# Patient Record
Sex: FEMALE | Race: WHITE | NOT HISPANIC OR LATINO | Employment: OTHER | URBAN - METROPOLITAN AREA
[De-identification: names, ages, dates, MRNs, and addresses within clinical notes are randomized per-mention and may not be internally consistent; named-entity substitution may affect disease eponyms.]

---

## 2023-04-05 ENCOUNTER — HOSPITAL ENCOUNTER (INPATIENT)
Facility: HOSPITAL | Age: 83
DRG: 57 | End: 2023-04-05
Attending: PHYSICAL MEDICINE & REHABILITATION | Admitting: PHYSICAL MEDICINE & REHABILITATION
Payer: MEDICARE

## 2023-04-05 DIAGNOSIS — Z09 FOLLOW-UP EXAM: ICD-10-CM

## 2023-04-05 DIAGNOSIS — Z74.09 IMPAIRED FUNCTIONAL MOBILITY, BALANCE, GAIT, AND ENDURANCE: Primary | ICD-10-CM

## 2023-04-05 PROBLEM — I63.9 INFARCTION OF LEFT BASAL GANGLIA: Status: ACTIVE | Noted: 2023-04-05

## 2023-04-05 RX ORDER — ACETAMINOPHEN 325 MG/1
650 TABLET ORAL EVERY 6 HOURS PRN
Status: DISCONTINUED | OUTPATIENT
Start: 2023-04-05 | End: 2023-04-29 | Stop reason: HOSPADM

## 2023-04-05 RX ORDER — POLYETHYLENE GLYCOL 3350 17 G/17G
17 POWDER, FOR SOLUTION ORAL DAILY PRN
Status: DISCONTINUED | OUTPATIENT
Start: 2023-04-05 | End: 2023-04-29 | Stop reason: HOSPADM

## 2023-04-05 RX ORDER — ACETAMINOPHEN 650 MG/1
650 SUPPOSITORY RECTAL EVERY 4 HOURS PRN
Status: DISCONTINUED | OUTPATIENT
Start: 2023-04-05 | End: 2023-04-06

## 2023-04-05 RX ORDER — ASPIRIN 81 MG/1
81 TABLET, CHEWABLE ORAL DAILY
Status: DISCONTINUED | OUTPATIENT
Start: 2023-04-06 | End: 2023-04-29 | Stop reason: HOSPADM

## 2023-04-05 RX ORDER — MELATONIN
1000 DAILY
Status: DISCONTINUED | OUTPATIENT
Start: 2023-04-06 | End: 2023-04-29 | Stop reason: HOSPADM

## 2023-04-05 RX ORDER — CLOPIDOGREL BISULFATE 75 MG/1
75 TABLET ORAL DAILY
Status: COMPLETED | OUTPATIENT
Start: 2023-04-06 | End: 2023-04-19

## 2023-04-05 RX ORDER — ASCORBIC ACID 500 MG
250 TABLET ORAL DAILY
Status: DISCONTINUED | OUTPATIENT
Start: 2023-04-06 | End: 2023-04-29 | Stop reason: HOSPADM

## 2023-04-05 RX ORDER — AMLODIPINE BESYLATE 10 MG/1
10 TABLET ORAL
Status: DISCONTINUED | OUTPATIENT
Start: 2023-04-06 | End: 2023-04-29 | Stop reason: HOSPADM

## 2023-04-05 RX ORDER — ENOXAPARIN SODIUM 100 MG/ML
40 INJECTION SUBCUTANEOUS EVERY 12 HOURS
Status: DISCONTINUED | OUTPATIENT
Start: 2023-04-06 | End: 2023-04-06

## 2023-04-05 RX ORDER — AMOXICILLIN 250 MG
2 CAPSULE ORAL 2 TIMES DAILY
Status: DISCONTINUED | OUTPATIENT
Start: 2023-04-05 | End: 2023-04-09

## 2023-04-05 RX ORDER — BISACODYL 5 MG/1
5 TABLET, DELAYED RELEASE ORAL DAILY PRN
Status: DISCONTINUED | OUTPATIENT
Start: 2023-04-05 | End: 2023-04-29 | Stop reason: HOSPADM

## 2023-04-05 RX ORDER — LISINOPRIL 5 MG/1
5 TABLET ORAL
Status: DISCONTINUED | OUTPATIENT
Start: 2023-04-06 | End: 2023-04-29 | Stop reason: HOSPADM

## 2023-04-05 RX ORDER — ROSUVASTATIN CALCIUM 20 MG/1
20 TABLET, COATED ORAL NIGHTLY
Status: DISCONTINUED | OUTPATIENT
Start: 2023-04-05 | End: 2023-04-29 | Stop reason: HOSPADM

## 2023-04-05 RX ORDER — BISACODYL 10 MG
10 SUPPOSITORY, RECTAL RECTAL DAILY PRN
Status: DISCONTINUED | OUTPATIENT
Start: 2023-04-05 | End: 2023-04-29 | Stop reason: HOSPADM

## 2023-04-05 RX ADMIN — ROSUVASTATIN CALCIUM 20 MG: 20 TABLET, FILM COATED ORAL at 22:06

## 2023-04-05 RX ADMIN — ACETAMINOPHEN 650 MG: 325 TABLET, FILM COATED ORAL at 22:06

## 2023-04-05 RX ADMIN — DOCUSATE SODIUM 50MG AND SENNOSIDES 8.6MG 2 TABLET: 8.6; 5 TABLET, FILM COATED ORAL at 22:06

## 2023-04-06 ENCOUNTER — APPOINTMENT (OUTPATIENT)
Dept: OTHER | Facility: HOSPITAL | Age: 83
DRG: 57 | End: 2023-04-06
Payer: MEDICARE

## 2023-04-06 LAB
ANION GAP SERPL CALCULATED.3IONS-SCNC: 9.7 MMOL/L (ref 5–15)
BASOPHILS # BLD AUTO: 0.05 10*3/MM3 (ref 0–0.2)
BASOPHILS NFR BLD AUTO: 0.7 % (ref 0–1.5)
BUN SERPL-MCNC: 22 MG/DL (ref 8–23)
BUN/CREAT SERPL: 31 (ref 7–25)
CALCIUM SPEC-SCNC: 9.6 MG/DL (ref 8.6–10.5)
CHLORIDE SERPL-SCNC: 107 MMOL/L (ref 98–107)
CO2 SERPL-SCNC: 24.3 MMOL/L (ref 22–29)
CREAT SERPL-MCNC: 0.71 MG/DL (ref 0.57–1)
DEPRECATED RDW RBC AUTO: 40.5 FL (ref 37–54)
EGFRCR SERPLBLD CKD-EPI 2021: 84 ML/MIN/1.73
EOSINOPHIL # BLD AUTO: 0.34 10*3/MM3 (ref 0–0.4)
EOSINOPHIL NFR BLD AUTO: 5.1 % (ref 0.3–6.2)
ERYTHROCYTE [DISTWIDTH] IN BLOOD BY AUTOMATED COUNT: 12.1 % (ref 12.3–15.4)
GLUCOSE SERPL-MCNC: 157 MG/DL (ref 65–99)
HCT VFR BLD AUTO: 42.4 % (ref 34–46.6)
HGB BLD-MCNC: 14.3 G/DL (ref 12–15.9)
IMM GRANULOCYTES # BLD AUTO: 0.02 10*3/MM3 (ref 0–0.05)
IMM GRANULOCYTES NFR BLD AUTO: 0.3 % (ref 0–0.5)
LYMPHOCYTES # BLD AUTO: 1.48 10*3/MM3 (ref 0.7–3.1)
LYMPHOCYTES NFR BLD AUTO: 22 % (ref 19.6–45.3)
MCH RBC QN AUTO: 30.2 PG (ref 26.6–33)
MCHC RBC AUTO-ENTMCNC: 33.7 G/DL (ref 31.5–35.7)
MCV RBC AUTO: 89.5 FL (ref 79–97)
MONOCYTES # BLD AUTO: 0.78 10*3/MM3 (ref 0.1–0.9)
MONOCYTES NFR BLD AUTO: 11.6 % (ref 5–12)
NEUTROPHILS NFR BLD AUTO: 4.06 10*3/MM3 (ref 1.7–7)
NEUTROPHILS NFR BLD AUTO: 60.3 % (ref 42.7–76)
NRBC BLD AUTO-RTO: 0 /100 WBC (ref 0–0.2)
PLATELET # BLD AUTO: 177 10*3/MM3 (ref 140–450)
PMV BLD AUTO: 9.8 FL (ref 6–12)
POTASSIUM SERPL-SCNC: 3.7 MMOL/L (ref 3.5–5.2)
RBC # BLD AUTO: 4.74 10*6/MM3 (ref 3.77–5.28)
SODIUM SERPL-SCNC: 141 MMOL/L (ref 136–145)
WBC NRBC COR # BLD: 6.73 10*3/MM3 (ref 3.4–10.8)

## 2023-04-06 PROCEDURE — 25010000002 ENOXAPARIN PER 10 MG: Performed by: PHYSICAL MEDICINE & REHABILITATION

## 2023-04-06 PROCEDURE — 97162 PT EVAL MOD COMPLEX 30 MIN: CPT

## 2023-04-06 PROCEDURE — 96125 COGNITIVE TEST BY HC PRO: CPT

## 2023-04-06 PROCEDURE — 97110 THERAPEUTIC EXERCISES: CPT

## 2023-04-06 PROCEDURE — 97535 SELF CARE MNGMENT TRAINING: CPT | Performed by: OCCUPATIONAL THERAPIST

## 2023-04-06 PROCEDURE — 85025 COMPLETE CBC W/AUTO DIFF WBC: CPT | Performed by: PHYSICAL MEDICINE & REHABILITATION

## 2023-04-06 PROCEDURE — 97166 OT EVAL MOD COMPLEX 45 MIN: CPT | Performed by: OCCUPATIONAL THERAPIST

## 2023-04-06 PROCEDURE — 97530 THERAPEUTIC ACTIVITIES: CPT

## 2023-04-06 PROCEDURE — 80048 BASIC METABOLIC PNL TOTAL CA: CPT | Performed by: PHYSICAL MEDICINE & REHABILITATION

## 2023-04-06 RX ORDER — MELATONIN
1000 DAILY
Status: ON HOLD | COMMUNITY
End: 2023-04-27 | Stop reason: SDUPTHER

## 2023-04-06 RX ORDER — ROSUVASTATIN CALCIUM 10 MG/1
10 TABLET, COATED ORAL DAILY
Status: ON HOLD | COMMUNITY
End: 2023-04-26 | Stop reason: SDUPTHER

## 2023-04-06 RX ORDER — ENOXAPARIN SODIUM 100 MG/ML
60 INJECTION SUBCUTANEOUS EVERY 12 HOURS
Status: DISCONTINUED | OUTPATIENT
Start: 2023-04-07 | End: 2023-04-06

## 2023-04-06 RX ORDER — MULTIVIT WITH MINERALS/LUTEIN
250 TABLET ORAL DAILY
Status: ON HOLD | COMMUNITY
End: 2023-04-27 | Stop reason: SDUPTHER

## 2023-04-06 RX ORDER — ENOXAPARIN SODIUM 100 MG/ML
40 INJECTION SUBCUTANEOUS EVERY 24 HOURS
Status: DISCONTINUED | OUTPATIENT
Start: 2023-04-07 | End: 2023-04-29 | Stop reason: HOSPADM

## 2023-04-06 RX ORDER — IBUPROFEN 200 MG
200 TABLET ORAL EVERY 6 HOURS PRN
Status: ON HOLD | COMMUNITY
End: 2023-04-27 | Stop reason: SDUPTHER

## 2023-04-06 RX ADMIN — ACETAMINOPHEN 650 MG: 325 TABLET, FILM COATED ORAL at 20:26

## 2023-04-06 RX ADMIN — DOCUSATE SODIUM 50MG AND SENNOSIDES 8.6MG 2 TABLET: 8.6; 5 TABLET, FILM COATED ORAL at 07:33

## 2023-04-06 RX ADMIN — AMLODIPINE BESYLATE 10 MG: 10 TABLET ORAL at 07:29

## 2023-04-06 RX ADMIN — LISINOPRIL 5 MG: 5 TABLET ORAL at 07:31

## 2023-04-06 RX ADMIN — ROSUVASTATIN CALCIUM 20 MG: 20 TABLET, FILM COATED ORAL at 22:13

## 2023-04-06 RX ADMIN — ASPIRIN 81 MG: 81 TABLET, CHEWABLE ORAL at 07:29

## 2023-04-06 RX ADMIN — DOCUSATE SODIUM 50MG AND SENNOSIDES 8.6MG 2 TABLET: 8.6; 5 TABLET, FILM COATED ORAL at 20:26

## 2023-04-06 RX ADMIN — CLOPIDOGREL BISULFATE 75 MG: 75 TABLET, FILM COATED ORAL at 07:32

## 2023-04-06 RX ADMIN — ACETAMINOPHEN 650 MG: 325 TABLET, FILM COATED ORAL at 05:19

## 2023-04-06 RX ADMIN — ROSUVASTATIN CALCIUM 20 MG: 20 TABLET, FILM COATED ORAL at 07:32

## 2023-04-06 RX ADMIN — ENOXAPARIN SODIUM 40 MG: 100 INJECTION SUBCUTANEOUS at 07:30

## 2023-04-06 RX ADMIN — Medication 1000 UNITS: at 07:32

## 2023-04-06 RX ADMIN — OXYCODONE HYDROCHLORIDE AND ACETAMINOPHEN 250 MG: 500 TABLET ORAL at 07:32

## 2023-04-06 NOTE — PROGRESS NOTES
Inpatient Rehabilitation Functional Measures Assessment and Plan of Care    Plan of Care  Updated Problems/Interventions  Mobility    [OT] Toilet Transfers(Active)  Current Status(04/06/2023): mod x2  Weekly Goal(04/13/2023): min  Discharge Goal: CGA    [OT] Tub/Shower Transfers(Active)  Current Status(04/06/2023): est mod x2  Weekly Goal(04/13/2023): min  Discharge Goal: CGA        Self Care    [OT] Bathing(Active)  Current Status(04/06/2023): mod  Weekly Goal(04/13/2023): min  Discharge Goal: CGA    [OT] Dressing (Lower)(Active)  Current Status(04/06/2023): max/dep  Weekly Goal(04/13/2023): mod  Discharge Goal: min    [OT] Dressing (Upper)(Active)  Current Status(04/06/2023): min  Weekly Goal(04/13/2023): SBA  Discharge Goal:  set up    [OT] Grooming(Active)  Current Status(04/06/2023): min  Weekly Goal(04/13/2023): SBA  Discharge Goal: set up    [OT] Toileting(Active)  Current Status(04/06/2023): dep  Weekly Goal(04/13/2023): mod  Discharge Goal: CGA    Functional Measures  MESHA Eating:  Branch  MESHA Grooming: Branch  MESHA Bathing:  Branch  MESHA Upper Body Dressing:  Branch  MESHA Lower Body Dressing:  Branch  MESHA Toileting:  Branch    MESHA Bladder Management  Level of Assistance:  Branch  Frequency/Number of Accidents this Shift:  Branch    MESHA Bowel Management  Level of Assistance: Branch  Frequency/Number of Accidents this Shift: Branch    MESHA Bed/Chair/Wheelchair Transfer:  Branch  MESHA Toilet Transfer:  Branch  MESHA Tub/Shower Transfer:  Branch    Previously Documented Mode of Locomotion at Discharge: Field  MESHA Expected Mode of Locomotion at Discharge: Branch  MESHA Walk/Wheelchair:  Branch  MESHA Stairs:  Branch    MESHA Comprehension:  Branch  MESHA Expression:  Branch  MESHA Social Interaction:  Branch  MESHA Problem Solving:  Branch  MESHA Memory:  Branch    Therapy Mode Minutes  Occupational Therapy: Branch  Physical Therapy: Branch  Speech Language Pathology:  Branch    Signed by: Patsy Blank OTR/L

## 2023-04-06 NOTE — H&P
"Name: PIERRE FREEMAN  : 1940  MRN: [id number]    ADMITTING DIAGNOSIS:   CVA of Left Basal Ganglia  Impaired mobility  Impaired cognition    HISTORY OF PRESENT ILLNESS:  Mrs. Lizz Freeman is an 82 year old right handed female with past medical history of hyperlipidemia who presents to rehab with a left basal ganglia stroke. She woke up on 3/29/2023 and went to make coffee but noticed that she was having a hard time stirring her coffee with her right hand. She was also having difficulty walking. She spoke to her daughter on the phone and her daughter said her speech \"sounded funny\" so she went to the ED. She was found to be severely hypertensive with left basal ganglia stroke thought to be from small vessel disease. TPA was not administered due to last known well at time of arrival. Not a candidate for mechanical thrombectomy. Stroke was thought to be small vessel disease based on size and location without significant carotid disease, so she was placed on DAPT for 21 days with aspirin monotherapy thereafter.     On admission, she is doing well. She complains of some shortness of breath and a cough, which is new. CXR was obtained in acute care showing remote granulomatous disease without evidence of acute process. She has had difficulty urinating which started about 24 hours ago, though she does not feel significant urgency and cannot tell her bladder is distended. She complains of weakness of the left upper and lower extremity. Her last bowel movement was this morning.     Questions were answered and encouraged.    ALLERGIES:   Allergies   Allergen Reactions   • Sulfa Antibiotics Itching and Rash         PAST MEDICAL HISTORY:  Past Medical History:   Diagnosis Date   • GERD (gastroesophageal reflux disease)    • Hyperlipidemia    • Hypertension    • Stroke          PAST SURGICAL HISTORY:   Past Surgical History:   Procedure Laterality Date   • COLONOSCOPY     • HYSTERECTOMY     • TONSILLECTOMY   "       FAMILY HISTORY:   Mother: CVA Father: CAD    SOCIAL HISTORY:  Tobacco: never  Alcohol: denies  Illicit Drugs: denies    FUNCTIONAL HISTORY:  Baseline Function: independent with all ADLS  Living: lives in a one story home with a basement alone since 2019 when her  . She has three adult daughters, one of which lives locally and helps her with her laundry and groceries.   Education: some college ([employer])  Career: assistant to the board of directors of the [employer]  DME: walker, shower bench    Per PT/OT/SLP PreAdmission Notes:   PT: min-mod with mobility, walking 14'x2  OT: setup for grooming, mad assist for LBD, toileting and bathing transfers  SLP: dysphagia on pureed and nectar thick liquids, severe cognitive deficit, dysarthria    HOME MEDICATIONS:  Prior to Admission medications    Medication Sig Start Date End Date Taking? Authorizing Provider   Cholecalciferol 25 MCG (1000 UT) tablet Take 1 tablet by mouth Daily.   Yes Bryan Tafoya MD   ibuprofen (ADVIL,MOTRIN) 200 MG tablet Take 1 tablet by mouth Every 6 (Six) Hours As Needed for Mild Pain.   Yes Bryan Tafoya MD   rosuvastatin (CRESTOR) 10 MG tablet Take 1 tablet by mouth Daily.   Yes Bryan Tafoya MD   vitamin C (ASCORBIC ACID) 250 MG tablet Take 1 tablet by mouth Daily.   Yes ProviderBryan MD         REVIEW OF SYSTEMS:    General: denies weight change, fatigue, weakness, fever, chills, night sweats.   Skin: denies itching, rashes, sores and bruises.   Neurologic: denies headache, nausea, vomiting, or visual changes.   HEENT:  denies discharge, sore throat, allergies, and congestion.   Respiratory: denies shortness of breath, wheeze, cough and hemoptysis.   Cardiac:  denies chest pain or palpitations.   Gastrointestinal:  denies changes in appetite, nausea, vomiting, dysphagia, abdominal pain, constipation, or diarrhea.   Urinary:  (+) retention  Musculoskeletal:  denies muscle  weakness, pain, or joint stiffness.    OBJECTIVE:    Vitals:    23 0515   BP: 142/73   Pulse: 85   Resp: 20   Temp: 98 °F (36.7 °C)   SpO2: 96%       PHYSICAL EXAM:   General: pleasant, in no acute distress  HEENT: NCAT, sclera anicteric, conjunctiva pink, mucoa moist  Cardiovascular: RRR, +S1+S2, no obvious m/g/r  Lungs: CTAB, no rhonchi or wheezing  Abdomen: normoactive bowel sounds, soft, non tender to palpation  Extremities: no peripheral edema  Skin: exposed surfaces of skin warm, intact, without erythema  Neuro: awake alert and oriented to person, place, time, and situation, CN II-XII grossly intact  MSK: LUE and LLE: 5 out of 5 throughout, RUE: 3/5 shoulder, 4/5 otherwise, her LE: 4/5 throughout      MEDICATIONS  Scheduled Meds:  amLODIPine, 10 mg, Oral, Q24H  vitamin C, 250 mg, Oral, Daily  aspirin, 81 mg, Oral, Daily  cholecalciferol, 1,000 Units, Oral, Daily  clopidogrel, 75 mg, Oral, Daily  enoxaparin, 40 mg, Subcutaneous, Q12H  lisinopril, 5 mg, Oral, Q24H  rosuvastatin, 20 mg, Oral, Nightly  senna-docusate sodium, 2 tablet, Oral, BID        Continuous Infusions:       PRN Meds:  •  acetaminophen **OR** acetaminophen  •  senna-docusate sodium **AND** polyethylene glycol **AND** bisacodyl **AND** bisacodyl      RESULTS:  Glucose   Date/Time Value Ref Range Status   2023 113 71 - 139 mg/dL Final     Results from last 7 days   Lab Units 23  0602   WBC 10*3/mm3 6.73   HEMOGLOBIN g/dL 14.3   HEMATOCRIT % 42.4   PLATELETS 10*3/mm3 177     Results from last 7 days   Lab Units 23  0602   SODIUM mmol/L 141   POTASSIUM mmol/L 3.7   CHLORIDE mmol/L 107   CO2 mmol/L 24.3   BUN mg/dL 22   CREATININE mg/dL 0.71   CALCIUM mg/dL 9.6   GLUCOSE mg/dL 157*     IMAGIN/5/2023 CXR 1 VIEW  DATE: 2023 at 9:02 AM.     COMPARISON: 2018     INDICATION: hypoxia.     FINDINGS: There are calcified granulomas and calcified hilar lymph nodes. Thoracic aorta is calcified, ectatic, and  torturous. There is borderline enlargement of the cardiac silhouette. No effusion or pneumothorax is seen.     IMPRESSION: Remote granulomatous disease and ectatic and torturous thoracic aorta with no active disease in the chest.     Dictated by: Danilo Hanna M.D.     Images and Report reviewed and interpreted by: Danilo Hanna M.D.       3/29/2023 MRI STROKE PROTOCOL  DATE: March 29, 2023     CLINICAL INDICATION: Slurred speech     COMPARISON: CT head March 29, 2023 there is parenchymal volume loss and T2 hyperintensity of the periventricular white matter consistent with chronic small vessel ischemic change.     TECHNIQUE: Diffusion-weighted imaging and ADC map of the brain was performed per stroke protocol without IV gadolinium contrast.     FINDINGS: There is restricted diffusion left basal ganglia consistent with acute/subacute infarct. There is no significant associated edema or mass effect.     IMPRESSION:   1. Restricted diffusion left basal ganglia consistent with acute/subacute infarct without significant mass effect.   2. Chronic change with volume loss and chronic ischemic change of the periventricular white matter.     The emergency department was notified of this report at 8:01 PM.     Dictated by: Danilo Hanna M.D.     Images and Report reviewed and interpreted by: Danilo Hanna M.D.       ASSESSMENT and PLAN:    Infarction of left basal ganglia    Impaired Mobility   Impaired ADLs  Impaired Cognition  - Initiate comprehensive rehab plan with speech therapy, Occupational Therapy, and physical therapy 3 hours/day 5 days per week    CVA of left basal ganglia  - Etiology thought to be small vessel disease  - Deficits include dysphagia, acute on chronic cognitive impairments, weakness of the right upper and lower extremity, diminished motor initiation on the right, impaired mobility   - Continue DAPT for 21 days from 3/29/2023 with asa monotherapy thereafter (4/20/2023)  - Continue  rosuvastatin 20mg qd    Urinary retention  - Patient with urinary retention the last 24 hours. BladderScan overnight showed greater than 500 cc in the bladder.  - Initiate bladder scan every 6 hours if no void.  If PVR is greater than 300 cc intermittent catheterize.    Hypertension  - Continue amlodipine 10 mg daily and lisinopril 5 mg daily    Hyperlipidemia  - Continue Crestor 20 mg daily    Dysphagia  - Initiate speech therapy  - Puréed with nectar thick diet    Vitamin Supplementation   - Continue home supplementation of vitamin C and D  - Follow up with PCP     Bowel Regimen  - Initiate docusate-senna two tabs two times daily   - Use PRN miralax, bisacodyl po, and bisacodyl rectal as indicated      CODE STATUS:  Code Status (Patient has no pulse and is not breathing): CPR (Attempt to Resuscitate)  Medical Interventions (Patient has pulse or is breathing): Full Support  Release to patient: Routine Release      REHAB NECESSITY:  Now admit for comprehensive acute inpatient rehabilitation .  This would be an interdisciplinary program with physical therapy 1.5 hour,  occupational therapy 1.5 hour,  5 days a week. Rehabilitation nursing for carryover, monitoring of medical   status, bowel and bladder, and skin  Ongoing physician follow-up.  Weekly team conferences.  Goals are to achieve a level of safety with  mobility and self-care and improved ADLs and swallowing.   Rehabilitation prognosis good.  Medical prognosis good.  Estimated length of stay is approximately 2 weeks, but is only an estimation.   The patient's functional status and clinical status is unchanged from preadmission assessment and the patient continues appropriate for acute inpatient rehabilitation.  Goal is for home with occupational, physical, and speech   therapies.  Barrier to discharge: ADLs, independence, cognition, safety - work on strength, transfers, cognition to overcome.     Patient seen and evaluated with attending physician,   Tana. Please see attestation.     Shannon Martinez,   Physical Medicine and Rehabilitation   PGY-2    Used appropriate personal protective equipment including mask and gloves.  Additional gown if indicated.  Mask used was standard procedure mask. Appropriate PPE was worn during the entire visit.  Hand hygiene was completed before and after.

## 2023-04-06 NOTE — PROGRESS NOTES
SECTION GG    Eating Performance: Patient completed the activities by themself with no  assistance from a helper.    Eating Discharge Goals: Patient completed the activities by themself with no  assistance from a helper.    Section B. Hearing and Vision  Ability to Hear:  Adequate - no difficulty in normal conversation, social  interaction, listening to TV  Ability to See in Adequate Light:  Adequate - sees fine detail, such as regular  print in newspapers/books    Section B. Health Literacy  Frequency of Needing Assistance Reading:  Never    Section D. Mood  Presence of little interest or pleasure in doing things:   No  Frequency of having little interest or pleasure in doing things:   Never or 1  day  Presence of feeling down, depressed, or hopeless:   No  Frequency of feeling down, depressed, or hopeless:   Never or 1 day   Interview Ended. Above responses do not meet criteria to continue.  Total Severity Score:   0    Section D. Social Isolation  Frequ[place] of Feeling Lonely or Isolated:  Rarely    Section J. Health Conditions (Pain Effect on Sleep)  Pain Effect on Sleep:   Frequently    Signed by: Janis Magana RN

## 2023-04-06 NOTE — PLAN OF CARE
Problem: Rehabilitation ([facility]) Plan of Care  Goal: Plan of Care Review  Outcome: Ongoing, Progressing  Flowsheets (Taken 4/6/2023 1540)  Progress: no change  Plan of Care Reviewed With: patient  Outcome Evaluation: Pt is A&Ox4, cooperative, flat affect, pt is a new admission to rehab from [facility] on 4/5, no c/o pain, up w/ asst x1 to WC, R side hemiparesis, does have R facial droop, R side slightly weaker than left, she does have some slurred speech and difficulty walking, NIH 2-for facial droop and dysarthria, per night shift RN, pt did not urinate all last night, encouraged fluids and therapy put pt on toilet and she still did not go, notified Dr. Fajardo, and he placed order for bladder scan and I/O cath if no void, , I/O cath and got out 500ml dark yellow urine, next to bladder scan around 1730 tonight, encourage patient to drink more fluids, she is on NTL and doesnt really like it much, pt participated in all therapies today, she is tired and currently taking a nap, will continue to monitor.   Goal Outcome Evaluation:  Plan of Care Reviewed With: patient        Progress: no change  Outcome Evaluation: Pt is A&Ox4, cooperative, flat affect, pt is a new admission to rehab from [facility] on 4/5, no c/o pain, up w/ asst x1 to WC, R side hemiparesis, does have R facial droop, R side slightly weaker than left, she does have some slurred speech and difficulty walking, NIH 2-for facial droop and dysarthria, per night shift RN, pt did not urinate all last night, encouraged fluids and therapy put pt on toilet and she still did not go, notified Dr. Fajardo, and he placed order for bladder scan and I/O cath if no void, , I/O cath and got out 500ml dark yellow urine, next to bladder scan around 1730 tonight, encourage patient to drink more fluids, she is on NTL and doesnt really like it much, pt participated in all therapies today, she is tired and currently taking a nap, will continue to monitor.

## 2023-04-06 NOTE — PLAN OF CARE
Goal Outcome Evaluation:  Plan of Care Reviewed With: patient     Cognitive-Linguistic Quick Test (CLQT) administered. Pt scored an overall composite severity rating of 3.4/4.0, indicating a mild cognitive-communication deficit for her age. She scored WNL in the domains of memory and language and mildly impaired in the domains of attention, executive functions, and language. She scored below the criterion cut-off scores for her age on generative naming and mazes. She also exhibited mild difficulty with story retelling, design generation, and symbol trails tasks. She exhibited relative strengths in stating personal facts, symbol cancellation, confrontation naming, and design memory. On clock draw task, she exhibited reduced spacing of numbers and spacing of hands of clock; however, she iliana hands to correct time. Pt also presented with moderate mixed (flaccid/hypokinetic) dysarthria. She is tolerating puree diet with NTL. Plan to complete Clinical Swallow Evaluation in next session. Recommend continued speech therapy to address cognitive-communication skills, motor speech and swallowing.

## 2023-04-06 NOTE — PROGRESS NOTES
Inpatient Rehabilitation Plan of Care Note    Plan of Care  Care Plan Reviewed - No updates at this time.    Psychosocial    [RN] Coping/Adjustment(Active)  Current Status(04/06/2023): Lacks insight with current situation  Weekly Goal(04/13/2023): provide education material regarding stroke  Discharge Goal: Knowledgeable of care needs and stroke recovery        Sphincter Control    [RN] Bladder Management(Active)  Current Status(04/06/2023): Urinary Retention  Weekly Goal(04/13/2023): The patient is able to void independently  Discharge Goal: The patient empties bladder completely    [RN] Bowel Management(Active)  Current Status(04/06/2023): Incontinent of stool  Weekly Goal(04/13/2023): Continent 50%  Discharge Goal: Continent 100%        Safety    [RN] Potential for Injury(Active)  Current Status(04/06/2023): Risk for falls  Weekly Goal(04/13/2023): Instruct family/caregivers regarding safety precautions  and need for close supervision  Discharge Goal: Family/caregiver will be knowledgeable of need for cueing and  supervision to avoid falls    Signed by: Janis Magana RN

## 2023-04-06 NOTE — PLAN OF CARE
Goal Outcome Evaluation:  Plan of Care Reviewed With: patient             Problem: Rehabilitation ([facility]) Plan of Care  Goal: Plan of Care Review  Outcome: Ongoing, Progressing  Flowsheets (Taken 4/6/2023 2268)  Plan of Care Reviewed With: patient  Outcome Evaluation:  Pt is alert and oriented x 4. Dysarthria noted, slurred speech. Meds whole with AS, NTL. Right hemiparesis with right droop. Incontinent of bowel and bladder this shift. Wearing brief. Checked and changed q2 during rounds. Scattered bruising. Pt states blurred vision to right eye prior to stroke. Per report pt had need 02 during HS; spot checked during rounds and pt was at 90% on RA; 2L nc placed and reading 96%. C/o of HA, PRN Tylenol administered 2206. Has rested well with eyes closed. Turned and repositioned q2. no unsafe behaviors. Call light within reach.

## 2023-04-06 NOTE — NURSING NOTE
Pt incontinent per report; brief has been dry all night. Bladder scanned this am for 564cc. Assist x 2 to BSC with no success. Pt stated that other hospital used a Purewick. Pt assisted back to bed and placed Purewick to see if pt would void. She denies bladder discomfort. Will pass on to dayshift RN to assess.

## 2023-04-06 NOTE — PROGRESS NOTES
SECTION GG    Self Care Performance:   Oral Hygiene: [place] sets up or cleans up; patient completes activity. [place]  assists only prior to or following the activity.   Toileting Hygiene: [place] does all of the effort. Patient does none of the  effort to complete the activity. Or, the assistance of 2 or more helpers is  required for the patient to complete the activity.   Shower/Bathe Self: [place] does more than half the effort. [place] lifts or holds  trunk or limbs and provides more than half the effort.   Upper Body Dressing: [place] does less than half the effort. [place] lifts, holds  or supports trunk or limbs but provides less than half the effort.   Lower Body Dressing: [place] does all of the effort. Patient does none of the  effort to complete the activity. Or, the assistance of 2 or more helpers is  required for the patient to complete the activity.   Putting On/Taking Off Footwear: [place] does all of the effort. Patient does  none of the effort to complete the activity. Or, the assistance of 2 or more  helpers is required for the patient to complete the activity.    Self Care Discharge Goals:   Shower/Bathe Self: [place] does less than half the effort. [place] lifts, holds  or supports trunk or limbs but provides less than half the effort.    Mobility Toilet Transfer Performance: [place] does more than half the effort.  [place] lifts or holds trunk or limbs and provides more than half the effort.    Mobility Toilet Transfer Discharge Goal: [place] does less than half the effort.  [place] lifts, holds or supports trunk or limbs but provides less than half the  effort.    Signed by: Patsy Blank, OTR/L

## 2023-04-06 NOTE — PROGRESS NOTES
Inpatient Rehabilitation Functional Measures Assessment and Plan of Care    Plan of Care  Updated Problems/Interventions  Swallow Function    [ST] Swallowing(Active)  Current Status(04/06/2023): Puree diet with Nectar Thick Liquids  Weekly Goal(04/11/2023): Will tolerate trials of soft (ground meats)/no mixed  consistencies with MIN cues for clearing oral cavity.  Discharge Goal: Will tolerate the least restrictive diet with 1:1 supervision  for meals.        Cognition    [ST] Attention(Active)  Current Status(04/06/2023): Mildly impaired attention skills on CLQT.  Weekly Goal(04/11/2023): Will complete basic attention to detail tasks with NO  cues.  Discharge Goal: Improved attention skills for home environment.        Communication    [ST] Expression(Active)  Current Status(04/06/2023): Mildly impaired language skills on CLQT. Occasional  word finding delays in conversation. Moderate mixed dysarthria.  Weekly Goal(04/11/2023): Will use speech intelligibility strategies in  conversation with MIN cues.  Discharge Goal: Will be able to participate in a complex conversation with NO  cues for use of speech intelligibility strategies.    Signed by: Janell Gallego, SLP

## 2023-04-06 NOTE — PROGRESS NOTES
"Section B. Hearing, Speech, Vision: Expression of Ideas and Wants: Exhibits some  difficulty with expressing needs and ideas (e.g., some words or finishing  thoughts) or speech is not clear.  Understanding Verbal and Non-Verbal Content: Understands: Clear comprehension  without cues or repetitions.    Section C. Cognitive Patterns: Brief Interview for Mental Status (BIMS) was  conducted.  Repetition of Three Words: Three words  Able to report correct year: Correct  Able to report correct month: Accurate within 5 days  Able to report correct day of the week: Correct  Able to recall \"sock\": Yes, no cue required  Able to recall \"blue\": Yes, no cue required  Able to recall \"bed\": Yes, no cue required    BIMS SUMMARY SCORE: 15 Cognitively intact Patient was able to complete the Brief  Interview for Mental Status    Section C. Signs and Symptoms of Delirium (from CAM): Evidence of Acute Change  in Mental Status:   Yes  Inattention: Behavior not present  Thinking Disorganized or Incoherent:   Behavior not present  Altered Level of Consciousness:   Behavior not present    Signed by: Janell Gallego, SLP    "

## 2023-04-06 NOTE — PROGRESS NOTES
Discharge Planning Assessment  [facility]     Patient Name: Melissa Gomez  MRN: [id number]  Today's Date: 4/6/2023    Admit Date: 4/5/2023    Plan: Patient's plan is return home. Daughter available to assist and possible private caretaker needed as well.   Discharge Needs Assessment    No documentation.                Discharge Plan     Row Name 04/06/23 1552       Plan    Plan Patient's plan is return home. Daughter available to assist and possible private caretaker needed as well.    Patient/Family in Agreement with Plan yes    Plan Comments Completed assessment with patient and patient's daughter. Patient lives alone in a two level home with a basement. Two steps with a rail to enter the house. Patient's bed and bath on the main floor. She does not go to the basement. Patient was independent before coming to the hospital with taking meds and preparing meals. She is no longer driving and family and friends helped with cleaning, grocery shopping and running errands. Patient's  passed away in 2019 and she has three adult daughters, one local. Her local daughter can assist patient but not 24/7. Patient uses a rolling walker for mobility and has a shower chair and grab bars to help in the bathroom. Patient and daughter agreeable to private caregiver coming to help patient at home, if needed. SW explained her role on the unit and team and family conference. Will continue to assess for d/c needs.              Continued Care and Services - Admitted Since 4/5/2023    Coordination has not been started for this encounter.          Demographic Summary    No documentation.                Functional Status     Row Name 04/06/23 1543       Functional Status    Usual Activity Tolerance moderate    Current Activity Tolerance fair       Physical Activity    On average, how many days per week do you engage in moderate to strenuous exercise (like a brisk walk)? 0 days    On average, how many minutes do you engage in  exercise at this level? 0 min    Number of minutes of exercise per week 0       Functional Status, IADL    Medications independent    Meal Preparation assistive equipment;independent    Housekeeping assistive person    Laundry assistive person    Shopping assistive person       Mental Status    General Appearance WDL WDL       Mental Status Summary    Recent Changes in Mental Status/Cognitive Functioning no changes       Employment/    Employment Status retired    Employment/ Comments Retired from [employer]- 23 years               Psychosocial     Row Name 04/06/23 3339       Behavior WDL    Behavior WDL WDL    Motor Movement gait unsteady       Emotion Mood WDL    Emotion/Mood/Affect WDL emotion mood    Emotion/Mood appropriate to situation;calm       Mental Health    Little Interest or Pleasure in Doing Things 0-->not at all    Feeling Down, Depressed or Hopeless 0-->not at all    Do you feel stress - tense, restless, nervous, or anxious, or unable to sleep at night because your mind is troubled all the time - these days? Not at all       Speech WDL    Speech WDL speech    Speech dysarthric;soft/quiet       Perceptual State WDL    Perceptual State WDL WDL       Thought Process WDL    Thought Process WDL WDL       Intellectual Performance WDL    Intellectual Performance WDL WDL    Intellectual Performance able to comprehend;oriented x 4    Level of Consciousness Alert       Coping/Stress    Major Change/Loss/Stressor loss of independence;family/significant other illness    Patient Personal Strengths strong support system    Sources of Support adult child(naz);friend(s)    Reaction to Health Status adjusting    Understanding of Condition and Treatment adequate understanding of medical condition       Developmental Stage (Eriksson's)    Developmental Stage Stage 8 (65 years-death/Late Adulthood) Integrity vs. Despair       C-SSRS (Recent)    Q1 Wished to be Dead (Past Month) no    Q2 Suicidal  Thoughts (Past Month) no    Q6 Suicide Behavior (Lifetime) no       Violence Risk    Feels Like Hurting Others no    Previous Attempt to Harm Others no               Abuse/Neglect     Row Name 04/06/23 1549       Personal Safety    Feels Unsafe at Home or Work/School no    Feels Threatened by Someone no    Does Anyone Try to Keep You From Having Contact with Others or Doing Things Outside Your Home? no    Physical Signs of Abuse Present no               Legal    No documentation.                Substance Abuse     Row Name 04/06/23 1549       AUDIT-C (Alcohol Use Disorders ID Test)    Q1: How often do you have a drink containing alcohol? Never    Q2: How many drinks containing alcohol do you have on a typical day when you are drinking? None    Q3: How often do you have six or more drinks on one occasion? Never    Audit-C Score 0               Patient Forms    No documentation.                   JANET Christie

## 2023-04-06 NOTE — CONSULTS
"Nutrition Services    Patient Name:  Melissa Gomez  YOB: 1940  MRN: [id number]  Admit Date:  4/5/2023    Assessment Date:  04/06/23    CLINICAL NUTRITION ASSESSMENT    Comments:  Nutrition consult received for new rehab admission. Pt s/p infarction of L basal ganglia; dysarthria and L hemiparesis present. Current diet: pureed, NTL.   Visited pt in doorway during lunch; upon introduction, pt started aspirating severely after bite of pureed food. Pt states this happens on occasion, more with food vs. NTLs; SLP is following. Pt denies N/V. Pt reported no wt loss, but desiring to lose wt. Question accuracy of last wt reading of 422#; per chart review, pt was 191# 1/9/23 - suspect pt remains close to this wt (possible that pounds were entered incorrectly as kg). Pt reports low appetite since \"forever\". Pt has Boost Pudding on supplement order; currently unavailable. Pt agreeable to trying a variety of NTL compliant ONS. RD to check back on Monday for acceptance/tolerance of added supplements and make adjustments as needed.    Recommendations:   1. Adding Magic Cup (varying flavors) BID.  2. Adding Mighty Shake (varying flavors) BID.     RD will continue to follow-up, per protocol.          Reason for Assessment Acute Rehab Admission     Admitting Diagnosis Infarction of L basal ganglia     Medical/Surgical History   Past Medical History:   Diagnosis Date   • GERD (gastroesophageal reflux disease)    • Hyperlipidemia    • Hypertension    • Stroke        Past Surgical History:   Procedure Laterality Date   • COLONOSCOPY     • HYSTERECTOMY     • TONSILLECTOMY          Encounter Information        Nutrition History:  3 small meals/d   Food Preferences:    Supplements:    Factors Affecting Intake: chewing difficulty, decreased appetite, early satiety , swallow impairment     Current Nutrition Orders & Evaluation of Intake       Oral Nutrition     Food Allergies NKFA   Current PO Diet Diet: Regular/House " "Diet; Texture: Pureed (NDD 1); Fluid Consistency: Nectar Thick   Supplement N/a   PO Evaluation     % PO Intake No documented intakes since admission.     # of Days Evaluated 1   --  Anthropometrics        Current Height  Current Weight  BMI kg/m2 Height: 162.6 cm (64\")  Weight: (!) 192 kg (422 lb 6.4 oz) (04/05/23 2019)  Body mass index is 72.51 kg/m².   Adj BMI (if applicable)        Ideal Body Weight (IBW) 120#   Adj IBW (if applicable)        Usual Body Weight (UBW) DEANA - pt reports no recent wt changes.    Weight Change/Trend Stable       Weight History Wt Readings from Last 15 Encounters:   04/05/23 2019 (!) 192 kg (422 lb 6.4 oz)   04/05/23 1957 (!) 192 kg (422 lb 6.4 oz)      --  Physical Findings          Physical Appearance alert, hemiparesis , obese, oriented   Oral/Mouth Cavity teeth missing   Edema  lower extremity , 1+ (trace), 2+ (mild)   Gastrointestinal normoactive, last bowel movement:4/6   Skin  skin intact   Tubes/Drains none     Tests/Procedures/Labs        Tests/Procedures No new tests/procedures       Pertinent Labs     Results from last 7 days   Lab Units 04/06/23  0602   SODIUM mmol/L 141   POTASSIUM mmol/L 3.7   CHLORIDE mmol/L 107   CO2 mmol/L 24.3   BUN mg/dL 22   CREATININE mg/dL 0.71   CALCIUM mg/dL 9.6   GLUCOSE mg/dL 157*     Results from last 7 days   Lab Units 04/06/23  0602   HEMOGLOBIN g/dL 14.3   HEMATOCRIT % 42.4   WBC 10*3/mm3 6.73     Results from last 7 days   Lab Units 04/06/23  0602   PLATELETS 10*3/mm3 177     No results found for: COVID19  Lab Results   Component Value Date    HGBA1C 5.3 03/30/2023        Medications           Scheduled Medications amLODIPine, 10 mg, Oral, Q24H  vitamin C, 250 mg, Oral, Daily  aspirin, 81 mg, Oral, Daily  cholecalciferol, 1,000 Units, Oral, Daily  clopidogrel, 75 mg, Oral, Daily  enoxaparin, 40 mg, Subcutaneous, Q12H  lisinopril, 5 mg, Oral, Q24H  rosuvastatin, 20 mg, Oral, Nightly  senna-docusate sodium, 2 tablet, Oral, BID     "   Infusions     PRN Medications •  acetaminophen **OR** acetaminophen  •  senna-docusate sodium **AND** polyethylene glycol **AND** bisacodyl **AND** bisacodyl        Nutrition Diagnosis        Nutrition Dx Problem  Problem: Predicted Suboptimal Intake, Biting/Chewing Difficulty and Swallowing Difficulty  Etiology: Medical Diagnosis Infarction of L basal ganglia  Signs/Symptoms: Report of Minimal PO Intake and Report/Observation    Comment:      NUTRITION INTERVENTION / PLAN OF CARE  Goals        Intervention Goal(s) Maintain nutrition status, Reduce/improve symptoms, Establish PO intake, Tolerate PO , Advance diet, Maintain weight and Appropriate weight loss     Nutrition Intervention        RD Action Adjusted supplement, Encourage intake, Follow Tx Progress and Care plan reviewed     Prescription        Diet Prescription    Supplement Prescription Mighty Shakes BID, Magic Cups BID (varying flavors of each)   Snack Prescription    EN Prescription    New Prescription Ordered? Yes     Monitor/Evaluation        Monitor Per protocol   Discharge Needs Pending clinical course   Education Will instruct as appropriate       RD to follow up per protocol.    Electronically signed by:  Yue Ayala  04/06/23 12:36 EDT

## 2023-04-06 NOTE — THERAPY EVALUATION
Inpatient Rehabilitation - Speech Language Pathology Initial Evaluation    [facility]     Patient Name: Melissa Gomez  : 1940  MRN: [id number]    Today's Date: 2023                   Admit Date: 2023       Visit Dx:      ICD-10-CM ICD-9-CM   1. Impaired functional mobility, balance, gait, and endurance  Z74.09 V49.89   2. Follow-up exam  Z09 V67.9       Patient Active Problem List   Diagnosis   • Infarction of left basal ganglia       Past Medical History:   Diagnosis Date   • GERD (gastroesophageal reflux disease)    • Hyperlipidemia    • Hypertension    • Stroke        Past Surgical History:   Procedure Laterality Date   • COLONOSCOPY     • HYSTERECTOMY     • TONSILLECTOMY         SLP Recommendation and Plan    SLP Diagnosis: mild, cognitive-linguistic disorder, moderate, dysarthria (23)  SLP Diagnosis Comments: Oropharyngeal dysphagia (23)     Rehab Potential/Prognosis: good (23)     Anticipated Discharge Disposition (SLP): home with OP services (23)        Predicted Duration Therapy Intervention (Days): until discharge (23)  SLC Diagnostic Follow-Up Needed:  (Swallow evaluation) (23)                            SLP EVALUATION (last 72 hours)     SLP SLC Evaluation     Row Name 23                   Communication Assessment/Intervention    Document Type evaluation  -AL        Subjective Information no complaints  -AL        Patient Observations alert;cooperative  -AL        Patient/Family/Caregiver Comments/Observations Pt sitting upright in wheelchair.  -AL        Patient Effort good  -AL        Symptoms Noted During/After Treatment none  -AL           General Information    Patient Profile Reviewed yes  -AL        Pertinent History Of Current Problem Pt is an 82-year-old female admitted to inpatient rehab under diagnosis L Basal Ganglia Infarct/Right Sided Weakness with onset date 3/29/23. Pt reports changes in  speech, cognition and swallowing.  -AL        Precautions/Limitations, Vision WFL;for purposes of eval  typically wears reading glasses, but did not have them with her  -AL        Precautions/Limitations, Hearing WFL  -AL        Patient Level of Education Unknown  -AL        Prior Level of Function-Communication WFL  -AL        Plans/Goals Discussed with patient  -AL        Barriers to Rehab none identified  -AL        Patient's Goals for Discharge functional communication;functional cognition  regular diet  -AL        Standardized Assessment Used CLQT  -AL           Pain Scale: Numbers Pre/Post-Treatment    Pretreatment Pain Rating 0/10 - no pain  -AL           Oral Musculature and Cranial Nerve Assessment    Oral Motor, Comment Right facial weakness, judged to be mild-moderately reduced. Lingual protrusion at midline. Lateralization appeared WNL. Vocal quality was clear.  -AL           Motor Speech Assessment/Intervention    Motor Speech, Comment Moderate mixed (flaccid/hypokinetic) dysarthria characterized by imprecise consonants and intermittent periods of rushed/mumbled speech with reduced vocal intensity. Pt judged to be 70% intelligible to an unfamiliar listener in complex conversation. Speech clarity noted to be reduced in afternoon session compared to morning session.  -AL           Cognitive Assessment Intervention- SLP    Cognition, Comment BIMS: Pt was oriented to month, year and RUTH. She recalled 3/3 unrelated words immediately and after short delay.  -AL           Standardized Tests    Cognitive/Memory Tests CLQT: Cognitive Linguistic Quick Test  -AL           CLQT (The Cognitive Linguistic Quick Test)    Attention Domain Score 154  -AL        Attention Severity Rating 3: Mild  -AL        Memory Domain Score 148  -AL        Memory Severity Rating 3: Mild  -AL        Executive Function Domain Score 14  -AL        Executive Function Severity Rating 3: Mild  -AL        Language Domain Score 25  -AL         Language Severity Rating 3: Mild  -AL        Visuospatial Domain Score 66  -AL        Visuospatial Severity Rating 4: WNL  -AL        Clock Drawing Total Score 11  -AL        Clock Drawing Severity Rating WNL  -AL        Composite Severity Rating 3.4  -AL        Composite Severity Rating Range 3.4 - 2.5: Mild  -AL        CLQT Comments Pt scored an overall composite severity rating of 3.4/4.0, indicating a mild cognitive-communication deficit for her age. She scored WNL in the domains of memory and language and mildly impaired in the domains of attention, executive functions, and language. She scored below the criterion cut-off scores for her age on generative naming and mazes. She also exhibited mild difficulty with design generation, story retelling, and symbol trails tasks. She exhibited relative strengths in stating personal facts, symbol cancellation, confrontation naming, and design memory. On clock draw task, she exhibited reduced spacing of numbers and spacing of hands of clock; however, she iliana hands to correct time. Pt also presented with moderate mixed (flaccid/hypokinetic) dysarthria. She is tolerating puree diet with NTL. Recommend continued speech therapy to address cognitive-communication skills, motor speech and swallowing.  -AL           SLP Evaluation Clinical Impressions    SLP Diagnosis mild;cognitive-linguistic disorder;moderate;dysarthria  -AL        SLP Diagnosis Comments Oropharyngeal dysphagia  -AL        Rehab Potential/Prognosis good  -AL        SLC Criteria for Skilled Therapy Interventions Met yes  -AL        Functional Impact functional impact in social situations;difficulty in expressing complex messages;needs 24 hour supervision;difficulty completing home management task  -AL           Recommendations    Therapy Frequency (SLP SLC) 2 times/day;5 days per week  -AL        Predicted Duration Therapy Intervention (Days) until discharge  -AL        Anticipated Discharge Disposition (SLP)  home with  services  -AL        SLC Diagnostic Follow-Up Needed --  Swallow evaluation  -AL           Communication Treatment Objective and Progress Goals (SLP)    Motor Speech/Dysarthria Treatment Objectives Motor Speech/Dysarthria Treatment Objectives (Group)  -AL        Cognitive Linguistic Treatment Objectives Cognitive Linguistic Treatment Objectives (Group)  -AL           Motor Speech/Dysarthria Treatment Objectives    Articulation Selection articulation, SLP goal 1  -AL           Articulation Goal 1 (SLP)    Improve Articulation Goal 1 (SLP) by over-articulating at word level;by over-articulating at phrase level;by over-articulating in connected speech;80%;with minimal cues (75-90%)  -AL        Time Frame (Articulation Goal 1, SLP) 1 week  -AL           Cognitive Linguistic Treatment Objectives    Attention Selection attention, SLP goal 1  -AL        Memory Skills Selection memory skills, SLP goal 1  -AL        Organizational Skills Selection organizational skills, SLP goal 1  -AL        Reasoning Selection reasoning, SLP goal 1  -AL        Executive Function Skills Selection executive function skills, SLP goal 1  -AL           Attention Goal 1 (SLP)    Improve Attention by Goal 1 (SLP) complete selective attention task;complete sustained attention task;80%;independently (over 90% accuracy)  -AL        Time Frame (Attention Goal 1, SLP) 1 week  -AL           Memory Skills Goal 1 (SLP)    Improve Memory Skills Through Goal 1 (SLP) recalling related word lists with an imposed delay;listen to a paragraph and answer questions;recall details of the day;80%;independently (over 90% accuracy)  -AL        Time Frame (Memory Skills Goal 1, SLP) 1 week  -AL           Organizational Skills Goal 1 (SLP)    Improve Thought Organization Through Goal 1 (SLP) completing a divergent naming task;80%;with minimal cues (75-90%)  -AL        Time Frame (Thought Organization Skills Goal 1, SLP) 1 week  -AL           Reasoning Goal  1 (SLP)    Improve Reasoning Through Goal 1 (SLP) complete deductive reasoning task;80%;independently (over 90% accuracy)  -AL        Time Frame (Reasoning Goal 1, SLP) 1 week  -AL           Executive Functional Skills Goal 1 (SLP)    Improve Executive Function Skills Goal 1 (SLP) home management activity;80%;with minimal cues (75-90%)  -AL        Time Frame (Executive Function Skills Goal 1, SLP) 1 week  -AL              User Key  (r) = Recorded By, (t) = Taken By, (c) = Cosigned By    Initials Name Effective Dates    Janell Erickson, MS CCC-SLP 06/16/21 -                Patient was not wearing a face mask during this therapy encounter. Therapist used appropriate personal protective equipment including mask, eye protection and gloves.  Mask used was standard procedure mask. Appropriate PPE was worn during the entire therapy session. Hand hygiene was completed before and after therapy session. Patient is not in enhanced droplet precautions.               EDUCATION    The patient has been educated in the following areas:       Cognitive Impairment Communication Impairment.             SLP GOALS     Row Name 04/06/23 0900             Articulation Goal 1 (SLP)    Improve Articulation Goal 1 (SLP) by over-articulating at word level;by over-articulating at phrase level;by over-articulating in connected speech;80%;with minimal cues (75-90%)  -AL      Time Frame (Articulation Goal 1, SLP) 1 week  -AL         Attention Goal 1 (SLP)    Improve Attention by Goal 1 (SLP) complete selective attention task;complete sustained attention task;80%;independently (over 90% accuracy)  -AL      Time Frame (Attention Goal 1, SLP) 1 week  -AL         Memory Skills Goal 1 (SLP)    Improve Memory Skills Through Goal 1 (SLP) recalling related word lists with an imposed delay;listen to a paragraph and answer questions;recall details of the day;80%;independently (over 90% accuracy)  -AL      Time Frame (Memory Skills Goal 1, SLP) 1 week   -AL         Organizational Skills Goal 1 (SLP)    Improve Thought Organization Through Goal 1 (SLP) completing a divergent naming task;80%;with minimal cues (75-90%)  -AL      Time Frame (Thought Organization Skills Goal 1, SLP) 1 week  -AL         Reasoning Goal 1 (SLP)    Improve Reasoning Through Goal 1 (SLP) complete deductive reasoning task;80%;independently (over 90% accuracy)  -AL      Time Frame (Reasoning Goal 1, SLP) 1 week  -AL         Executive Functional Skills Goal 1 (SLP)    Improve Executive Function Skills Goal 1 (SLP) home management activity;80%;with minimal cues (75-90%)  -AL      Time Frame (Executive Function Skills Goal 1, SLP) 1 week  -AL            User Key  (r) = Recorded By, (t) = Taken By, (c) = Cosigned By    Initials Name Provider Type    Janell Erickson MS CCC-SLP Speech and Language Pathologist                            Time Calculation:        Time Calculation- SLP     Row Name 04/06/23 1525 04/06/23 1524          Time Calculation- SLP    SLP Start Time 1300  -AL 0900  -AL     SLP Stop Time 1330  -AL 0930  -AL     SLP Time Calculation (min) 30 min  -AL 30 min  -AL     Total Timed Code Minutes-  minute(s)  Chart review: 0815-0830, Scoring/documentation: [id number]  -AL --        Timed Charges    96125-Standardized cognitive performance testing 120  -AL --        Total Minutes    Timed Charges Total Minutes 120  -AL --      Total Minutes 120  -AL --           User Key  (r) = Recorded By, (t) = Taken By, (c) = Cosigned By    Initials Name Provider Type    Janell Erickson MS CCC-SLP Speech and Language Pathologist                  Therapy Charges for Today     Code Description Service Date Service Provider Modifiers Qty    [id number]  ST STD COG PERF TEST PER HOUR 4/6/2023 Janell Gallego MS CCC-SLP GN 2                           Janell Gallego MS CCC-SLP  4/6/2023     Statement Selected

## 2023-04-06 NOTE — THERAPY EVALUATION
Inpatient Rehabilitation - Occupational Therapy Initial Evaluation    [facility]     Patient Name: Melissa Gomez  : 1940  MRN: [id number]    Today's Date: 2023                 Admit Date: 2023       No diagnosis found.    Patient Active Problem List   Diagnosis   • Infarction of left basal ganglia       Past Medical History:   Diagnosis Date   • GERD (gastroesophageal reflux disease)    • Hyperlipidemia    • Hypertension    • Stroke        Past Surgical History:   Procedure Laterality Date   • COLONOSCOPY     • HYSTERECTOMY     • TONSILLECTOMY               IRF OT ASSESSMENT FLOWSHEET (last 12 hours)     IRF OT Evaluation and Treatment     Row Name 23 1153          OT Time and Intention    Document Type initial evaluation  -     Mode of Treatment individual therapy;occupational therapy  -KP     Patient Effort good  -KP     Symptoms Noted During/After Treatment none  -KP     Row Name 23 1153          General Information    Patient Profile Reviewed yes  -KP     General Observations of Patient pt sitting in wc finishing breakfast upon OT arrival. pt on NTL  -KP     Existing Precautions/Restrictions fall  -KP     Row Name 23 1153          Previous Level of Function/Home Environm    Bathing/Grooming, Premorbid Functional Level independent  -KP     Dressing, Premorbid Functional Level independent  -KP     Eating/Feeding, Premorbid Functional Level independent  -KP     Toileting, Premorbid Functional Level independent  -KP     BADLs, Premorbid Functional Level independent  used shower seat  -     IADLs, Premorbid Functional Level independent  -KP     Bed Mobility, Premorbid Functional Level independent  -KP     Transfers, Premorbid Functional Level independent  -KP     Household Ambulation, Premorbid Functional Level independent  -KP     Stairs, Premorbid Functional Level independent  -KP     Community Ambulation, Premorbid Functional Level independent  -KP     Functional  Cognition, Premorbid Functional Level used a rolling walker PTA  -     Row Name 04/06/23 1153          Living Environment    Current Living Arrangements home  -     Row Name 04/06/23 1153          Home Use of Assistive/Adaptive Equipment    Equipment Currently Used at Home shower chair;walker, rolling  -[facility] Name 04/06/23 1153          Occupational Profile    Reason for Services/Referral (Occupational Profile) pt admitted from [facility] s/p stroke. L basal ganglia infarct  -[facility] Name 04/06/23 1153          Pain Assessment    Pretreatment Pain Rating 5/10  -KP     Posttreatment Pain Rating 5/10  -KP     Pain Location - Side/Orientation Right  -     Pain Location upper  -     Pain Location - extremity  -     Pre/Posttreatment Pain Comment reports  R UE hurting lately and reports she could have slept on it wrong  -[facility] Name 04/06/23 1153          Cognition/Psychosocial    Affect/Mental Status (Cognition) WFL  -     Orientation Status (Cognition) oriented x 3  -     Follows Commands (Cognition) follows one-step commands;over 90% accuracy;verbal cues/prompting required  -     Personal Safety Interventions fall prevention program maintained;gait belt;muscle strengthening facilitated;nonskid shoes/slippers when out of bed  -     Row Name 04/06/23 1153          Range of Motion (ROM)    Range of Motion bilateral upper extremities  -[facility] Name 04/06/23 1153          Range of Motion Comprehensive    General Range of Motion upper extremity range of motion deficits identified  -[facility] Name 04/06/23 1153          General Upper Extremity Assessment (Range of Motion)    Comment: Upper Extremity ROM R UE shoulder 7/8, R elbow, wrist, forearm 8/8 L UE 8/8  -     Row Name 04/06/23 1153          Strength (Manual Muscle Testing)    Strength (Manual Muscle Testing) bilateral upper extremities  -     Left Hand, Setting 1 (Dynamometer Testing) 20  -     Right Hand, Setting 1 (Dynamometer  Testing) 15  -KP     Left Hand: Lateral (Key) Pinch Strength (Pinch Dynamometer Testing) 8  -KP     Left Hand: [place] (Jony) Pinch Strength (Pinch Dynamometer Testing) 5  -KP     Right Hand: Lateral (Key) Pinch Strength (Pinch Dynamometer Testing) 8  -KP     Right Hand: [place] (Jony) Pinch Strength (Pinch Dynamometer Testing) 6  -KP     Additional Documentation Comment, Left Hand (Dynamometer Testing) (Row);Comment, Right Hand (Pinch Dynamometer Testing) (Row);Left Hand: Lateral (Key) Pinch Strength (Pinch Dynamometer Testing) (Row);Left Hand: [place] (Jony) Pinch Strength (Pinch Dynamometer Testing) (Row);Right Hand: Lateral (Key) Pinch Strength (Pinch Dynamometer Testing) (Row);Right Hand: [place] (Jony) Pinch Strength (Pinch Dynamometer Testing) (Row)  -     Row Name 04/06/23 1153          Strength Comprehensive (MMT)    General Manual Muscle Testing (MMT) Assessment upper extremity strength deficits identified;hand strength deficits identified  -     Hand Strength Assessment hand  strength  -[facility] Name 04/06/23 1153          Upper Extremity (Manual Muscle Testing)    Comment, MMT: Upper Extremity R shoulder 3-/5 R elbow 3+/5 R forearm 3+/5 L UE 4/5  -[facility] Name 04/06/23 1153          Sensory    Additional Documentation Sensory Assessment (Somatosensory) (Group);Vision Assessment/Intervention (Group)  -     Row Name 04/06/23 1153          Vision Assessment/Intervention    Visual Impairment/Limitations WFL  -     Row Name 04/06/23 1153          Sensory Assessment (Somatosensory)    Sensory Subjective Reports numbness;tingling  -     Sensory Assessment Lt touch in tact  -     Row Name 04/06/23 1153          Basic Activities of Daily Living (BADLs)    Basic Activities of Daily Living bathing;upper body dressing;lower body dressing;grooming;toileting;self-feeding  -     Row Name 04/06/23 1153          Bathing    [place] Level (Bathing) bathing skills;upper  body;set up;standby assist;lower body;moderate assist (50% patient effort)  -     Assistive Device (Bathing) grab bar/tub rail  -     Position (Bathing) sink side;supported sitting  -     Set-up Assistance (Bathing) obtain supplies  -     Comment (Bathing) pt already in new clothes bottom half and reports sanket/buttocks already cleaned.  -     Row Name 04/06/23 1153          Upper Body Dressing    [place] Level (Upper Body Dressing) upper body dressing skills;doff;don;pull over garment;set up assistance;verbal cues;minimum assist (75% or more patient effort)  -     Position (Upper Body Dressing) supported sitting  -     Set-up Assistance (Upper Body Dressing) obtain clothing  -     Comment (Upper Body Dressing) pt put R arm through headhole at first, VC to adjust to R arm hole and ed to don R UE first next session  -     Row Name 04/06/23 1153          Lower Body Dressing    [place] Level (Lower Body Dressing) doff;don;socks;dependent (less than 25% patient effort)  -     Position (Lower Body Dressing) supported sitting  -     Comment (Lower Body Dressing) pt already in clean pants, wants to keep on and not change. pt pants donned in am prior to OT session  -     Row Name 04/06/23 1153          Grooming    [place] Level (Grooming) grooming skills;deodorant application;oral care regimen;wash face, hands;set up;standby assist;hair care, combing/brushing;minimum assist (75% patient effort)  -     Position (Grooming) sink side;supported sitting  -     Set-up Assistance (Grooming) obtain supplies;open containers  -     Comment (Grooming) A w back of her hair. R UE decr and diff w L UE to comb back of hair  -     Row Name 04/06/23 1153          Toileting    Comment (Toileting) est dep. already completed in bed per pt report  -     Row Name 04/06/23 1153          Self-Feeding    [place] Level (Self-Feeding) feeding skills;finger foods;liquids to mouth;scoop food onto  utensil;utensil to mouth;set up  -     Position (Self-Feeding) supported sitting  -     Comment (Self-Feeding) pt on NTL  -     Row Name 04/06/23 1153          Bed Mobility    Bed Mobility supine-sit;sit-supine  -     Supine-Sit [place] (Bed Mobility) not tested  -     Sit-Supine [place] (Bed Mobility) not tested  -     Comment, (Bed Mobility) UIC  -     Row Name 04/06/23 1153          Functional Mobility    Functional Mobility- Comment NT pt A of two to stand  -     Row Name 04/06/23 1153          Transfer Assessment/Treatment    Transfers sit-stand transfer;stand-sit transfer;toilet transfer;shower transfer  -     Row Name 04/06/23 1153          Sit-Stand Transfer    Sit-Stand [place] (Transfers) set up;verbal cues;moderate assist (50% patient effort);2 person assist  -     Assistive Device (Sit-Stand Transfers) wheelchair  -     Row Name 04/06/23 1153          Stand-Sit Transfer    Stand-Sit [place] (Transfers) set up;minimum assist (75% patient effort);moderate assist (50% patient effort);2 person assist;verbal cues  -     Assistive Device (Stand-Sit Transfers) wheelchair  -     Row Name 04/06/23 1153          Toilet Transfer    Comment, (Toilet Transfer) est mod x2, pt reports she doesn't need to use BR already used earlier  -     Row Name 04/06/23 1153          Shower Transfer    Comment, (Shower Transfer) NT pt request bath at sink for first day  -     Row Name 04/06/23 1153          Motor Skills    Motor Skills coordination;functional endurance;motor control/coordination interventions  -     Coordination 9 Hole Peg Test of Fine Motor Coordination Results  -     Results, 9 Hole Peg Test of Fine Motor Coordination R pegs 2 min 15 sec. L pegs 50 sec. R blocks 6 and then 6 made it over but hit the barrier, L blocks 27. pt w pain in  RUE  -     Functional Endurance fair +, fatigued post washing upper body  -     Motor Control/Coordination Interventions  therapeutic exercise/ROM;occupation/activity based treatment;weight-bearing activities;fine motor manipulation/dexterity activities  -     Therapeutic Exercise shoulder;elbow/forearm;wrist;hand  -     Row Name 04/06/23 1153          Balance    Balance Assessment standing static balance;sitting static balance;sitting dynamic balance  -     Static Sitting Balance set-up;standby assist  -     Dynamic Sitting Balance standby assist;set-up  -     Position, Sitting Balance other (see comments)  [facility]     Static Standing Balance set-up;minimal assist;moderate assist;2-person assist  -     Position/Device Used, Standing Balance other (see comments)    -     Row Name 04/06/23 1153          Positioning and Restraints    Pre-Treatment Position sitting in chair/recliner  -     Post Treatment Position wheelchair  -     In Wheelchair sitting;exit alarm on;with SLP  -     Row Name 04/06/23 1153          Therapy Assessment/Plan (OT)    Patient's Goals For Discharge take care of myself at home  -     Row Name 04/06/23 1153          Therapy Assessment/Plan (OT)    Functional Level at Time of Evaluation (OT) pt was A of two to stand this date w OT and PT tech (min to mod x2) pt may do better next time as pt did not have cushion in her chair and therapist A her to stand to place cushion in wc. pt was min A w UBD and grooming skills. dep w socks. est max/dep w pants as clean pair already on pt. Bathing skills mod A.  -     OT Diagnosis L basal ganglia stroke affecting R UE and LE as well as speech  -     Rehab Potential/Prognosis (OT) good, to achieve stated therapy goals  -     Frequency of Treatment (OT) 5 times per week  -     Estimated Duration of Therapy (OT) 2 weeks;3 weeks  -     Problem List (OT) problems related to;balance;coordination;mobility;range of motion (ROM);sensation;strength;pain;other (see comments)  self care  -     Activity Limitations Related to Problem List (OT) unable to  ambulate safely;BADLs not performed adequately or safely;unable to transfer safely  -     Planned Therapy Interventions (OT) activity tolerance training;adaptive equipment training;BADL retraining;functional balance retraining;neuromuscular control/coordination retraining;occupation/activity based interventions;patient/caregiver education/training;ROM/therapeutic exercise;strengthening exercise;transfer/mobility retraining  -     Row Name 04/06/23 1153          Evaluation Complexity (OT)    Review Occupational Profile/Medical/Therapy History Complexity expanded/moderate complexity  -     Assessment, Occupational Performance/Identification of Deficit Complexity 3-5 performance deficits  -     Clinical Decision Making Complexity (OT) detailed assessment/moderate complexity  -     Overall Complexity of Evaluation (OT) moderate complexity  -     Row Name 04/06/23 1153          Daily Progress Summary (OT)    Overall Progress Toward Functional Goals (OT) progressing toward functional goals as expected  -     Row Name 04/06/23 1153          Therapy Plan Review/Discharge Plan (OT)    Therapy Plan Review (OT) evaluation/treatment results reviewed;care plan/treatment goals reviewed;participants voiced agreement with care plan;participants included;patient  -     Anticipated Discharge Disposition (OT) home with 24/7 care;home with home health  -     Plan for Continued Service After Discharge (OT) HH OT  -     Row Name 04/06/23 1153          IRF OT Goals    Transfer Goal Selection (OT-IRF) transfers, OT goal 1;transfers, OT goal 2  -KP     Bathing Goal Selection (OT-IRF) bathing, OT goal 1;bathing, OT goal 2  -KP     UB Dressing Goal Selection (OT-IRF) UB dressing, OT goal 1;UB dressing, OT goal 2  -KP     LB Dressing Goal Selection (OT-IRF) LB dressing, OT goal 1;LB dressing, OT goal 2  -KP     Grooming Goal Selection (OT-IRF) grooming, OT goal 1;grooming, OT goal 2  -KP     Toileting Goal Selection  (OT-IRF) toileting, OT goal 1;toileting, OT goal 2  -KP     Strength Goal Selection (OT-IRF) strength, OT goal 1 (free text)  -KP     Balance Goal Selection (OT) balance, OT goal 1  -KP     Functional Mobility Goal Selection (OT) functional mobility, OT goal 1  -KP     Coordination Goal Selection (OT) coordination, OT goal 1  -KP     Caregiver Training Goal Selection (OT-IRF) caregiver training, OT goal 1  -     Row Name 04/06/23 1153          Transfer Goal 1 (OT-IRF)    Activity/Assistive Device (Transfer Goal 1, OT-IRF) toilet;sit-to-stand/stand-to-sit;shower chair;walk-in shower;walker, rolling  -     [place] Level (Transfer Goal 1, OT-IRF) set-up required;minimum assist (75% or more patient effort);moderate assist (50-74% patient effort)  -     Time Frame (Transfer Goal 1, OT-IRF) short-term goal (STG);1 week  -KP     Progress/Outcomes (Transfer Goal 1, OT-IRF) new goal;goal ongoing  -     Row Name 04/06/23 1153          Transfer Goal 2 (OT-IRF)    Activity/Assistive Device (Transfer Goal 2, OT-IRF) sit-to-stand/stand-to-sit;toilet;walk-in shower;shower chair;walker, rolling  -     [place] Level (Transfer Goal 2, OT-IRF) set-up required;contact guard required  -     Time Frame (Transfer Goal 2, OT-IRF) long-term goal (LTG);2 weeks;3 weeks;by discharge  -     Progress/Outcomes (Transfer Goal 2, OT-IRF) new goal;goal ongoing  -     Row Name 04/06/23 1153          Bathing Goal 1 (OT-IRF)    Activity/Device (Bathing Goal 1, OT-IRF) bathing skills, all;hand-held shower spray hose;grab bar, tub/shower;shower chair  -     [place] Level (Bathing Goal 1, OT-IRF) minimum assist (75% or more patient effort)  -     Time Frame (Bathing Goal 1, OT-IRF) short-term goal (STG);1 week  -KP     Progress/Outcomes (Bathing Goal 1, OT-IRF) goal ongoing;new goal  -     Row Name 04/06/23 1153          Bathing Goal 2 (OT-IRF)    Activity/Device (Bathing Goal 2, OT-IRF) grab bar, tub/shower;hand-held  shower spray hose;shower chair;bathing skills, all  -KP     [place] Level (Bathing Goal 2, OT-IRF) set-up required;contact guard required  -KP     Time Frame (Bathing Goal 2, OT-IRF) long-term goal (LTG);by discharge;2 weeks;3 weeks  -KP     Progress/Outcomes (Bathing Goal 2, OT-IRF) new goal;goal ongoing  -KP     Row Name 04/06/23 1153          UB Dressing Goal 1 (OT-IRF)    Activity/Device (UB Dressing Goal 1, OT-IRF) upper body dressing  -KP     [place] (UB Dress Goal 1, OT-IRF) set-up required;standby assist  -KP     Time Frame (UB Dressing Goal 1, OT-IRF) short-term goal (STG);1 week  -KP     Progress/Outcomes (UB Dressing Goal 1, OT-IRF) goal ongoing;new goal  -     Row Name 04/06/23 7025          UB Dressing Goal 2 (OT-IRF)    Activity/Device (UB Dressing Goal 2, OT-IRF) upper body dressing  -KP     [place] (UB Dress Goal 2, OT-IRF) set-up required  -KP     Time Frame (UB Dressing Goal 2, OT-IRF) long-term goal (LTG);2 weeks;3 weeks;by discharge  -KP     Progress/Outcomes (UB Dressing Goal 2, OT-IRF) new goal;goal ongoing  -     Row Name 04/06/23 9873          LB Dressing Goal 1 (OT-IRF)    Activity/Device (LB Dressing Goal 1, OT-IRF) lower body dressing;elastic laces;sock aid;reacher  -KP     [place] (LB Dressing Goal 1, OT-IRF) moderate assist (50-74% patient effort)  -KP     Time Frame (LB Dressing Goal 1, OT-IRF) short-term goal (STG);1 week  -KP     Progress/Outcomes (LB Dressing Goal 1, OT-IRF) goal ongoing;new goal  -KP     Row Name 04/06/23 8053          LB Dressing Goal 2 (OT-IRF)    Activity/Device (LB Dressing Goal 2, OT-IRF) lower body dressing;reacher;sock aid;elastic laces  -KP     [place] (LB Dressing Goal 2, OT-IRF) minimum assist (75% or more patient effort)  -KP     Time Frame (LB Dressing Goal 2, OT-IRF) long-term goal (LTG);2 weeks;3 weeks;by discharge  -KP     Progress/Outcomes (LB Dressing Goal 2, OT-IRF) new goal;goal ongoing  -     Row Name 04/06/23 1156           Grooming Goal 1 (OT-IRF)    Activity/Device (Grooming Goal 1, OT-IRF) grooming skills, all  -KP     [place] (Grooming Goal 1, OT-IRF) set-up required;supervision required  -KP     Time Frame (Grooming Goal 1, OT-IRF) short-term goal (STG);1 week  -KP     Progress/Outcomes (Grooming Goal 1, OT-IRF) new goal;goal ongoing  -     Row Name 04/06/23 8750          Grooming Goal 2 (OT-IRF)    Activity/Device (Grooming Goal 2, OT-IRF) grooming skills, all  -KP     [place] (Grooming Goal 2, OT-IRF) set-up required  -KP     Time Frame (Grooming Goal 2, OT-IRF) long-term goal (LTG);by discharge;3 weeks;2 weeks  -KP     Progress/Outcomes (Grooming Goal 2, OT-IRF) new goal;goal ongoing  -     Row Name 04/06/23 6545          Toileting Goal 1 (OT-IRF)    Activity/Device (Toileting Goal 1, OT-IRF) toileting skills, all;grab bar/safety frame  -KP     [place] Level (Toileting Goal 1, OT-IRF) moderate assist (50-74% patient effort)  -KP     Progress/Outcomes (Toileting Goal 1, OT-IRF) goal ongoing;new goal  -KP     Time Frame (Toileting Goal 1, OT-IRF) short-term goal (STG);1 week  -     Row Name 04/06/23 3216          Toileting Goal 2 (OT-IRF)    Activity/Device (Toileting Goal 2, OT-IRF) toileting skills, all;grab bar/safety frame  -KP     [place] Level (Toileting Goal 2, OT-IRF) set-up required;contact guard required;minimum assist (75% or more patient effort)  -KP     Progress/Outcomes (Toileting Goal 2, OT-IRF) new goal;goal ongoing  -KP     Time Frame (Toileting Goal 2, OT-IRF) long-term goal (LTG);3 weeks;2 weeks;by discharge  -     Row Name 04/06/23 1076          Strength Goal 1 (OT-IRF)    Strength Goal 1 (OT-IRF) incr R hand  to 20 and R UE to 4-/5  -KP     Time Frame (Strength Goal 1, OT-IRF) long-term goal (LTG);by discharge  -KP     Progress/Outcomes (Strength Goal 1, OT-IRF) goal ongoing;new goal  -KP     Row Name 04/06/23 1153          Balance Goal 1 (OT)    Activity/Assistive  Device (Balance Goal 1, OT) standing, dynamic;walker, rolling  -KP     [place] Level/Cues Needed (Balance Goal 1, OT) contact guard assist  -KP     Time Frame (Balance Goal 1, OT) long term goal (LTG);by discharge  -KP     Progress/Outcomes (Balance Goal 1, OT) new goal;goal ongoing  -     Row Name 04/06/23 5008          Functional Mobility Goal 1 (OT)    Activity/Assistive Device (Functional Mobility Goal 1, OT) walker, rolling  -KP     [place] Level/Cues Needed (Functional Mobility Goal 1, OT) set-up required;contact guard assist  -KP     Distance Goal 1 (Functional Mobility, OT) --  to BR , shower, toilet  -KP     Progress/Outcome (Functional Mobility Goal 1, OT) new goal;goal ongoing  -     Row Name 04/06/23 9832          Coordination Goal 1 (OT)    Activity/Assistive Device (Coordination Goal 1, OT) FM task;GM task;FM written ex program  pt to complete [facility] task R hand and complete HWT tasks w fair formation. pt complete 9 hole peg test w R hand 1 min.  -KP     [place] Level/Cues Needed (Coordination Goal 1, OT) set-up required  -KP     Time Frame (Coordination Goal 1, OT) long term goal (LTG);by discharge  -KP     Progress/Outcomes (Coordination Goal 1, OT) new goal;goal ongoing  -     Row Name 04/06/23 5653          Caregiver Training Goal 1 (OT-IRF)    Caregiver Training Goal 1 (OT-IRF) pt family ed on safety w tsf w pt and ADLs. pt ed on HEP for [facility] and [facility]  -KP     Time Frame (Caregiver Training Goal 1, OT-IRF) long-term goal (LTG);by discharge  -KP     Progress/Outcomes (Caregiver Training Goal 1, OT-IRF) new goal;goal ongoing  -           User Key  (r) = Recorded By, (t) = Taken By, (c) = Cosigned By    Initials Name Effective Dates    Patsy Rosen, OTR 06/16/21 -                  Occupational Therapy Education     Title: PT OT SLP Therapies (Done)     Topic: Occupational Therapy (Done)     Point: ADL training (Done)     Description:   Instruct learner(s) on proper  safety adaptation and remediation techniques during self care or transfers.   Instruct in proper use of assistive devices.              Learning Progress Summary           Patient Acceptance, E,TB,D, VU,DU,NR by  at 4/6/2023 1230    Comment: ed pt on role of OT. benefit of therapy, POC w.  OT ed on safety w ADL and tsf. pt ed on UBD technique.                   Point: Home exercise program (Done)     Description:   Instruct learner(s) on appropriate technique for monitoring, assisting and/or progressing therapeutic exercises/activities.              Learning Progress Summary           Patient Acceptance, E,TB,D, VU,DU,NR by  at 4/6/2023 1230    Comment: ed pt on role of OT. benefit of therapy, POC w.  OT ed on safety w ADL and tsf. pt ed on UBD technique.                   Point: Precautions (Done)     Description:   Instruct learner(s) on prescribed precautions during self-care and functional transfers.              Learning Progress Summary           Patient Acceptance, E,TB,D, VU,DU,NR by  at 4/6/2023 1230    Comment: ed pt on role of OT. benefit of therapy, POC w.  OT ed on safety w ADL and tsf. pt ed on UBD technique.                   Point: Body mechanics (Done)     Description:   Instruct learner(s) on proper positioning and spine alignment during self-care, functional mobility activities and/or exercises.              Learning Progress Summary           Patient Acceptance, E,TB,D, VU,DU,NR by  at 4/6/2023 1230    Comment: ed pt on role of OT. benefit of therapy, POC w.  OT ed on safety w ADL and tsf. pt ed on UBD technique.                               User Key     Initials Effective Dates Name Provider Type Discipline     06/16/21 -  Patsy Blank OTR Occupational Therapist OT                    OT Recommendation and Plan    Planned Therapy Interventions (OT): activity tolerance training, adaptive equipment training, BADL retraining, functional balance retraining, neuromuscular  control/coordination retraining, occupation/activity based interventions, patient/caregiver education/training, ROM/therapeutic exercise, strengthening exercise, transfer/mobility retraining       Daily Progress Summary (OT)  Overall Progress Toward Functional Goals (OT): progressing toward functional goals as expected            Time Calculation:      Time Calculation- OT     Row Name 04/06/23 1231             Time Calculation- OT    OT Start Time 0800  -      OT Stop Time 0900  -      OT Time Calculation (min) 60 min  -      OT Received On 04/06/23  -      OT Goal Re-Cert Due Date 04/13/23  -            User Key  (r) = Recorded By, (t) = Taken By, (c) = Cosigned By    Initials Name Provider Type     Patsy Blank OTR Occupational Therapist              Therapy Charges for Today     Code Description Service Date Service Provider Modifiers Qty    [id number]  OT SELF CARE/MGMT/TRAIN EA 15 MIN 4/6/2023 Patsy Blank OTR GO 2    [id number]  OT EVAL MOD COMPLEXITY 3 4/6/2023 Patsy Blank OTR GO 1                   RIAN Barton  4/6/2023

## 2023-04-06 NOTE — THERAPY EVALUATION
Inpatient Rehabilitation - Physical Therapy Initial Evaluation       [facility]     Patient Name: Melissa Gomez  : 1940  MRN: [id number]    Today's Date: 2023                    Admit Date: 2023      Visit Dx:     ICD-10-CM ICD-9-CM   1. Impaired functional mobility, balance, gait, and endurance  Z74.09 V49.89   2. Follow-up exam  Z09 V67.9       Patient Active Problem List   Diagnosis   • Infarction of left basal ganglia       Past Medical History:   Diagnosis Date   • GERD (gastroesophageal reflux disease)    • Hyperlipidemia    • Hypertension    • Stroke        Past Surgical History:   Procedure Laterality Date   • COLONOSCOPY     • HYSTERECTOMY     • TONSILLECTOMY         PT ASSESSMENT (last 12 hours)     IRF PT Evaluation and Treatment     Row Name 23 1005          PT Time and Intention    Document Type initial evaluation  -     Mode of Treatment physical therapy  -     Patient/Family/Caregiver Comments/Observations Pt pleasant and agreeable to rx.  Noted that she is fatigued from previous therapies.  -     Row Name 23 1005          General Information    Patient Profile Reviewed yes  -     General Observations of Patient Voice is slight, mild dysarthria.  -     Existing Precautions/Restrictions fall  -     Row Name 23 1005          Previous Level of Function/Home Environm    Bed Mobility, Premorbid Functional Level independent  -     Transfers, Premorbid Functional Level independent  -     Household Ambulation, Premorbid Functional Level independent  -     Stairs, Premorbid Functional Level independent  -     Community Ambulation, Premorbid Functional Level independent  -     Functional Cognition, Premorbid Functional Level used a rwx.  -     Row Name 23 1005          Living Environment    Current Living Arrangements home  -     Home Accessibility stairs to enter home;stairs within home  -     People in Home alone  -     Row Name  04/06/23 1005          Home Main Entrance    Number of Stairs, Main Entrance two  stoop  -     Stair Railings, Main Entrance railings on both sides of stairs  can not reach both rails at same time  -[facility] Name 04/06/23 1005          Stairs Within Home, Primary    Number of Stairs, Within Home, Primary twelve  -     Stairs Comment, Within Home, Primary does not use basement  -[facility] Name 04/06/23 1005          Home Use of Assistive/Adaptive Equipment    Equipment Currently Used at Home grab bar;bath bench;walker, standard;shower chair  -[facility] Name 04/06/23 1005          Pain Assessment    Pretreatment Pain Rating 5/10  -KP     Posttreatment Pain Rating 5/10  -KP     Pain Location - Side/Orientation Right  -     Pain Location upper  -     Pain Location - extremity  -     Pre/Posttreatment Pain Comment 2 day hx of R UE pain  -[facility] Name 04/06/23 1005          Cognition/Psychosocial    Affect/Mental Status (Cognition) agitated  -     Orientation Status (Cognition) oriented x 3  -     Follows Commands (Cognition) does not follow one-step commands;over 90% accuracy;verbal cues/prompting required  -     Personal Safety Interventions fall prevention program maintained;gait belt;nonskid shoes/slippers when out of bed;supervised activity  -     Cognitive Function attention deficit;safety deficit  -     Attention Deficit (Cognition) minimal deficit;distractible in noisy environment;alternating attention  -     Safety Deficit (Cognition) minimal deficit;insight into deficits/self-awareness;safety precautions awareness  -[facility] Name 04/06/23 1005          Range of Motion Comprehensive    Comment, General Range of Motion B LE AROm grossly WFL for age - varied during session.  -[facility] Name 04/06/23 1005          Strength Comprehensive (MMT)    Comment, General Manual Muscle Testing (MMT) Assessment B LE strength grossly at least 4-/5 throughout.  Strength varied throughout session.  and at times L LE appeared weaker than R .  -     Row Name 04/06/23 1005          Sensory    Additional Documentation Sensory Assessment (Somatosensory) (Group)  -     Row Name 04/06/23 1005          Sensory Assessment (Somatosensory)    Sensory Assessment LT intact.  -     Row Name 04/06/23 1005          Bed Mobility    Bed Mobility rolling left;rolling right  -     Rolling Left [place] (Bed Mobility) moderate assist (50% patient effort);verbal cues;set up  -     Rolling Right [place] (Bed Mobility) moderate assist (50% patient effort);verbal cues;set up  -     Supine-Sit [place] (Bed Mobility) moderate assist (50% patient effort);verbal cues;set up  -     Sit-Supine [place] (Bed Mobility) moderate assist (50% patient effort);verbal cues;set up  -     Bed Mobility, Safety Issues decreased use of arms for pushing/pulling;decreased use of legs for bridging/pushing  -     Comment, (Bed Mobility) perofrmend on both mat and in bed.  Difficulty sequencing to improve function.  A for B LE to get into bed and out of bed.  -     Row Name 04/06/23 1005          Transfer Assessment/Treatment    Transfers bed-chair transfer;chair-bed transfer;sit-stand transfer;stand-sit transfer;car transfer  -     Row Name 04/06/23 1005          Bed-Chair Transfer    Bed-Chair [place] (Transfers) moderate assist (50% patient effort);minimum assist (75% patient effort);verbal cues;set up  -     Assistive Device (Bed-Chair Transfers) wheelchair  -[facility] Name 04/06/23 1005          Chair-Bed Transfer    Chair-Bed [place] (Transfers) moderate assist (50% patient effort);minimum assist (75% patient effort);verbal cues;set up  -     Assistive Device (Chair-Bed Transfers) wheelchair  -[facility] Name 04/06/23 1005          Sit-Stand Transfer    Sit-Stand [place] (Transfers) moderate assist (50% patient effort);verbal cues;set up  -     Assistive Device (Sit-Stand Transfers)  wheelchair;walker, front-wheeled  -     Row Name 04/06/23 1005          Stand-Sit Transfer    Stand-Sit [place] (Transfers) moderate assist (50% patient effort);minimum assist (75% patient effort);verbal cues;set up  -     Assistive Device (Stand-Sit Transfers) wheelchair;walker, front-wheeled  -     Row Name 04/06/23 1005          Car Transfer    Type (Car Transfer) sit-stand;stand-sit  -     [place] Level (Car Transfer) moderate assist (50% patient effort);minimum assist (75% patient effort);verbal cues  -     Assistive Device (Car Transfer) walker, front-wheeled;wheelchair  -     Row Name 04/06/23 1005          Gait/Stairs (Locomotion)    [place] Level (Gait) minimum assist (75% patient effort);moderate assist (50% patient effort);verbal cues;set up  -     Assistive Device (Gait) walker, front-wheeled  -     Distance in Feet (Gait) 80, 50, 40  -     Pattern (Gait) step-through  -     Deviations/Abnormal Patterns (Gait) base of support, narrow;lenard decreased;gait speed decreased;stride length decreased;weight shifting decreased  -     Bilateral Gait Deviations forward flexed posture;heel strike decreased;weight shift ability decreased  mild ER B LE, min shuffling gait at times  -     Gait Assessment/Intervention Gait is slow and labored.  Impairments vary throughout cycle.  Roach by LE fatigue with dec foot clearance as sesson went on.  -     Comment, (Gait/Stairs) Not appropriate at this time.  -     Row Name 04/06/23 1005          Balance    Static Sitting Balance standby assist;set-up  -     Dynamic Sitting Balance standby assist  -     Position, Sitting Balance supported;sitting edge of bed  -     Static Standing Balance minimal assist;verbal cues;set-up  once up to stand.  -     Position/Device Used, Standing Balance walker, front-wheeled  -     Comment, Balance Post lean coming to stand.  -     Row Name 04/06/23 1005          Motor Skills    Therapeutic  Exercise hip;knee;ankle  -[facility] Name 04/06/23 1005          Hip (Therapeutic Exercise)    Hip (Therapeutic Exercise) strengthening exercise  -     Hip Strengthening (Therapeutic Exercise) bilateral;supine;15 repititions;heel slides  -     Row Name 04/06/23 1005          Knee (Therapeutic Exercise)    Knee (Therapeutic Exercise) strengthening exercise  -     Knee Strengthening (Therapeutic Exercise) supine;bilateral;SAQ (short arc quad);15 repititions  -     Row Name 04/06/23 1005          Ankle (Therapeutic Exercise)    Ankle (Therapeutic Exercise) strengthening exercise  -     Ankle Strengthening (Therapeutic Exercise) supine;bilateral;dorsiflexion;plantarflexion;inversion;eversion;15 repititions  -     Row Name 04/06/23 1005          Positioning and Restraints    Pre-Treatment Position sitting in chair/recliner  -     Post Treatment Position bed  -     In Bed notified nsg;supine;call light within reach;encouraged to call for assist;exit alarm on  -     Row Name 04/06/23 1005          Therapy Assessment/Plan (PT)    Patient's Goals For Discharge take care of myself at home  -     Family Goals For Discharge --  Pt to return home with assist of family and caregiver.  -     Row Name 04/06/23 1005          Therapy Assessment/Plan (PT)    Functional Level at Time of Evaluation (PT) Mod/min  -     PT Diagnosis (PT) Impaired functional mobility  -     Rehab Potential/Prognosis (PT) good, to achieve stated therapy goals  -     Frequency of Treatment (PT) 60 minutes per session;5 times per week  -     Estimated Duration of Therapy (PT) 2 weeks  -     Problem List (PT) problems related to;balance;cognition;strength  -     Activity Limitations Related to Problem List (PT) unable to ambulate safely;unable to transfer safely  -     Comment, Therapy Assessment/Plan (PT) Pt is an 81 yo female who presents for evaluation following L basal ganglian infarct with R sided weakness.  Pt  demonstrates impaired balance, dec strength, dec safety, impaired AROM, and impaired cognition all of which are contributing to impaired functional mobility and indep. Pt would benefit from skilled PT to address impairments and progress Pt as tolerated to allow for pt to return home to care of fmaily.  -     Row Name 04/06/23 1005          Therapy Plan Review/Discharge Plan (PT)    Therapy Plan Review (PT) evaluation/treatment results reviewed;care plan/treatment goals reviewed;risks/benefits reviewed  -     Anticipated Equipment Needs at Discharge (PT Eval) --  Pt has  -     Anticipated Discharge Disposition (PT) home with 24/7 care  -     Row Name 04/06/23 1005          IRF PT Goals    Bed Mobility Goal Selection (PT-IRF) bed mobility, PT goal 1  -     Transfer Goal Selection (PT-IRF) transfers, PT goal 1  -     Gait (Walking Locomotion) Goal Selection (PT-IRF) gait, PT goal 1  -     Stairs Goal Selection (PT-IRF) stairs, PT goal 1  -     Row Name 04/06/23 1005          Bed Mobility Goal 1 (PT-IRF)    Activity/Assistive Device (Bed Mobility Goal 1, PT-IRF) bed mobility activities, all  -KP     [place] Level (Bed Mobility Goal 1, PT-IRF) modified independence  -KP     Time Frame (Bed Mobility Goal 1, PT-IRF) long-term goal (LTG);2 weeks  -     Progress/Outcomes (Bed Mobility Goal 1, PT-IRF) goal ongoing  -     Row Name 04/06/23 1005          Transfer Goal 1 (PT-IRF)    Activity/Assistive Device (Transfer Goal 1, PT-IRF) all transfers  -     [place] Level (Transfer Goal 1, PT-IRF) contact guard required  -     Time Frame (Transfer Goal 1, PT-IRF) long-term goal (LTG);2 weeks  -     Progress/Outcomes (Transfer Goal 1, PT-IRF) goal ongoing  -     Row Name 04/06/23 1005          Gait/Walking Locomotion Goal 1 (PT-IRF)    Activity/Assistive Device (Gait/Walking Locomotion Goal 1, PT-IRF) gait (walking locomotion);walker, rolling  -     Gait/Walking Locomotion Distance Goal 1  (PT-IRF) 100  -KP     [place] Level (Gait/Walking Locomotion Goal 1, PT-IRF) contact guard required  -KP     Progress/Outcomes (Gait/Walking Locomotion Goal 1, PT-IRF) goal ongoing  -     Row Name 04/06/23 1005          Stairs Goal 1 (PT-IRF)    Activity/Assistive Device (Stairs Goal 1, PT-IRF) stairs, all skills;walker, rolling  -     Number of Stairs (Stairs Goal 1, PT-IRF) 2  platform  -KP     [place] Level (Stairs Goal 1, PT-IRF) minimum assist (75% or more patient effort)  -KP     Progress/Outcomes (Stairs Goal 1, PT-IRF) goal ongoing  -           User Key  (r) = Recorded By, (t) = Taken By, (c) = Cosigned By    Initials Name Provider Type     Chen Centeno, PT Physical Therapist                 Physical Therapy Education     Title: PT OT SLP Therapies (Done)     Topic: Physical Therapy (Done)     Point: Mobility training (Done)     Learning Progress Summary           Patient Acceptance, E,TB, VU by  at 4/6/2023 1527                   Point: Home exercise program (Done)     Learning Progress Summary           Patient Acceptance, E,TB, VU by  at 4/6/2023 1527                               User Key     Initials Effective Dates Name Provider Type [facility] 06/16/21 -  Chen Centeno PT Physical Therapist PT                PT Recommendation and Plan    Planned Therapy Interventions (PT): balance training, bed mobility training, gait training, home exercise program, neuromuscular re-education, patient/family education, stair training, strengthening, transfer training  Frequency of Treatment (PT): 60 minutes per session, 5 times per week  Anticipated Equipment Needs at Discharge (PT Eval):  (Pt has)                  Time Calculation:      PT Charges     Row Name 04/06/23 1527             Time Calculation    Start Time 1000  -      Stop Time 1030  -      Time Calculation (min) 30 min  -      PT Received On 04/06/23  -      PT - Next Appointment 04/07/23  -      PT Goal  Re-Cert Due Date 04/13/23  -            User Key  (r) = Recorded By, (t) = Taken By, (c) = Cosigned By    Initials Name Provider Type    Chen Pathak, PT Physical Therapist                Therapy Charges for Today     Code Description Service Date Service Provider Modifiers Qty    23871683099  PT EVAL MOD COMPLEXITY 3 4/6/2023 Chen Centeno, PT GP 1    [id number]  PT THER PROC EA 15 MIN 4/6/2023 Chen Centeno, PT GP 1    [id number]  PT THERAPEUTIC ACT EA 15 MIN 4/6/2023 Chen Centeno, PT GP 1               Patient was intermittently wearing a face mask during this therapy encounter. Therapist used appropriate personal protective equipment including mask and gloves.  Mask used was standard procedure mask. Appropriate PPE was worn during the entire therapy session. Hand hygiene was completed before and after therapy session. Patient is not in enhanced droplet precautions.       Chen Centeno PT  4/6/2023

## 2023-04-07 LAB
25(OH)D3 SERPL-MCNC: 58.9 NG/ML (ref 30–100)
ANION GAP SERPL CALCULATED.3IONS-SCNC: 9.4 MMOL/L (ref 5–15)
BASOPHILS # BLD AUTO: 0.04 10*3/MM3 (ref 0–0.2)
BASOPHILS NFR BLD AUTO: 0.6 % (ref 0–1.5)
BUN SERPL-MCNC: 23 MG/DL (ref 8–23)
BUN/CREAT SERPL: 39 (ref 7–25)
CALCIUM SPEC-SCNC: 9.2 MG/DL (ref 8.6–10.5)
CHLORIDE SERPL-SCNC: 105 MMOL/L (ref 98–107)
CO2 SERPL-SCNC: 25.6 MMOL/L (ref 22–29)
CREAT SERPL-MCNC: 0.59 MG/DL (ref 0.57–1)
DEPRECATED RDW RBC AUTO: 41.3 FL (ref 37–54)
EGFRCR SERPLBLD CKD-EPI 2021: 89 ML/MIN/1.73
EOSINOPHIL # BLD AUTO: 0.39 10*3/MM3 (ref 0–0.4)
EOSINOPHIL NFR BLD AUTO: 6.2 % (ref 0.3–6.2)
ERYTHROCYTE [DISTWIDTH] IN BLOOD BY AUTOMATED COUNT: 12.4 % (ref 12.3–15.4)
GLUCOSE SERPL-MCNC: 130 MG/DL (ref 65–99)
HCT VFR BLD AUTO: 41.6 % (ref 34–46.6)
HGB BLD-MCNC: 14 G/DL (ref 12–15.9)
IMM GRANULOCYTES # BLD AUTO: 0.01 10*3/MM3 (ref 0–0.05)
IMM GRANULOCYTES NFR BLD AUTO: 0.2 % (ref 0–0.5)
LYMPHOCYTES # BLD AUTO: 1.5 10*3/MM3 (ref 0.7–3.1)
LYMPHOCYTES NFR BLD AUTO: 23.8 % (ref 19.6–45.3)
MCH RBC QN AUTO: 30.8 PG (ref 26.6–33)
MCHC RBC AUTO-ENTMCNC: 33.7 G/DL (ref 31.5–35.7)
MCV RBC AUTO: 91.4 FL (ref 79–97)
MONOCYTES # BLD AUTO: 0.65 10*3/MM3 (ref 0.1–0.9)
MONOCYTES NFR BLD AUTO: 10.3 % (ref 5–12)
NEUTROPHILS NFR BLD AUTO: 3.7 10*3/MM3 (ref 1.7–7)
NEUTROPHILS NFR BLD AUTO: 58.9 % (ref 42.7–76)
NRBC BLD AUTO-RTO: 0 /100 WBC (ref 0–0.2)
PLATELET # BLD AUTO: 175 10*3/MM3 (ref 140–450)
PMV BLD AUTO: 10.1 FL (ref 6–12)
POTASSIUM SERPL-SCNC: 3.5 MMOL/L (ref 3.5–5.2)
RBC # BLD AUTO: 4.55 10*6/MM3 (ref 3.77–5.28)
SODIUM SERPL-SCNC: 140 MMOL/L (ref 136–145)
TSH SERPL DL<=0.05 MIU/L-ACNC: 3.1 UIU/ML (ref 0.27–4.2)
VIT B12 BLD-MCNC: 796 PG/ML (ref 211–946)
WBC NRBC COR # BLD: 6.29 10*3/MM3 (ref 3.4–10.8)

## 2023-04-07 PROCEDURE — 92610 EVALUATE SWALLOWING FUNCTION: CPT

## 2023-04-07 PROCEDURE — 97112 NEUROMUSCULAR REEDUCATION: CPT | Performed by: OCCUPATIONAL THERAPIST

## 2023-04-07 PROCEDURE — 82607 VITAMIN B-12: CPT | Performed by: PHYSICAL MEDICINE & REHABILITATION

## 2023-04-07 PROCEDURE — 92526 ORAL FUNCTION THERAPY: CPT

## 2023-04-07 PROCEDURE — 85025 COMPLETE CBC W/AUTO DIFF WBC: CPT | Performed by: PHYSICAL MEDICINE & REHABILITATION

## 2023-04-07 PROCEDURE — 84443 ASSAY THYROID STIM HORMONE: CPT | Performed by: PHYSICAL MEDICINE & REHABILITATION

## 2023-04-07 PROCEDURE — 97535 SELF CARE MNGMENT TRAINING: CPT | Performed by: OCCUPATIONAL THERAPIST

## 2023-04-07 PROCEDURE — 82306 VITAMIN D 25 HYDROXY: CPT | Performed by: PHYSICAL MEDICINE & REHABILITATION

## 2023-04-07 PROCEDURE — 25010000002 ENOXAPARIN PER 10 MG: Performed by: PHYSICAL MEDICINE & REHABILITATION

## 2023-04-07 PROCEDURE — 97530 THERAPEUTIC ACTIVITIES: CPT

## 2023-04-07 PROCEDURE — 80048 BASIC METABOLIC PNL TOTAL CA: CPT | Performed by: PHYSICAL MEDICINE & REHABILITATION

## 2023-04-07 RX ADMIN — ACETAMINOPHEN 650 MG: 325 TABLET, FILM COATED ORAL at 02:06

## 2023-04-07 RX ADMIN — Medication 1000 UNITS: at 08:03

## 2023-04-07 RX ADMIN — ROSUVASTATIN CALCIUM 20 MG: 20 TABLET, FILM COATED ORAL at 22:11

## 2023-04-07 RX ADMIN — LISINOPRIL 5 MG: 5 TABLET ORAL at 08:03

## 2023-04-07 RX ADMIN — ENOXAPARIN SODIUM 40 MG: 100 INJECTION SUBCUTANEOUS at 11:29

## 2023-04-07 RX ADMIN — ASPIRIN 81 MG: 81 TABLET, CHEWABLE ORAL at 08:03

## 2023-04-07 RX ADMIN — AMLODIPINE BESYLATE 10 MG: 10 TABLET ORAL at 08:03

## 2023-04-07 RX ADMIN — OXYCODONE HYDROCHLORIDE AND ACETAMINOPHEN 250 MG: 500 TABLET ORAL at 08:03

## 2023-04-07 RX ADMIN — CLOPIDOGREL BISULFATE 75 MG: 75 TABLET, FILM COATED ORAL at 08:02

## 2023-04-07 NOTE — PLAN OF CARE
Goal Outcome Evaluation:  Plan of Care Reviewed With: patient             Problem: Rehabilitation (IRF) Plan of Care  Goal: Plan of Care Review  Outcome: Ongoing, Progressing  Flowsheets (Taken 4/7/2023 0240)  Plan of Care Reviewed With: patient  Outcome Evaluation: Brigid is alert and oriented x 4 this shift. Can be confused at times. Took meds crushed with AS, NTL. Unable to void, bladder scanned q6. IC performed at 2000 with 400cc emptied. Tolerated well. Bladder scanned at 0200 for 113cc. Denies abd discomfort. Spot checked at midnight and o2 read 90% on RA, 2L nc applied. Continues with dysarthria and facial droop. Complains of headache and RUE discomfort. PRn Tylenol administered at 2000 and 0200. No unsafe behaviors. Call light within reach.

## 2023-04-07 NOTE — PLAN OF CARE
Goal Outcome Evaluation:  Plan of Care Reviewed With: patient        Progress: improving  Outcome Evaluation: A&OX4, calm and cooperative. Dsyarthria. R facial droop. R sided hemiparesis/weakness. Assist X1-2 to wc. Incon of B&B. Pt unable to urinate, ISC required X2 this shift, see orders. Pt c/o of SOB on exertion at time. 2 L of O2 at HS. Cough noted. Meds crushed in applesuace. Pureed diet with NTL. Scattered bruising noted. Excoriation to sanket area, Zguard cream applied. Blurred vision in R eye at baseline. Pt educated on use of call light for assistance.

## 2023-04-07 NOTE — THERAPY TREATMENT NOTE
Inpatient Rehabilitation - Occupational Therapy Treatment Note    [facility]     Patient Name: Melissa Gomez  : 1940  MRN: [id number]    Today's Date: 2023                 Admit Date: 2023         ICD-10-CM ICD-9-CM   1. Impaired functional mobility, balance, gait, and endurance  Z74.09 V49.89   2. Follow-up exam  Z09 V67.9       Patient Active Problem List   Diagnosis   • Infarction of left basal ganglia       Past Medical History:   Diagnosis Date   • GERD (gastroesophageal reflux disease)    • Hyperlipidemia    • Hypertension    • Stroke        Past Surgical History:   Procedure Laterality Date   • COLONOSCOPY     • HYSTERECTOMY     • TONSILLECTOMY               IRF OT ASSESSMENT FLOWSHEET (last 12 hours)     IRF OT Evaluation and Treatment     Row Name 23 1223          OT Time and Intention    Document Type daily treatment  -     Mode of Treatment individual therapy;occupational therapy  -     Patient Effort good  -     Symptoms Noted During/After Treatment fatigue  -     Row Name 23 1223          General Information    Patient/Family/Caregiver Comments/Observations pt supine in bed. slp in room  -     Existing Precautions/Restrictions fall  -     Row Name 23 1223          Pain Assessment    Pretreatment Pain Rating 0/10 - no pain  -     Posttreatment Pain Rating 3/10  -KP     Pain Location - Side/Orientation Right  -     Pain Location upper  -     Pain Location - extremity  -     Row Name 23 1223          Cognition/Psychosocial    Orientation Status (Cognition) oriented x 4  -     Follows Commands (Cognition) follows one-step commands;over 90% accuracy;verbal cues/prompting required  -     Personal Safety Interventions fall prevention program maintained;gait belt;nonskid shoes/slippers when out of bed  -     Row Name 23 1223          Bathing    [place] Level (Bathing) bathing skills;upper body;set up;standby assist;lower  body;moderate assist (50% patient effort)  -     Assistive Device (Bathing) grab bar/tub rail  -     Position (Bathing) sink side;supported sitting;supported standing  -     Set-up Assistance (Bathing) obtain supplies;open containers  -     Row Name 04/07/23 1223          Upper Body Dressing    [place] Level (Upper Body Dressing) upper body dressing skills;doff;don;pull over garment;set up assistance;verbal cues;minimum assist (75% or more patient effort)  -     Position (Upper Body Dressing) supported sitting  -     Set-up Assistance (Upper Body Dressing) obtain clothing  -     Comment (Upper Body Dressing) pt fatigued after putting arms through arm holes. VC on technique to don R UE first and past elbow, needs  to pull past elbow  -     Row Name 04/07/23 1223          Lower Body Dressing    [place] Level (Lower Body Dressing) doff;don;pants/bottoms;shoes/slippers;socks;set up;dependent (less than 25% patient effort);maximum assist (25% patient effort)  -     Position (Lower Body Dressing) supported sitting;supported standing  -     Set-up Assistance (Lower Body Dressing) obtain clothing  -     Row Name 04/07/23 1223          Grooming    [place] Level (Grooming) grooming skills;deodorant application;oral care regimen;wash face, hands;set up;standby assist;hair care, combing/brushing;minimum assist (75% patient effort)  -     Position (Grooming) sink side;supported sitting  -     Set-up Assistance (Grooming) obtain supplies;open containers  -     Row Name 04/07/23 1223          Toileting    [place] Level (Toileting) toileting skills;adjust/manage clothing;perform perineal hygiene;set up assistance;verbal cues;dependent (less than 25% patient effort)  -     Assistive Device Use (Toileting) grab bar/safety frame  -     Position (Toileting) supported standing;supported sitting  -     Set-up Assistance (Toileting) change pad/brief;obtain supplies  -     Row Name  04/07/23 1223          Bed Mobility    Supine-Sit [place] (Bed Mobility) set up;moderate assist (50% patient effort);verbal cues  -KP     Row Name 04/07/23 1223          Bed-Chair Transfer    Bed-Chair [place] (Transfers) set up;verbal cues;minimum assist (75% patient effort);moderate assist (50% patient effort)  -KP     Assistive Device (Bed-Chair Transfers) wheelchair  -KP     Row Name 04/07/23 1223          Sit-Stand Transfer    Sit-Stand [place] (Transfers) set up;verbal cues;minimum assist (75% patient effort);moderate assist (50% patient effort)  once standing does better. sit to stand better from wc than bed. raised bed some to stand from bed  -KP     Assistive Device (Sit-Stand Transfers) wheelchair  -KP     Row Name 04/07/23 1223          Stand-Sit Transfer    Stand-Sit [place] (Transfers) set up;verbal cues;minimum assist (75% patient effort)  -     Assistive Device (Stand-Sit Transfers) wheelchair  -KP     Row Name 04/07/23 1223          Toilet Transfer    Type (Toilet Transfer) sit-stand;stand-sit  -     [place] Level (Toilet Transfer) set up;minimum assist (75% patient effort);verbal cues  -     Assistive Device (Toilet Transfer) wheelchair;grab bars/safety frame  -KP     Row Name 04/07/23 1223          Shower Transfer    Comment, (Shower Transfer) est min /mod  -KP     Row Name 04/07/23 1223          Balance    Static Sitting Balance standby assist  -KP     Dynamic Sitting Balance standby assist  -KP     Static Standing Balance minimal assist  -KP     Row Name 04/07/23 1223          Positioning and Restraints    Pre-Treatment Position in bed  -KP     Post Treatment Position wheelchair  -KP     In Wheelchair sitting;exit alarm on;with PT  -KP     Row Name 04/07/23 1223          Daily Progress Summary (OT)    Overall Progress Toward Functional Goals (OT) progressing toward functional goals as expected  -KP           User Key  (r) = Recorded By, (t) = Taken By, (c) =  Cosigned By    Initials Name Effective Dates     Patsy Blank, OTR 06/16/21 -                  Occupational Therapy Education     Title: PT OT SLP Therapies (Done)     Topic: Occupational Therapy (Done)     Point: ADL training (Done)     Description:   Instruct learner(s) on proper safety adaptation and remediation techniques during self care or transfers.   Instruct in proper use of assistive devices.              Learning Progress Summary           Patient Acceptance, E,TB,D, VU,DU,NR by  at 4/6/2023 1230    Comment: ed pt on role of OT. benefit of therapy, POC w.  OT ed on safety w ADL and tsf. pt ed on UBD technique.                   Point: Home exercise program (Done)     Description:   Instruct learner(s) on appropriate technique for monitoring, assisting and/or progressing therapeutic exercises/activities.              Learning Progress Summary           Patient Acceptance, E,TB,D, VU,DU,NR by  at 4/6/2023 1230    Comment: ed pt on role of OT. benefit of therapy, POC w.  OT ed on safety w ADL and tsf. pt ed on UBD technique.                   Point: Precautions (Done)     Description:   Instruct learner(s) on prescribed precautions during self-care and functional transfers.              Learning Progress Summary           Patient Acceptance, E,TB,D, VU,DU,NR by  at 4/6/2023 1230    Comment: ed pt on role of OT. benefit of therapy, POC w.  OT ed on safety w ADL and tsf. pt ed on UBD technique.                   Point: Body mechanics (Done)     Description:   Instruct learner(s) on proper positioning and spine alignment during self-care, functional mobility activities and/or exercises.              Learning Progress Summary           Patient Acceptance, E,TB,D, VU,DU,NR by  at 4/6/2023 1230    Comment: ed pt on role of OT. benefit of therapy, POC w.  OT ed on safety w ADL and tsf. pt ed on UBD technique.                               User Key     Initials Effective Dates Name Provider Type  Discipline     06/16/21 -  Patsy Blank OTR Occupational Therapist OT                    OT Recommendation and Plan    Planned Therapy Interventions (OT): activity tolerance training, adaptive equipment training, BADL retraining, functional balance retraining, neuromuscular control/coordination retraining, occupation/activity based interventions, patient/caregiver education/training, ROM/therapeutic exercise, strengthening exercise, transfer/mobility retraining       Daily Progress Summary (OT)  Overall Progress Toward Functional Goals (OT): progressing toward functional goals as expected            Time Calculation:      Time Calculation- OT     Row Name 04/07/23 1227             Time Calculation- OT    OT Start Time 0930  -      OT Stop Time 1000  -      OT Time Calculation (min) 30 min  -            User Key  (r) = Recorded By, (t) = Taken By, (c) = Cosigned By    Initials Name Provider Type     Patsy Blank OTR Occupational Therapist              Therapy Charges for Today     Code Description Service Date Service Provider Modifiers Qty    02501784614 HC OT SELF CARE/MGMT/TRAIN EA 15 MIN 4/6/2023 Patsy Blank OTR GO 2    41219172763 HC OT EVAL MOD COMPLEXITY 3 4/6/2023 Patsy Blank OTR GO 1    55690992256 HC OT SELF CARE/MGMT/TRAIN EA 15 MIN 4/7/2023 Patsy Blank OTR GO 2                   RIAN Barton  4/7/2023

## 2023-04-07 NOTE — THERAPY TREATMENT NOTE
"Inpatient Rehabilitation - Physical Therapy Treatment Note       [facility]     Patient Name: Melissa Gomez  : 1940  MRN: [id number]    Today's Date: 2023                    Admit Date: 2023      Visit Dx:     ICD-10-CM ICD-9-CM   1. Impaired functional mobility, balance, gait, and endurance  Z74.09 V49.89   2. Follow-up exam  Z09 V67.9       Patient Active Problem List   Diagnosis   • Infarction of left basal ganglia       Past Medical History:   Diagnosis Date   • GERD (gastroesophageal reflux disease)    • Hyperlipidemia    • Hypertension    • Stroke        Past Surgical History:   Procedure Laterality Date   • COLONOSCOPY     • HYSTERECTOMY     • TONSILLECTOMY         PT ASSESSMENT (last 12 hours)     IRF PT Evaluation and Treatment     Row Name 23 1017          PT Time and Intention    Document Type daily treatment  -EE     Mode of Treatment physical therapy;individual therapy  -EE     Patient/Family/Caregiver Comments/Observations Pt sitting up in WC, reports fatigue but agreeable to PT.  -EE     Row Name 23 1017          General Information    Existing Precautions/Restrictions fall  -EE     Row Name 23 1017          Pain Assessment    Pretreatment Pain Rating 5/10  -EE     Posttreatment Pain Rating 5/10  -EE     Pain Location - Side/Orientation Right  -EE     Pain Location upper  -EE     Pain Location - extremity  -EE     Pre/Posttreatment Pain Comment Reports pain in proximal R UE from shoulder to elbow, states she thinks she \"slept on it wrong\". Pt provided rest breaks and repositioned as needed for comfort.  -EE     Row Name 23 1017          Cognition/Psychosocial    Affect/Mental Status (Cognition) WFL  -EE     Row Name 23 1017          Bed Mobility    Sit-Supine [place] (Bed Mobility) moderate assist (50% patient effort);verbal cues  -EE     Row Name 23 1017          Transfer Assessment/Treatment    Comment, (Transfers) 5x STS in PM for " functional strengthening; cues for sequencing and setup.  -EE     Row Name 04/07/23 1017          Chair-Bed Transfer    Chair-Bed [place] (Transfers) minimum assist (75% patient effort);verbal cues  -EE     Assistive Device (Chair-Bed Transfers) wheelchair  -EE     Row Name 04/07/23 1017          Sit-Stand Transfer    Sit-Stand [place] (Transfers) maximum assist (25% patient effort);verbal cues  -EE     Assistive Device (Sit-Stand Transfers) walker, front-wheeled;wheelchair  -EE     Row Name 04/07/23 1017          Stand-Sit Transfer    Stand-Sit [place] (Transfers) minimum assist (75% patient effort);verbal cues  -EE     Assistive Device (Stand-Sit Transfers) walker, front-wheeled;wheelchair  -EE     Row Name 04/07/23 1017          Gait/Stairs (Locomotion)    [place] Level (Gait) minimum assist (75% patient effort);contact guard;verbal cues  -EE     Assistive Device (Gait) walker, front-wheeled  -EE     Distance in Feet (Gait) 80' x 2  -EE     Pattern (Gait) step-through  -EE     Deviations/Abnormal Patterns (Gait) base of support, narrow;lenard decreased;stride length decreased;weight shifting decreased;festinating/shuffling  mild shuffling at times  -EE     Bilateral Gait Deviations forward flexed posture;heel strike decreased  -EE     Right Sided Gait Deviations foot drop/toe drag  increased R foot drag noted with fatigue  -EE     Row Name 04/07/23 1017          Safety Issues, Functional Mobility    Impairments Affecting Function (Mobility) balance;endurance/activity tolerance;postural/trunk control;strength  -EE     Row Name 04/07/23 1017          Positioning and Restraints    Pre-Treatment Position sitting in chair/recliner  -EE     Post Treatment Position bed  -EE     In Bed fowlers;call light within reach;encouraged to call for assist;exit alarm on  -EE           User Key  (r) = Recorded By, (t) = Taken By, (c) = Cosigned By    Initials Name Provider Type    Diann Batista PT Physical  Therapist                 Physical Therapy Education     Title: PT OT SLP Therapies (Done)     Topic: Physical Therapy (Done)     Point: Mobility training (Done)     Learning Progress Summary           Patient Acceptance, E,TB, VU,NR by  at 4/7/2023 1054    Acceptance, E,TB, VU by  at 4/6/2023 1527                   Point: Home exercise program (Done)     Learning Progress Summary           Patient Acceptance, E,TB, VU,NR by  at 4/7/2023 1054    Acceptance, E,TB, VU by KP at 4/6/2023 1527                               User Key     Initials Effective Dates Name Provider Type Discipline    EE 06/16/21 -  Diann Gonzáles, PT Physical Therapist PT     06/16/21 -  Chen Centeno PT Physical Therapist PT                PT Recommendation and Plan                          Time Calculation:      PT Charges     Row Name 04/07/23 1444 04/07/23 1054          Time Calculation    Start Time 1400  -EE 1000  -EE     Stop Time 1430  -EE 1030  -EE     Time Calculation (min) 30 min  -EE 30 min  -EE     PT Received On 04/07/23  -EE 04/07/23  -EE     PT - Next Appointment 04/08/23  -EE 04/07/23  -EE        Time Calculation- PT    Total Timed Code Minutes- PT 30 minute(s)  -EE 30 minute(s)  -EE           User Key  (r) = Recorded By, (t) = Taken By, (c) = Cosigned By    Initials Name Provider Type    Diann Batista, PT Physical Therapist                Therapy Charges for Today     Code Description Service Date Service Provider Modifiers Qty    57919194173 HC PT THERAPEUTIC ACT EA 15 MIN 4/7/2023 Diann Gonzáles, PT GP 4                   Diann Gonzáles PT  4/7/2023

## 2023-04-07 NOTE — THERAPY TREATMENT NOTE
"Inpatient Rehabilitation - Physical Therapy Treatment Note       [facility]     Patient Name: Melissa Gomez  : 1940  MRN: [id number]    Today's Date: 2023                    Admit Date: 2023      Visit Dx:     ICD-10-CM ICD-9-CM   1. Impaired functional mobility, balance, gait, and endurance  Z74.09 V49.89   2. Follow-up exam  Z09 V67.9       Patient Active Problem List   Diagnosis   • Infarction of left basal ganglia       Past Medical History:   Diagnosis Date   • GERD (gastroesophageal reflux disease)    • Hyperlipidemia    • Hypertension    • Stroke        Past Surgical History:   Procedure Laterality Date   • COLONOSCOPY     • HYSTERECTOMY     • TONSILLECTOMY         PT ASSESSMENT (last 12 hours)     IRF PT Evaluation and Treatment     Row Name 23 1017          PT Time and Intention    Document Type daily treatment  -EE     Mode of Treatment physical therapy;individual therapy  -EE     Patient/Family/Caregiver Comments/Observations Pt sitting up in WC, reports fatigue but agreeable to PT.  -EE     Row Name 23 1017          General Information    Existing Precautions/Restrictions fall  -EE     Row Name 23 1017          Pain Assessment    Pretreatment Pain Rating 5/10  -EE     Posttreatment Pain Rating 5/10  -EE     Pain Location - Side/Orientation Right  -EE     Pain Location upper  -EE     Pain Location - extremity  -EE     Pre/Posttreatment Pain Comment Reports pain in proximal R UE from shoulder to elbow, states she thinks she \"slept on it wrong\". Pt provided rest breaks and repositioned as needed for comfort.  -EE     Row Name 23 1017          Cognition/Psychosocial    Affect/Mental Status (Cognition) WFL  -EE     Row Name 23 1017          Bed Mobility    Sit-Supine [place] (Bed Mobility) moderate assist (50% patient effort);verbal cues  -EE     Row Name 23 1017          Transfer Assessment/Treatment    Comment, (Transfers) 5x STS in PM for " functional strengthening; cues for sequencing and setup.  -EE     Row Name 04/07/23 1017          Chair-Bed Transfer    Chair-Bed [place] (Transfers) minimum assist (75% patient effort);verbal cues  -EE     Assistive Device (Chair-Bed Transfers) wheelchair  -EE     Row Name 04/07/23 1017          Sit-Stand Transfer    Sit-Stand [place] (Transfers) verbal cues;minimum assist (75% patient effort)  -EE     Assistive Device (Sit-Stand Transfers) walker, front-wheeled;wheelchair  -EE     Row Name 04/07/23 1017          Stand-Sit Transfer    Stand-Sit [place] (Transfers) minimum assist (75% patient effort);verbal cues  -EE     Assistive Device (Stand-Sit Transfers) walker, front-wheeled;wheelchair  -EE     Row Name 04/07/23 1017          Gait/Stairs (Locomotion)    [place] Level (Gait) minimum assist (75% patient effort);contact guard;verbal cues  -EE     Assistive Device (Gait) walker, front-wheeled  -EE     Distance in Feet (Gait) 80' x 2  -EE     Pattern (Gait) step-through  -EE     Deviations/Abnormal Patterns (Gait) base of support, narrow;[place] decreased;stride length decreased;weight shifting decreased;festinating/shuffling  mild shuffling at times  -EE     Bilateral Gait Deviations forward flexed posture;heel strike decreased  -EE     Right Sided Gait Deviations foot drop/toe drag  increased R foot drag noted with fatigue  -EE     Row Name 04/07/23 1017          Safety Issues, Functional Mobility    Impairments Affecting Function (Mobility) balance;endurance/activity tolerance;postural/trunk control;strength  -EE     Row Name 04/07/23 1017          Positioning and Restraints    Pre-Treatment Position sitting in chair/recliner  -EE     Post Treatment Position bed  -EE     In Bed fowlers;call light within reach;encouraged to call for assist;exit alarm on  -EE           User Key  (r) = Recorded By, (t) = Taken By, (c) = Cosigned By    Initials Name Provider Type    Diann Batista PT Physical  Therapist                 Physical Therapy Education     Title: PT OT SLP Therapies (Done)     Topic: Physical Therapy (Done)     Point: Mobility training (Done)     Learning Progress Summary           Patient Acceptance, E,TB, VU,NR by  at 4/7/2023 1054    Acceptance, E,TB, VU by  at 4/6/2023 1527                   Point: Home exercise program (Done)     Learning Progress Summary           Patient Acceptance, E,TB, VU,NR by  at 4/7/2023 1054    Acceptance, E,TB, VU by KP at 4/6/2023 1527                               User Key     Initials Effective Dates Name Provider Type Discipline    EE 06/16/21 -  Diann Gonzáles, PT Physical Therapist PT     06/16/21 -  Chen Centeno PT Physical Therapist PT                PT Recommendation and Plan                          Time Calculation:      PT Charges     Row Name 04/07/23 1444 04/07/23 1054          Time Calculation    Start Time 1400  -EE 1000  -EE     Stop Time 1430  -EE 1030  -EE     Time Calculation (min) 30 min  -EE 30 min  -EE     PT Received On 04/07/23  -EE 04/07/23  -EE     PT - Next Appointment 04/08/23  -EE 04/07/23  -EE        Time Calculation- PT    Total Timed Code Minutes- PT 30 minute(s)  -EE 30 minute(s)  -EE           User Key  (r) = Recorded By, (t) = Taken By, (c) = Cosigned By    Initials Name Provider Type    Diann Batista, PT Physical Therapist                Therapy Charges for Today     Code Description Service Date Service Provider Modifiers Qty    28037283808 HC PT THERAPEUTIC ACT EA 15 MIN 4/7/2023 Diann Gonzáles, PT GP 4                   Diann Gonzáles PT  4/7/2023

## 2023-04-07 NOTE — PLAN OF CARE
Goal Outcome Evaluation:  Plan of Care Reviewed With: patient      Clinical Swallow Evaluation completed. Pt exhibited strong coughing with initial trial of thins by tsp. With cue for bolus hold with thins by tsp, delayed throat clear noted in 2/2 trials. Light delayed throat clear also observed in 3/4 trials of NTL by cup. No overt s/s of aspiration or penetration observed with NTL by tsp, HTL by tsp and puree. Noted delayed throat clear in 1/3 trials of HTL by cup. Swallow initiation appeared delayed (~3 seconds) with all consistencies. Pt was able to complete a double swallow with additional time. Recommend continue puree with NTL. Double swallow with liquids and solids. If coughing observed, recommend give NTL by spoon. Pt must have 1:1 supervision for meals. Sit upright in wheelchair for meals. Recommend meds crushed in applesauce. Recommend ice chips 30 minutes after meals with adequate oral care.

## 2023-04-07 NOTE — PROGRESS NOTES
Inpatient Rehabilitation Plan of Care Note    Plan of Care  Care Plan Reviewed - No updates at this time.    Psychosocial    Performed Intervention(s)  Verbalizes needs and concerns  Therapeutic environmental set up      Sphincter Control    Performed Intervention(s)  Bladder scan or I/O cath per order  Encourage fluids  Bowel/bladder training      Safety    Performed Intervention(s)  Safety Rounds  Bed alarm and/or chair alarm    Signed by: Heavenly Garcia RN

## 2023-04-07 NOTE — PROGRESS NOTES
Inpatient Rehabilitation Admission  Section A. Transportation  Issues Due to Lack of Transportation:   No    Signed by: Yue Robert, Social Work

## 2023-04-07 NOTE — PROGRESS NOTES
Inpatient Rehabilitation Plan of Care Note    Plan of Care  Care Plan Reviewed - Updates as Follows    Psychosocial    [RN] Coping/Adjustment(Active)  Current Status(04/07/2023): Lacks insight with current situation  Weekly Goal(04/13/2023): provide education material regarding stroke  Discharge Goal: Knowledgeable of care needs and stroke recovery    Performed Intervention(s)  Verbalizes needs and concerns  Therapeutic environmental set up      Sphincter Control    [RN] Bladder Management(Active)  Current Status(04/07/2023): Urinary Retention; BS if unable to void q 6; IC if  scan >300  Weekly Goal(04/13/2023): The patient is unable to void independently  Discharge Goal: The patient empties bladder completely    [RN] Bowel Management(Active)  Current Status(04/07/2023): Incontinent of stool  Weekly Goal(04/13/2023): Continent 50%  Discharge Goal: Continent 100%    Performed Intervention(s)  Bladder scan or I/O cath per order  Encourage fluids  Bowel/bladder training      Safety    [RN] Potential for Injury(Active)  Current Status(04/07/2023): Risk for falls  Weekly Goal(04/13/2023): Instruct family/caregivers regarding safety precautions  and need for close supervision  Discharge Goal: Family/caregiver will be knowledgeable of need for cueing and  supervision to avoid falls    Performed Intervention(s)  Safety Rounds  Bed alarm and/or chair alarm    Signed by: Emperatriz Mccoy RN

## 2023-04-07 NOTE — THERAPY EVALUATION
Inpatient Rehab - Speech Language Pathology   Swallow Initial Evaluation- [facility]     Patient Name: Melissa Gomez  : 1940  MRN: [id number]  Today's Date: 2023               Admit Date: 2023    Visit Dx:     ICD-10-CM ICD-9-CM   1. Impaired functional mobility, balance, gait, and endurance  Z74.09 V49.89   2. Follow-up exam  Z09 V67.9     Patient Active Problem List   Diagnosis   • Infarction of left basal ganglia     Past Medical History:   Diagnosis Date   • GERD (gastroesophageal reflux disease)    • Hyperlipidemia    • Hypertension    • Stroke      Past Surgical History:   Procedure Laterality Date   • COLONOSCOPY     • HYSTERECTOMY     • TONSILLECTOMY       Patient was not wearing a face mask during this therapy encounter. Therapist used appropriate personal protective equipment including mask, eye protection and gloves.  Mask used was standard procedure mask. Appropriate PPE was worn during the entire therapy session. Hand hygiene was completed before and after therapy session. Patient is not in enhanced droplet precautions.           SLP Recommendation and Plan     SLP Diet Recommendation: puree, nectar thick liquids, ice chips between meals after oral care, with supervision (23)  Recommended Precautions and Strategies: upright posture during/after eating, small bites of food and sips of liquid, multiple swallows per bite of food, multiple swallows per sip of liquid, general aspiration precautions, 1:1 supervision, assist with feeding (23)  SLP Rec. for Method of Medication Administration: meds crushed, with puree (23)     Monitor for Signs of Aspiration: yes, notify SLP if any concerns (23)  Recommended Diagnostics: reassess via clinical swallow evaluation (VFSS in ~1 week) (23)     Anticipated Discharge Disposition (SLP): home with OP services (23)     Therapy Frequency (Swallow): 5 days per week (23  "0900)  Predicted Duration Therapy Intervention (Days): until discharge (04/07/23 0900)                                        Plan of Care Reviewed With: patient      SWALLOW EVALUATION (last 72 hours)     SLP Adult Swallow Evaluation     Row Name 04/07/23 0900                   Rehab Evaluation    Document Type evaluation  -AL        Subjective Information no complaints  -AL        Patient Observations alert;cooperative  -AL        Symptoms Noted During/After Treatment none  -AL           General Information    Patient Profile Reviewed yes  -AL        Pertinent History Of Current Problem Pt is an 82-year-old female admitted to inpatient rehab under diagnosis L Basal Ganglia Infarct/Right Sided Weakness with onset date 3/29/23. Pt reports changes in speech, cognition and swallowing. V[facility]SS completed at [facility] on 3/30/23 with the following results: \"Pt presented with mild-moderate oral dysphagia and moderate pharyngeal dysphagia. Pt greatest deficit is delayed swallow initiation resulting in nikolas aspiration of thin liquids and penetration of NTL and puree solids. Swallow function does not change when comparing NTL and HTL.\" Recommended puree with NTL, straws okay. Small bites/sips.  -AL        Current Method of Nutrition pureed;nectar/syrup-thick liquids  -AL        Precautions/Limitations, Vision WFL;for purposes of eval  -AL        Precautions/Limitations, Hearing WFL  -AL        Prior Level of Function-Communication WFL  -AL        Prior Level of Function-Swallowing no diet consistency restrictions  -AL        Plans/Goals Discussed with patient  -AL        Barriers to Rehab none identified  -AL        Patient's Goals for Discharge eat/drink without coughing/choking;return to regular diet  -AL           Pain Scale: Numbers Pre/Post-Treatment    Pretreatment Pain Rating 0/10 - no pain  -AL           Oral Motor Structure and Function    Dentition Assessment natural, present and adequate  -AL        " Mucosal Quality --  white coating on tongue  -AL        Volitional Swallow delayed  -AL        Volitional Cough weak  -AL           Oral Musculature and Cranial Nerve Assessment    Oral Motor, Comment Right facial weakness, labial ROM on right judged to be mild-moderately reduced. Lingual protrusion at midline. Lateralization appeared WNL. Vocal quality was clear. Moderate mixed dysarthria.  -AL           General Eating/Swallowing Observations    Respiratory Support Currently in Use ventilator  -AL        Positioning During Eating upright 90 degree;upright in bed  -AL        Utensils Used spoon;cup  -AL        Consistencies Trialed pureed;ice chips;thin liquids;nectar/syrup-thick liquids  -AL           Clinical Swallow Eval    Clinical Swallow Evaluation Summary Clinical Swallow Evaluation completed. Pt exhibited strong coughing with initial trial of thins by tsp. With cue for bolus hold with thins by tsp, delayed throat clear noted in 2/2 trials. Light delayed throat clear also observed in 3/4 trials of NTL by cup. No overt s/s of aspiration or penetration observed with NTL by tsp, HTL by tsp and puree. Noted delayed throat clear in 1/3 trials of HTL by cup. Swallow initiation appeared delayed (~3 seconds) with all consistencies. Pt was able to complete a double swallow with additional time. Recommend continue puree with NTL. Double swallow with liquids and solids. If coughing observed, recommend give NTL by spoon. Pt must have 1:1 supervision for meals. Sit upright in wheelchair for meals. Recommend meds crushed in applesauce. Recommend ice chips 30 minutes after meals with adequate oral care.  -AL           Recommendations    Therapy Frequency (Swallow) 5 days per week  -AL        Predicted Duration Therapy Intervention (Days) until discharge  -AL        SLP Diet Recommendation puree;nectar thick liquids;ice chips between meals after oral care, with supervision  -AL        Recommended Diagnostics reassess via  clinical swallow evaluation  VFSS in ~1 week  -AL        Recommended Precautions and Strategies upright posture during/after eating;small bites of food and sips of liquid;multiple swallows per bite of food;multiple swallows per sip of liquid;general aspiration precautions;1:1 supervision;assist with feeding  -AL        Oral Care Recommendations Oral Care BID/PRN;Before ice/water  -AL        SLP Rec. for Method of Medication Administration meds crushed;with puree  -AL        Monitor for Signs of Aspiration yes;notify SLP if any concerns  -AL        Anticipated Discharge Disposition (SLP) home with OP services  -AL           Swallow Goals (SLP)    Swallow LTGs Patient will demonstrate functional swallow for  -AL        Swallow STGs pharyngeal strengthening exercise goal selection (SLP);labial strengthening goal selection (SLP);diet tolerance goal selection (SLP)  -AL        Diet Tolerance Goal Selection (SLP) Patient will tolerate therapeutic trials of  -AL        Labial Strengthening Goal Selection (SLP) labial strengthening, SLP goal 1  -AL        Pharyngeal Strengthening Exercise Goal Selection (SLP) pharyngeal strengthening exercise, SLP goal 1  -AL           (LTG) Patient will demonstrate functional swallow for    Diet Texture (Demonstrate functional swallow) soft to chew (chopped) textures  -AL        Liquid viscosity (Demonstrate functional swallow) thin liquids  -AL        Time Frame (Demonstrate functional swallow) by discharge  -AL           (STG) Patient will tolerate trials of    Consistencies Trialed (Tolerate trials) thin liquids  by spoon  -AL        Desired Outcome (Tolerate trials) without signs/symptoms of aspiration  -AL        [place] (Tolerate trials) with 1:1 assist/ supervision  -AL           (STG) Patient will tolerate therapeutic trials of    Consistencies Trialed (Tolerate therapeutic trials) mechanical ground textures;nectar/ mildly thick liquids  -AL        Desired Outcome (Tolerate  therapeutic trials) without signs/symptoms of aspiration  -AL        [place] (Tolerate therapeutic trials) with 1:1 assist/ supervision  -AL           (STG) Labial Strengthening Goal 1 (SLP)    Activity (Labial Strengthening Goal 1, SLP) increase labial tone  -AL        Increase Labial Tone IOPI  -AL        [place]/Accuracy (Labial Strengthening Goal 1, SLP) with moderate cues (50-74% accuracy)  -AL           (STG) Pharyngeal Strengthening Exercise Goal 1 (SLP)    Activity (Pharyngeal Strengthening Goal 1, SLP) increase timing;increase superior movement of the hyolaryngeal complex;increase anterior movement of the hyolaryngeal complex;increase epiglottic inversion and retroflexion;increase closure at entrance to airway/closure of airway at glottis  -AL        Increase Timing hard effortful swallow  -AL        Increase Superior Movement of the Hyolaryngeal Complex hard effortful swallow  -AL        Increase Anterior Movement of the Hyolaryngeal Complex EMST  -AL        Increase Epiglottic Inversion and Retroflexion Mendelsohn;hard effortful swallow;EMST  -AL        Increase Closure at Entrance to Airway/Closure of Airway at Glottis Mendelsohn;hard effortful swallow  -AL        [place]/Accuracy (Pharyngeal Strengthening Goal 1, SLP) with moderate cues (50-74% accuracy)  -AL        Time Frame (Pharyngeal Strengthening Goal 1, SLP) 1 week  -AL              User Key  (r) = Recorded By, (t) = Taken By, (c) = Cosigned By    Initials Name Effective Dates    Janell Erickson, MS CCC-SLP 06/16/21 -                 EDUCATION  The patient has been educated in the following areas:   Cognitive Impairment Communication Impairment Dysphagia (Swallowing Impairment).        SLP GOALS     Row Name 04/07/23 0900 04/06/23 0900          (LTG) Patient will demonstrate functional swallow for    Diet Texture (Demonstrate functional swallow) soft to chew (chopped) textures  -AL --     Liquid viscosity (Demonstrate  functional swallow) thin liquids  -AL --     Time Frame (Demonstrate functional swallow) by discharge  -AL --        (STG) Patient will tolerate trials of    Consistencies Trialed (Tolerate trials) thin liquids  by spoon  -AL --     Desired Outcome (Tolerate trials) without signs/symptoms of aspiration  -AL --     [place] (Tolerate trials) with 1:1 assist/ supervision  -AL --        (STG) Patient will tolerate therapeutic trials of    Consistencies Trialed (Tolerate therapeutic trials) mechanical ground textures;nectar/ mildly thick liquids  -AL --     Desired Outcome (Tolerate therapeutic trials) without signs/symptoms of aspiration  -AL --     [place] (Tolerate therapeutic trials) with 1:1 assist/ supervision  -AL --        (STG) Labial Strengthening Goal 1 (SLP)    Activity (Labial Strengthening Goal 1, SLP) increase labial tone  -AL --     Increase Labial Tone IOPI  -AL --     [place]/Accuracy (Labial Strengthening Goal 1, SLP) with moderate cues (50-74% accuracy)  -AL --        (STG) Pharyngeal Strengthening Exercise Goal 1 (SLP)    Activity (Pharyngeal Strengthening Goal 1, SLP) increase timing;increase superior movement of the hyolaryngeal complex;increase anterior movement of the hyolaryngeal complex;increase epiglottic inversion and retroflexion;increase closure at entrance to airway/closure of airway at glottis  -AL --     Increase Timing hard effortful swallow  -AL --     Increase Superior Movement of the Hyolaryngeal Complex hard effortful swallow  -AL --     Increase Anterior Movement of the Hyolaryngeal Complex EMST  -AL --     Increase Epiglottic Inversion and Retroflexion Mendelsohn;hard effortful swallow;EMST  -AL --     Increase Closure at Entrance to Airway/Closure of Airway at Glottis Mendelsohn;hard effortful swallow  -AL --     [place]/Accuracy (Pharyngeal Strengthening Goal 1, SLP) with moderate cues (50-74% accuracy)  -AL --     Time Frame (Pharyngeal Strengthening Goal 1,  SLP) 1 week  -AL --        Articulation Goal 1 (SLP)    Improve Articulation Goal 1 (SLP) -- by over-articulating at word level;by over-articulating at phrase level;by over-articulating in connected speech;80%;with minimal cues (75-90%)  -AL     Time Frame (Articulation Goal 1, SLP) -- 1 week  -AL        Attention Goal 1 (SLP)    Improve Attention by Goal 1 (SLP) -- complete selective attention task;complete sustained attention task;80%;independently (over 90% accuracy)  -AL     Time Frame (Attention Goal 1, SLP) -- 1 week  -AL        Memory Skills Goal 1 (SLP)    Improve Memory Skills Through Goal 1 (SLP) -- recalling related word lists with an imposed delay;listen to a paragraph and answer questions;recall details of the day;80%;independently (over 90% accuracy)  -AL     Time Frame (Memory Skills Goal 1, SLP) -- 1 week  -AL        Organizational Skills Goal 1 (SLP)    Improve Thought Organization Through Goal 1 (SLP) -- completing a divergent naming task;80%;with minimal cues (75-90%)  -AL     Time Frame (Thought Organization Skills Goal 1, SLP) -- 1 week  -AL        Reasoning Goal 1 (SLP)    Improve Reasoning Through Goal 1 (SLP) -- complete deductive reasoning task;80%;independently (over 90% accuracy)  -AL     Time Frame (Reasoning Goal 1, SLP) -- 1 week  -AL        Executive Functional Skills Goal 1 (SLP)    Improve Executive Function Skills Goal 1 (SLP) -- home management activity;80%;with minimal cues (75-90%)  -AL     Time Frame (Executive Function Skills Goal 1, SLP) -- 1 week  -AL           User Key  (r) = Recorded By, (t) = Taken By, (c) = Cosigned By    Initials Name Provider Type    Janell Erickson, MS CCC-SLP Speech and Language Pathologist                   Time Calculation:    Time Calculation- SLP     Row Name 04/07/23 1222             Time Calculation- SLP    SLP Start Time 0900  -AL      SLP Stop Time 0930  -AL      SLP Time Calculation (min) 30 min  -AL            User Key  (r) = Recorded  By, (t) = Taken By, (c) = Cosigned By    Initials Name Provider Type    Janell Erickson, MS CCC-SLP Speech and Language Pathologist                Therapy Charges for Today     Code Description Service Date Service Provider Modifiers Qty    [id number] HC ST STD COG PERF TEST PER HOUR 4/6/2023 Janell Gallego, MS CCC-SLP GN 2    [id number] HC ST EVAL ORAL PHARYNG SWALLOW 2 4/7/2023 Janell Gallego, MS CCC-SLP GN 1               Janell Gallego MS CCC-SLP  4/7/2023

## 2023-04-07 NOTE — THERAPY TREATMENT NOTE
Inpatient Rehabilitation - Occupational Therapy Treatment Note    [facility]     Patient Name: Melissa Gomez  : 1940  MRN: [id number]    Today's Date: 2023                 Admit Date: 2023         ICD-10-CM ICD-9-CM   1. Impaired functional mobility, balance, gait, and endurance  Z74.09 V49.89   2. Follow-up exam  Z09 V67.9       Patient Active Problem List   Diagnosis   • Infarction of left basal ganglia       Past Medical History:   Diagnosis Date   • GERD (gastroesophageal reflux disease)    • Hyperlipidemia    • Hypertension    • Stroke        Past Surgical History:   Procedure Laterality Date   • COLONOSCOPY     • HYSTERECTOMY     • TONSILLECTOMY               IRF OT ASSESSMENT FLOWSHEET (last 12 hours)     IRF OT Evaluation and Treatment     Row Name 23 1539 23 1223       OT Time and Intention    Document Type daily treatment  -KP daily treatment  -KP    Mode of Treatment individual therapy;occupational therapy  -KP individual therapy;occupational therapy  -KP    Patient Effort good  -KP good  -KP    Symptoms Noted During/After Treatment fatigue  -KP fatigue  -KP    Row Name 23 1539 23 1223       General Information    Patient/Family/Caregiver Comments/Observations pt sitting on commode in BR. nsg present  -KP pt supine in bed. slp in room  -KP    Existing Precautions/Restrictions fall  -KP fall  -KP    Row Name 23 1539 23 1223       Pain Assessment    Pretreatment Pain Rating 0/10 - no pain  -KP 0/10 - no pain  -KP    Posttreatment Pain Rating 0/10 - no pain  -KP 3/10  -KP    Pain Location - Side/Orientation -- Right  -KP    Pain Location -- upper  -    Pain Location - -- extremity  -KP    Row Name 23 1539 23 1223       Cognition/Psychosocial    Orientation Status (Cognition) oriented x 4  -KP oriented x 4  -KP    Follows Commands (Cognition) follows one-step commands;over 90% accuracy;verbal cues/prompting required  -KP follows one-step  commands;over 90% accuracy;verbal cues/prompting required  -    Personal Safety Interventions fall prevention program maintained;muscle strengthening facilitated;nonskid shoes/slippers when out of bed;gait belt  - fall prevention program maintained;gait belt;nonskid shoes/slippers when out of bed  -    Row Name 04/07/23 1223          Bathing    [place] Level (Bathing) bathing skills;upper body;set up;standby assist;lower body;moderate assist (50% patient effort)  -     Assistive Device (Bathing) grab bar/tub rail  -     Position (Bathing) sink side;supported sitting;supported standing  -     Set-up Assistance (Bathing) obtain supplies;open containers  -     Row Name 04/07/23 1223          Upper Body Dressing    [place] Level (Upper Body Dressing) upper body dressing skills;doff;don;pull over garment;set up assistance;verbal cues;minimum assist (75% or more patient effort)  -     Position (Upper Body Dressing) supported sitting  [facility]     Set-up Assistance (Upper Body Dressing) obtain clothing  -     Comment (Upper Body Dressing) pt fatigued after putting arms through arm holes. VC on technique to don R UE first and past elbow, needs  to pull past elbow  -     Row Name 04/07/23 1223          Lower Body Dressing    [place] Level (Lower Body Dressing) doff;don;pants/bottoms;shoes/slippers;socks;set up;dependent (less than 25% patient effort);maximum assist (25% patient effort)  -     Position (Lower Body Dressing) supported sitting;supported standing  [facility]     Set-up Assistance (Lower Body Dressing) obtain clothing  -     Row Name 04/07/23 1539 04/07/23 1223       Grooming    [place] Level (Grooming) grooming skills;wash face, hands;set up;standby assist  - grooming skills;deodorant application;oral care regimen;wash face, hands;set up;standby assist;hair care, combing/brushing;minimum assist (75% patient effort)  -    Position (Grooming) sink side;supported sitting  -  sink side;supported sitting  -    Set-up Assistance (Grooming) -- obtain supplies;open containers  -    Row Name 04/07/23 1539 04/07/23 1223       Toileting    [place] Level (Toileting) toileting skills;adjust/manage clothing;perform perineal hygiene;set up assistance;verbal cues;dependent (less than 25% patient effort)  - toileting skills;adjust/manage clothing;perform perineal hygiene;set up assistance;verbal cues;dependent (less than 25% patient effort)  -    Assistive Device Use (Toileting) grab bar/safety frame  - grab bar/safety frame  -    Position (Toileting) supported sitting;supported standing  - supported standing;supported sitting  -    Set-up Assistance (Toileting) change pad/brief;obtain supplies  - change pad/brief;obtain supplies  -    Row Name 04/07/23 1539 04/07/23 1223       Bed Mobility    Supine-Sit [place] (Bed Mobility) -- set up;moderate assist (50% patient effort);verbal cues  -    Comment, (Bed Mobility) in wc  -KP --    Row Name 04/07/23 1223          Bed-Chair Transfer    Bed-Chair [place] (Transfers) set up;verbal cues;minimum assist (75% patient effort);moderate assist (50% patient effort)  -     Assistive Device (Bed-Chair Transfers) wheelchair  -     Row Name 04/07/23 1539 04/07/23 1223       Sit-Stand Transfer    Sit-Stand [place] (Transfers) set up;minimum assist (75% patient effort);verbal cues  - set up;verbal cues;minimum assist (75% patient effort);moderate assist (50% patient effort)  once standing does better. sit to stand better from wc than bed. raised bed some to stand from bed  -    Assistive Device (Sit-Stand Transfers) wheelchair  - wheelchair  -    Row Name 04/07/23 1539 04/07/23 1223       Stand-Sit Transfer    Stand-Sit [place] (Transfers) set up;minimum assist (75% patient effort);verbal cues  - set up;verbal cues;minimum assist (75% patient effort)  -    Assistive Device (Stand-Sit Transfers) wheelchair   - wheelchair  -    Row Name 04/07/23 1539 04/07/23 1223       Toilet Transfer    Type (Toilet Transfer) sit-stand;stand-sit;stand pivot/stand step  - sit-stand;stand-sit  -    [place] Level (Toilet Transfer) set up;minimum assist (75% patient effort)  - set up;minimum assist (75% patient effort);verbal cues  -    Assistive Device (Toilet Transfer) wheelchair;grab bars/safety frame  - wheelchair;grab bars/safety frame  -    Row Name 04/07/23 1223          Shower Transfer    Comment, (Shower Transfer) est min /mod  -     Row Name 04/07/23 1539          Motor Skills    Results, 9 Hole Peg Test of Fine Motor Coordination pt completed reaching task for cones w R UE/hand and insert onto dowel john, attempted on table top but able to get one cone and very difficult. then placed john on stool and easier for pt to complete.  -     Row Name 04/07/23 1539          Shoulder (Therapeutic Exercise)    Shoulder (Therapeutic Exercise) other (see comments)  table slides 10x1  -     Row Name 04/07/23 1539          Elbow/Forearm (Therapeutic Exercise)    Elbow/Forearm (Therapeutic Exercise) AROM (active range of motion)  -     Elbow/Forearm AROM (Therapeutic Exercise) right;flexion;extension;supination;pronation;sitting;10 repetitions  -     Row Name 04/07/23 1539          Wrist (Therapeutic Exercise)    Wrist (Therapeutic Exercise) AROM (active range of motion)  -     Wrist AROM (Therapeutic Exercise) right;flexion;extension;10 repetitions  -     Row Name 04/07/23 1539          Hand (Therapeutic Exercise)    Hand (Therapeutic Exercise) AROM (active range of motion)  -     Hand AROM/AAROM (Therapeutic Exercise) right;AROM (active range of motion);finger flexion;finger extension;10 repetitions  -     Row Name 04/07/23 1539 04/07/23 1223       Balance    Static Sitting Balance standby assist  - standby assist  -    Dynamic Sitting Balance standby assist  - standby assist  -    Static Standing  Balance minimal assist  -KP minimal assist  -KP    Row Name 04/07/23 1539 04/07/23 1223       Positioning and Restraints    Pre-Treatment Position bathroom  -KP in bed  -KP    Post Treatment Position wheelchair  -KP wheelchair  -KP    In Wheelchair sitting;exit alarm on;with SLP  -KP sitting;exit alarm on;with PT  -KP    Row Name 04/07/23 1223          Daily Progress Summary (OT)    Overall Progress Toward Functional Goals (OT) progressing toward functional goals as expected  -KP           User Key  (r) = Recorded By, (t) = Taken By, (c) = Cosigned By    Initials Name Effective Dates    Patsy Rosen, OTR 06/16/21 -                  Occupational Therapy Education     Title: PT OT SLP Therapies (Done)     Topic: Occupational Therapy (Done)     Point: ADL training (Done)     Description:   Instruct learner(s) on proper safety adaptation and remediation techniques during self care or transfers.   Instruct in proper use of assistive devices.              Learning Progress Summary           Patient Acceptance, E,TB,D, VU,DU,NR by  at 4/6/2023 1230    Comment: ed pt on role of OT. benefit of therapy, POC w.  OT ed on safety w ADL and tsf. pt ed on UBD technique.                   Point: Home exercise program (Done)     Description:   Instruct learner(s) on appropriate technique for monitoring, assisting and/or progressing therapeutic exercises/activities.              Learning Progress Summary           Patient Acceptance, E,TB,D, VU,DU,NR by  at 4/6/2023 1230    Comment: ed pt on role of OT. benefit of therapy, POC w.  OT ed on safety w ADL and tsf. pt ed on UBD technique.                   Point: Precautions (Done)     Description:   Instruct learner(s) on prescribed precautions during self-care and functional transfers.              Learning Progress Summary           Patient Acceptance, E,TB,D, VU,DU,NR by  at 4/6/2023 1230    Comment: ed pt on role of OT. benefit of therapy, POC w.  OT ed on safety  w ADL and tsf. pt ed on UBD technique.                   Point: Body mechanics (Done)     Description:   Instruct learner(s) on proper positioning and spine alignment during self-care, functional mobility activities and/or exercises.              Learning Progress Summary           Patient Acceptance, E,TB,D, VU,DU,NR by  at 4/6/2023 1230    Comment: ed pt on role of OT. benefit of therapy, POC w.  OT ed on safety w ADL and tsf. pt ed on UBD technique.                               User Key     Initials Effective Dates Name Provider Type Discipline     06/16/21 -  Patsy Blank OTR Occupational Therapist OT                    OT Recommendation and Plan    Planned Therapy Interventions (OT): activity tolerance training, adaptive equipment training, BADL retraining, functional balance retraining, neuromuscular control/coordination retraining, occupation/activity based interventions, patient/caregiver education/training, ROM/therapeutic exercise, strengthening exercise, transfer/mobility retraining       Daily Progress Summary (OT)  Overall Progress Toward Functional Goals (OT): progressing toward functional goals as expected            Time Calculation:      Time Calculation- OT     Row Name 04/07/23 1545 04/07/23 1227          Time Calculation- OT    OT Start Time 1230  -KP 0930  -     OT Stop Time 1300  -KP 1000  -     OT Time Calculation (min) 30 min  - 30 min  -           User Key  (r) = Recorded By, (t) = Taken By, (c) = Cosigned By    Initials Name Provider Type     Patsy Blank OTR Occupational Therapist              Therapy Charges for Today     Code Description Service Date Service Provider Modifiers Qty    27696524183 HC OT SELF CARE/MGMT/TRAIN EA 15 MIN 4/6/2023 Patsy Blank OTR GO 2    49220696703 HC OT EVAL MOD COMPLEXITY 3 4/6/2023 Patsy Blank OTR GO 1    04552557778 HC OT SELF CARE/MGMT/TRAIN EA 15 MIN 4/7/2023 Patsy Blank OTR GO 2     44854602813 HC OT SELF CARE/MGMT/TRAIN EA 15 MIN 4/7/2023 Patsy Blank, OTR GO 1    80023233094 HC OT NEUROMUSC RE EDUCATION EA 15 MIN 4/7/2023 Patsy Blank, RIAN GO 1                   RIAN Barton  4/7/2023

## 2023-04-07 NOTE — THERAPY TREATMENT NOTE
Inpatient Rehabilitation - Speech Language Pathology Treatment Note    [facility]     Patient Name: Melissa Gomez  : 1940  MRN: [id number]    Today's Date: 2023                   Admit Date: 2023       Visit Dx:      ICD-10-CM ICD-9-CM   1. Impaired functional mobility, balance, gait, and endurance  Z74.09 V49.89   2. Follow-up exam  Z09 V67.9       Patient Active Problem List   Diagnosis   • Infarction of left basal ganglia       Past Medical History:   Diagnosis Date   • GERD (gastroesophageal reflux disease)    • Hyperlipidemia    • Hypertension    • Stroke        Past Surgical History:   Procedure Laterality Date   • COLONOSCOPY     • HYSTERECTOMY     • TONSILLECTOMY         SLP Recommendation and Plan                   Anticipated Discharge Disposition (SLP): home with OP services (23)     Therapy Frequency (Swallow): 5 days per week (23)  Predicted Duration Therapy Intervention (Days): until discharge (23)              Plan of Care Reviewed With: patient (23 1226)                SLP EVALUATION (last 72 hours)     SLP SLC Evaluation     Row Name 23 1300 23                Communication Assessment/Intervention    Document Type therapy note (daily note)  -AL evaluation  -AL       Subjective Information -- no complaints  -AL       Patient Observations -- alert;cooperative  -AL       Patient/Family/Caregiver Comments/Observations Pt upright in wheelchair. She reported her daughter assisted her at lunch, and she had no episodes of coughing/choking.  -AL Pt sitting upright in wheelchair.  -AL       Patient Effort -- good  -AL       Symptoms Noted During/After Treatment -- none  -AL          General Information    Patient Profile Reviewed -- yes  -AL       Pertinent History Of Current Problem -- Pt is an 82-year-old female admitted to inpatient rehab under diagnosis L Basal Ganglia Infarct/Right Sided Weakness with onset date 3/29/23. Pt  reports changes in speech, cognition and swallowing.  -AL       Precautions/Limitations, Vision -- WFL;for purposes of eval  typically wears reading glasses, but did not have them with her  -AL       Precautions/Limitations, Hearing -- WFL  -AL       Patient Level of Education -- Unknown  -AL       Prior Level of Function-Communication -- WFL  -AL       Plans/Goals Discussed with -- patient  -AL       Barriers to Rehab -- none identified  -AL       Patient's Goals for Discharge -- functional communication;functional cognition  regular diet  -AL       Standardized Assessment Used -- CLQT  -AL          Pain Scale: Numbers Pre/Post-Treatment    Pretreatment Pain Rating 0/10 - no pain  -AL 0/10 - no pain  -AL          Oral Musculature and Cranial Nerve Assessment    Oral Motor, Comment -- Right facial weakness, judged to be mild-moderately reduced. Lingual protrusion at midline. Lateralization appeared WNL. Vocal quality was clear.  -AL          Motor Speech Assessment/Intervention    Motor Speech, Comment -- Moderate mixed (flaccid/hypokinetic) dysarthria characterized by imprecise consonants and intermittent periods of rushed/mumbled speech with reduced vocal intensity. Pt judged to be 70% intelligible to an unfamiliar listener in complex conversation. Speech clarity noted to be reduced in afternoon session compared to morning session.  -AL          Cognitive Assessment Intervention- SLP    Cognition, Comment -- BIMS: Pt was oriented to month, year and RUTH. She recalled 3/3 unrelated words immediately and after short delay.  -AL          Standardized Tests    Cognitive/Memory Tests -- CLQT: Cognitive Linguistic Quick Test  -AL          CLQT (The Cognitive Linguistic Quick Test)    Attention Domain Score -- 154  -AL       Attention Severity Rating -- 3: Mild  -AL       Memory Domain Score -- 148  -AL       Memory Severity Rating -- 3: Mild  -AL       Executive Function Domain Score -- 14  -AL       Executive Function  Severity Rating -- 3: Mild  -AL       Language Domain Score -- 25  -AL       Language Severity Rating -- 3: Mild  -AL       Visuospatial Domain Score -- 66  -AL       Visuospatial Severity Rating -- 4: WNL  -AL       Clock Drawing Total Score -- 11  -AL       Clock Drawing Severity Rating -- WNL  -AL       Composite Severity Rating -- 3.4  -AL       Composite Severity Rating Range -- 3.4 - 2.5: Mild  -AL       CLQT Comments -- Pt scored an overall composite severity rating of 3.4/4.0, indicating a mild cognitive-communication deficit for her age. She scored WNL in the domains of memory and language and mildly impaired in the domains of attention, executive functions, and language. She scored below the criterion cut-off scores for her age on generative naming and mazes. She also exhibited mild difficulty with generative naming, story retelling, and symbol trails tasks. She exhibited relative strengths in stating personal facts, symbol cancellation, confrontation naming, and design memory. On clock draw task, she exhibited reduced spacing of numbers and spacing of hands of clock; however, she iliana hands to correct time. Pt also presented with moderate mixed (flaccid/hypokinetic) dysarthria. She is tolerating puree diet with NTL. Recommend continued speech therapy to address cognitive-communication skills, motor speech and swallowing.  -AL          SLP Evaluation Clinical Impressions    SLP Diagnosis -- mild;cognitive-linguistic disorder;moderate;dysarthria  -AL       SLP Diagnosis Comments -- Oropharyngeal dysphagia  -AL       Rehab Potential/Prognosis -- good  -AL       SLC Criteria for Skilled Therapy Interventions Met -- yes  -AL       Functional Impact -- functional impact in social situations;difficulty in expressing complex messages;needs 24 hour supervision;difficulty completing home management task  -AL          Recommendations    Therapy Frequency (SLP SLC) -- 2 times/day;5 days per week  -AL       Predicted  Duration Therapy Intervention (Days) -- until discharge  -AL       Anticipated Discharge Disposition (SLP) -- home with  services  -AL       SLC Diagnostic Follow-Up Needed -- --  Swallow evaluation  -AL          Communication Treatment Objective and Progress Goals (SLP)    Motor Speech/Dysarthria Treatment Objectives -- Motor Speech/Dysarthria Treatment Objectives (Group)  -AL       Cognitive Linguistic Treatment Objectives -- Cognitive Linguistic Treatment Objectives (Group)  -AL          Motor Speech/Dysarthria Treatment Objectives    Articulation Selection -- articulation, SLP goal 1  -AL          Articulation Goal 1 (SLP)    Improve Articulation Goal 1 (SLP) -- by over-articulating at word level;by over-articulating at phrase level;by over-articulating in connected speech;80%;with minimal cues (75-90%)  -AL       Time Frame (Articulation Goal 1, SLP) -- 1 week  -AL          Cognitive Linguistic Treatment Objectives    Attention Selection -- attention, SLP goal 1  -AL       Memory Skills Selection -- memory skills, SLP goal 1  -AL       Organizational Skills Selection -- organizational skills, SLP goal 1  -AL       Reasoning Selection -- reasoning, SLP goal 1  -AL       Executive Function Skills Selection -- executive function skills, SLP goal 1  -AL          Attention Goal 1 (SLP)    Improve Attention by Goal 1 (SLP) -- complete selective attention task;complete sustained attention task;80%;independently (over 90% accuracy)  -AL       Time Frame (Attention Goal 1, SLP) -- 1 week  -AL          Memory Skills Goal 1 (SLP)    Improve Memory Skills Through Goal 1 (SLP) -- recalling related word lists with an imposed delay;listen to a paragraph and answer questions;recall details of the day;80%;independently (over 90% accuracy)  -AL       Time Frame (Memory Skills Goal 1, SLP) -- 1 week  -AL          Organizational Skills Goal 1 (SLP)    Improve Thought Organization Through Goal 1 (SLP) -- completing a divergent  naming task;80%;with minimal cues (75-90%)  -AL       Time Frame (Thought Organization Skills Goal 1, SLP) -- 1 week  -AL          Reasoning Goal 1 (SLP)    Improve Reasoning Through Goal 1 (SLP) -- complete deductive reasoning task;80%;independently (over 90% accuracy)  -AL       Time Frame (Reasoning Goal 1, SLP) -- 1 week  -AL          Executive Functional Skills Goal 1 (SLP)    Improve Executive Function Skills Goal 1 (SLP) -- home management activity;80%;with minimal cues (75-90%)  -AL       Time Frame (Executive Function Skills Goal 1, SLP) -- 1 week  -AL             User Key  (r) = Recorded By, (t) = Taken By, (c) = Cosigned By    Initials Name Effective Dates    Janell Erickson, MS CCC-SLP 06/16/21 -                    EDUCATION    The patient has been educated in the following areas:       Cognitive Impairment Communication Impairment Dysphagia (Swallowing Impairment).             SLP GOALS     Row Name 04/07/23 1300 04/07/23 0900 04/06/23 0900       (LTG) Patient will demonstrate functional swallow for    Diet Texture (Demonstrate functional swallow) -- soft to chew (chopped) textures  -AL --    Liquid viscosity (Demonstrate functional swallow) -- thin liquids  -AL --    Time Frame (Demonstrate functional swallow) -- by discharge  -AL --       (STG) Patient will tolerate trials of    Consistencies Trialed (Tolerate trials) -- thin liquids  by spoon  -AL --    Desired Outcome (Tolerate trials) -- without signs/symptoms of aspiration  -AL --    [place] (Tolerate trials) -- with 1:1 assist/ supervision  -AL --       (STG) Patient will tolerate therapeutic trials of    Consistencies Trialed (Tolerate therapeutic trials) -- mechanical ground textures;nectar/ mildly thick liquids  -AL --    Desired Outcome (Tolerate therapeutic trials) -- without signs/symptoms of aspiration  -AL --    [place] (Tolerate therapeutic trials) -- with 1:1 assist/ supervision  -AL --    Progress/Outcomes (Tolerate  therapeutic trials) good progress toward goal  -AL -- --    Comment (Tolerate therapeutic trials) Pt exhibited no overt s/s of aspiration or penetration in 7/8 trials of NTL by cup with MIN cues for use of double swallow; delayed throat clear x1.  -AL -- --       (STG) Labial Strengthening Goal 1 (SLP)    Activity (Labial Strengthening Goal 1, SLP) -- increase labial tone  -AL --    Increase Labial Tone -- IOPI  -AL --    [place]/Accuracy (Labial Strengthening Goal 1, SLP) -- with moderate cues (50-74% accuracy)  -AL --       (STG) Pharyngeal Strengthening Exercise Goal 1 (SLP)    Activity (Pharyngeal Strengthening Goal 1, SLP) -- increase timing;increase superior movement of the hyolaryngeal complex;increase anterior movement of the hyolaryngeal complex;increase epiglottic inversion and retroflexion;increase closure at entrance to airway/closure of airway at glottis  -AL --    Increase Timing -- hard effortful swallow  -AL --    Increase Superior Movement of the Hyolaryngeal Complex -- hard effortful swallow  -AL --    Increase Anterior Movement of the Hyolaryngeal Complex -- EMST  -AL --    Increase Epiglottic Inversion and Retroflexion -- [place];hard effortful swallow;EMST  -AL --    Increase Closure at Entrance to Airway/Closure of Airway at Glottis -- [place];hard effortful swallow  -AL --    [place]/Accuracy (Pharyngeal Strengthening Goal 1, SLP) -- with moderate cues (50-74% accuracy)  -AL --    Time Frame (Pharyngeal Strengthening Goal 1, SLP) -- 1 week  -AL --    Progress/Outcomes (Pharyngeal Strengthening Goal 1, SLP) good progress toward goal  -AL -- --    Comment (Pharyngeal Strengthening Goal 1, SLP) Pt completed 20 repetitions of effortful swallow with sips of NTL by cup with overall MIN-MOD cues. Delayed swallow initiation (3-4 seconds) noted. Introduced EMST exercise. Pt was unable to open valve on [id number] device. With [id number] device, pt was able to complete 2 sets of 5 repetitions at  5 cmH20 plus 1 full turn. Pt exhibited fatigue as repetitions progressed.  -AL -- --       Articulation Goal 1 (SLP)    Improve Articulation Goal 1 (SLP) -- -- by over-articulating at word level;by over-articulating at phrase level;by over-articulating in connected speech;80%;with minimal cues (75-90%)  -AL    Time Frame (Articulation Goal 1, SLP) -- -- 1 week  -AL       Attention Goal 1 (SLP)    Improve Attention by Goal 1 (SLP) -- -- complete selective attention task;complete sustained attention task;80%;independently (over 90% accuracy)  -AL    Time Frame (Attention Goal 1, SLP) -- -- 1 week  -AL       Memory Skills Goal 1 (SLP)    Improve Memory Skills Through Goal 1 (SLP) -- -- recalling related word lists with an imposed delay;listen to a paragraph and answer questions;recall details of the day;80%;independently (over 90% accuracy)  -AL    Time Frame (Memory Skills Goal 1, SLP) -- -- 1 week  -AL       Organizational Skills Goal 1 (SLP)    Improve Thought Organization Through Goal 1 (SLP) -- -- completing a divergent naming task;80%;with minimal cues (75-90%)  -AL    Time Frame (Thought Organization Skills Goal 1, SLP) -- -- 1 week  -AL       Reasoning Goal 1 (SLP)    Improve Reasoning Through Goal 1 (SLP) -- -- complete deductive reasoning task;80%;independently (over 90% accuracy)  -AL    Time Frame (Reasoning Goal 1, SLP) -- -- 1 week  -AL       Executive Functional Skills Goal 1 (SLP)    Improve Executive Function Skills Goal 1 (SLP) -- -- home management activity;80%;with minimal cues (75-90%)  -AL    Time Frame (Executive Function Skills Goal 1, SLP) -- -- 1 week  -AL          User Key  (r) = Recorded By, (t) = Taken By, (c) = Cosigned By    Initials Name Provider Type    Janell Erickson MS CCC-SLP Speech and Language Pathologist                            Time Calculation:        Time Calculation- SLP     Row Name 04/07/23 1454 04/07/23 1222          Time Calculation- SLP    SLP Start Time 1300   -AL 0900  -AL     SLP Stop Time 1330  -AL 0930  -AL     SLP Time Calculation (min) 30 min  -AL 30 min  -AL           User Key  (r) = Recorded By, (t) = Taken By, (c) = Cosigned By    Initials Name Provider Type    Janell Erickson, MS CCC-SLP Speech and Language Pathologist                  Therapy Charges for Today     Code Description Service Date Service Provider Modifiers Qty    16669478865 HC ST STD COG PERF TEST PER HOUR 4/6/2023 Janell Gallego, MS CCC-SLP GN 2    27602630268 HC ST EVAL ORAL PHARYNG SWALLOW 2 4/7/2023 Janell Gallego, MS CCC-SLP GN 1    [id number] HC ST TREATMENT SWALLOW 2 4/7/2023 Janell Gallego, MS CCC-SLP GN 1                           Janell Gallego MS CCC-SLP  4/7/2023

## 2023-04-07 NOTE — PROGRESS NOTES
LOS: 2 days   Patient Care Team:  Jayro Fountain MD as PCP - General (Internal Medicine)      PIERRE MITCHELLGARCIA  1940    Diagnoses    1. Follow-up examination  2. IMPAIRED FUNCTIONAL MOBILITY, BALANCE, GAIT, AND ENDURANCE       ADMITTING DIAGNOSIS:  Left basal ganglia stroke  Right hemiparesis with impaired motor control        Subjective     Patient continues with right-sided weakness and impaired motor control.  Continues with urinary retention.    Objective     Vitals:    04/07/23 0522   BP: 129/74   Pulse: 83   Resp: 20   Temp: 97.8 °F (36.6 °C)   SpO2: 94%       PHYSICAL EXAM:     MENTAL STATUS -  AWAKE / ALERT  HEENT- NCAT,   SCLERAE ANICTERIC, CONJUNCTIVAE PINK, OP MOIST, NO JVD, EARS UNREMARKABLE EXTERNALLY  LUNGS - CTA, NO WHEEZES, RALES OR RHONCHI  HEART- RRR, NO RUB, MURMUR, OR GALLOP  ABD - NORMOACTIVE BOWEL SOUNDS, SOFT, NT. NO HEPATOSPLENOMEGALY APPRECIATED  EXT - NO EDEMA OR CYANOSIS  NEURO -awake alert.  Oriented.  Dysarthria  Occasional word hesitancy  Right hemiparesis with impaired motor control.  Right shoulder flexion 3/5, elbow flexion 4/5, finger flexion 4/5, knee extension 4/5, ankle dorsiflexion 4/5      MEDICATIONS  Scheduled Meds:amLODIPine, 10 mg, Oral, Q24H  vitamin C, 250 mg, Oral, Daily  aspirin, 81 mg, Oral, Daily  cholecalciferol, 1,000 Units, Oral, Daily  clopidogrel, 75 mg, Oral, Daily  enoxaparin, 40 mg, Subcutaneous, Q24H  lisinopril, 5 mg, Oral, Q24H  rosuvastatin, 20 mg, Oral, Nightly  senna-docusate sodium, 2 tablet, Oral, BID      Continuous Infusions:   PRN Meds:.•  acetaminophen **OR** [DISCONTINUED] acetaminophen  •  senna-docusate sodium **AND** polyethylene glycol **AND** bisacodyl **AND** bisacodyl      RESULTS  No results found for: POCGLU  Results from last 7 days   Lab Units 04/07/23  0833 04/06/23  0602   WBC 10*3/mm3 6.29 6.73   HEMOGLOBIN g/dL 14.0 14.3   HEMATOCRIT % 41.6 42.4   PLATELETS 10*3/mm3 175 177     Results from last 7 days   Lab Units  04/07/23  0833 04/06/23  0602   SODIUM mmol/L 140 141   POTASSIUM mmol/L 3.5 3.7   CHLORIDE mmol/L 105 107   CO2 mmol/L 25.6 24.3   BUN mg/dL 23 22   CREATININE mg/dL 0.59 0.71   CALCIUM mg/dL 9.2 9.6   GLUCOSE mg/dL 130* 157*      Latest Reference Range & Units 04/07/23 08:33   TSH Baseline 0.270 - 4.200 uIU/mL 3.100   Vitamin B-12 211 - 946 pg/mL 796   25 Hydroxy, Vitamin D 30.0 - 100.0 ng/ml 58.9         ASSESSMENT and PLAN    Infarction of left basal ganglia    Left basal ganglia stroke  Stroke prophylaxis- ASA and Plavix x 21 days from March 30, 2023, then ASA 81 mg daily. Crestor 20 mg.    Right hemiparesis with impaired motor control  Dysphagia   Dysarthria.    Hypertension-amlodipine/lisinopril    Check TSH, Vit B12 and Vit D levels-within normal limit    Urinary retention. Treated for UTI as Reggie Duran. Check bladder scan PVR and cath if >300 cc until three consecutive < 250 cc  April 7-requires intermittent straight cath 400-113-300 cc.  No spontaneous void.  Follow pattern.        DVT prophylaxis-on dual antiplatelet therapies.  SCDs.  Lovenox    admit for comprehensive acute inpatient rehabilitation .  This would be an interdisciplinary program with physical therapy 1.5 hour,  occupational therapy 1.5 hour,  5 days a week. Rehabilitation nursing for carryover, monitoring of medical   status, bowel and bladder, and skin  Ongoing physician follow-up.  Weekly team conferences.   Goal is for home with occupational, physical, and speech   therapies.  Barrier to discharge: ADLs, independence, cognition, safety - work on strength, transfers, cognition to overcome.       Anton Fajardo MD      During rounds, used appropriate personal protective equipment including mask and gloves.  Additional gown if indicated.  Mask used was standard procedure mask. Appropriate PPE was worn during the entire visit.  Hand hygiene was completed before and after.

## 2023-04-07 NOTE — PROGRESS NOTES
Case Management  Inpatient Rehabilitation Plan of Care and Discharge Plan Note    Rehabilitation Diagnosis:  Branch  Date of Onset:  Branch    Medical Summary:  Branch  Past Medical History: Branch    Plan of Care  Updated Problems/Interventions  Field    Expected Intensity:  Branch  Interdisciplinary Team:  Branch  Estimated Length of Stay/Anticipated Discharge Date: Branch  Anticipated Discharge Destination:  Anticipated discharge destination from inpatient rehabilitation is community  discharge with assistance. Patient plans to d/c home with home health and  possible private caregivers. Daughter can assist intermittently.      Based on the patient's medical and functional status, their prognosis and  expected level of functional improvement is:  Branch    Signed by: Yue Robert, Social Work

## 2023-04-08 PROCEDURE — 92507 TX SP LANG VOICE COMM INDIV: CPT

## 2023-04-08 PROCEDURE — 92526 ORAL FUNCTION THERAPY: CPT

## 2023-04-08 PROCEDURE — 97110 THERAPEUTIC EXERCISES: CPT

## 2023-04-08 PROCEDURE — 97112 NEUROMUSCULAR REEDUCATION: CPT

## 2023-04-08 PROCEDURE — 97530 THERAPEUTIC ACTIVITIES: CPT

## 2023-04-08 PROCEDURE — 25010000002 ENOXAPARIN PER 10 MG: Performed by: PHYSICAL MEDICINE & REHABILITATION

## 2023-04-08 PROCEDURE — 97535 SELF CARE MNGMENT TRAINING: CPT

## 2023-04-08 RX ADMIN — OXYCODONE HYDROCHLORIDE AND ACETAMINOPHEN 250 MG: 500 TABLET ORAL at 07:18

## 2023-04-08 RX ADMIN — DOCUSATE SODIUM 50MG AND SENNOSIDES 8.6MG 2 TABLET: 8.6; 5 TABLET, FILM COATED ORAL at 07:17

## 2023-04-08 RX ADMIN — LISINOPRIL 5 MG: 5 TABLET ORAL at 07:18

## 2023-04-08 RX ADMIN — DOCUSATE SODIUM 50MG AND SENNOSIDES 8.6MG 2 TABLET: 8.6; 5 TABLET, FILM COATED ORAL at 22:23

## 2023-04-08 RX ADMIN — AMLODIPINE BESYLATE 10 MG: 10 TABLET ORAL at 07:17

## 2023-04-08 RX ADMIN — ROSUVASTATIN CALCIUM 20 MG: 20 TABLET, FILM COATED ORAL at 22:23

## 2023-04-08 RX ADMIN — CLOPIDOGREL BISULFATE 75 MG: 75 TABLET, FILM COATED ORAL at 07:17

## 2023-04-08 RX ADMIN — ASPIRIN 81 MG: 81 TABLET, CHEWABLE ORAL at 07:17

## 2023-04-08 RX ADMIN — ACETAMINOPHEN 650 MG: 325 TABLET, FILM COATED ORAL at 07:38

## 2023-04-08 RX ADMIN — ACETAMINOPHEN 650 MG: 325 TABLET, FILM COATED ORAL at 22:23

## 2023-04-08 RX ADMIN — ENOXAPARIN SODIUM 40 MG: 100 INJECTION SUBCUTANEOUS at 11:44

## 2023-04-08 RX ADMIN — Medication 1000 UNITS: at 07:17

## 2023-04-08 NOTE — THERAPY TREATMENT NOTE
Inpatient Rehabilitation - Occupational Therapy Treatment Note    [facility]     Patient Name: Melissa Gomez  : 1940  MRN: [id number]    Today's Date: 2023                 Admit Date: 2023         ICD-10-CM ICD-9-CM   1. Impaired functional mobility, balance, gait, and endurance  Z74.09 V49.89   2. Follow-up exam  Z09 V67.9       Patient Active Problem List   Diagnosis   • Infarction of left basal ganglia       Past Medical History:   Diagnosis Date   • GERD (gastroesophageal reflux disease)    • Hyperlipidemia    • Hypertension    • Stroke        Past Surgical History:   Procedure Laterality Date   • COLONOSCOPY     • HYSTERECTOMY     • TONSILLECTOMY               IRF OT ASSESSMENT FLOWSHEET (last 12 hours)     IRF OT Evaluation and Treatment     Row Name 23 1400 23 1200       OT Time and Intention    Document Type daily treatment  -KN daily treatment  -KN    Mode of Treatment individual therapy;occupational therapy  -KN individual therapy;occupational therapy  -KN    Patient Effort fair  -KN fair  -KN    Symptoms Noted During/After Treatment fatigue  -KN fatigue  -KN    Row Name 23 1400 23 1200       General Information    Patient Profile Reviewed yes  -KN yes  -KN    Patient/Family/Caregiver Comments/Observations from nursing  -KN from PT  -KN    Existing Precautions/Restrictions fall  -KN fall  -KN    Row Name 23 1200          Basic Activities of Daily Living (BADLs)    Basic Activities of Daily Living other (see comments)  refused  -KN     Row Name 23 1400          Lower Body Dressing    Charles Level (Lower Body Dressing) doff;don;pants/bottoms;shoes/slippers;socks;set up;dependent (less than 25% patient effort);maximum assist (25% patient effort)  -KN     Row Name 23 1400          Stand-Sit Transfer    Stand-Sit Charles (Transfers) set up;minimum assist (75% patient effort);verbal cues  -KN     Assistive Device (Stand-Sit Transfers)  wheelchair  -     Row Name 04/08/23 1400          Toilet Transfer    Type (Toilet Transfer) sit-stand;stand-sit;stand pivot/stand step  -KN     Hernando Level (Toilet Transfer) set up;minimum assist (75% patient effort)  -KN     Assistive Device (Toilet Transfer) wheelchair;grab bars/safety frame  -     Row Name 04/08/23 1200          Shoulder (Therapeutic Exercise)    Shoulder (Therapeutic Exercise) AROM (active range of motion)  -KN     Shoulder AROM (Therapeutic Exercise) right;flexion;external rotation;internal rotation  table slides  -KN     Shoulder AAROM (Therapeutic Exercise) --  tables slides  -     Row Name 04/08/23 1200          Elbow/Forearm (Therapeutic Exercise)    Elbow/Forearm (Therapeutic Exercise) AROM (active range of motion)  -KN     Elbow/Forearm AROM (Therapeutic Exercise) right;flexion;supination;pronation  -KN           User Key  (r) = Recorded By, (t) = Taken By, (c) = Cosigned By    Initials Name Effective Dates    KN Jose Alexandre OT 08/02/22 -                  Occupational Therapy Education     Title: PT OT SLP Therapies (Done)     Topic: Occupational Therapy (Done)     Point: ADL training (Done)     Description:   Instruct learner(s) on proper safety adaptation and remediation techniques during self care or transfers.   Instruct in proper use of assistive devices.              Learning Progress Summary           Patient Acceptance, E, VU by LS at 4/8/2023 1204    Acceptance, E,TB,D, VU,DU,NR by  at 4/6/2023 1230    Comment: ed pt on role of OT. benefit of therapy, POC w.  OT ed on safety w ADL and tsf. pt ed on UBD technique.                   Point: Home exercise program (Done)     Description:   Instruct learner(s) on appropriate technique for monitoring, assisting and/or progressing therapeutic exercises/activities.              Learning Progress Summary           Patient Acceptance, E, VU by LS at 4/8/2023 1204    Acceptance, E,TB,D, VU,DU,NR by  at 4/6/2023 1230     Comment: ed pt on role of OT. benefit of therapy, POC w.  OT ed on safety w ADL and tsf. pt ed on UBD technique.                   Point: Precautions (Done)     Description:   Instruct learner(s) on prescribed precautions during self-care and functional transfers.              Learning Progress Summary           Patient Acceptance, E, VU by  at 4/8/2023 1204    Acceptance, E,TB,D, VU,DU,NR by  at 4/6/2023 1230    Comment: ed pt on role of OT. benefit of therapy, POC w.  OT ed on safety w ADL and tsf. pt ed on UBD technique.                   Point: Body mechanics (Done)     Description:   Instruct learner(s) on proper positioning and spine alignment during self-care, functional mobility activities and/or exercises.              Learning Progress Summary           Patient Acceptance, E, VU by  at 4/8/2023 1204    Acceptance, E,TB,D, VU,DU,NR by  at 4/6/2023 1230    Comment: ed pt on role of OT. benefit of therapy, POC w.  OT ed on safety w ADL and tsf. pt ed on UBD technique.                               User Key     Initials Effective Dates Name Provider Type Discipline     06/16/21 -  Patsy Blnak OTR Occupational Therapist OT     06/16/21 -  Yamila Wheat MS CCC-SLP Speech and Language Pathologist SLP                    OT Recommendation and Plan                         Time Calculation:      Time Calculation- OT     Row Name 04/08/23 1407 04/08/23 1219          Time Calculation- OT    OT Start Time 1300  -KN 1100  -KN     OT Stop Time 1320  -KN 1130  -KN     OT Time Calculation (min) 20 min  -KN 30 min  -KN        Timed Charges    51536 -  OT Neuromuscular Reeducation Minutes -- 30  -KN     60955 - OT Self Care/Mgmt Minutes 20  -KN --        Total Minutes    Timed Charges Total Minutes 20  -KN 30  -KN      Total Minutes 20  -KN 30  -KN           User Key  (r) = Recorded By, (t) = Taken By, (c) = Cosigned By    Initials Name Provider Type    Jose Mcgovern OT Occupational Therapist               Therapy Charges for Today     Code Description Service Date Service Provider Modifiers Qty    05872805840 HC OT NEUROMUSC RE EDUCATION EA 15 MIN 4/8/2023 Jose Alexandre OT GO 2    29139211061 HC OT SELF CARE/MGMT/TRAIN EA 15 MIN 4/8/2023 Jose Alexandre OT GO 1                   Jose Alexandre OT  4/8/2023

## 2023-04-08 NOTE — THERAPY TREATMENT NOTE
Inpatient Rehabilitation - Physical Therapy Treatment Note       [facility]     Patient Name: Melissa Gomez  : 1940  MRN: [id number]    Today's Date: 2023                    Admit Date: 2023      Visit Dx:     ICD-10-CM ICD-9-CM   1. Impaired functional mobility, balance, gait, and endurance  Z74.09 V49.89   2. Follow-up exam  Z09 V67.9       Patient Active Problem List   Diagnosis   • Infarction of left basal ganglia       Past Medical History:   Diagnosis Date   • GERD (gastroesophageal reflux disease)    • Hyperlipidemia    • Hypertension    • Stroke        Past Surgical History:   Procedure Laterality Date   • COLONOSCOPY     • HYSTERECTOMY     • TONSILLECTOMY         PT ASSESSMENT (last 12 hours)     IRF PT Evaluation and Treatment     Row Name 23 1015          PT Time and Intention    Document Type daily treatment  -EE     Mode of Treatment physical therapy;individual therapy  -EE     Patient/Family/Caregiver Comments/Observations Pt sitting up in WC, reports fatigue and states she didn't sleep well last night, but agreeable to PT.  -EE     Row Name 23 1015          General Information    Existing Precautions/Restrictions fall  -EE     Row Name 23 1015          Pain Assessment    Pretreatment Pain Rating 4/10  -EE     Posttreatment Pain Rating 4/10  -EE     Pain Location - Side/Orientation Right  -EE     Pain Location upper  -EE     Pain Location - extremity  -EE     Pre/Posttreatment Pain Comment pt repositioned for comfort during session  -EE     Row Name 23 1015          Cognition/Psychosocial    Affect/Mental Status (Cognition) WFL  -EE     Orientation Status (Cognition) oriented x 4  -EE     Follows Commands (Cognition) follows one-step commands;over 90% accuracy;verbal cues/prompting required  -EE     Personal Safety Interventions fall prevention program maintained;gait belt;muscle strengthening facilitated;nonskid shoes/slippers when out of bed;supervised  activity  -EE     Cognitive Function attention deficit;safety deficit  -EE     Attention Deficit (Cognition) minimal deficit;distractible in noisy environment;alternating attention  -EE     Safety Deficit (Cognition) minimal deficit;insight into deficits/self-awareness;safety precautions awareness  -EE     Row Name 04/08/23 1015          Bed Mobility    Supine-Sit Magna (Bed Mobility) moderate assist (50% patient effort);verbal cues  -EE     Sit-Supine Magna (Bed Mobility) moderate assist (50% patient effort);verbal cues  -EE     Bed Mobility, Safety Issues decreased use of arms for pushing/pulling;decreased use of legs for bridging/pushing;impaired trunk control for bed mobility  -EE     Comment, (Bed Mobility) mat mobility  -EE     Row Name 04/08/23 1015          Bed-Chair Transfer    Bed-Chair Magna (Transfers) minimum assist (75% patient effort);verbal cues  -EE     Assistive Device (Bed-Chair Transfers) wheelchair  -EE     Comment, (Bed-Chair Transfer) stand pivot EOM > WC  -EE     Row Name 04/08/23 1015          Sit-Stand Transfer    Sit-Stand Magna (Transfers) minimum assist (75% patient effort);verbal cues  -EE     Assistive Device (Sit-Stand Transfers) walker, front-wheeled;wheelchair  -EE     Comment, (Sit-Stand Transfer) cues for sequencing  -EE     Row Name 04/08/23 1015          Stand-Sit Transfer    Stand-Sit Magna (Transfers) minimum assist (75% patient effort);verbal cues  -EE     Assistive Device (Stand-Sit Transfers) walker, front-wheeled;wheelchair  -EE     Comment, (Stand-Sit Transfer) cues for sequencing and hand placement  -EE     Row Name 04/08/23 1015          Gait/Stairs (Locomotion)    Magna Level (Gait) minimum assist (75% patient effort);verbal cues  -EE     Assistive Device (Gait) walker, front-wheeled  -EE     Distance in Feet (Gait) 80' x 2  -EE     Pattern (Gait) step-through  -EE     Deviations/Abnormal Patterns (Gait) base of support,  narrow;lenard decreased;stride length decreased;weight shifting decreased;festinating/shuffling  -EE     Bilateral Gait Deviations forward flexed posture;heel strike decreased  -EE     Right Sided Gait Deviations foot drop/toe drag  increased R foot drag noted with fatigue  -EE     Row Name 04/08/23 1015          Safety Issues, Functional Mobility    Impairments Affecting Function (Mobility) balance;endurance/activity tolerance;postural/trunk control;strength  -EE     Row Name 04/08/23 1015          Hip (Therapeutic Exercise)    Hip (Therapeutic Exercise) AROM (active range of motion)  -EE     Hip AROM (Therapeutic Exercise) supine;bilateral;aBduction;aDduction;flexion;extension;10 repetitions  B LEs on powder board; intermittent assist required R LE due to fatigue and weakness  -EE     Hip Strengthening (Therapeutic Exercise) bilateral;marching while seated;aBduction;aDduction;10 repetitions  -EE     Row Name 04/08/23 1015          Knee (Therapeutic Exercise)    Knee Strengthening (Therapeutic Exercise) bilateral;LAQ (long arc quad);10 repetitions  -EE     Row Name 04/08/23 1015          Ankle (Therapeutic Exercise)    Ankle Strengthening (Therapeutic Exercise) bilateral;dorsiflexion;plantarflexion;10 repetitions  -EE     Row Name 04/08/23 1015          Positioning and Restraints    Pre-Treatment Position sitting in chair/recliner  -EE     Post Treatment Position wheelchair  -EE     In Wheelchair sitting;exit alarm on;with OT  -EE           User Key  (r) = Recorded By, (t) = Taken By, (c) = Cosigned By    Initials Name Provider Type    EE Diann Gonzáles, PT Physical Therapist                 Physical Therapy Education     Title: PT OT SLP Therapies (Done)     Topic: Physical Therapy (Done)     Point: Mobility training (Done)     Learning Progress Summary           Patient Acceptance, E, VU by LS at 4/8/2023 1204    Acceptance, E,TB, VU,NR by EE at 4/7/2023 1054    Acceptance, E,TB, VU by KP at 4/6/2023 1528                    Point: Home exercise program (Done)     Learning Progress Summary           Patient Acceptance, E, VU by LS at 4/8/2023 1204    Acceptance, E,TB, VU,NR by EE at 4/7/2023 1054    Acceptance, E,TB, VU by KP at 4/6/2023 1527                               User Key     Initials Effective Dates Name Provider Type Discipline    LS 06/16/21 -  Yamila Wheat MS CCC-SLP Speech and Language Pathologist SLP    EE 06/16/21 -  Diann Gonzáles, PT Physical Therapist PT    KRISH 06/16/21 -  Chen Centeno PT Physical Therapist PT                PT Recommendation and Plan                          Time Calculation:      PT Charges     Row Name 04/08/23 1545             Time Calculation    Start Time 1000  -EE      Stop Time 1100  -EE      Time Calculation (min) 60 min  -EE      PT Received On 04/08/23  -EE      PT - Next Appointment 04/10/23  -EE         Time Calculation- PT    Total Timed Code Minutes- PT 60 minute(s)  -EE            User Key  (r) = Recorded By, (t) = Taken By, (c) = Cosigned By    Initials Name Provider Type    EE Diann Gonzáles, PT Physical Therapist                Therapy Charges for Today     Code Description Service Date Service Provider Modifiers Qty    81875776406 HC PT THERAPEUTIC ACT EA 15 MIN 4/7/2023 Diann Gonzáles, PT GP 4    50676965637 HC PT THER PROC EA 15 MIN 4/8/2023 Diann Gonzáles, PT GP 2    31796348335 HC PT THERAPEUTIC ACT EA 15 MIN 4/8/2023 Diann Gonzáles, PT GP 2    94206350180 HC PT THER SUPP EA 15 MIN 4/8/2023 Diann Gonzáles, PT GP 2                   Diann Gonzáles PT  4/8/2023

## 2023-04-08 NOTE — PROGRESS NOTES
LOS: 3 days   Patient Care Team:  Jayro Fountain MD as PCP - General (Internal Medicine)      PIERRE MITCHELLGARCIA  1940    Diagnoses    1. Follow-up examination  2. IMPAIRED FUNCTIONAL MOBILITY, BALANCE, GAIT, AND ENDURANCE       ADMITTING DIAGNOSIS:  Left basal ganglia stroke  Right hemiparesis with impaired motor control        Subjective     Patient continues with right-sided weakness and impaired motor control.  Continues with urinary retention.    Objective     Vitals:    04/08/23 1323   BP: 104/63   Pulse: 88   Resp:    Temp: 97.4 °F (36.3 °C)   SpO2: 93%       PHYSICAL EXAM:     MENTAL STATUS -  AWAKE / ALERT  HEENT- NCAT,   SCLERAE ANICTERIC, CONJUNCTIVAE PINK, OP MOIST, NO JVD, EARS UNREMARKABLE EXTERNALLY  LUNGS - CTA, NO WHEEZES, RALES OR RHONCHI  HEART- RRR, NO RUB, MURMUR, OR GALLOP  ABD - NORMOACTIVE BOWEL SOUNDS, SOFT, NT. NO HEPATOSPLENOMEGALY APPRECIATED  EXT - NO EDEMA OR CYANOSIS  NEURO -awake alert.  Oriented.  Dysarthria  Occasional word hesitancy  Right hemiparesis with impaired motor control.  Right shoulder flexion 3/5, elbow flexion 4/5, finger flexion 4/5, knee extension 4/5, ankle dorsiflexion 4/5      MEDICATIONS  Scheduled Meds:amLODIPine, 10 mg, Oral, Q24H  vitamin C, 250 mg, Oral, Daily  aspirin, 81 mg, Oral, Daily  cholecalciferol, 1,000 Units, Oral, Daily  clopidogrel, 75 mg, Oral, Daily  enoxaparin, 40 mg, Subcutaneous, Q24H  lisinopril, 5 mg, Oral, Q24H  rosuvastatin, 20 mg, Oral, Nightly  senna-docusate sodium, 2 tablet, Oral, BID      Continuous Infusions:   PRN Meds:.•  acetaminophen **OR** [DISCONTINUED] acetaminophen  •  senna-docusate sodium **AND** polyethylene glycol **AND** bisacodyl **AND** bisacodyl      RESULTS  No results found for: POCGLU  Results from last 7 days   Lab Units 04/07/23  0833 04/06/23  0602   WBC 10*3/mm3 6.29 6.73   HEMOGLOBIN g/dL 14.0 14.3   HEMATOCRIT % 41.6 42.4   PLATELETS 10*3/mm3 175 177     Results from last 7 days   Lab Units  04/07/23  0833 04/06/23  0602   SODIUM mmol/L 140 141   POTASSIUM mmol/L 3.5 3.7   CHLORIDE mmol/L 105 107   CO2 mmol/L 25.6 24.3   BUN mg/dL 23 22   CREATININE mg/dL 0.59 0.71   CALCIUM mg/dL 9.2 9.6   GLUCOSE mg/dL 130* 157*      Latest Reference Range & Units 04/07/23 08:33   TSH Baseline 0.270 - 4.200 uIU/mL 3.100   Vitamin B-12 211 - 946 pg/mL 796   25 Hydroxy, Vitamin D 30.0 - 100.0 ng/ml 58.9         ASSESSMENT and PLAN    Infarction of left basal ganglia    Left basal ganglia stroke  Stroke prophylaxis- ASA and Plavix x 21 days from March 30, 2023, then ASA 81 mg daily. Crestor 20 mg.    Right hemiparesis with impaired motor control  Dysphagia   Dysarthria.    Hypertension-amlodipine/lisinopril    Check TSH, Vit B12 and Vit D levels-within normal limit    Urinary retention. Treated for UTI as Reggie Duran. Check bladder scan PVR and cath if >300 cc until three consecutive < 250 cc  April 7-requires intermittent straight cath 400-113-300 cc.  No spontaneous void.  Follow pattern.        DVT prophylaxis-on dual antiplatelet therapies.  SCDs.  Lovenox    admit for comprehensive acute inpatient rehabilitation .  This would be an interdisciplinary program with physical therapy 1.5 hour,  occupational therapy 1.5 hour,  5 days a week. Rehabilitation nursing for carryover, monitoring of medical   status, bowel and bladder, and skin  Ongoing physician follow-up.  Weekly team conferences.   Goal is for home with occupational, physical, and speech   therapies.  Barrier to discharge: ADLs, independence, cognition, safety - work on strength, transfers, cognition to overcome.       Dorian Escalante MD      During rounds, used appropriate personal protective equipment including mask and gloves.  Additional gown if indicated.  Mask used was standard procedure mask. Appropriate PPE was worn during the entire visit.  Hand hygiene was completed before and after.

## 2023-04-08 NOTE — PLAN OF CARE
Goal Outcome Evaluation:              Outcome Evaluation: Stroke. NIH=2. A&OX4. Forgetful. R. facial droop. Slurred speech. Diet: pureed, NTL. Upright and one-on-one supervision for all meals. Tylenol for HA. Incontinent and continent B&B. Last BM 4/6. Bladder scan q 6 hours. Cath. if greater than 300 ml. Meds crushed in AS. Full code. 2L O2 at nite. Labs today. Carolann area excoriated. Barrier cream applied. Wears a brief. Participated fully in therapy. Calm, cooperative, and pleasant. Assistx2 to wheelchair.

## 2023-04-08 NOTE — THERAPY TREATMENT NOTE
Inpatient Rehabilitation - Speech Language Pathology Treatment Note    [facility]     Patient Name: Melissa Gomez  : 1940  MRN: [id number]    Today's Date: 2023                   Admit Date: 2023       Visit Dx:      ICD-10-CM ICD-9-CM   1. Impaired functional mobility, balance, gait, and endurance  Z74.09 V49.89   2. Follow-up exam  Z09 V67.9       Patient Active Problem List   Diagnosis   • Infarction of left basal ganglia       Past Medical History:   Diagnosis Date   • GERD (gastroesophageal reflux disease)    • Hyperlipidemia    • Hypertension    • Stroke        Past Surgical History:   Procedure Laterality Date   • COLONOSCOPY     • HYSTERECTOMY     • TONSILLECTOMY         SLP Recommendation and Plan                                                            SLP EVALUATION (last 72 hours)     SLP SLC Evaluation     Row Name 23 1300 23 0900                Communication Assessment/Intervention    Document Type therapy note (daily note)  -AL evaluation  -AL       Subjective Information -- no complaints  -AL       Patient Observations -- alert;cooperative  -AL       Patient/Family/Caregiver Comments/Observations Pt upright in wheelchair. She reported her daughter assisted her at lunch, and she had no episodes of coughing/choking.  -AL Pt sitting upright in wheelchair.  -AL       Patient Effort -- good  -AL       Symptoms Noted During/After Treatment -- none  -AL          General Information    Patient Profile Reviewed -- yes  -AL       Pertinent History Of Current Problem -- Pt is an 82-year-old female admitted to inpatient rehab under diagnosis L Basal Ganglia Infarct/Right Sided Weakness with onset date 3/29/23. Pt reports changes in speech, cognition and swallowing.  -AL       Precautions/Limitations, Vision -- WFL;for purposes of eval  typically wears reading glasses, but did not have them with her  -AL       Precautions/Limitations, Hearing -- WFL  -AL       Patient Level of  Education -- Unknown  -AL       Prior Level of Function-Communication -- WFL  -AL       Plans/Goals Discussed with -- patient  -AL       Barriers to Rehab -- none identified  -AL       Patient's Goals for Discharge -- functional communication;functional cognition  regular diet  -AL       Standardized Assessment Used -- CLQT  -AL          Pain Scale: Numbers Pre/Post-Treatment    Pretreatment Pain Rating 0/10 - no pain  -AL 0/10 - no pain  -AL          Oral Musculature and Cranial Nerve Assessment    Oral Motor, Comment -- Right facial weakness, judged to be mild-moderately reduced. Lingual protrusion at midline. Lateralization appeared WNL. Vocal quality was clear.  -AL          Motor Speech Assessment/Intervention    Motor Speech, Comment -- Moderate mixed (flaccid/hypokinetic) dysarthria characterized by imprecise consonants and intermittent periods of rushed/mumbled speech with reduced vocal intensity. Pt judged to be 70% intelligible to an unfamiliar listener in complex conversation. Speech clarity noted to be reduced in afternoon session compared to morning session.  -AL          Cognitive Assessment Intervention- SLP    Cognition, Comment -- BIMS: Pt was oriented to month, year and RUTH. She recalled 3/3 unrelated words immediately and after short delay.  -AL          Standardized Tests    Cognitive/Memory Tests -- CLQT: Cognitive Linguistic Quick Test  -AL          CLQT (The Cognitive Linguistic Quick Test)    Attention Domain Score -- 154  -AL       Attention Severity Rating -- 3: Mild  -AL       Memory Domain Score -- 148  -AL       Memory Severity Rating -- 3: Mild  -AL       Executive Function Domain Score -- 14  -AL       Executive Function Severity Rating -- 3: Mild  -AL       Language Domain Score -- 25  -AL       Language Severity Rating -- 3: Mild  -AL       Visuospatial Domain Score -- 66  -AL       Visuospatial Severity Rating -- 4: WNL  -AL       Clock Drawing Total Score -- 11  -AL       Clock  Drawing Severity Rating -- WNL  -AL       Composite Severity Rating -- 3.4  -AL       Composite Severity Rating Range -- 3.4 - 2.5: Mild  -AL       CLQT Comments -- Pt scored an overall composite severity rating of 3.4/4.0, indicating a mild cognitive-communication deficit for her age. She scored WNL in the domains of memory and language and mildly impaired in the domains of attention, executive functions, and language. She scored below the criterion cut-off scores for her age on generative naming and mazes. She also exhibited mild difficulty with generative naming, story retelling, and symbol trails tasks. She exhibited relative strengths in stating personal facts, symbol cancellation, confrontation naming, and design memory. On clock draw task, she exhibited reduced spacing of numbers and spacing of hands of clock; however, she iliana hands to correct time. Pt also presented with moderate mixed (flaccid/hypokinetic) dysarthria. She is tolerating puree diet with NTL. Recommend continued speech therapy to address cognitive-communication skills, motor speech and swallowing.  -AL          SLP Evaluation Clinical Impressions    SLP Diagnosis -- mild;cognitive-linguistic disorder;moderate;dysarthria  -AL       SLP Diagnosis Comments -- Oropharyngeal dysphagia  -AL       Rehab Potential/Prognosis -- good  -AL       SLC Criteria for Skilled Therapy Interventions Met -- yes  -AL       Functional Impact -- functional impact in social situations;difficulty in expressing complex messages;needs 24 hour supervision;difficulty completing home management task  -AL          Recommendations    Therapy Frequency (SLP SLC) -- 2 times/day;5 days per week  -AL       Predicted Duration Therapy Intervention (Days) -- until discharge  -AL       Anticipated Discharge Disposition (SLP) -- home with  services  -AL       SLC Diagnostic Follow-Up Needed -- --  Swallow evaluation  -AL          Communication Treatment Objective and Progress  Goals (SLP)    Motor Speech/Dysarthria Treatment Objectives -- Motor Speech/Dysarthria Treatment Objectives (Group)  -AL       Cognitive Linguistic Treatment Objectives -- Cognitive Linguistic Treatment Objectives (Group)  -AL          Motor Speech/Dysarthria Treatment Objectives    Articulation Selection -- articulation, SLP goal 1  -AL          Articulation Goal 1 (SLP)    Improve Articulation Goal 1 (SLP) -- by over-articulating at word level;by over-articulating at phrase level;by over-articulating in connected speech;80%;with minimal cues (75-90%)  -AL       Time Frame (Articulation Goal 1, SLP) -- 1 week  -AL          Cognitive Linguistic Treatment Objectives    Attention Selection -- attention, SLP goal 1  -AL       Memory Skills Selection -- memory skills, SLP goal 1  -AL       Organizational Skills Selection -- organizational skills, SLP goal 1  -AL       Reasoning Selection -- reasoning, SLP goal 1  -AL       Executive Function Skills Selection -- executive function skills, SLP goal 1  -AL          Attention Goal 1 (SLP)    Improve Attention by Goal 1 (SLP) -- complete selective attention task;complete sustained attention task;80%;independently (over 90% accuracy)  -AL       Time Frame (Attention Goal 1, SLP) -- 1 week  -AL          Memory Skills Goal 1 (SLP)    Improve Memory Skills Through Goal 1 (SLP) -- recalling related word lists with an imposed delay;listen to a paragraph and answer questions;recall details of the day;80%;independently (over 90% accuracy)  -AL       Time Frame (Memory Skills Goal 1, SLP) -- 1 week  -AL          Organizational Skills Goal 1 (SLP)    Improve Thought Organization Through Goal 1 (SLP) -- completing a divergent naming task;80%;with minimal cues (75-90%)  -AL       Time Frame (Thought Organization Skills Goal 1, SLP) -- 1 week  -AL          Reasoning Goal 1 (SLP)    Improve Reasoning Through Goal 1 (SLP) -- complete deductive reasoning task;80%;independently (over 90%  accuracy)  -AL       Time Frame (Reasoning Goal 1, SLP) -- 1 week  -AL          Executive Functional Skills Goal 1 (SLP)    Improve Executive Function Skills Goal 1 (SLP) -- home management activity;80%;with minimal cues (75-90%)  -AL       Time Frame (Executive Function Skills Goal 1, SLP) -- 1 week  -AL             User Key  (r) = Recorded By, (t) = Taken By, (c) = Cosigned By    Initials Name Effective Dates    Janell Erickson, MS CCC-SLP 06/16/21 -                    EDUCATION    The patient has been educated in the following areas:       Communication Impairment Dysphagia (Swallowing Impairment).             SLP GOALS     Row Name 04/08/23 0800 04/07/23 1300 04/07/23 0900       (LTG) Patient will demonstrate functional swallow for    Diet Texture (Demonstrate functional swallow) -- -- soft to chew (chopped) textures  -AL    Liquid viscosity (Demonstrate functional swallow) -- -- thin liquids  -AL    Time Frame (Demonstrate functional swallow) -- -- by discharge  -AL       (STG) Patient will tolerate trials of    Consistencies Trialed (Tolerate trials) -- -- thin liquids  by spoon  -AL    Desired Outcome (Tolerate trials) -- -- without signs/symptoms of aspiration  -AL    [place] (Tolerate trials) -- -- with 1:1 assist/ supervision  -AL       (STG) Patient will tolerate therapeutic trials of    Consistencies Trialed (Tolerate therapeutic trials) -- -- mechanical ground textures;nectar/ mildly thick liquids  -AL    Desired Outcome (Tolerate therapeutic trials) -- -- without signs/symptoms of aspiration  -AL    [place] (Tolerate therapeutic trials) -- -- with 1:1 assist/ supervision  -AL    Progress/Outcomes (Tolerate therapeutic trials) -- good progress toward goal  -AL --    Comment (Tolerate therapeutic trials) -- Pt exhibited no overt s/s of aspiration or penetration in 7/8 trials of NTL by cup with MIN cues for use of double swallow; delayed throat clear x1.  -AL --       (STG) Labial  Strengthening Goal 1 (SLP)    Activity (Labial Strengthening Goal 1, SLP) -- -- increase labial tone  -AL    Increase Labial Tone -- -- IOPI  -AL    [place]/Accuracy (Labial Strengthening Goal 1, SLP) -- -- with moderate cues (50-74% accuracy)  -AL       (STG) Pharyngeal Strengthening Exercise Goal 1 (SLP)    Activity (Pharyngeal Strengthening Goal 1, SLP) -- -- increase timing;increase superior movement of the hyolaryngeal complex;increase anterior movement of the hyolaryngeal complex;increase epiglottic inversion and retroflexion;increase closure at entrance to airway/closure of airway at glottis  -AL    Increase Timing -- -- hard effortful swallow  -AL    Increase Superior Movement of the Hyolaryngeal Complex -- -- hard effortful swallow  -AL    Increase Anterior Movement of the Hyolaryngeal Complex -- -- EMST  -AL    Increase Epiglottic Inversion and Retroflexion -- -- [place];hard effortful swallow;EMST  -AL    Increase Closure at Entrance to Airway/Closure of Airway at Glottis -- -- [place]elsohn;hard effortful swallow  -AL    [place]/Accuracy (Pharyngeal Strengthening Goal 1, SLP) -- -- with moderate cues (50-74% accuracy)  -AL    Time Frame (Pharyngeal Strengthening Goal 1, SLP) -- -- 1 week  -AL    Progress/Outcomes (Pharyngeal Strengthening Goal 1, SLP) good progress toward goal  -LS good progress toward goal  -AL --    Comment (Pharyngeal Strengthening Goal 1, SLP) Pt performed 7 repetitions of effortful swallow with sips of water by cup with mod cues. mild 3 second delayed swallow inition  noted. Pt became a little out of breath after the seventh trial.   SLP then introduced the Tongue Hold exercise with water. SLP placed 1/2 tsp of water in the pt's mouth and asked her to hold it. She was then asked  to place her tongue gently between her teeth and squeeze her throat  muscles.  Pt performed five sets with mod cues.  -LS Pt completed 20 repetitions of effortful swallow with sips of NTL by cup  with overall MIN-MOD cues. Delayed swallow initiation (3-4 seconds) noted. Introduced EMST exercise. Pt was unable to open valve on EXHR283 device. With EMST75 device, pt was able to complete 2 sets of 5 repetitions at 5 cmH20 plus 1 full turn. Pt exhibited fatigue as repetitions progressed.  -AL --       Articulation Goal 1 (SLP)    Comment (Articulation Goal 1, SLP) SLP utilized The Constant Therapy Program to facilitate improvement with intelligibility. Pt worrked on repeating words into a VU meter.  Pt achieved 82% accuracy.  It was noted that pt exhibited difficulty saying words with initial /h/.  Pt   Pt was able to imitate active sentences with 98% accuracy.  Pt required cues to slow down and over emphasize her words. This increased intelligibility.  -LS -- --    Row Name 04/06/23 0900             Articulation Goal 1 (SLP)    Improve Articulation Goal 1 (SLP) by over-articulating at word level;by over-articulating at phrase level;by over-articulating in connected speech;80%;with minimal cues (75-90%)  -AL      Time Frame (Articulation Goal 1, SLP) 1 week  -AL         Attention Goal 1 (SLP)    Improve Attention by Goal 1 (SLP) complete selective attention task;complete sustained attention task;80%;independently (over 90% accuracy)  -AL      Time Frame (Attention Goal 1, SLP) 1 week  -AL         Memory Skills Goal 1 (SLP)    Improve Memory Skills Through Goal 1 (SLP) recalling related word lists with an imposed delay;listen to a paragraph and answer questions;recall details of the day;80%;independently (over 90% accuracy)  -AL      Time Frame (Memory Skills Goal 1, SLP) 1 week  -AL         Organizational Skills Goal 1 (SLP)    Improve Thought Organization Through Goal 1 (SLP) completing a divergent naming task;80%;with minimal cues (75-90%)  -AL      Time Frame (Thought Organization Skills Goal 1, SLP) 1 week  -AL         Reasoning Goal 1 (SLP)    Improve Reasoning Through Goal 1 (SLP) complete deductive  reasoning task;80%;independently (over 90% accuracy)  -AL      Time Frame (Reasoning Goal 1, SLP) 1 week  -AL         Executive Functional Skills Goal 1 (SLP)    Improve Executive Function Skills Goal 1 (SLP) home management activity;80%;with minimal cues (75-90%)  -AL      Time Frame (Executive Function Skills Goal 1, SLP) 1 week  -AL            User Key  (r) = Recorded By, (t) = Taken By, (c) = Cosigned By    Initials Name Provider Type    Yamila Dahl MS CCC-SLP Speech and Language Pathologist    Janell Erickson MS CCC-SLP Speech and Language Pathologist                            Time Calculation:        Time Calculation- SLP     Row Name 04/08/23 1204             Time Calculation- SLP    SLP Start Time 0800  -LS      SLP Stop Time 0900  -LS      SLP Time Calculation (min) 60 min  -LS            User Key  (r) = Recorded By, (t) = Taken By, (c) = Cosigned By    Initials Name Provider Type    Yamila Dahl MS CCC-SLP Speech and Language Pathologist                  Therapy Charges for Today     Code Description Service Date Service Provider Modifiers Qty    41305538433 HC ST TREATMENT SWALLOW 2 4/8/2023 Yamila hWeat MS CCC-SLP GN 1    [id number] HC ST TREATMENT SPEECH 2 4/8/2023 Yamila Wheat MS CCC-SLP GN 1                           Yamila Wheat MS CCC-KAVITA  4/8/2023

## 2023-04-08 NOTE — PROGRESS NOTES
Inpatient Rehabilitation Plan of Care Note    Plan of Care  Care Plan Reviewed - No updates at this time.    Psychosocial    Performed Intervention(s)  Verbalizes needs and concerns  Therapeutic environmental set up      Sphincter Control    Performed Intervention(s)  Bladder scan or I/O cath per order  Encourage fluids  Bowel/bladder training      Safety    Performed Intervention(s)  Safety Rounds  Bed alarm and/or chair alarm    Signed by: Dalila Klein RN

## 2023-04-08 NOTE — PLAN OF CARE
Goal Outcome Evaluation:  Plan of Care Reviewed With: patient        Progress: improving  Outcome Evaluation: Brigid rested well this shift.  Incont of bladder.  AM Bladder scan <300, no i/o cath.  All meds crushed w/applesauce.  NTL/puree diet.  Carolann area excoriated.  Baseline blurred vision R eye.

## 2023-04-09 PROCEDURE — 25010000002 ENOXAPARIN PER 10 MG: Performed by: PHYSICAL MEDICINE & REHABILITATION

## 2023-04-09 RX ADMIN — Medication 1000 UNITS: at 10:48

## 2023-04-09 RX ADMIN — AMLODIPINE BESYLATE 10 MG: 10 TABLET ORAL at 10:48

## 2023-04-09 RX ADMIN — ENOXAPARIN SODIUM 40 MG: 100 INJECTION SUBCUTANEOUS at 16:34

## 2023-04-09 RX ADMIN — OXYCODONE HYDROCHLORIDE AND ACETAMINOPHEN 250 MG: 500 TABLET ORAL at 10:48

## 2023-04-09 RX ADMIN — DOCUSATE SODIUM 50MG AND SENNOSIDES 8.6MG 2 TABLET: 8.6; 5 TABLET, FILM COATED ORAL at 10:48

## 2023-04-09 RX ADMIN — ACETAMINOPHEN 650 MG: 325 TABLET, FILM COATED ORAL at 10:49

## 2023-04-09 RX ADMIN — ROSUVASTATIN CALCIUM 20 MG: 20 TABLET, FILM COATED ORAL at 21:23

## 2023-04-09 RX ADMIN — CLOPIDOGREL BISULFATE 75 MG: 75 TABLET, FILM COATED ORAL at 10:48

## 2023-04-09 RX ADMIN — ASPIRIN 81 MG: 81 TABLET, CHEWABLE ORAL at 10:48

## 2023-04-09 RX ADMIN — LISINOPRIL 5 MG: 5 TABLET ORAL at 10:48

## 2023-04-09 RX ADMIN — ACETAMINOPHEN 650 MG: 325 TABLET, FILM COATED ORAL at 21:23

## 2023-04-09 NOTE — PROGRESS NOTES
LOS: 4 days   Patient Care Team:  Jayro Fountain MD as PCP - General (Internal Medicine)      PIERRE GARCÍAARACELIS  1940    Diagnoses    1. Follow-up examination  2. IMPAIRED FUNCTIONAL MOBILITY, BALANCE, GAIT, AND ENDURANCE       ADMITTING DIAGNOSIS:  Left basal ganglia stroke  Right hemiparesis with impaired motor control        Subjective   Pt c/o diarrhea today. Denies CP, SOB, N/V, F/C    Objective     Vitals:    04/09/23 1300   BP: 129/67   Pulse: 90   Resp: 17   Temp: 97.6 °F (36.4 °C)   SpO2: 93%       PHYSICAL EXAM:     MENTAL STATUS -  AWAKE / ALERT  HEENT- NCAT,   SCLERAE ANICTERIC, CONJUNCTIVAE PINK, OP MOIST, NO JVD, EARS UNREMARKABLE EXTERNALLY  LUNGS - CTA, NO WHEEZES, RALES OR RHONCHI  HEART- RRR, NO RUB, MURMUR, OR GALLOP  ABD - NORMOACTIVE BOWEL SOUNDS, SOFT, NT. NO HEPATOSPLENOMEGALY APPRECIATED  EXT - NO EDEMA OR CYANOSIS  NEURO -awake alert.  Oriented.  Dysarthria  Occasional word hesitancy  Right hemiparesis with impaired motor control.  Right shoulder flexion 3/5, elbow flexion 4/5, finger flexion 4/5, knee extension 4/5, ankle dorsiflexion 4/5      MEDICATIONS  Scheduled Meds:amLODIPine, 10 mg, Oral, Q24H  vitamin C, 250 mg, Oral, Daily  aspirin, 81 mg, Oral, Daily  cholecalciferol, 1,000 Units, Oral, Daily  clopidogrel, 75 mg, Oral, Daily  enoxaparin, 40 mg, Subcutaneous, Q24H  lisinopril, 5 mg, Oral, Q24H  rosuvastatin, 20 mg, Oral, Nightly  senna-docusate sodium, 2 tablet, Oral, BID      Continuous Infusions:   PRN Meds:.•  acetaminophen **OR** [DISCONTINUED] acetaminophen  •  senna-docusate sodium **AND** polyethylene glycol **AND** bisacodyl **AND** bisacodyl      RESULTS  No results found for: POCGLU  Results from last 7 days   Lab Units 04/07/23  0833 04/06/23  0602   WBC 10*3/mm3 6.29 6.73   HEMOGLOBIN g/dL 14.0 14.3   HEMATOCRIT % 41.6 42.4   PLATELETS 10*3/mm3 175 177     Results from last 7 days   Lab Units 04/07/23  0833 04/06/23  0602   SODIUM mmol/L 140 141   POTASSIUM  mmol/L 3.5 3.7   CHLORIDE mmol/L 105 107   CO2 mmol/L 25.6 24.3   BUN mg/dL 23 22   CREATININE mg/dL 0.59 0.71   CALCIUM mg/dL 9.2 9.6   GLUCOSE mg/dL 130* 157*      Latest Reference Range & Units 04/07/23 08:33   TSH Baseline 0.270 - 4.200 uIU/mL 3.100   Vitamin B-12 211 - 946 pg/mL 796   25 Hydroxy, Vitamin D 30.0 - 100.0 ng/ml 58.9         ASSESSMENT and PLAN    Infarction of left basal ganglia    Left basal ganglia stroke  Stroke prophylaxis- ASA and Plavix x 21 days from March 30, 2023, then ASA 81 mg daily. Crestor 20 mg.    Right hemiparesis with impaired motor control  Dysphagia   Dysarthria.    Hypertension-amlodipine/lisinopril    Check TSH, Vit B12 and Vit D levels-within normal limit    Urinary retention. Treated for UTI as Reggie Duran. Check bladder scan PVR and cath if >300 cc until three consecutive < 250 cc  April 7-requires intermittent straight cath 400-113-300 cc.  No spontaneous void.  Follow pattern.    Diarrhea  - 4/9-  Stop Doc-Senna and monitor BMs.    DVT prophylaxis-on dual antiplatelet therapies.  SCDs.  Lovenox    admit for comprehensive acute inpatient rehabilitation .  This would be an interdisciplinary program with physical therapy 1.5 hour,  occupational therapy 1.5 hour,  5 days a week. Rehabilitation nursing for carryover, monitoring of medical   status, bowel and bladder, and skin  Ongoing physician follow-up.  Weekly team conferences.   Goal is for home with occupational, physical, and speech   therapies.  Barrier to discharge: ADLs, independence, cognition, safety - work on strength, transfers, cognition to overcome.       Dorian Escalante MD      During rounds, used appropriate personal protective equipment including mask and gloves.  Additional gown if indicated.  Mask used was standard procedure mask. Appropriate PPE was worn during the entire visit.  Hand hygiene was completed before and after.

## 2023-04-09 NOTE — PLAN OF CARE
Goal Outcome Evaluation:   PT is alert and O x 4. VSS. In good spirits, cooperative with care. Right sided weakness with x 1-2 assist with transfers. Admin  Tylenol 650 MG for comfort this AM. Bladder scanned at 1200 HRS: 257cc and 1700 HRS: 136 cc. Large creamy Bms x 2 this shift. Meds crushed in applesauce and feeds self with supervision at 90 degrees. Speech is slurred. WCTM.

## 2023-04-10 LAB
ANION GAP SERPL CALCULATED.3IONS-SCNC: 9.4 MMOL/L (ref 5–15)
BASOPHILS # BLD AUTO: 0.02 10*3/MM3 (ref 0–0.2)
BASOPHILS NFR BLD AUTO: 0.3 % (ref 0–1.5)
BUN SERPL-MCNC: 19 MG/DL (ref 8–23)
BUN/CREAT SERPL: 23.2 (ref 7–25)
CALCIUM SPEC-SCNC: 9.5 MG/DL (ref 8.6–10.5)
CHLORIDE SERPL-SCNC: 107 MMOL/L (ref 98–107)
CO2 SERPL-SCNC: 26.6 MMOL/L (ref 22–29)
CREAT SERPL-MCNC: 0.82 MG/DL (ref 0.57–1)
DEPRECATED RDW RBC AUTO: 40.6 FL (ref 37–54)
EGFRCR SERPLBLD CKD-EPI 2021: 70.6 ML/MIN/1.73
EOSINOPHIL # BLD AUTO: 0.25 10*3/MM3 (ref 0–0.4)
EOSINOPHIL NFR BLD AUTO: 3.6 % (ref 0.3–6.2)
ERYTHROCYTE [DISTWIDTH] IN BLOOD BY AUTOMATED COUNT: 12.2 % (ref 12.3–15.4)
GLUCOSE SERPL-MCNC: 191 MG/DL (ref 65–99)
HCT VFR BLD AUTO: 41.5 % (ref 34–46.6)
HGB BLD-MCNC: 13.4 G/DL (ref 12–15.9)
IMM GRANULOCYTES # BLD AUTO: 0.03 10*3/MM3 (ref 0–0.05)
IMM GRANULOCYTES NFR BLD AUTO: 0.4 % (ref 0–0.5)
LYMPHOCYTES # BLD AUTO: 1.35 10*3/MM3 (ref 0.7–3.1)
LYMPHOCYTES NFR BLD AUTO: 19.7 % (ref 19.6–45.3)
MCH RBC QN AUTO: 29.4 PG (ref 26.6–33)
MCHC RBC AUTO-ENTMCNC: 32.3 G/DL (ref 31.5–35.7)
MCV RBC AUTO: 91 FL (ref 79–97)
MONOCYTES # BLD AUTO: 0.66 10*3/MM3 (ref 0.1–0.9)
MONOCYTES NFR BLD AUTO: 9.6 % (ref 5–12)
NEUTROPHILS NFR BLD AUTO: 4.54 10*3/MM3 (ref 1.7–7)
NEUTROPHILS NFR BLD AUTO: 66.4 % (ref 42.7–76)
NRBC BLD AUTO-RTO: 0 /100 WBC (ref 0–0.2)
PLATELET # BLD AUTO: 187 10*3/MM3 (ref 140–450)
PMV BLD AUTO: 10.3 FL (ref 6–12)
POTASSIUM SERPL-SCNC: 3.6 MMOL/L (ref 3.5–5.2)
RBC # BLD AUTO: 4.56 10*6/MM3 (ref 3.77–5.28)
SODIUM SERPL-SCNC: 143 MMOL/L (ref 136–145)
WBC NRBC COR # BLD: 6.85 10*3/MM3 (ref 3.4–10.8)

## 2023-04-10 PROCEDURE — 97535 SELF CARE MNGMENT TRAINING: CPT

## 2023-04-10 PROCEDURE — 97130 THER IVNTJ EA ADDL 15 MIN: CPT

## 2023-04-10 PROCEDURE — 97129 THER IVNTJ 1ST 15 MIN: CPT

## 2023-04-10 PROCEDURE — 97530 THERAPEUTIC ACTIVITIES: CPT

## 2023-04-10 PROCEDURE — 85025 COMPLETE CBC W/AUTO DIFF WBC: CPT | Performed by: PHYSICAL MEDICINE & REHABILITATION

## 2023-04-10 PROCEDURE — 97112 NEUROMUSCULAR REEDUCATION: CPT

## 2023-04-10 PROCEDURE — 92526 ORAL FUNCTION THERAPY: CPT

## 2023-04-10 PROCEDURE — 25010000002 ENOXAPARIN PER 10 MG: Performed by: PHYSICAL MEDICINE & REHABILITATION

## 2023-04-10 PROCEDURE — 80048 BASIC METABOLIC PNL TOTAL CA: CPT | Performed by: PHYSICAL MEDICINE & REHABILITATION

## 2023-04-10 PROCEDURE — 97110 THERAPEUTIC EXERCISES: CPT

## 2023-04-10 RX ADMIN — CLOPIDOGREL BISULFATE 75 MG: 75 TABLET, FILM COATED ORAL at 07:58

## 2023-04-10 RX ADMIN — ACETAMINOPHEN 650 MG: 325 TABLET, FILM COATED ORAL at 04:07

## 2023-04-10 RX ADMIN — ENOXAPARIN SODIUM 40 MG: 100 INJECTION SUBCUTANEOUS at 12:12

## 2023-04-10 RX ADMIN — Medication 1000 UNITS: at 07:59

## 2023-04-10 RX ADMIN — ROSUVASTATIN CALCIUM 20 MG: 20 TABLET, FILM COATED ORAL at 19:38

## 2023-04-10 RX ADMIN — ASPIRIN 81 MG: 81 TABLET, CHEWABLE ORAL at 07:58

## 2023-04-10 RX ADMIN — LISINOPRIL 5 MG: 5 TABLET ORAL at 07:59

## 2023-04-10 RX ADMIN — AMLODIPINE BESYLATE 10 MG: 10 TABLET ORAL at 07:59

## 2023-04-10 RX ADMIN — ACETAMINOPHEN 650 MG: 325 TABLET, FILM COATED ORAL at 19:37

## 2023-04-10 RX ADMIN — OXYCODONE HYDROCHLORIDE AND ACETAMINOPHEN 250 MG: 500 TABLET ORAL at 07:59

## 2023-04-10 NOTE — PLAN OF CARE
Goal Outcome Evaluation:  Plan of Care Reviewed With: patient             Problem: Rehabilitation ([facility]) Plan of Care  Goal: Plan of Care Review  Outcome: Ongoing, Progressing  Flowsheets (Taken 4/10/2023 0306)  Plan of Care Reviewed With: patient  Outcome Evaluation: Brigid is alert and oriented x 4. Flat affect, can be confused at times. Meds crushed with AS, NTL. Dysarthria noted. Bladder scanned this evening with 444cc, had incontinence episode and scanned after for 234cc. Small incontinent bowel movement. Strong odor to urine and BM. Wearing 1L nc at HS. PRN Tylenol administered at 2123 r.t headache. No unsafe behaviors. call light within reach.

## 2023-04-10 NOTE — THERAPY TREATMENT NOTE
Inpatient Rehabilitation - Occupational Therapy Treatment Note    [facility]     Patient Name: Melissa Gomez  : 1940  MRN: [id number]    Today's Date: 4/10/2023                 Admit Date: 2023         ICD-10-CM ICD-9-CM   1. Impaired functional mobility, balance, gait, and endurance  Z74.09 V49.89   2. Follow-up exam  Z09 V67.9       Patient Active Problem List   Diagnosis   • Infarction of left basal ganglia       Past Medical History:   Diagnosis Date   • GERD (gastroesophageal reflux disease)    • Hyperlipidemia    • Hypertension    • Stroke        Past Surgical History:   Procedure Laterality Date   • COLONOSCOPY     • HYSTERECTOMY     • TONSILLECTOMY               IRF OT ASSESSMENT FLOWSHEET (last 12 hours)     IRF OT Evaluation and Treatment     Row Name 04/10/23 1106          OT Time and Intention    Document Type daily treatment  -KA     Mode of Treatment individual therapy;occupational therapy  -KA     Patient Effort good  -KA     Row Name 04/10/23 1106          General Information    Patient Profile Reviewed yes  -KA     Patient/Family/Caregiver Comments/Observations Pt sitting in wc finishing up breakfast upon arrival  -KA     Existing Precautions/Restrictions fall  -KA     Row Name 04/10/23 1106          Pain Assessment    Pretreatment Pain Rating 7/10  -KA     Posttreatment Pain Rating 7/10  -KA     Pain Location - Side/Orientation Right  -KA     Pain Location upper  -KA     Pain Location - extremity  -KA     Pre/Posttreatment Pain Comment Repositioned for comfort  -KA     Row Name 04/10/23 1106          Cognition/Psychosocial    Affect/Mental Status (Cognition) WFL  -KA     Orientation Status (Cognition) oriented x 4  -KA     Follows Commands (Cognition) follows one-step commands;over 90% accuracy;verbal cues/prompting required  -KA     Personal Safety Interventions fall prevention program maintained;gait belt;nonskid shoes/slippers when out of bed  -KA     Cognitive Function  attention deficit;safety deficit  -KA     Attention Deficit (Cognition) minimal deficit;distractible in noisy environment;alternating attention  -KA     Safety Deficit (Cognition) minimal deficit;insight into deficits/self-awareness;safety precautions awareness  -     Row Name 04/10/23 1106          Bathing    [place] Level (Bathing) bathing skills;upper body;set up;standby assist;lower body;moderate assist (50% patient effort)  -KA     Position (Bathing) sink side;supported sitting;supported standing  -KA     Set-up Assistance (Bathing) adjust water temperature;obtain supplies  -KA     Comment (Bathing) Pt requested to shower tomorrow. Bathing performed at the sink today  -     Row Name 04/10/23 1106          Upper Body Dressing    [place] Level (Upper Body Dressing) upper body dressing skills;doff;don;pull over garment;minimum assist (75% or more patient effort)  -KA     Position (Upper Body Dressing) supported sitting  -KA     Set-up Assistance (Upper Body Dressing) obtain clothing  -KA     Comment (Upper Body Dressing) Cues to don RUE first and to pull past elbow. Fatigued with ADLs this AM  -     Row Name 04/10/23 1106          Lower Body Dressing    [place] Level (Lower Body Dressing) doff;don;pants/bottoms;shoes/slippers;socks;set up;dependent (less than 25% patient effort);maximum assist (25% patient effort)  -KA     Position (Lower Body Dressing) supported sitting;supported standing  -KA     Set-up Assistance (Lower Body Dressing) obtain clothing  -KA     Comment (Lower Body Dressing) Unable to attempt pulling up pants in standing due to decreased balance.  -     Row Name 04/10/23 1106          Grooming    [place] Level (Grooming) grooming skills;wash face, hands;set up;standby assist;oral care regimen  -KA     Position (Grooming) sink side;supported sitting  -KA     Set-up Assistance (Grooming) obtain supplies;open containers  -KA     Comment (Grooming) Pt able to open  toothpaste and apply to toothbrush. Increased time required to complete  -KA     Row Name 04/10/23 1106          Self-Feeding    [place] Level (Self-Feeding) feeding skills;finger foods;liquids to mouth;scoop food onto utensil;utensil to mouth;set up  -KA     Position (Self-Feeding) supported sitting  -KA     Comment (Self-Feeding) on NTL  -KA     Row Name 04/10/23 1106          Sit-Stand Transfer    Sit-Stand [place] (Transfers) contact guard;minimum assist (75% patient effort);verbal cues  -KA     Assistive Device (Sit-Stand Transfers) wheelchair  -     Row Name 04/10/23 1106          Stand-Sit Transfer    Stand-Sit [place] (Transfers) contact guard;minimum assist (75% patient effort);verbal cues  -KA     Assistive Device (Stand-Sit Transfers) wheelchair  -     Row Name 04/10/23 1106          Motor Skills    Motor Skills --  Worked on RUE FMC with large pegs and blue resistive board. Pt used the board at times to assist with turning pegs. Cues to attempt in hand manipulation with pegs. Also worked on stringing beads. pt required increased time to complete  -     Coordination --  Worked on FMC with opening containers this AM for ADLs. Also worked on BUE hand strengthening squeezing water out of wash cloth  -     Row Name 04/10/23 1106          Positioning and Restraints    Pre-Treatment Position sitting in chair/recliner  -KA     Post Treatment Position wheelchair  -KA     In Wheelchair sitting;call light within reach;encouraged to call for assist;exit alarm on  -KA           User Key  (r) = Recorded By, (t) = Taken By, (c) = Cosigned By    Initials Name Effective Dates    Tiffanie Jasmine, GREYSON 09/22/22 -                  Occupational Therapy Education     Title: PT OT SLP Therapies (Done)     Topic: Occupational Therapy (Done)     Point: ADL training (Done)     Description:   Instruct learner(s) on proper safety adaptation and remediation techniques during self care or transfers.    Instruct in proper use of assistive devices.              Learning Progress Summary           Patient Acceptance, E, VU by  at 4/8/2023 1204    Acceptance, E,TB,D, VU,DU,NR by  at 4/6/2023 1230    Comment: ed pt on role of OT. benefit of therapy, POC w.  OT ed on safety w ADL and tsf. pt ed on UBD technique.                   Point: Home exercise program (Done)     Description:   Instruct learner(s) on appropriate technique for monitoring, assisting and/or progressing therapeutic exercises/activities.              Learning Progress Summary           Patient Acceptance, E, VU by  at 4/8/2023 1204    Acceptance, E,TB,D, VU,DU,NR by  at 4/6/2023 1230    Comment: ed pt on role of OT. benefit of therapy, POC w.  OT ed on safety w ADL and tsf. pt ed on UBD technique.                   Point: Precautions (Done)     Description:   Instruct learner(s) on prescribed precautions during self-care and functional transfers.              Learning Progress Summary           Patient Acceptance, E, VU by  at 4/8/2023 1204    Acceptance, E,TB,D, VU,DU,NR by  at 4/6/2023 1230    Comment: ed pt on role of OT. benefit of therapy, POC w.  OT ed on safety w ADL and tsf. pt ed on UBD technique.                   Point: Body mechanics (Done)     Description:   Instruct learner(s) on proper positioning and spine alignment during self-care, functional mobility activities and/or exercises.              Learning Progress Summary           Patient Acceptance, E, VU by  at 4/8/2023 1204    Acceptance, E,TB,D, VU,DU,NR by  at 4/6/2023 1230    Comment: ed pt on role of OT. benefit of therapy, POC w.  OT ed on safety w ADL and tsf. pt ed on UBD technique.                               User Key     Initials Effective Dates Name Provider Type Discipline     06/16/21 -  Patsy Blank OTR Occupational Therapist OT     06/16/21 -  Yamila Wheat MS CCC-SLP Speech and Language Pathologist SLP                    OT  Recommendation and Plan                         Time Calculation:      Time Calculation- OT     Row Name 04/10/23 1118             Time Calculation- OT    OT Start Time 0800  -      OT Stop Time 0900  -      OT Time Calculation (min) 60 min  -            User Key  (r) = Recorded By, (t) = Taken By, (c) = Cosigned By    Initials Name Provider Type    Tiffanie Jasmine OT Occupational Therapist              Therapy Charges for Today     Code Description Service Date Service Provider Modifiers Qty    19739942971 HC OT SELF CARE/MGMT/TRAIN EA 15 MIN 4/10/2023 Tiffanie Farias OT GO 2    [id number] HC OT NEUROMUSC RE EDUCATION EA 15 MIN 4/10/2023 Tiffanie Farias OT GO 2                   Tiffanie Farias OT  4/10/2023

## 2023-04-10 NOTE — PROGRESS NOTES
"Nutrition Services    Patient Name:  Melissa Gomez  YOB: 1940  MRN: [id number]  Admit Date:  4/5/2023      Assessment Date:  04/10/23    FOLLOW UP NOTE - CLINICAL NUTRITION    Comments:  Visited pt in room who reported poor intakes, stating she ate no dinner last night and 50% of L. Per EMR, intakes vary, %. Pt is receiving supplements and likes, though reported difficulty opening the mighty shake carton. Pt doesn't always like pureed selections. Encouraged pt to keep exploring the menu to find pureed options she enjoys. Will also adjust supplements, per pt's preferences: strawberry mighty shakes preferred, nectar thick iced tea at all meals, adding carnation instant breakfast to be mixed in nectar thick milk at breakfast, daily.     RD will continue to follow-up, per protocol.    Encounter Information        Reason for Encounter Follow-up    Current Issues Infarction of L basal ganglia     Current Nutrition Orders & Evaluation of Intake       Oral Nutrition     Current PO Diet Diet: Regular/House Diet; Texture: Pureed (NDD 1); Fluid Consistency: Nectar Thick   Supplement Magic Cup BID, Mighty Shake BID   PO Evaluation    % PO Intake/# of days Varying intakes, %.    Factors Affecting Intake  dislikes modified diet     Anthropometrics         Height   Weight Height: 162.6 cm (64\")  Weight: 86.9 kg (191 lb 9.6 oz) (04/08/23 1312)   BMI kg/m2 Body mass index is 32.89 kg/m².   Weight trend Stable     Physical Findings          Physical Appearance alert, oriented   Oral/Mouth Cavity teeth missing   Edema  1+ (trace), 2+ (mild)   Gastrointestinal normoactive, last bowel movement:4/9   Skin  skin intact   Tubes/Drains none     Labs       Pertinent Labs Reviewed, listed below     Results from last 7 days   Lab Units 04/10/23  0614 04/07/23  0833 04/06/23  0602   SODIUM mmol/L 143 140 141   POTASSIUM mmol/L 3.6 3.5 3.7   CHLORIDE mmol/L 107 105 107   CO2 mmol/L 26.6 25.6 24.3   BUN mg/dL 19 23 " 22   CREATININE mg/dL 0.82 0.59 0.71   CALCIUM mg/dL 9.5 9.2 9.6   GLUCOSE mg/dL 191* 130* 157*     Results from last 7 days   Lab Units 04/10/23  0614   HEMOGLOBIN g/dL 13.4   HEMATOCRIT % 41.5   WBC 10*3/mm3 6.85     Results from last 7 days   Lab Units 04/10/23  0614 04/07/23  0833 04/06/23  0602   PLATELETS 10*3/mm3 187 175 177     No results found for: COVID19  Lab Results   Component Value Date    HGBA1C 5.3 03/30/2023          Medications           Scheduled Medications amLODIPine, 10 mg, Oral, Q24H  vitamin C, 250 mg, Oral, Daily  aspirin, 81 mg, Oral, Daily  cholecalciferol, 1,000 Units, Oral, Daily  clopidogrel, 75 mg, Oral, Daily  enoxaparin, 40 mg, Subcutaneous, Q24H  lisinopril, 5 mg, Oral, Q24H  rosuvastatin, 20 mg, Oral, Nightly       Infusions     PRN Medications •  acetaminophen **OR** [DISCONTINUED] acetaminophen  •  [DISCONTINUED] senna-docusate sodium **AND** polyethylene glycol **AND** bisacodyl **AND** bisacodyl     PLAN OF CARE  Intervention Goal        Intervention Goal(s) Maintain nutrition status, Maintain intake, Continue positive trend, Advance diet, Maintain weight and No significant weight loss     Nutrition Intervention        RD Action Adjusted supplement, Encourage intake, Follow Tx Progress and Care plan reviewed     Prescription        Diet Prescription Nectar thick iced tea at all meals   Supplement Prescription Prefers strawberry mighty shake, Adding CIB (chocolate) thickened with NTL milk at breakfast, daily.    EN/PN Prescription    Prescription Ordered Yes   --  Monitor/Evaluation        Monitor Per protocol, PO intake, Supplement intake, Weight, Skin status, GI status, Symptoms, Swallow function   Discharge Plan Pending clinical course   Education Will educate as needed       Electronically signed by:  Yue Ayala  04/10/23 16:24 EDT

## 2023-04-10 NOTE — PROGRESS NOTES
Inpatient Rehabilitation Functional Measures Assessment and Plan of Care    Plan of Care  Updated Problems/Interventions  Swallow Function    [ST] Swallowing(Active)  Current Status(04/10/2023): Puree diet with Nectar Thick Liquids. Exhibits  intermittent coughing and throat clearing with trials of water by tsp; exhibits  occasional throat clear with trials of ice chips.  Weekly Goal(04/18/2023): Will tolerate trials of soft (ground meats)/no mixed  consistencies with MIN cues for clearing oral cavity.  Discharge Goal: Will tolerate the least restrictive diet with 1:1 supervision  for meals.        Cognition    [ST] Attention(Active)  Current Status(04/10/2023): Mildly impaired attention skills on CLQT.  Weekly Goal(04/18/2023): Will complete basic attention to detail tasks with NO  cues.  Discharge Goal: Improved attention skills for home environment.        Communication    [ST] Expression(Active)  Current Status(04/10/2023): Mildly impaired language skills on CLQT. Occasional  word finding delays in conversation. Now presents wtih Mild-Moderate mixed  dysarthria.  Weekly Goal(04/18/2023): Will use speech intelligibility strategies in  conversation with MIN cues.  Discharge Goal: Will be able to participate in a complex conversation with NO  cues for use of speech intelligibility strategies.    Signed by: Janell Gallego, SLP

## 2023-04-10 NOTE — PROGRESS NOTES
LOS: 5 days   Patient Care Team:  Jayro Fountain MD as PCP - General (Internal Medicine)      PIERRE GARCÍAARACELIS  1940    Diagnoses    1. Follow-up examination  2. IMPAIRED FUNCTIONAL MOBILITY, BALANCE, GAIT, AND ENDURANCE       ADMITTING DIAGNOSIS:  Left basal ganglia stroke  Right hemiparesis with impaired motor control        Subjective     Patient continues with right-sided weakness and impaired motor control.  Continues with urinary retention.    Objective     Vitals:    04/10/23 1255   BP: 122/75   Pulse: 89   Resp: 18   Temp: 97.6 °F (36.4 °C)   SpO2: 95%       PHYSICAL EXAM:     MENTAL STATUS -  AWAKE / ALERT  HEENT- NCAT,   SCLERAE ANICTERIC, CONJUNCTIVAE PINK, OP MOIST, NO JVD, EARS UNREMARKABLE EXTERNALLY  LUNGS - CTA, NO WHEEZES, RALES OR RHONCHI  HEART- RRR, NO RUB, MURMUR, OR GALLOP  ABD - NORMOACTIVE BOWEL SOUNDS, SOFT, NT. NO HEPATOSPLENOMEGALY APPRECIATED  EXT - NO EDEMA OR CYANOSIS  NEURO -awake alert.  Oriented.  Dysarthria  Occasional word hesitancy  Right hemiparesis with impaired motor control.  Right shoulder flexion 3/5, elbow flexion 4/5, finger flexion 4/5, knee extension 4/5, ankle dorsiflexion 4/5      MEDICATIONS  Scheduled Meds:amLODIPine, 10 mg, Oral, Q24H  vitamin C, 250 mg, Oral, Daily  aspirin, 81 mg, Oral, Daily  cholecalciferol, 1,000 Units, Oral, Daily  clopidogrel, 75 mg, Oral, Daily  enoxaparin, 40 mg, Subcutaneous, Q24H  lisinopril, 5 mg, Oral, Q24H  rosuvastatin, 20 mg, Oral, Nightly      Continuous Infusions:   PRN Meds:.•  acetaminophen **OR** [DISCONTINUED] acetaminophen  •  [DISCONTINUED] senna-docusate sodium **AND** polyethylene glycol **AND** bisacodyl **AND** bisacodyl      RESULTS  No results found for: POCGLU  Results from last 7 days   Lab Units 04/10/23  0614 04/07/23  0833 04/06/23  0602   WBC 10*3/mm3 6.85 6.29 6.73   HEMOGLOBIN g/dL 13.4 14.0 14.3   HEMATOCRIT % 41.5 41.6 42.4   PLATELETS 10*3/mm3 187 175 177     Results from last 7 days   Lab Units  04/10/23  0614 04/07/23  0833 04/06/23  0602   SODIUM mmol/L 143 140 141   POTASSIUM mmol/L 3.6 3.5 3.7   CHLORIDE mmol/L 107 105 107   CO2 mmol/L 26.6 25.6 24.3   BUN mg/dL 19 23 22   CREATININE mg/dL 0.82 0.59 0.71   CALCIUM mg/dL 9.5 9.2 9.6   GLUCOSE mg/dL 191* 130* 157*      Latest Reference Range & Units 04/07/23 08:33   TSH Baseline 0.270 - 4.200 uIU/mL 3.100   Vitamin B-12 211 - 946 pg/mL 796   25 Hydroxy, Vitamin D 30.0 - 100.0 ng/ml 58.9         ASSESSMENT and PLAN    Infarction of left basal ganglia    Left basal ganglia stroke  Stroke prophylaxis- ASA and Plavix x 21 days from March 30, 2023, then ASA 81 mg daily. Crestor 20 mg.    Right hemiparesis with impaired motor control  Dysphagia   Dysarthria.    Hypertension-amlodipine/lisinopril    Check TSH, Vit B12 and Vit D levels-within normal limit    Urinary retention. Treated for UTI as Reggie Duran. Check bladder scan PVR and cath if >300 cc until three consecutive < 250 cc  April 7-requires intermittent straight cath 400-113-300 cc.  No spontaneous void.  Follow pattern.  April 10-requirement straight catheter 300 cc about every 6 or 8 hours.  - will extend interval to every 12 hours to see if she voids with a larger volume.        DVT prophylaxis-on dual antiplatelet therapies.  SCDs.  Lovenox    admit for comprehensive acute inpatient rehabilitation .  This would be an interdisciplinary program with physical therapy 1.5 hour,  occupational therapy 1.5 hour,  5 days a week. Rehabilitation nursing for carryover, monitoring of medical   status, bowel and bladder, and skin  Ongoing physician follow-up.  Weekly team conferences.   Goal is for home with occupational, physical, and speech   therapies.  Barrier to discharge: ADLs, independence, cognition, safety - work on strength, transfers, cognition to overcome.       Anton Fajardo MD      During rounds, used appropriate personal protective equipment including mask and gloves.  Additional  gown if indicated.  Mask used was standard procedure mask. Appropriate PPE was worn during the entire visit.  Hand hygiene was completed before and after.

## 2023-04-10 NOTE — PLAN OF CARE
Goal Outcome Evaluation:              Outcome Evaluation: Stroke. NIH=2. A&OX4. Forgetful. R. facial droop. Slurred speech. Diet: pureed, NTL. Upright and one-on-one supervision for all meals. Tylenol for HA. Incontinent and continent B&B. Last BM 4/09. Bladder scan q 12 hours. Cath. if greater than 300 ml. Meds crushed in AS. Full code. 2L O2 at nite. Labs today. Carolann area excoriated. Barrier cream applied. Wears a brief. Participated fully in therapy. Calm, cooperative, and pleasant. Assistx2 to wheelchair. Blurred vision in R. eye.

## 2023-04-10 NOTE — PROGRESS NOTES
Inpatient Rehabilitation Plan of Care Note    Plan of Care  Care Plan Reviewed - No updates at this time.    Psychosocial    [RN] Coping/Adjustment(Active)  Current Status(04/10/2023): Lacks insight with current situation  Weekly Goal(04/13/2023): provide education material regarding stroke  Discharge Goal: Knowledgeable of care needs and stroke recovery    Performed Intervention(s)  Verbalizes needs and concerns  Therapeutic environmental set up      Sphincter Control    [RN] Bladder Management(Active)  Current Status(04/10/2023): Urinary Retention; BS if unable to void q 6; IC if  scan >300  Weekly Goal(04/13/2023): The patient is unable to void independently  Discharge Goal: The patient empties bladder completely    [RN] Bowel Management(Active)  Current Status(04/10/2023): Incontinent of stool  Weekly Goal(04/13/2023): Continent 50%  Discharge Goal: Continent 100%    Performed Intervention(s)  Bladder scan or I/O cath per order  Encourage fluids  Bowel/bladder training      Safety    [RN] Potential for Injury(Active)  Current Status(04/10/2023): Risk for falls  Weekly Goal(04/13/2023): Instruct family/caregivers regarding safety precautions  and need for close supervision  Discharge Goal: Family/caregiver will be knowledgeable of need for cueing and  supervision to avoid falls    Performed Intervention(s)  Safety Rounds  Bed alarm and/or chair alarm    Signed by: Emperatriz Mccoy RN

## 2023-04-10 NOTE — PROGRESS NOTES
"Case Management  Inpatient Rehabilitation Plan of Care and Discharge Plan Note    Rehabilitation Diagnosis:  CVA of Left Basal Ganglia  Date of Onset:  03/29/2023    Medical Summary:  Mrs. Lizz Gomez is an 82 year old right handed female with  past medical history of hyperlipidemia who presents to rehab with a left basal  ganglia stroke. She woke up on 3/29/2023 and went to make coffee but noticed  that she was having a hard time stirring her coffee with her right hand. She was  also having difficulty walking. She spoke to her daughter on the phone and her  daughter said her speech \"sounded funny\" so she went to the ED. She was found to  be severely hypertensive with left basal ganglia stroke thought to be from small  vessel disease. TPA was not administered due to last known well at time of  arrival. Not a candidate for mechanical thrombectomy. Stroke was thought to be  small vessel disease based on size and location without significant carotid  disease, so she was placed on DAPT for 21 days with aspirin monotherapy  thereafter.  ?  On admission, she is doing well. She complains of some shortness of breath and a  cough, which is new. CXR was obtained in acute care showing remote granulomatous  disease without evidence of acute process. She has had difficulty urinating  which started about 24 hours ago, though she does not feel significant urgency  and cannot tell her bladder is distended. She complains of weakness of the left  upper and lower extremity. Her last bowel movement was this morning.  ?  Past Medical History: Past Medical History:  DiagnosisDate  ?GERD (gastroesophageal reflux disease)?  ?Hyperlipidemia?  ?Hypertension?  ?Stroke?    ?  ?  ?  PAST SURGICAL HISTORY:  Surgical History  Past Surgical History:  ProcedureLateralityDate  ?COLONOSCOPY??  ?HYSTERECTOMY??  ?TONSILLECTOMY    Plan of Care  Updated Problems/Interventions  Field    Expected Intensity:  Average of 3 hours of therapy 5 " days/week.  Interdisciplinary Team:  Interdisciplinary Team: Medical Supervision and 24 Hour Rehabilitation Nursing.,  Physical Therapy:, Occupational Therapy:, Speech and Language Therapy:, Social  Work, Therapeutic Recreation., Psychology., Registered Dietitian.  Physical Therapy Intensity/Duration: 1 hour / day, 5 days / week, for  approximately 2 weeks.  Physical Therapy Intensity/Duration: 1 hour / day, 5 days / week, for  approximately 2 weeks.  Occupational Therapy Intensity/Duration: 1 hour / day, 5 days / week, for  approximately 2 weeks.  Speech Language Pathology  Intensity/Duration: 1 hour / day, 5 days / week, for  approximately 2 weeks.  Estimated Length of Stay/Anticipated Discharge Date: 2 weeks  Anticipated Discharge Destination:  Anticipated discharge destination from inpatient rehabilitation is community  discharge with assistance. Patient plans to d/c home with home health and  possible private caregivers. Daughter can assist intermittently.      Based on the patient's medical and functional status, their prognosis and  expected level of functional improvement is:  Goals are to achieve a level of  safety with  mobility and self-care and improved ADLs and swallowing.  Rehabilitation prognosis good.  Medical prognosis good.    Signed by: Chelsea Ortega RN

## 2023-04-10 NOTE — PROGRESS NOTES
PPS CMG Coordinator  Inpatient Rehabilitation Admission    Ethnic Group: White.  Marital Status:  Marital Status: .    IRF Admission Date:  03/29/2023  Admission Class: Initial Rehab.  Admit From:  [facility]    Pre-Hospital Living: Home. Pre-Hospital Living  With: (1) Alone.    Payment Sources: Primary: Medicare - Medicare Advantage  Secondary: Not Listed.  Impairment Group: 01.4 No Paresis  Date of Onset of Impairment: 03/29/2023    Etiologic Diagnosis Code(s):  Rank Code      Description  1    I63.9     Cerebral infarction, unspecified    Comorbidities:  ICD    Are there any arthritis conditions recorded for Impairment Group, Etiologic  Diagnosis, or Comorbid Conditions that meet all of the regulatory requirements  for IRF classification (in 42 .29(b)(2)(x), (xi), and xii))? No    Presence of Pressure Ulcer:  No observed/documented pressure ulcers.    MEDICAL NEEDS  Height on Admission:  64 inches.  Weight on Admission:  422 pounds.    QUALITY INDICATORS  Prior Functioning:  Self Care: Patient completed all the activities by themself, with or without an  assistive device, with no assistance from a helper.  Indoor Mobility: Patient completed the activities by themself, with or without  an assistive device, with no assistance from a helper.  Stairs: Patient completed the activities by themself, with or without an  assistive device, with no assistance from a helper.  Functional Cognition: Patient completed the activities by themself, with or  without an assistive device, with no assistance from a helper.  Prior Device Use: Walker    Bladder and Bowel: Bladder Continence: Not applicable (e.g., indwelling  catheter).  Bowel Continence: Always incontinent (no episodes of continent bowel movements).    Swallowing/Nutritional Status: Modiified food consistency/supervision (patient  requires modified food or liquid consistency and/or needs supervision during  eating for safety).  Special  Conditions: Patient did not receive total parenteral nutrition treatment  at the time of admission.  Section A. Ethnicity/ Race/Language  Ethnicity: Not of , /a, [place] Origin  Race: White  Preferred Language: English  Requests  to Communicate:   No    Section I. Active Diagnosis: Comorbidities and Co-existing Conditions:   Patient  does not have PAD, PVD, or Diabetes Mellitus  Section J. Health Conditions: Patient has not had any falls in the past year.  Patient has not had major surgery during the 100 days prior to admission.  Section K. Swallowing/Nutritional Status  Nutritional Approaches on Admission:  Nutritional Approaches on Admission:  Mechanically altered diet - require change in texture of food or liquids (e.g.,  pureed food, thickened liquids)  Section M. Skin Conditions  Unhealed Pressure Ulcer/Injuries at Stage 1 or  Higher on Admission:  No.  Section N. Medication:  Potential Clinically Significant Medication Issues: No issues found during  review  Section . High-Risk Drug Classes: Use and Indication                       Is Taking                    Indication noted  High-RiskDrug Class  E. Anticoagulant     Yes                          Yes  I. Antiplatelet      Yes                          Yes    Section O. Special Treatments, Procedures, and Programs  Oxygen Therapy: Intermittent    Signed by: Chelsea Ortega RN

## 2023-04-10 NOTE — PROGRESS NOTES
Inpatient Rehabilitation Plan of Care Note    Plan of Care  Updated Problems/Interventions  Mobility    [PT] Walk(Active)  Current Status(04/10/2023): 80' Min/CGA RWX  Weekly Goal(04/18/2023): CGA to BR with RWX  Discharge Goal: 100' CGA RWX    [PT] Bed/Chair/Wheelchair(Active)  Current Status(04/10/2023): Min RWX  Weekly Goal(04/18/2023): CGA RWX  Discharge Goal: CGA RWX    [PT] Bed Mobility(Active)  Current Status(04/10/2023): Mod  Weekly Goal(04/18/2023): Min/CGA  Discharge Goal: SBA    Signed by: EDSON ReisT

## 2023-04-10 NOTE — PROGRESS NOTES
Inpatient Rehabilitation Functional Measures Assessment and Plan of Care    Plan of Care  Updated Problems/Interventions  Mobility    [OT] Toilet Transfers(Active)  Current Status(04/06/2023): Min A  Weekly Goal(04/13/2023): CGA  Discharge Goal: CGA    [OT] Tub/Shower Transfers(Active)  Current Status(04/10/2023): est min A  Weekly Goal(04/17/2023): CGA  Discharge Goal: CGA        Self Care    [OT] Bathing(Active)  Current Status(04/10/2023): mod  Weekly Goal(04/17/2023): min  Discharge Goal: CGA    [OT] Dressing (Lower)(Active)  Current Status(04/10/2023): max/dep  Weekly Goal(04/13/2023): mod  Discharge Goal: min    [OT] Dressing (Upper)(Active)  Current Status(04/10/2023): min  Weekly Goal(04/17/2023): SBA  Discharge Goal:  set up    [OT] Grooming(Active)  Current Status(04/10/2023): SBA  Weekly Goal(04/17/2023): set up  Discharge Goal: supervision    [OT] Toileting(Active)  Current Status(04/10/2023): dep  Weekly Goal(04/13/2023): mod  Discharge Goal: CGA    Functional Measures  MESHA Eating:  Branch  MESHA Grooming: Branch  MESHA Bathing:  Branch  MESHA Upper Body Dressing:  Branch  MESHA Lower Body Dressing:  Branch  MESHA Toileting:  Branch    MESHA Bladder Management  Level of Assistance:  Branch  Frequency/Number of Accidents this Shift:  Branch    MESHA Bowel Management  Level of Assistance: Branch  Frequency/Number of Accidents this Shift: Branch    MESHA Bed/Chair/Wheelchair Transfer:  Branch  MESHA Toilet Transfer:  Branch  MESHA Tub/Shower Transfer:  Branch    Previously Documented Mode of Locomotion at Discharge: Field  MESHA Expected Mode of Locomotion at Discharge: Branch  MESHA Walk/Wheelchair:  Branch  MESHA Stairs:  Branch    MESHA Comprehension:  Branch  MESHA Expression:  Branch  MESHA Social Interaction:  Branch  MESHA Problem Solving:  Branch  MESHA Memory:  Branch    Therapy Mode Minutes  Occupational Therapy: Branch  Physical Therapy: Branch  Speech Language Pathology:  Branch    Signed by: Tiffanie Farias OT

## 2023-04-10 NOTE — THERAPY PROGRESS REPORT/RE-CERT
Inpatient Rehabilitation - Speech Language Pathology Progress Note    [facility]     Patient Name: Melissa Gomez  : 1940  MRN: [id number]    Today's Date: 4/10/2023                   Admit Date: 2023       Visit Dx:      ICD-10-CM ICD-9-CM   1. Impaired functional mobility, balance, gait, and endurance  Z74.09 V49.89   2. Follow-up exam  Z09 V67.9       Patient Active Problem List   Diagnosis   • Infarction of left basal ganglia       Past Medical History:   Diagnosis Date   • GERD (gastroesophageal reflux disease)    • Hyperlipidemia    • Hypertension    • Stroke        Past Surgical History:   Procedure Laterality Date   • COLONOSCOPY     • HYSTERECTOMY     • TONSILLECTOMY         SLP Recommendation and Plan  Pt is making good progress toward short-term cognitive, communication and swallow goals. She now presents with a mild cognitive-communication deficit. She is now able to recall 3 errands after 10 minutes with NO cues (x1). She requires intermittent MIN-MOD cues to complete attention to detail tasks. Goals for home management tasks, divergent thinking and immediate memory have not been addressed due to focus on speech and swallowing. Pt presents with mild-moderate mixed (flaccid/hypokinetic) dysarthria. She is judged to be 90% intelligible in complex conversation with a familiar listener (x1). She is tolerating puree diet with NTL. She exhibits intermittent coughing/throat clearing with trials of water by tsp and ice chips. She is participating in swallow exercises (effortful swallow and EMST) with overall MOD cues. Recommend continued intensive inpatient speech therapy to address motor speech, cognition and swallowing.     SLP EVALUATION (last 72 hours)     SLP [facility] Evaluation     Row Name 04/10/23 1100 04/10/23 0900 23 1300             Communication Assessment/Intervention    Document Type therapy note (daily note)  -AL therapy note (daily note)  -AL therapy note (daily note)  -AL       Patient/Family/Caregiver Comments/Observations Pt participated well.  -AL Pt participated well.  -AL Pt upright in wheelchair. She reported her daughter assisted her at lunch, and she had no episodes of coughing/choking.  -AL         Pain Scale: Numbers Pre/Post-Treatment    Pretreatment Pain Rating 0/10 - no pain  -AL 0/10 - no pain  -AL 0/10 - no pain  -AL            User Key  (r) = Recorded By, (t) = Taken By, (c) = Cosigned By    Initials Name Effective Dates    Janell Erickson MS CCC-SLP 06/16/21 -                    EDUCATION    The patient has been educated in the following areas:       Cognitive Impairment Communication Impairment Dysphagia (Swallowing Impairment).             SLP GOALS     Row Name 04/10/23 1100 04/10/23 0900 04/08/23 0800       ([facility]) Pharyngeal Strengthening Exercise Goal 1 (SLP)    Activity (Pharyngeal Strengthening Goal 1, SLP) -- increase timing;increase superior movement of the hyolaryngeal complex;increase anterior movement of the hyolaryngeal complex;increase epiglottic inversion and retroflexion;increase closure at entrance to airway/closure of airway at glottis  -AL --    Increase Timing -- hard effortful swallow  -AL --    Increase Superior Movement of the Hyolaryngeal Complex -- hard effortful swallow  -AL --    Increase Anterior Movement of the Hyolaryngeal Complex -- EMST  -AL --    Increase Epiglottic Inversion and Retroflexion -- Mendelsohn;hard effortful swallow;EMST  -AL --    Increase Closure at Entrance to Airway/Closure of Airway at Glottis -- Mendelsohn;hard effortful swallow  -AL --    Myra/Accuracy (Pharyngeal Strengthening Goal 1, SLP) -- with moderate cues (50-74% accuracy)  -AL --    Time Frame (Pharyngeal Strengthening Goal 1, SLP) -- 1 week  -AL --    Progress/Outcomes (Pharyngeal Strengthening Goal 1, SLP) -- good progress toward goal  -AL good progress toward goal  -LS    Comment (Pharyngeal Strengthening Goal 1, SLP) -- Pt completed 20  repetitions of effortful swallow with MOD cues. She completed 5 sets of 5 reps of EMST75 at 1 full turn past 5 cmH20 with MIN-MOD cues. Pt exhibited no overt s/s of aspiration or penetration in 2/6 trials of water by tsp; cough x1, strong throat clearing x3. Pt exhibited no overt s/s of aspiration or penetration in 3/5 trials of ice chips; throat clear x2.  -AL Pt performed 7 repetitions of effortful swallow with sips of water by cup with mod cues. mild 3 second delayed swallow inition  noted. Pt became a little out of breath after the seventh trial.   SLP then introduced the Tongue Hold exercise with water. SLP placed 1/2 tsp of water in the pt's mouth and asked her to hold it. She was then asked  to place her tongue gently between her teeth and squeeze her throat  muscles.  Pt performed five sets with mod cues.  -       Articulation Goal 1 (SLP)    Improve Articulation Goal 1 (SLP) -- by over-articulating at word level;by over-articulating at phrase level;by over-articulating in connected speech;80%;with minimal cues (75-90%)  -AL --    Time Frame (Articulation Goal 1, SLP) -- 1 week  -AL --    Progress/Outcomes (Articulation Goal 1, SLP) -- good progress toward goal  -AL --    Comment (Articulation Goal 1, SLP) -- Pt was 90% intelligible with NO cues, 100% with MIN cues  -AL SLP utilized [facility] to facilitate improvement with intelligibility. Pt worrked on repeating words into a VU meter.  Pt achieved 82% accuracy.  It was noted that pt exhibited difficulty saying words with initial /h/.  Pt   Pt was able to imitate active sentences with 98% accuracy.  Pt required cues to slow down and over emphasize her words. This increased intelligibility.  -       Attention Goal 1 (SLP)    Improve Attention by Goal 1 (SLP) complete selective attention task;complete sustained attention task;80%;independently (over 90% accuracy)  -AL -- --    Time Frame (Attention Goal 1, SLP) 1 week  -AL -- --     Progress/Outcomes (Attention Goal 1, SLP) good progress toward goal  -AL -- --    Comment (Attention Goal 1, SLP) Attention to detail for written directions: 80% with NO cues, 100% with MIN-MOD cues  -AL -- --       Memory Skills Goal 1 (SLP)    Improve Memory Skills Through Goal 1 (SLP) recalling related word lists with an imposed delay;listen to a paragraph and answer questions;recall details of the day;80%;independently (over 90% accuracy)  -AL -- --    Time Frame (Memory Skills Goal 1, SLP) 1 week  -AL -- --    Comment (Memory Skills Goal 1, SLP) Recalled 3/3 errands after 10 minutes with NO cues. Goal met x1.  -AL -- --       Organizational Skills Goal 1 (SLP)    Improve Thought Organization Through Goal 1 (SLP) completing a divergent naming task;80%;with minimal cues (75-90%)  -AL -- --    Time Frame (Thought Organization Skills Goal 1, SLP) 1 week  -AL -- --    Comment (Thought Organization Skills Goal 1, SLP) goal not yet addressed due to time constraints  -AL -- --       Reasoning Goal 1 (SLP)    Improve Reasoning Through Goal 1 (SLP) complete deductive reasoning task;80%;independently (over 90% accuracy)  -AL -- --    Time Frame (Reasoning Goal 1, SLP) 1 week  -AL -- --    Comment (Reasoning Goal 1, SLP) goal not yet addressed due to time constraints  -AL -- --       Executive Functional Skills Goal 1 (SLP)    Improve Executive Function Skills Goal 1 (SLP) home management activity;80%;with minimal cues (75-90%)  -AL -- --    Time Frame (Executive Function Skills Goal 1, SLP) 1 week  -AL -- --    Comment (Executive Function Skills Goal 1, SLP) goal not yet addressed due to time constraints  -AL -- --    Row Name 04/07/23 1300             ([facility]) Patient will tolerate therapeutic trials of    Progress/Outcomes (Tolerate therapeutic trials) good progress toward goal  -AL      Comment (Tolerate therapeutic trials) Pt exhibited no overt s/s of aspiration or penetration in 7/8 trials of NTL by cup with MIN cues  for use of double swallow; delayed throat clear x1.  -AL         ([facility]) Pharyngeal Strengthening Exercise Goal 1 (SLP)    Progress/Outcomes (Pharyngeal Strengthening Goal 1, SLP) good progress toward goal  -AL      Comment (Pharyngeal Strengthening Goal 1, SLP) Pt completed 20 repetitions of effortful swallow with sips of NTL by cup with overall MIN-MOD cues. Delayed swallow initiation (3-4 seconds) noted. Introduced EMST exercise. Pt was unable to open valve on OWPH762 device. With EMST75 device, pt was able to complete 2 sets of 5 repetitions at 5 cmH20 plus 1 full turn. Pt exhibited fatigue as repetitions progressed.  -AL            User Key  (r) = Recorded By, (t) = Taken By, (c) = Cosigned By    Initials Name Provider Type    Yamila Dahl MS CCC-SLP Speech and Language Pathologist    Janell Erickson MS CCC-SLP Speech and Language Pathologist                            Time Calculation:        Time Calculation- SLP     Row Name 04/10/23 1205 04/10/23 1204          Time Calculation- SLP    SLP Start Time 1100  -AL 0900  -AL     SLP Stop Time 1130  -AL 0930  -AL     SLP Time Calculation (min) 30 min  -AL 30 min  -AL           User Key  (r) = Recorded By, (t) = Taken By, (c) = Cosigned By    Initials Name Provider Type    Janell Erickson MS CCC-SLP Speech and Language Pathologist                  Therapy Charges for Today     Code Description Service Date Service Provider Modifiers Qty    32913197131 HC ST TREATMENT SWALLOW 2 4/10/2023 Janell Gallego MS CCC-SLP GN 1    11779139819 HC ST DEV OF COGN SKILLS INITIAL 15 MIN 4/10/2023 Janell Gallego MS CCC-SLP  1    33768773207 HC ST DEV OF COGN SKILLS EACH ADDT'L 15 MIN 4/10/2023 Janell Gallego MS CCC-SLP  1                           Janell Gallego MS CCC-SLP  4/10/2023

## 2023-04-10 NOTE — PROGRESS NOTES
Inpatient Rehabilitation Plan of Care Note    Plan of Care  Care Plan Reviewed - No updates at this time.    Psychosocial    Performed Intervention(s)  Verbalizes needs and concerns  Therapeutic environmental set up      Sphincter Control    Performed Intervention(s)  Bladder scan or I/O cath per order  Encourage fluids  Bowel/bladder training      Safety    Performed Intervention(s)  Safety Rounds  Bed alarm and/or chair alarm    Signed by: Sharon Diop RN

## 2023-04-10 NOTE — THERAPY TREATMENT NOTE
"Inpatient Rehabilitation - Physical Therapy Treatment Note       [facility]     Patient Name: Melissa Gomez  : 1940  MRN: [id number]    Today's Date: 4/10/2023                    Admit Date: 2023      Visit Dx:     ICD-10-CM ICD-9-CM   1. Impaired functional mobility, balance, gait, and endurance  Z74.09 V49.89   2. Follow-up exam  Z09 V67.9       Patient Active Problem List   Diagnosis   • Infarction of left basal ganglia       Past Medical History:   Diagnosis Date   • GERD (gastroesophageal reflux disease)    • Hyperlipidemia    • Hypertension    • Stroke        Past Surgical History:   Procedure Laterality Date   • COLONOSCOPY     • HYSTERECTOMY     • TONSILLECTOMY         PT ASSESSMENT (last 12 hours)     IRF PT Evaluation and Treatment     Row Name 04/10/23 1413 04/10/23 1023       PT Time and Intention    Document Type daily treatment  -EE daily treatment  -EE    Mode of Treatment physical therapy;individual therapy  -EE physical therapy;individual therapy  -EE    Patient/Family/Caregiver Comments/Observations Pt supine in bed, agreeable to PT.  -EE Pt sitting up in WC, agreeable to PT.  -EE    Row Name 04/10/23 1413 04/10/23 1023       General Information    Existing Precautions/Restrictions fall  -EE fall  -EE    Row Name 04/10/23 1413 04/10/23 1023       Pain Assessment    Pretreatment Pain Rating 5/10  -EE 4/10  -EE    Posttreatment Pain Rating 5/10  -EE 4/10  -EE    Pain Location - Side/Orientation Right  -EE Right  -EE    Pain Location upper  -EE upper  -EE    Pain Location - extremity  -EE extremity  -EE    Pre/Posttreatment Pain Comment pt repositioned, provided rest breaks as needed during therapy session  -EE Pt repositioned for comfort. Continues to c/o ongoing R UE pain and feeling \"like I slept on my arm wrong\".  -EE    Row Name 04/10/23 1413 04/10/23 1023       Cognition/Psychosocial    Affect/Mental Status (Cognition) WFL  -EE WFL  -EE    Orientation Status (Cognition) oriented " x 4  -EE oriented x 4  -EE    Follows Commands (Cognition) follows one-step commands;over 90% accuracy;verbal cues/prompting required  -EE follows one-step commands;over 90% accuracy;verbal cues/prompting required  -EE    Personal Safety Interventions fall prevention program maintained;gait belt;muscle strengthening facilitated;nonskid shoes/slippers when out of bed;supervised activity  -EE fall prevention program maintained;gait belt;muscle strengthening facilitated;nonskid shoes/slippers when out of bed;supervised activity  -EE    Cognitive Function attention deficit;safety deficit  -EE attention deficit;safety deficit  -EE    Attention Deficit (Cognition) minimal deficit;distractible in noisy environment;alternating attention  -EE minimal deficit;distractible in noisy environment;alternating attention  -EE    Safety Deficit (Cognition) minimal deficit;insight into deficits/self-awareness;safety precautions awareness  -EE minimal deficit;insight into deficits/self-awareness;safety precautions awareness  -EE    Row Name 04/10/23 1413          Bed Mobility    Supine-Sit [place] (Bed Mobility) moderate assist (50% patient effort);verbal cues  -EE     Sit-Supine [place] (Bed Mobility) moderate assist (50% patient effort);verbal cues  -EE     Bed Mobility, Safety Issues decreased use of arms for pushing/pulling;decreased use of legs for bridging/pushing;impaired trunk control for bed mobility  -EE     Assistive Device (Bed Mobility) bed rails;head of bed elevated  -EE     Row Name 04/10/23 1023          Transfer Assessment/Treatment    Comment, (Transfers) 5x STS for functional strengthening. Cues for sequencing/setup.  -EE     Row Name 04/10/23 1413          Bed-Chair Transfer    Bed-Chair [place] (Transfers) minimum assist (75% patient effort);verbal cues  -EE     Assistive Device (Bed-Chair Transfers) wheelchair  -EE     Row Name 04/10/23 1413          Chair-Bed Transfer    Chair-Bed [place]  (Transfers) minimum assist (75% patient effort);verbal cues  -EE     Assistive Device (Chair-Bed Transfers) wheelchair  -EE     Row Name 04/10/23 1413 04/10/23 1023       Sit-Stand Transfer    Sit-Stand [place] (Transfers) contact guard;minimum assist (75% patient effort);verbal cues  -EE contact guard;minimum assist (75% patient effort);verbal cues  -EE    Assistive Device (Sit-Stand Transfers) wheelchair;walker, front-wheeled  -EE walker, front-wheeled;wheelchair  -EE    Comment, (Sit-Stand Transfer) vc's for hand placement and sequencing  -EE cues for hand placement/sequencing  -EE    Row Name 04/10/23 1413 04/10/23 1023       Stand-Sit Transfer    Stand-Sit [place] (Transfers) contact guard;minimum assist (75% patient effort);verbal cues  -EE contact guard;minimum assist (75% patient effort);verbal cues  -EE    Assistive Device (Stand-Sit Transfers) walker, front-wheeled;wheelchair  -EE walker, front-wheeled;wheelchair  -EE    Comment, (Stand-Sit Transfer) cues for hand placement  -EE cues for hand placement  -EE    Row Name 04/10/23 1413 04/10/23 1023       Gait/Stairs (Locomotion)    [place] Level (Gait) contact guard;verbal cues  -EE contact guard;minimum assist (75% patient effort);verbal cues  -EE    Assistive Device (Gait) walker, front-wheeled  -EE walker, front-wheeled  -EE    Distance in Feet (Gait) 80' x 2  -EE 80' x 1  -EE    Pattern (Gait) step-through  -EE step-through  -EE    Deviations/Abnormal Patterns (Gait) lenard decreased;base of support, narrow;festinating/shuffling;stride length decreased;weight shifting decreased  -EE lenard decreased;base of support, narrow;festinating/shuffling;stride length decreased;weight shifting decreased  -EE    Bilateral Gait Deviations forward flexed posture;heel strike decreased  -EE forward flexed posture;heel strike decreased  -EE    Right Sided Gait Deviations -- foot drop/toe drag  R foot drag/shuffling noted  -EE    Gait  Assessment/Intervention Cues for increased step length; improved after stepping activity in // bars.  -EE cues for upright posture and increased toe clearance  -EE    Comment, (Gait/Stairs) Stepping activity in // bars with pt stepping to colored targets 4 x 6' with emphasis on increased step length. CGA with B UE support on // bars.  -EE --    Row Name 04/10/23 1023          Safety Issues, Functional Mobility    Impairments Affecting Function (Mobility) balance;endurance/activity tolerance;postural/trunk control;strength;pain  -EE     Row Name 04/10/23 1023          Hip (Therapeutic Exercise)    Hip Strengthening (Therapeutic Exercise) bilateral;marching while seated;aDduction;aBduction;10 repetitions  yellow tband for hip abd  -EE     Row Name 04/10/23 1023          Knee (Therapeutic Exercise)    Knee Strengthening (Therapeutic Exercise) bilateral;LAQ (long arc quad);10 repetitions  -EE     Row Name 04/10/23 1023          Ankle (Therapeutic Exercise)    Ankle Strengthening (Therapeutic Exercise) bilateral;dorsiflexion;plantarflexion;10 repetitions;2 sets  -EE     Row Name 04/10/23 1413 04/10/23 1023       Positioning and Restraints    Pre-Treatment Position in bed  -EE sitting in chair/recliner  -EE    Post Treatment Position bed  -EE wheelchair  -EE    In Bed fowlers;call light within reach;encouraged to call for assist;exit alarm on  -EE --    In Wheelchair -- sitting;call light within reach;encouraged to call for assist;exit alarm on  -EE          User Key  (r) = Recorded By, (t) = Taken By, (c) = Cosigned By    Initials Name Provider Type    Diann Batista PT Physical Therapist                 Physical Therapy Education     Title: PT OT SLP Therapies (Done)     Topic: Physical Therapy (Done)     Point: Mobility training (Done)     Learning Progress Summary           Patient Acceptance, E, VU,NR by EE at 4/10/2023 1043    Acceptance, E, VU by LS at 4/8/2023 1204    Acceptance, E,TB, VU,NR by EE at 4/7/2023  1054    Acceptance, E,TB, VU by  at 4/6/2023 1527                   Point: Home exercise program (Done)     Learning Progress Summary           Patient Acceptance, E, VU,NR by EE at 4/10/2023 1043    Acceptance, E, VU by  at 4/8/2023 1204    Acceptance, E,TB, VU,NR by EE at 4/7/2023 1054    Acceptance, E,TB, VU by  at 4/6/2023 1527                               User Key     Initials Effective Dates Name Provider Type Discipline    LS 06/16/21 -  Yamila Wheat MS CCC-SLP Speech and Language Pathologist SLP     06/16/21 -  Diann Gonzáles, PT Physical Therapist PT     06/16/21 -  Chen Centeno PT Physical Therapist PT                PT Recommendation and Plan                          Time Calculation:      PT Charges     Row Name 04/10/23 1454 04/10/23 1043          Time Calculation    Start Time 1400  -EE 1000  -EE     Stop Time 1430  -EE 1030  -EE     Time Calculation (min) 30 min  -EE 30 min  -EE     PT Received On 04/10/23  -EE 04/10/23  -EE     PT - Next Appointment 04/11/23  -EE 04/10/23  -EE        Time Calculation- PT    Total Timed Code Minutes- PT 30 minute(s)  -EE 30 minute(s)  -EE           User Key  (r) = Recorded By, (t) = Taken By, (c) = Cosigned By    Initials Name Provider Type    EE Diann Gonzáles, PT Physical Therapist                Therapy Charges for Today     Code Description Service Date Service Provider Modifiers Qty    88513110294 HC PT THER PROC EA 15 MIN 4/10/2023 Diann Gonzáles, PT GP 1    58356680058 HC PT THERAPEUTIC ACT EA 15 MIN 4/10/2023 Diann Gonzáles, PT GP 1    74880385509 HC PT THERAPEUTIC ACT EA 15 MIN 4/10/2023 Diann Gonzáles, PT GP 2                   Diann Gonzáles PT  4/10/2023

## 2023-04-10 NOTE — THERAPY TREATMENT NOTE
"Inpatient Rehabilitation - Physical Therapy Treatment Note       [facility]     Patient Name: Melissa Gomez  : 1940  MRN: [id number]    Today's Date: 4/10/2023                    Admit Date: 2023      Visit Dx:     ICD-10-CM ICD-9-CM   1. Impaired functional mobility, balance, gait, and endurance  Z74.09 V49.89   2. Follow-up exam  Z09 V67.9       Patient Active Problem List   Diagnosis   • Infarction of left basal ganglia       Past Medical History:   Diagnosis Date   • GERD (gastroesophageal reflux disease)    • Hyperlipidemia    • Hypertension    • Stroke        Past Surgical History:   Procedure Laterality Date   • COLONOSCOPY     • HYSTERECTOMY     • TONSILLECTOMY         PT ASSESSMENT (last 12 hours)     IRF PT Evaluation and Treatment     Row Name 04/10/23 1023          PT Time and Intention    Document Type daily treatment  -EE     Mode of Treatment physical therapy;individual therapy  -EE     Patient/Family/Caregiver Comments/Observations Pt sitting up in WC, agreeable to PT.  -EE     Row Name 04/10/23 1023          General Information    Existing Precautions/Restrictions fall  -EE     Row Name 04/10/23 1023          Pain Assessment    Pretreatment Pain Rating 4/10  -EE     Posttreatment Pain Rating 4/10  -EE     Pain Location - Side/Orientation Right  -EE     Pain Location upper  -EE     Pain Location - extremity  -EE     Pre/Posttreatment Pain Comment Pt repositioned for comfort. Continues to c/o ongoing R UE pain and feeling \"like I slept on my arm wrong\".  -EE     Row Name 04/10/23 1023          Cognition/Psychosocial    Affect/Mental Status (Cognition) WFL  -EE     Orientation Status (Cognition) oriented x 4  -EE     Follows Commands (Cognition) follows one-step commands;over 90% accuracy;verbal cues/prompting required  -EE     Personal Safety Interventions fall prevention program maintained;gait belt;muscle strengthening facilitated;nonskid shoes/slippers when out of bed;supervised " activity  -EE     Cognitive Function attention deficit;safety deficit  -EE     Attention Deficit (Cognition) minimal deficit;distractible in noisy environment;alternating attention  -EE     Safety Deficit (Cognition) minimal deficit;insight into deficits/self-awareness;safety precautions awareness  -EE     Row Name 04/10/23 1023          Transfer Assessment/Treatment    Comment, (Transfers) 5x STS for functional strengthening. Cues for sequencing/setup.  -EE     Row Name 04/10/23 1023          Sit-Stand Transfer    Sit-Stand [place] (Transfers) contact guard;minimum assist (75% patient effort);verbal cues  -EE     Assistive Device (Sit-Stand Transfers) walker, front-wheeled;wheelchair  -EE     Comment, (Sit-Stand Transfer) cues for hand placement/sequencing  -EE     Row Name 04/10/23 1023          Stand-Sit Transfer    Stand-Sit [place] (Transfers) contact guard;minimum assist (75% patient effort);verbal cues  -EE     Assistive Device (Stand-Sit Transfers) walker, front-wheeled;wheelchair  -EE     Comment, (Stand-Sit Transfer) cues for hand placement  -EE     Row Name 04/10/23 1023          Gait/Stairs (Locomotion)    [place] Level (Gait) contact guard;minimum assist (75% patient effort);verbal cues  -EE     Assistive Device (Gait) walker, front-wheeled  -EE     Distance in Feet (Gait) 80' x 1  -EE     Pattern (Gait) step-through  -EE     Deviations/Abnormal Patterns (Gait) lenard decreased;base of support, narrow;festinating/shuffling;stride length decreased;weight shifting decreased  -EE     Bilateral Gait Deviations forward flexed posture;heel strike decreased  -EE     Right Sided Gait Deviations foot drop/toe drag  R foot drag/shuffling noted  -EE     Gait Assessment/Intervention cues for upright posture and increased toe clearance  -EE     Row Name 04/10/23 1023          Safety Issues, Functional Mobility    Impairments Affecting Function (Mobility) balance;endurance/activity  tolerance;postural/trunk control;strength;pain  -EE     Row Name 04/10/23 1023          Hip (Therapeutic Exercise)    Hip Strengthening (Therapeutic Exercise) bilateral;marching while seated;aDduction;aBduction;10 repetitions  yellow tband for hip abd  -EE     Row Name 04/10/23 1023          Knee (Therapeutic Exercise)    Knee Strengthening (Therapeutic Exercise) bilateral;LAQ (long arc quad);10 repetitions  -EE     Row Name 04/10/23 1023          Ankle (Therapeutic Exercise)    Ankle Strengthening (Therapeutic Exercise) bilateral;dorsiflexion;plantarflexion;10 repetitions;2 sets  -EE     Row Name 04/10/23 1023          Positioning and Restraints    Pre-Treatment Position sitting in chair/recliner  -EE     Post Treatment Position wheelchair  -EE     In Wheelchair sitting;call light within reach;encouraged to call for assist;exit alarm on  -EE           User Key  (r) = Recorded By, (t) = Taken By, (c) = Cosigned By    Initials Name Provider Type    EE Diann Gonzáles, PT Physical Therapist                 Physical Therapy Education     Title: PT OT SLP Therapies (Done)     Topic: Physical Therapy (Done)     Point: Mobility training (Done)     Learning Progress Summary           Patient Acceptance, E, VU,NR by  at 4/10/2023 1043    Acceptance, E, VU by  at 4/8/2023 1204    Acceptance, E,TB, VU,NR by EE at 4/7/2023 1054    Acceptance, E,TB, VU by  at 4/6/2023 1527                   Point: Home exercise program (Done)     Learning Progress Summary           Patient Acceptance, E, VU,NR by EE at 4/10/2023 1043    Acceptance, E, VU by  at 4/8/2023 1204    Acceptance, E,TB, VU,NR by  at 4/7/2023 1054    Acceptance, E,TB, VU by  at 4/6/2023 1527                               User Key     Initials Effective Dates Name Provider Type Discipline     06/16/21 -  Yamila Whaet MS CCC-SLP Speech and Language Pathologist SLP     06/16/21 -  Diann Gonzáles, GIRISH Physical Therapist PT    KP 06/16/21 -  Chen Centeno  PT Physical Therapist PT                PT Recommendation and Plan                          Time Calculation:      PT Charges     Row Name 04/10/23 1043             Time Calculation    Start Time 1000  -EE      Stop Time 1030  -EE      Time Calculation (min) 30 min  -EE      PT Received On 04/10/23  -EE      PT - Next Appointment 04/10/23  -EE         Time Calculation- PT    Total Timed Code Minutes- PT 30 minute(s)  -EE            User Key  (r) = Recorded By, (t) = Taken By, (c) = Cosigned By    Initials Name Provider Type    EE Diann Gonzáles, PT Physical Therapist                Therapy Charges for Today     Code Description Service Date Service Provider Modifiers Qty    19821290383 HC PT THER PROC EA 15 MIN 4/10/2023 Diann Gonzáles, PT GP 1    71357619570 HC PT THERAPEUTIC ACT EA 15 MIN 4/10/2023 Diann Gonzáles, PT GP 1                   Diann Gonzáles PT  4/10/2023

## 2023-04-10 NOTE — PROGRESS NOTES
SECTION GG    Mobility Performance:     Roll Left and Right: [place] does less than half the effort. [place] lifts, holds  or supports trunk or limbs but provides less than half the effort.   Sit to Lying: [place] does less than half the effort. [place] lifts, holds or  supports trunk or limbs but provides less than half the effort.   Lying to Sitting on Side of Bed: [place] does less than half the effort. [place]  lifts, holds or supports trunk or limbs but provides less than half the effort.   Sit to Stand: [place] does less than half the effort. [place] lifts, holds or  supports trunk or limbs but provides less than half the effort.   Chair/Bed to Chair Transfer: [place] does less than half the effort. [place]  lifts, holds or supports trunk or limbs but provides less than half the effort.   Car Transfer: [place] does less than half the effort. [place] lifts, holds or  supports trunk or limbs but provides less than half the effort.   Walk 10 Feet:   [place] does less than half the effort. [place] lifts, holds or  supports trunk or limbs but provides less than half the effort.  Walk 50 Feet with 2 Turns:   [place] does less than half the effort. [place]  lifts, holds or supports trunk or limbs but provides less than half the effort.  Walk 150 Feet:   Not attempted due to medical or safety concerns.  Walking 10 Feet on Uneven Surfaces:   Not attempted due to medical or safety  concerns.  1 Step Over Curb or Up/Down Stair:   Not attempted due to medical or safety  concerns.  Picking up an Object:   Not attempted due to medical or safety concerns.  Uses Wheelchair/Scooter: No    Mobility Discharge Goals:   Walk Discharge Goals:   Walk 50 Feet With 2 Turns: [place] provides verbal cues or touching/steadying  assistance as patient completes activity.    Section J. Health Conditions (Pain):  Pain Interference with Therapy Activities:   Occasionally  Pain Interference with Day-to-Day Activities:   Occasionally    Signed by: Tiffanie Antunez  PT

## 2023-04-11 PROCEDURE — 97112 NEUROMUSCULAR REEDUCATION: CPT

## 2023-04-11 PROCEDURE — 97530 THERAPEUTIC ACTIVITIES: CPT

## 2023-04-11 PROCEDURE — 25010000002 ENOXAPARIN PER 10 MG: Performed by: PHYSICAL MEDICINE & REHABILITATION

## 2023-04-11 PROCEDURE — 97110 THERAPEUTIC EXERCISES: CPT

## 2023-04-11 PROCEDURE — 97129 THER IVNTJ 1ST 15 MIN: CPT

## 2023-04-11 PROCEDURE — 92526 ORAL FUNCTION THERAPY: CPT

## 2023-04-11 PROCEDURE — 97130 THER IVNTJ EA ADDL 15 MIN: CPT

## 2023-04-11 PROCEDURE — 97535 SELF CARE MNGMENT TRAINING: CPT

## 2023-04-11 RX ADMIN — LISINOPRIL 5 MG: 5 TABLET ORAL at 07:25

## 2023-04-11 RX ADMIN — ACETAMINOPHEN 650 MG: 325 TABLET, FILM COATED ORAL at 19:44

## 2023-04-11 RX ADMIN — AMLODIPINE BESYLATE 10 MG: 10 TABLET ORAL at 07:25

## 2023-04-11 RX ADMIN — CLOPIDOGREL BISULFATE 75 MG: 75 TABLET, FILM COATED ORAL at 07:24

## 2023-04-11 RX ADMIN — ASPIRIN 81 MG: 81 TABLET, CHEWABLE ORAL at 07:24

## 2023-04-11 RX ADMIN — ACETAMINOPHEN 650 MG: 325 TABLET, FILM COATED ORAL at 07:34

## 2023-04-11 RX ADMIN — OXYCODONE HYDROCHLORIDE AND ACETAMINOPHEN 250 MG: 500 TABLET ORAL at 07:24

## 2023-04-11 RX ADMIN — ROSUVASTATIN CALCIUM 20 MG: 20 TABLET, FILM COATED ORAL at 19:44

## 2023-04-11 RX ADMIN — ENOXAPARIN SODIUM 40 MG: 100 INJECTION SUBCUTANEOUS at 11:53

## 2023-04-11 RX ADMIN — Medication 1000 UNITS: at 07:25

## 2023-04-11 NOTE — PROGRESS NOTES
Physical Medicine and Rehabilitation  Inpatient Rehabilitation Interdisciplinary Plan of Care    Demographics            Age: 82Y            Gender: Female    Admission Date: 4/5/2023 7:52:00 PM  Rehabilitation Diagnosis:  CVA of Left Basal Ganglia  Left basal ganglia stroke  Stroke prophylaxis- ASA and Plavix x 21 days from March 30, 2023, then ASA 81 mg  daily. Crestor 20 mg.    Right hemiparesis with impaired motor control  Dysphagia  Dysarthria.    Hypertension-amlodipine/lisinopril    Check TSH, Vit B12 and Vit D levels-within normal limit    Urinary retention. Treated for UTI as Reggie Duran. Check bladder scan PVR  and cath if >300 cc until three consecutive < 250 cc  April 7-requires intermittent straight cath 400-113-300 cc.  No spontaneous  void.  Follow pattern.        DVT prophylaxis-on dual antiplatelet therapies.  SCDs.  Lovenox    Plan of Care  Anticipated Discharge Date/Estimated Length of Stay: ELOS: 1 1/2 weeks  Anticipated Discharge Destination: Community discharge with assistance  Discharge Plan : Patient plans to d/c home with home health and possible private  caregivers. Daughter can assist intermittently.  Medical Necessity Expected Level Rationale: Goals are to achieve a level of SBA  / CGA with  mobility and self-care and improved ADLs and swallowing.  Rehabilitation prognosis good.  Medical prognosis good.  Intensity and Duration: an average of 3 hours/5 days per week  Medical Supervision and 24 Hour Rehab Nursing: x  Physical Therapy: x  PT Intensity/Duration: 1 hour / day, 5 days / week, for approximately 2 weeks.  Occupational Therapy: x  OT Intensity/Duration: 1 hour / day, 5 days / week, for approximately 2 weeks.  Speech and Language Therapy: x  SLP Intensity/Duration: 1 hour / day, 5 days / week, for approximately 2 weeks.  Social Work: x  Therapeutic Recreation: x  Psychology: x  Registered Dietician: x  Updated (if changes indicated)  No changes to plan.    Based on the  patient's medical and functional status, their prognosis and  expected level of functional improvement is: Goals are to achieve a level of SBA  / CGA with  mobility and self-care and improved ADLs and swallowing.  Rehabilitation prognosis good.  Medical prognosis good.    Interdisciplinary Problem/Goals/Status  Mobility    [OT] Toilet Transfers(Active)  Current Status(04/06/2023): Min A  Weekly Goal(04/13/2023): CGA  Discharge Goal: CGA    [OT] Tub/Shower Transfers(Active)  Current Status(04/10/2023): est min A  Weekly Goal(04/17/2023): CGA  Discharge Goal: CGA    [PT] Walk(Active)  Current Status(04/10/2023): 80' Min/CGA RWX  Weekly Goal(04/18/2023): CGA to BR with RWX  Discharge Goal: 100' CGA RWX    [PT] Bed/Chair/Wheelchair(Active)  Current Status(04/10/2023): Min RWX  Weekly Goal(04/18/2023): CGA RWX  Discharge Goal: CGA RWX    [PT] Bed Mobility(Active)  Current Status(04/10/2023): Mod  Weekly Goal(04/18/2023): Min/CGA  Discharge Goal: SBA        Self Care    [OT] Bathing(Active)  Current Status(04/10/2023): mod  Weekly Goal(04/17/2023): min  Discharge Goal: CGA    [OT] Dressing (Lower)(Active)  Current Status(04/10/2023): max/dep  Weekly Goal(04/13/2023): mod  Discharge Goal: min    [OT] Dressing (Upper)(Active)  Current Status(04/10/2023): min  Weekly Goal(04/17/2023): SBA  Discharge Goal:  set up    [OT] Grooming(Active)  Current Status(04/10/2023): SBA  Weekly Goal(04/17/2023): set up  Discharge Goal: supervision    [OT] Toileting(Active)  Current Status(04/10/2023): dep  Weekly Goal(04/13/2023): mod  Discharge Goal: CGA        Psychosocial    [RN] Coping/Adjustment(Active)  Current Status(04/10/2023): Lacks insight with current situation  Weekly Goal(04/13/2023): provide education material regarding stroke  Discharge Goal: Knowledgeable of care needs and stroke recovery        Sphincter Control    [RN] Bladder Management(Active)  Current Status(04/10/2023): Urinary Retention; BS if unable to void q 12; IC  if  scan >300  Weekly Goal(04/13/2023): The patient is unable to void independently  Discharge Goal: The patient empties bladder completely    [RN] Bowel Management(Active)  Current Status(04/10/2023): Incontinent of stool  Weekly Goal(04/13/2023): Continent 50%  Discharge Goal: Continent 100%        Safety    [RN] Potential for Injury(Active)  Current Status(04/10/2023): Risk for falls  Weekly Goal(04/13/2023): Instruct family/caregivers regarding safety precautions  and need for close supervision  Discharge Goal: Family/caregiver will be knowledgeable of need for cueing and  supervision to avoid falls        Swallow Function    [ST] Swallowing(Active)  Current Status(04/10/2023): Puree diet with Nectar Thick Liquids. Exhibits  intermittent coughing and throat clearing with trials of water by tsp; exhibits  occasional throat clear with trials of ice chips.  Weekly Goal(04/18/2023): Will tolerate trials of soft (ground meats)/no mixed  consistencies with MIN cues for clearing oral cavity.  Discharge Goal: Will tolerate the least restrictive diet with 1:1 supervision  for meals.        Cognition    [ST] Attention(Active)  Current Status(04/10/2023): Mildly impaired attention skills on CLQT.  Weekly Goal(04/18/2023): Will complete basic attention to detail tasks with NO  cues.  Discharge Goal: Improved attention skills for home environment.        Communication    [ST] Expression(Active)  Current Status(04/10/2023): Mildly impaired language skills on CLQT. Occasional  word finding delays in conversation. Now presents wtih Mild-Moderate mixed  dysarthria.  Weekly Goal(04/18/2023): Will use speech intelligibility strategies in  conversation with MIN cues.  Discharge Goal: Will be able to participate in a complex conversation with NO  cues for use of speech intelligibility strategies.      Comments: 4/11 Bed mob Mod I, transfers Min A rwx, amb 80' Min / CGA rwx, Min A  toilet transfer, est Min A for shower transfer.  Dep  w/ toileting.  Mod A w/  bathing.  Mildly impaired attention skills on CLQT.  Mildly impaired language  skills, occasional word finding delays in conversation.  Tolerating puree diet  NTL.  Intermittent coughing.  Urinary retention, ISC as needed.  Incontinent of  bowel.  Tylenol for headaches.    Signed by: Anton Fajardo MD

## 2023-04-11 NOTE — PROGRESS NOTES
LOS: 6 days   Patient Care Team:  Jayro Fountain MD as PCP - General (Internal Medicine)      PIERRE GARCÍAARACELIS  1940    Diagnoses    1. Follow-up examination  2. IMPAIRED FUNCTIONAL MOBILITY, BALANCE, GAIT, AND ENDURANCE       ADMITTING DIAGNOSIS:  Left basal ganglia stroke  Right hemiparesis with impaired motor control        Subjective     Patient continues with right-sided weakness and impaired motor control.  Continues with urinary retention.  Tolerates therapies. Strength about the same    Objective     Vitals:    04/11/23 1204   BP: 121/61   Pulse: 99   Resp: 18   Temp: 97.5 °F (36.4 °C)   SpO2: 97%       PHYSICAL EXAM:     MENTAL STATUS -  AWAKE / ALERT  HEENT-    LUNGS - normal resp  HEART- RRR,   ABD - non-distended  EXT - NO EDEMA OR CYANOSIS  NEURO -awake alert.  Oriented.  Dysarthria  Occasional word hesitancy  Right hemiparesis with impaired motor control.  Right shoulder flexion 3/5, elbow flexion 4/5, finger flexion 4/5, knee extension 4/5, ankle dorsiflexion 4/5      MEDICATIONS  Scheduled Meds:amLODIPine, 10 mg, Oral, Q24H  vitamin C, 250 mg, Oral, Daily  aspirin, 81 mg, Oral, Daily  cholecalciferol, 1,000 Units, Oral, Daily  clopidogrel, 75 mg, Oral, Daily  enoxaparin, 40 mg, Subcutaneous, Q24H  lisinopril, 5 mg, Oral, Q24H  rosuvastatin, 20 mg, Oral, Nightly      Continuous Infusions:   PRN Meds:.•  acetaminophen **OR** [DISCONTINUED] acetaminophen  •  [DISCONTINUED] senna-docusate sodium **AND** polyethylene glycol **AND** bisacodyl **AND** bisacodyl      RESULTS  No results found for: POCGLU  Results from last 7 days   Lab Units 04/10/23  0614 04/07/23  0833 04/06/23  0602   WBC 10*3/mm3 6.85 6.29 6.73   HEMOGLOBIN g/dL 13.4 14.0 14.3   HEMATOCRIT % 41.5 41.6 42.4   PLATELETS 10*3/mm3 187 175 177     Results from last 7 days   Lab Units 04/10/23  0614 04/07/23  0833 04/06/23  0602   SODIUM mmol/L 143 140 141   POTASSIUM mmol/L 3.6 3.5 3.7   CHLORIDE mmol/L 107 105 107   CO2 mmol/L  26.6 25.6 24.3   BUN mg/dL 19 23 22   CREATININE mg/dL 0.82 0.59 0.71   CALCIUM mg/dL 9.5 9.2 9.6   GLUCOSE mg/dL 191* 130* 157*      Latest Reference Range & Units 04/07/23 08:33   TSH Baseline 0.270 - 4.200 uIU/mL 3.100   Vitamin B-12 211 - 946 pg/mL 796   25 Hydroxy, Vitamin D 30.0 - 100.0 ng/ml 58.9         ASSESSMENT and PLAN    Infarction of left basal ganglia    Left basal ganglia stroke  Stroke prophylaxis- ASA and Plavix x 21 days from March 30, 2023, then ASA 81 mg daily. Crestor 20 mg.    Right hemiparesis with impaired motor control  Dysphagia   Dysarthria.    Hypertension-amlodipine/lisinopril    Check TSH, Vit B12 and Vit D levels-within normal limit    Urinary retention. Treated for UTI as Reggie Duran. Check bladder scan PVR and cath if >300 cc until three consecutive < 250 cc  April 7-requires intermittent straight cath 400-113-300 cc.  No spontaneous void.  Follow pattern.  April 10-requirement straight catheter 300 cc about every 6 or 8 hours.  - will extend interval to every 12 hours to see if she voids with a larger volume.      DVT prophylaxis-on dual antiplatelet therapies.  SCDs.  Lovenox    Team Conference 4/11/2023  Nursing: continent/incontinent with bladder, requiring cathing each bladder scan, no safety issues  PT: bed mobiliy min assist, contact guard with transfers, ambulating 80 feet, fatigues quickly with low endurance, cues for sequencing, drags left foot, anticipated contact guard with walker  OT: mod assist for bathing, max/dependent with lower body dressing, toileting max assist due to balance  SLP: mildly impaired on CLQT WNL memory/visuospatial, and mild for attention/executive function, puree and nectar thick diet, mild to moderate dysarthria  Discharge Date: 1.5 weeks    admit for comprehensive acute inpatient rehabilitation .  This would be an interdisciplinary program with physical therapy 1.5 hour,  occupational therapy 1.5 hour,  5 days a week. Rehabilitation nursing  for carryover, monitoring of medical   status, bowel and bladder, and skin  Ongoing physician follow-up.  Weekly team conferences.   Goal is for home with occupational, physical, and speech   therapies.  Barrier to discharge: ADLs, independence, cognition, safety - work on strength, transfers, cognition to overcome.       Anton Fajardo MD      During rounds, used appropriate personal protective equipment including mask and gloves.  Additional gown if indicated.  Mask used was standard procedure mask. Appropriate PPE was worn during the entire visit.  Hand hygiene was completed before and after.

## 2023-04-11 NOTE — THERAPY TREATMENT NOTE
Inpatient Rehabilitation - Speech Language Pathology Treatment Note    [facility]     Patient Name: Melissa Gomez  : 1940  MRN: [id number]    Today's Date: 2023                   Admit Date: 2023       Visit Dx:      ICD-10-CM ICD-9-CM   1. Impaired functional mobility, balance, gait, and endurance  Z74.09 V49.89   2. Follow-up exam  Z09 V67.9       Patient Active Problem List   Diagnosis   • Infarction of left basal ganglia       Past Medical History:   Diagnosis Date   • GERD (gastroesophageal reflux disease)    • Hyperlipidemia    • Hypertension    • Stroke        Past Surgical History:   Procedure Laterality Date   • COLONOSCOPY     • HYSTERECTOMY     • TONSILLECTOMY         SLP Recommendation and Plan                                                            SLP EVALUATION (last 72 hours)     SLP SLC Evaluation     Row Name 04/11/23 1100 04/11/23 0900 04/10/23 1100 04/10/23 0900          Communication Assessment/Intervention    Document Type therapy note (daily note)  -AL therapy note (daily note)  -AL therapy note (daily note)  -AL therapy note (daily note)  -AL     Patient/Family/Caregiver Comments/Observations Pt participated well.  -AL Pt participated well.  -AL Pt participated well.  -AL Pt participated well.  -AL        Pain Scale: Numbers Pre/Post-Treatment    Pretreatment Pain Rating 0/10 - no pain  -AL 0/10 - no pain  -AL 0/10 - no pain  -AL 0/10 - no pain  -AL           User Key  (r) = Recorded By, (t) = Taken By, (c) = Cosigned By    Initials Name Effective Dates    Janell Erickson MS CCC-SLP 21 -                    EDUCATION    The patient has been educated in the following areas:       Cognitive Impairment Communication Impairment Dysphagia (Swallowing Impairment).             SLP GOALS     Row Name 04/11/23 1100 04/11/23 0900 04/10/23 1100       (STG) Pharyngeal Strengthening Exercise Goal 1 (SLP)    Comment (Pharyngeal Strengthening Goal 1, SLP) -- Re-assessed  swallow funciton. Pt exhibited no overt s/s of aspiration or penetration with NTL by cup, puree, soft solid (drained peach), and regular. Mastication of solids appeared moderately prolonged. Pt exhibited coating of residue on her tongue after regular, requiring sips of liquid. Recommend trial mechanical soft (ground)/no mixed consistencies with NTL over lunch meal to assess tolerance and endurance. Pt in agreement with this plan.  -AL --       Attention Goal 1 (SLP)    Improve Attention by Goal 1 (SLP) complete selective attention task;complete sustained attention task;80%;independently (over 90% accuracy)  -AL -- complete selective attention task;complete sustained attention task;80%;independently (over 90% accuracy)  -AL    Time Frame (Attention Goal 1, SLP) 1 week  -AL -- 1 week  -AL    Progress/Outcomes (Attention Goal 1, SLP) good progress toward goal  -AL -- good progress toward goal  -AL    Comment (Attention Goal 1, SLP) Attention to detail for reading calendar: 7/8 with NO cues, 8/8 with MIN cues. Pt attempted writing information into calendar; however, her writing was micrographic with reduced legibility.  -AL -- Attention to detail for written directions: 80% with NO cues, 100% with MIN-MOD cues  -AL       Memory Skills Goal 1 (SLP)    Improve Memory Skills Through Goal 1 (SLP) recalling related word lists with an imposed delay;listen to a paragraph and answer questions;recall details of the day;80%;independently (over 90% accuracy)  -AL -- recalling related word lists with an imposed delay;listen to a paragraph and answer questions;recall details of the day;80%;independently (over 90% accuracy)  -AL    Time Frame (Memory Skills Goal 1, SLP) 1 week  -AL -- 1 week  -AL    Progress/Outcomes (Memory Skills Goal 1, SLP) good progress toward goal  -AL -- --    Comment (Memory Skills Goal 1, SLP) Recalled 3/3 errands after 25 minutes with NO cues. Goal met x2. Immediate memory for voicemails (complex  paragraph): 60% with NO cues, 100% with MIN cues.  -AL -- Recalled 3/3 errands after 10 minutes with NO cues. Goal met x1.  -AL       Organizational Skills Goal 1 (SLP)    Improve Thought Organization Through Goal 1 (SLP) -- -- completing a divergent naming task;80%;with minimal cues (75-90%)  -AL    Time Frame (Thought Organization Skills Goal 1, SLP) -- -- 1 week  -AL    Comment (Thought Organization Skills Goal 1, SLP) -- -- goal not yet addressed due to time constraints  -AL       Reasoning Goal 1 (SLP)    Improve Reasoning Through Goal 1 (SLP) -- -- complete deductive reasoning task;80%;independently (over 90% accuracy)  -AL    Time Frame (Reasoning Goal 1, SLP) -- -- 1 week  -AL    Comment (Reasoning Goal 1, SLP) -- -- goal not yet addressed due to time constraints  -AL       Executive Functional Skills Goal 1 (SLP)    Improve Executive Function Skills Goal 1 (SLP) -- -- home management activity;80%;with minimal cues (75-90%)  -AL    Time Frame (Executive Function Skills Goal 1, SLP) -- -- 1 week  -AL    Comment (Executive Function Skills Goal 1, SLP) -- -- goal not yet addressed due to time constraints  -AL    Row Name 04/10/23 0900             (STG) Pharyngeal Strengthening Exercise Goal 1 (SLP)    Activity (Pharyngeal Strengthening Goal 1, SLP) increase timing;increase superior movement of the hyolaryngeal complex;increase anterior movement of the hyolaryngeal complex;increase epiglottic inversion and retroflexion;increase closure at entrance to airway/closure of airway at glottis  -AL      Increase Timing hard effortful swallow  -AL      Increase Superior Movement of the Hyolaryngeal Complex hard effortful swallow  -AL      Increase Anterior Movement of the Hyolaryngeal Complex EMST  -AL      Increase Epiglottic Inversion and Retroflexion Mendelsohn;hard effortful swallow;EMST  -AL      Increase Closure at Entrance to Airway/Closure of Airway at Glottis Mendelsohn;hard effortful swallow  -AL       [place]/Accuracy (Pharyngeal Strengthening Goal 1, SLP) with moderate cues (50-74% accuracy)  -AL      Time Frame (Pharyngeal Strengthening Goal 1, SLP) 1 week  -AL      Progress/Outcomes (Pharyngeal Strengthening Goal 1, SLP) good progress toward goal  -AL      Comment (Pharyngeal Strengthening Goal 1, SLP) Pt completed 20 repetitions of effortful swallow with MOD cues. She completed 5 sets of 5 reps of EMST75 at 1 full turn past 5 cmH20 with MIN-MOD cues. Pt exhibited no overt s/s of aspiration or penetration in 2/6 trials of water by tsp; cough x1, strong throat clearing x3. Pt exhibited no overt s/s of aspiration or penetration in 3/5 trials of ice chips; throat clear x2.  -AL         Articulation Goal 1 (SLP)    Improve Articulation Goal 1 (SLP) by over-articulating at word level;by over-articulating at phrase level;by over-articulating in connected speech;80%;with minimal cues (75-90%)  -AL      Time Frame (Articulation Goal 1, SLP) 1 week  -AL      Progress/Outcomes (Articulation Goal 1, SLP) good progress toward goal  -AL      Comment (Articulation Goal 1, SLP) Pt was 90% intelligible with NO cues, 100% with MIN cues  -AL            User Key  (r) = Recorded By, (t) = Taken By, (c) = Cosigned By    Initials Name Provider Type    Janell Erickson MS CCC-SLP Speech and Language Pathologist                            Time Calculation:        Time Calculation- SLP     Row Name 04/11/23 1245 04/11/23 1244          Time Calculation- SLP    SLP Start Time 1100  -AL 0900  -AL     SLP Stop Time 1130  -AL 0930  -AL     SLP Time Calculation (min) 30 min  -AL 30 min  -AL           User Key  (r) = Recorded By, (t) = Taken By, (c) = Cosigned By    Initials Name Provider Type    Janell Erickson MS CCC-SLP Speech and Language Pathologist                  Therapy Charges for Today     Code Description Service Date Service Provider Modifiers Qty    02840124905  ST TREATMENT SWALLOW 2 4/10/2023 Janell Gallego  Lilly, MS CCC-SLP GN 1    [id number] HC ST DEV OF COGN SKILLS INITIAL 15 MIN 4/10/2023 Janell Gallego, MS CCC-SLP  1    [id number] HC ST DEV OF COGN SKILLS EACH ADDT'L 15 MIN 4/10/2023 Janell Gallego, MS CCC-SLP  1    [id number] HC ST TREATMENT SWALLOW 2 4/11/2023 Janell Gallego, MS CCC-SLP GN 1    [id number] HC ST DEV OF COGN SKILLS INITIAL 15 MIN 4/11/2023 Janell Gallego, MS CCC-SLP  1    [id number] HC ST DEV OF COGN SKILLS EACH ADDT'L 15 MIN 4/11/2023 Janell Gallego, MS CCC-SLP  1                           Janell Gallego, MS CCC-SLP  4/11/2023

## 2023-04-11 NOTE — THERAPY TREATMENT NOTE
Inpatient Rehabilitation - Physical Therapy Treatment Note       [facility]     Patient Name: Melissa Gomez  : 1940  MRN: [id number]    Today's Date: 2023                    Admit Date: 2023      Visit Dx:     ICD-10-CM ICD-9-CM   1. Impaired functional mobility, balance, gait, and endurance  Z74.09 V49.89   2. Follow-up exam  Z09 V67.9       Patient Active Problem List   Diagnosis   • Infarction of left basal ganglia       Past Medical History:   Diagnosis Date   • GERD (gastroesophageal reflux disease)    • Hyperlipidemia    • Hypertension    • Stroke        Past Surgical History:   Procedure Laterality Date   • COLONOSCOPY     • HYSTERECTOMY     • TONSILLECTOMY         PT ASSESSMENT (last 12 hours)     IRF PT Evaluation and Treatment     Row Name 23 0741          PT Time and Intention    Document Type daily treatment  -DP     Mode of Treatment physical therapy;individual therapy  -DP     Patient/Family/Caregiver Comments/Observations Pt sitting up in w/c and agreeable to PT  -DP     Row Name 23 0741          General Information    Patient Profile Reviewed yes  -DP     Existing Precautions/Restrictions fall  -DP     Row Name 23 0741          Pain Assessment    Pretreatment Pain Rating 5/10  -DP     Posttreatment Pain Rating 5/10  -DP     Pain Location - Side/Orientation Right  -DP     Pain Location upper  -DP     Pain Location - extremity  -DP     Pre/Posttreatment Pain Comment Pt stated  that she had pain all over  -DP     Row Name 23 0741          Cognition/Psychosocial    Affect/Mental Status (Cognition) WFL  -DP     Orientation Status (Cognition) oriented x 4  -DP     Follows Commands (Cognition) follows one-step commands;over 90% accuracy;verbal cues/prompting required  -DP     Personal Safety Interventions safety round/check completed;elopement precautions initiated;fall prevention program maintained;gait belt;muscle strengthening facilitated;nonskid  shoes/slippers when out of bed  -DP     Cognitive Function attention deficit;safety deficit  -DP     Row Name 04/11/23 0741          Bed Mobility    Sit-Supine [place] (Bed Mobility) moderate assist (50% patient effort);verbal cues  -DP     Bed Mobility, Safety Issues decreased use of arms for pushing/pulling;decreased use of legs for bridging/pushing;impaired trunk control for bed mobility  -DP     Assistive Device (Bed Mobility) bed rails;head of bed elevated  -DP     Row Name 04/11/23 0741          Transfer Assessment/Treatment    Comment, (Transfers) In PM session the pt required Lila at times for tansfers. Pt performed 3x STS with less VC required as pt continued  -DP     Row Name 04/11/23 0741          Chair-Bed Transfer    Chair-Bed [place] (Transfers) minimum assist (75% patient effort);verbal cues  -DP     Assistive Device (Chair-Bed Transfers) wheelchair  -DP     Row Name 04/11/23 0741          Sit-Stand Transfer    Sit-Stand [place] (Transfers) contact guard;minimum assist (75% patient effort);verbal cues  -DP     Assistive Device (Sit-Stand Transfers) wheelchair;walker, front-wheeled  -DP     Row Name 04/11/23 0741          Stand-Sit Transfer    Stand-Sit [place] (Transfers) contact guard;minimum assist (75% patient effort);verbal cues  -DP     Assistive Device (Stand-Sit Transfers) walker, front-wheeled;wheelchair  -DP     Row Name 04/11/23 0741          Gait/Stairs (Locomotion)    [place] Level (Gait) contact guard;verbal cues  -DP     Assistive Device (Gait) walker, front-wheeled  -DP     Distance in Feet (Gait) 80'x3  -DP     Pattern (Gait) step-through  -DP     Deviations/Abnormal Patterns (Gait) lenard decreased;base of support, narrow;festinating/shuffling;stride length decreased;weight shifting decreased  -DP     Bilateral Gait Deviations forward flexed posture;heel strike decreased  -DP     Right Sided Gait Deviations foot drop/toe drag  -DP     Gait  Assessment/Intervention pt able to advance RLE without dragging R toe until she became fatigued and then required VC to avoid dragging of RLE  -DP     Row Name 04/11/23 0741          Safety Issues, Functional Mobility    Impairments Affecting Function (Mobility) balance;endurance/activity tolerance;postural/trunk control;strength;pain  -DP     Row Name 04/11/23 0741          Hip (Therapeutic Exercise)    Hip (Therapeutic Exercise) isometric exercises  -DP     Hip Isometrics (Therapeutic Exercise) bilateral;gluteal sets;sitting;10 repetitions;3 second hold  -DP     Hip Strengthening (Therapeutic Exercise) bilateral;aBduction;marching while seated;sitting;yellow;resistance band;10 repetitions  MIP performed 2x10  -DP     Row Name 04/11/23 0741          Knee (Therapeutic Exercise)    Knee Strengthening (Therapeutic Exercise) bilateral;hamstring curls;resistance band;yellow;LAQ (long arc quad);1 lb free weight;10 repetitions  -DP     Row Name 04/11/23 0741          Ankle (Therapeutic Exercise)    Ankle (Therapeutic Exercise) strengthening exercise  -DP     Ankle Strengthening (Therapeutic Exercise) bilateral;dorsiflexion;sitting;yellow;resistance band;20 repititions  -DP     Row Name 04/11/23 0741          Positioning and Restraints    Pre-Treatment Position sitting in chair/recliner  -DP     Post Treatment Position bed  -DP     In Bed fowlers;call light within reach;encouraged to call for assist;exit alarm on  -DP           User Key  (r) = Recorded By, (t) = Taken By, (c) = Cosigned By    Initials Name Provider Type    Gen Thomas PT Physical Therapist                 Physical Therapy Education     Title: PT OT SLP Therapies (Done)     Topic: Physical Therapy (Done)     Point: Mobility training (Done)     Learning Progress Summary           Patient Acceptance, E,D, VU,DU by DP at 4/11/2023 1038    Acceptance, E, VU,NR by EE at 4/10/2023 1043    Acceptance, E, VU by LS at 4/8/2023 1204    Acceptance, E,TB, VU,NR  by EE at 4/7/2023 1054    Acceptance, E,TB, VU by KP at 4/6/2023 1527                   Point: Home exercise program (Done)     Learning Progress Summary           Patient Acceptance, E,D, VU,DU by DP at 4/11/2023 1038    Acceptance, E, VU,NR by EE at 4/10/2023 1043    Acceptance, E, VU by LS at 4/8/2023 1204    Acceptance, E,TB, VU,NR by EE at 4/7/2023 1054    Acceptance, E,TB, VU by KP at 4/6/2023 1527                               User Key     Initials Effective Dates Name Provider Type Discipline     06/16/21 -  Yamila Wheat, MS CCC-SLP Speech and Language Pathologist SLP    EE 06/16/21 -  Diann Gonzáles, PT Physical Therapist PT     06/16/21 -  Chen Centeno PT Physical Therapist PT    DP 08/24/21 -  Gen Gray, PT Physical Therapist PT                PT Recommendation and Plan                          Time Calculation:      PT Charges     Row Name 04/11/23 1349 04/11/23 1038          Time Calculation    Start Time 1230  -DP 1000  -DP     Stop Time 1300  -DP 1030  -DP     Time Calculation (min) 30 min  -DP 30 min  -DP     PT Received On -- 04/11/23  -DP     PT - Next Appointment -- 04/12/23  -DP        Time Calculation- PT    Total Timed Code Minutes- PT 30 minute(s)  -DP 30 minute(s)  -DP           User Key  (r) = Recorded By, (t) = Taken By, (c) = Cosigned By    Initials Name Provider Type    DP Gen Gray, PT Physical Therapist                Therapy Charges for Today     Code Description Service Date Service Provider Modifiers Qty    58162097599 HC PT THERAPEUTIC ACT EA 15 MIN 4/11/2023 MarinaRosalia teixeiran, PT GP 1    86890643980 HC PT THER PROC EA 15 MIN 4/11/2023 MarinaRosalia teixeiran, PT GP 1    90355756913 HC PT THERAPEUTIC ACT EA 15 MIN 4/11/2023 MarinaRosalia teixeiran, PT GP 1    39165254452 HC PT THER PROC EA 15 MIN 4/11/2023 Gen Gray, PT GP 1              Patient was wearing a face mask during this therapy encounter. Therapist used appropriate personal protective equipment including mask and  gloves.  Mask used was standard procedure mask. Appropriate PPE was worn during the entire therapy session. Hand hygiene was completed before and after therapy session. Patient is not in enhanced droplet precautions.         Gen Gray, PT  4/11/2023

## 2023-04-11 NOTE — PROGRESS NOTES
Form completed, will place in box.   Met with patient to review goals and progress. Patient's ELOS is 1.5 weeks. Patient does not think she will be ready by then to d/c and is worried because she doesn't have someone to come home with her full-time. Patient is walking 80 ft CGA with a rolling walker. Patient will need a walker when she goes home. Patient working on attention and language skills with ST. Patient currently presents with mild-mod mixed dysarthria. SW will continue to assess for d/c needs.

## 2023-04-11 NOTE — PLAN OF CARE
Goal Outcome Evaluation:  Plan of Care Reviewed With: patient             Problem: Rehabilitation (IRF) Plan of Care  Goal: Plan of Care Review  Outcome: Ongoing, Progressing  Flowsheets (Taken 4/11/2023 0113)  Plan of Care Reviewed With: patient  Outcome Evaluation: Pt is alert and oriented x 4. Can be forgetful at times. Dysarthria noted. Facial droop noted. Meds crushed with applesauce, NTL. Continues with RUE discomfort. Incontinent of bowel, wearing brief, checked and changed q2. Does have incontinent episodes of urine but does not void alot. Bladder scanned and IC q12. Has not voided so far. PRN Tylenol administered at 1937 for a headache. Turned and repositioned q 2. wearing 1L nc at HS. No unsafe behavior. Call light within reach.

## 2023-04-11 NOTE — PROGRESS NOTES
Case Management  Inpatient Rehabilitation Team Conference    Conference Date/Time: 4/11/2023 7:49:32 AM    Team Conference Attendees:  Dr. Anton Ramos, Psy.martin Carranza, Pharmacist  Yue Robert, RENETTA Gonzáles, PT  Janell Gallego, SLP  Nancy Gimenez, CTRS  Chelsea Ortega, RN  Dalila Klein, RN   Tiffanie Farias, OT    Demographics            Age: 82Y            Gender: Female    Admission Date: 4/5/2023 7:52:00 PM  Rehabilitation Diagnosis:  CVA of Left Basal Ganglia  Past Medical History: Past Medical History:  DiagnosisDate  ?GERD (gastroesophageal reflux disease)?  ?Hyperlipidemia?  ?Hypertension?  ?Stroke?    ?  ?  ?  PAST SURGICAL HISTORY:  Surgical History  Past Surgical History:  ProcedureLateralityDate  ?COLONOSCOPY??  ?HYSTERECTOMY??  ?TONSILLECTOMY      Plan of Care  Anticipated Discharge Date/Estimated Length of Stay: 2 weeks  Anticipated Discharge Destination: Community discharge with assistance  Discharge Plan : Patient plans to d/c home with home health and possible private  caregivers. Daughter can assist intermittently.  Medical Necessity Expected Level Rationale: Goals are to achieve a level of  safety with  mobility and self-care and improved ADLs and swallowing.  Rehabilitation prognosis good.  Medical prognosis good.  Intensity and Duration: an average of 3 hours/5 days per week  Medical Supervision and 24 Hour Rehab Nursing: x  Physical Therapy: x  PT Intensity/Duration: 1 hour / day, 5 days / week, for approximately 2 weeks.  Occupational Therapy: x  OT Intensity/Duration: 1 hour / day, 5 days / week, for approximately 2 weeks.  Speech and Language Therapy: x  SLP Intensity/Duration: 1 hour / day, 5 days / week, for approximately 2 weeks.  Social Work: x  Therapeutic Recreation: x  Psychology: x  Registered Dietician: x  Updated (if changes indicated)    Anticipated Discharge Date/Estimated Length of Stay:   ELOS: 1 1/2 weeks    Based on the patient's medical and  functional status, their prognosis and  expected level of functional improvement is: Goals are to achieve a level of SBA  / CGA with  mobility and self-care and improved ADLs and swallowing.  Rehabilitation prognosis good.  Medical prognosis good.      Interdisciplinary Problem/Goals/Status    All Rehab Problems:  Mobility    [OT] Toilet Transfers(Active)  Current Status(04/06/2023): Min A  Weekly Goal(04/13/2023): CGA  Discharge Goal: CGA    [OT] Tub/Shower Transfers(Active)  Current Status(04/10/2023): est min A  Weekly Goal(04/17/2023): CGA  Discharge Goal: CGA    [PT] Walk(Active)  Current Status(04/10/2023): 80' Min/CGA RWX  Weekly Goal(04/18/2023): CGA to BR with RWX  Discharge Goal: 100' CGA RWX    [PT] Bed/Chair/Wheelchair(Active)  Current Status(04/10/2023): Min RWX  Weekly Goal(04/18/2023): CGA RWX  Discharge Goal: CGA RWX    [PT] Bed Mobility(Active)  Current Status(04/10/2023): Mod  Weekly Goal(04/18/2023): Min/CGA  Discharge Goal: SBA        Self Care    [OT] Bathing(Active)  Current Status(04/10/2023): mod  Weekly Goal(04/17/2023): min  Discharge Goal: CGA    [OT] Dressing (Lower)(Active)  Current Status(04/10/2023): max/dep  Weekly Goal(04/13/2023): mod  Discharge Goal: min    [OT] Dressing (Upper)(Active)  Current Status(04/10/2023): min  Weekly Goal(04/17/2023): SBA  Discharge Goal:  set up    [OT] Grooming(Active)  Current Status(04/10/2023): SBA  Weekly Goal(04/17/2023): set up  Discharge Goal: supervision    [OT] Toileting(Active)  Current Status(04/10/2023): dep  Weekly Goal(04/13/2023): mod  Discharge Goal: CGA        Psychosocial    [RN] Coping/Adjustment(Active)  Current Status(04/10/2023): Lacks insight with current situation  Weekly Goal(04/13/2023): provide education material regarding stroke  Discharge Goal: Knowledgeable of care needs and stroke recovery        Sphincter Control    [RN] Bladder Management(Active)  Current Status(04/10/2023): Urinary Retention; BS if unable to void q 12;  IC if  scan >300  Weekly Goal(04/13/2023): The patient is unable to void independently  Discharge Goal: The patient empties bladder completely    [RN] Bowel Management(Active)  Current Status(04/10/2023): Incontinent of stool  Weekly Goal(04/13/2023): Continent 50%  Discharge Goal: Continent 100%        Safety    [RN] Potential for Injury(Active)  Current Status(04/10/2023): Risk for falls  Weekly Goal(04/13/2023): Instruct family/caregivers regarding safety precautions  and need for close supervision  Discharge Goal: Family/caregiver will be knowledgeable of need for cueing and  supervision to avoid falls        Swallow Function    [ST] Swallowing(Active)  Current Status(04/10/2023): Puree diet with Nectar Thick Liquids. Exhibits  intermittent coughing and throat clearing with trials of water by tsp; exhibits  occasional throat clear with trials of ice chips.  Weekly Goal(04/18/2023): Will tolerate trials of soft (ground meats)/no mixed  consistencies with MIN cues for clearing oral cavity.  Discharge Goal: Will tolerate the least restrictive diet with 1:1 supervision  for meals.        Cognition    [ST] Attention(Active)  Current Status(04/10/2023): Mildly impaired attention skills on CLQT.  Weekly Goal(04/18/2023): Will complete basic attention to detail tasks with NO  cues.  Discharge Goal: Improved attention skills for home environment.        Communication    [ST] Expression(Active)  Current Status(04/10/2023): Mildly impaired language skills on CLQT. Occasional  word finding delays in conversation. Now presents wtih Mild-Moderate mixed  dysarthria.  Weekly Goal(04/18/2023): Will use speech intelligibility strategies in  conversation with MIN cues.  Discharge Goal: Will be able to participate in a complex conversation with NO  cues for use of speech intelligibility strategies.        Comments: 4/11 Bed mob Mod I, transfers Min A rwx, amb 80' Min / CGA rwx, Min A  toilet transfer, est Min A for shower transfer.   Dep w/ toileting.  Mod A w/  bathing.  Mildly impaired attention skills on CLQT.  Mildly impaired language  skills, occasional word finding delays in conversation.  Tolerating puree diet  NTL.  Intermittent coughing.  Urinary retention, ISC as needed.  Incontinent of  bowel.  Tylenol for headaches.    Signed by: Chelsea Ortega RN    Physician CoSigned By: Anton Fajardo 04/11/2023 11:59:41

## 2023-04-11 NOTE — THERAPY TREATMENT NOTE
Inpatient Rehabilitation - Occupational Therapy Treatment Note    [facility]     Patient Name: Melissa Gomez  : 1940  MRN: [id number]    Today's Date: 2023                 Admit Date: 2023         ICD-10-CM ICD-9-CM   1. Impaired functional mobility, balance, gait, and endurance  Z74.09 V49.89   2. Follow-up exam  Z09 V67.9       Patient Active Problem List   Diagnosis   • Infarction of left basal ganglia       Past Medical History:   Diagnosis Date   • GERD (gastroesophageal reflux disease)    • Hyperlipidemia    • Hypertension    • Stroke        Past Surgical History:   Procedure Laterality Date   • COLONOSCOPY     • HYSTERECTOMY     • TONSILLECTOMY               IRF OT ASSESSMENT FLOWSHEET (last 12 hours)     IRF OT Evaluation and Treatment     Row Name 23 1115          OT Time and Intention    Document Type daily treatment  -DN     Mode of Treatment occupational therapy  -DN     Patient Effort good  -DN     Row Name 23 1115          Cognition/Psychosocial    Affect/Mental Status (Cognition) WFL  -DN     Personal Safety Interventions fall prevention program maintained;gait belt;supervised activity  -DN     Cognitive Function attention deficit  -DN     Attention Deficit (Cognition) minimal deficit  -DN     Safety Deficit (Cognition) minimal deficit  -DN     Row Name 23 1115          Bathing    [place] Level (Bathing) bathing skills;moderate assist (50% patient effort);verbal cues;nonverbal cues (demo/gesture)  -DN     Position (Bathing) sink side;supported sitting;supported standing  -DN     Set-up Assistance (Bathing) adjust water temperature;obtain supplies  -DN     Row Name 23 1115          Upper Body Dressing    [place] Level (Upper Body Dressing) upper body dressing skills;doff;don;pull over garment;minimum assist (75% or more patient effort);verbal cues;nonverbal cues (demo/gesture)  -DN     Position (Upper Body Dressing) supported sitting  -DN      Set-up Assistance (Upper Body Dressing) obtain clothing  -DN     Row Name 04/11/23 1115          Lower Body Dressing    [place] Level (Lower Body Dressing) doff;don;pants/bottoms;shoes/slippers;socks;set up;dependent (less than 25% patient effort);maximum assist (25% patient effort)  -DN     Position (Lower Body Dressing) supported sitting;supported standing  -DN     Set-up Assistance (Lower Body Dressing) obtain clothing  -DN     Row Name 04/11/23 1115          Grooming    [place] Level (Grooming) grooming skills;wash face, hands;set up;standby assist;oral care regimen  -DN     Position (Grooming) sink side;supported sitting  -DN     Set-up Assistance (Grooming) obtain supplies  -DN     Row Name 04/11/23 1115          Toileting    Comment (Toileting) pt did not need to use commode at time of tx  -DN     Row Name 04/11/23 1115          Positioning and Restraints    Pre-Treatment Position sitting in chair/recliner  -DN     Post Treatment Position wheelchair  -DN     In Bed call light within reach;encouraged to call for assist;exit alarm on  -DN           User Key  (r) = Recorded By, (t) = Taken By, (c) = Cosigned By    Initials Name Effective Dates    DN Ismael Lanier OT 06/16/21 -                  Occupational Therapy Education     Title: PT OT SLP Therapies (Done)     Topic: Occupational Therapy (Done)     Point: ADL training (Done)     Description:   Instruct learner(s) on proper safety adaptation and remediation techniques during self care or transfers.   Instruct in proper use of assistive devices.              Learning Progress Summary           Patient Acceptance, E, VU by LS at 4/8/2023 1204    Acceptance, E,TB,D, VU,DU,NR by KP at 4/6/2023 1230    Comment: ed pt on role of OT. benefit of therapy, POC w.  OT ed on safety w ADL and tsf. pt ed on UBD technique.                   Point: Home exercise program (Done)     Description:   Instruct learner(s) on appropriate technique for monitoring,  assisting and/or progressing therapeutic exercises/activities.              Learning Progress Summary           Patient Acceptance, E, VU by  at 4/8/2023 1204    Acceptance, E,TB,D, VU,DU,NR by  at 4/6/2023 1230    Comment: ed pt on role of OT. benefit of therapy, POC w.  OT ed on safety w ADL and tsf. pt ed on UBD technique.                   Point: Precautions (Done)     Description:   Instruct learner(s) on prescribed precautions during self-care and functional transfers.              Learning Progress Summary           Patient Acceptance, E, VU by  at 4/8/2023 1204    Acceptance, E,TB,D, VU,DU,NR by  at 4/6/2023 1230    Comment: ed pt on role of OT. benefit of therapy, POC w.  OT ed on safety w ADL and tsf. pt ed on UBD technique.                   Point: Body mechanics (Done)     Description:   Instruct learner(s) on proper positioning and spine alignment during self-care, functional mobility activities and/or exercises.              Learning Progress Summary           Patient Acceptance, E, VU by  at 4/8/2023 1204    Acceptance, E,TB,D, VU,DU,NR by  at 4/6/2023 1230    Comment: ed pt on role of OT. benefit of therapy, POC w.  OT ed on safety w ADL and tsf. pt ed on UBD technique.                               User Key     Initials Effective Dates Name Provider Type Discipline     06/16/21 -  Pasty Blank OTR Occupational Therapist OT     06/16/21 -  Yamila Wheat MS CCC-SLP Speech and Language Pathologist SLP                    OT Recommendation and Plan                         Time Calculation:      Time Calculation- OT     Row Name 04/11/23 0830             Time Calculation- OT    OT Start Time 0830  -DN      OT Stop Time 0900  -DN      OT Time Calculation (min) 30 min  -DN            User Key  (r) = Recorded By, (t) = Taken By, (c) = Cosigned By    Initials Name Provider Type    Ismael Quintanilla OT Occupational Therapist              Therapy Charges for Today     Code  Description Service Date Service Provider Modifiers Qty    05565073621 HC OT SELF CARE/MGMT/TRAIN EA 15 MIN 4/11/2023 Ismael Lanier, OT GO 2                   Ismael Lanier OT  4/11/2023

## 2023-04-11 NOTE — PROGRESS NOTES
Team Conference 4/11/2023  Nursing: continent/incontinent with bladder, requiring cathing each bladder scan, no safety issues  PT: bed mobiliy min assist, contact guard with transfers, ambulating 80 feet, fatigues quickly with low endurance, cues for sequencing, drags left foot, anticipated contact guard with walker  OT: mod assist for bathing, max/dependent with lower body dressing, toileting max assist due to balance  SLP: mildly impaired on CLQT WNL memory/visuospatial, and mild for attention/executive function, puree and nectar thick diet, mild to moderate dysarthria  Discharge Date: 1.5 weeks

## 2023-04-11 NOTE — PLAN OF CARE
Goal Outcome Evaluation:              Outcome Evaluation: Stroke. NIH=2. A&OX4. Forgetful. R. facial droop. Slurred speech. Diet: pureed, NTL. Upright and one-on-one supervision for all meals. Tylenol for HA. Incontinent and continent B&B. Last BM 4/09. Bladder scan q 12 hours. Cath. if greater than 300 ml. Meds crushed in AS. Full code. 2L O2 at nite. Labs today. Carolann area excoriated. Barrier cream applied. Wears a brief. Participated fully in therapy. Calm, cooperative, and pleasant. Assistx1-2 to wheelchair. Blurred vision in R. eye. Visited with daughter.

## 2023-04-11 NOTE — THERAPY TREATMENT NOTE
Inpatient Rehabilitation - Occupational Therapy Treatment Note    [facility]     Patient Name: Melissa Gomez  : 1940  MRN: [id number]    Today's Date: 2023                 Admit Date: 2023         ICD-10-CM ICD-9-CM   1. Impaired functional mobility, balance, gait, and endurance  Z74.09 V49.89   2. Follow-up exam  Z09 V67.9       Patient Active Problem List   Diagnosis   • Infarction of left basal ganglia       Past Medical History:   Diagnosis Date   • GERD (gastroesophageal reflux disease)    • Hyperlipidemia    • Hypertension    • Stroke        Past Surgical History:   Procedure Laterality Date   • COLONOSCOPY     • HYSTERECTOMY     • TONSILLECTOMY               IRF OT ASSESSMENT FLOWSHEET (last 12 hours)     IRF OT Evaluation and Treatment     Row Name 23 1629 23 1115       OT Time and Intention    Document Type daily treatment  -DN daily treatment  -DN    Mode of Treatment occupational therapy  -DN occupational therapy  -DN    Patient Effort adequate  -DN good  -DN    Row Name 23 1629          Pain Assessment    Pretreatment Pain Rating 0/10 - no pain  -DN     Posttreatment Pain Rating 5/10  -DN     Pain Location - Side/Orientation Right  -DN     Pain Location upper  -DN     Pain Location - extremity  -DN     Pre/Posttreatment Pain Comment pt states AAROM working to reduce pain  -DN     Row Name 23 1115          Cognition/Psychosocial    Affect/Mental Status (Cognition) WFL  -DN     Personal Safety Interventions fall prevention program maintained;gait belt;supervised activity  -DN     Cognitive Function attention deficit  -DN     Attention Deficit (Cognition) minimal deficit  -DN     Safety Deficit (Cognition) minimal deficit  -DN     Row Name 23 1115          Bathing    [place] Level (Bathing) bathing skills;moderate assist (50% patient effort);verbal cues;nonverbal cues (demo/gesture)  -DN     Position (Bathing) sink side;supported sitting;supported  standing  -DN     Set-up Assistance (Bathing) adjust water temperature;obtain supplies  -DN     Row Name 04/11/23 1115          Upper Body Dressing    [place] Level (Upper Body Dressing) upper body dressing skills;doff;don;pull over garment;minimum assist (75% or more patient effort);verbal cues;nonverbal cues (demo/gesture)  -DN     Position (Upper Body Dressing) supported sitting  -DN     Set-up Assistance (Upper Body Dressing) obtain clothing  -DN     Row Name 04/11/23 1115          Lower Body Dressing    [place] Level (Lower Body Dressing) doff;don;pants/bottoms;shoes/slippers;socks;set up;dependent (less than 25% patient effort);maximum assist (25% patient effort)  -DN     Position (Lower Body Dressing) supported sitting;supported standing  -DN     Set-up Assistance (Lower Body Dressing) obtain clothing  -DN     Row Name 04/11/23 1115          Grooming    [place] Level (Grooming) grooming skills;wash face, hands;set up;standby assist;oral care regimen  -DN     Position (Grooming) sink side;supported sitting  -DN     Set-up Assistance (Grooming) obtain supplies  -DN     Row Name 04/11/23 1115          Toileting    Comment (Toileting) pt did not need to use commode at time of tx  -DN     Row Name 04/11/23 1629          Bed-Chair Transfer    Bed-Chair [place] (Transfers) minimum assist (75% patient effort);verbal cues;nonverbal cues (demo/gesture)  -DN     Assistive Device (Bed-Chair Transfers) wheelchair  -DN     Comment, (Bed-Chair Transfer) stand pivot EOB to w/c with stand pivot sit transfer  -DN     Row Name 04/11/23 1629          Motor Skills    Coordination gross motor deficit;right;upper extremity;minimal impairment  pt working on reaching for cones with RUE to place on dowel john, pt had increased pain with abduction and some R visual neglected noted, pt able to place 10 cones  -DN     Row Name 04/11/23 1629 04/11/23 1115       Positioning and Restraints    Pre-Treatment Position in bed   -DN sitting in chair/recliner  -DN    Post Treatment Position wheelchair  -DN wheelchair  -DN    In Bed sitting;encouraged to call for assist;exit alarm on;with other staff  -DN call light within reach;encouraged to call for assist;exit alarm on  -DN          User Key  (r) = Recorded By, (t) = Taken By, (c) = Cosigned By    Initials Name Effective Dates    Ismael Quintanilla OT 06/16/21 -                  Occupational Therapy Education     Title: PT OT SLP Therapies (Done)     Topic: Occupational Therapy (Done)     Point: ADL training (Done)     Description:   Instruct learner(s) on proper safety adaptation and remediation techniques during self care or transfers.   Instruct in proper use of assistive devices.              Learning Progress Summary           Patient Acceptance, E, VU by  at 4/8/2023 1204    Acceptance, E,TB,D, VU,DU,NR by  at 4/6/2023 1230    Comment: ed pt on role of OT. benefit of therapy, POC w.  OT ed on safety w ADL and tsf. pt ed on UBD technique.                   Point: Home exercise program (Done)     Description:   Instruct learner(s) on appropriate technique for monitoring, assisting and/or progressing therapeutic exercises/activities.              Learning Progress Summary           Patient Acceptance, E, VU by LS at 4/8/2023 1204    Acceptance, E,TB,D, VU,DU,NR by  at 4/6/2023 1230    Comment: ed pt on role of OT. benefit of therapy, POC w.  OT ed on safety w ADL and tsf. pt ed on UBD technique.                   Point: Precautions (Done)     Description:   Instruct learner(s) on prescribed precautions during self-care and functional transfers.              Learning Progress Summary           Patient Acceptance, E, VU by LS at 4/8/2023 1204    Acceptance, E,TB,D, VU,DU,NR by  at 4/6/2023 1230    Comment: ed pt on role of OT. benefit of therapy, POC w.  OT ed on safety w ADL and tsf. pt ed on UBD technique.                   Point: Body mechanics (Done)     Description:    Instruct learner(s) on proper positioning and spine alignment during self-care, functional mobility activities and/or exercises.              Learning Progress Summary           Patient Acceptance, E, VU by  at 4/8/2023 1204    Acceptance, E,TB,D, VU,DU,NR by  at 4/6/2023 1230    Comment: ed pt on role of OT. benefit of therapy, POC w.  OT ed on safety w ADL and tsf. pt ed on UBD technique.                               User Key     Initials Effective Dates Name Provider Type Discipline     06/16/21 -  Patsy Blank, OTR Occupational Therapist OT     06/16/21 -  Yamila Wheat MS CCC-SLP Speech and Language Pathologist SLP                    OT Recommendation and Plan                         Time Calculation:      Time Calculation- OT     Row Name 04/11/23 1400 04/11/23 0830          Time Calculation- OT    OT Start Time 1400  -DN 0830  -DN     OT Stop Time 1430  -DN 0900  -DN     OT Time Calculation (min) 30 min  -DN 30 min  -DN           User Key  (r) = Recorded By, (t) = Taken By, (c) = Cosigned By    Initials Name Provider Type    Ismael Quintanilla OT Occupational Therapist              Therapy Charges for Today     Code Description Service Date Service Provider Modifiers Qty    25075942138 HC OT SELF CARE/MGMT/TRAIN EA 15 MIN 4/11/2023 Ismael Lanier OT GO 2    33877971490 HC OT THER PROC EA 15 MIN 4/11/2023 Ismael Lanier OT GO 1    71492685356 HC OT NEUROMUSC RE EDUCATION EA 15 MIN 4/11/2023 Ismael Lanier OT GO 1                   Ismael Lanier OT  4/11/2023

## 2023-04-11 NOTE — PROGRESS NOTES
Recreational Therapy Note    Patient Name: Melissa Gomez   MRN: [id number]    Therapeutic Recreation Eval and Treat (last 12 hours)     Therapeutic Recreation Eval & Treat     Row Name 04/11/23 1500       Therapeutic Recreation Participation    Recreation Therapy Participation games  -TW    Games card games  Rummy  -TW    Objectives of Recreation Participation increase;sense of autonomy by choosing level of participation;positive attitudes leading to a healthy leisure lifestyle  -TW    Comment, Recreation Participation BUE use to hold cards; placed with R hand  -TW    Recreation Therapy Summary of Participation active participation  -TW          User Key  (r) = Recorded By, (t) = Taken By, (c) = Cosigned By    Initials Name Provider Type    TW Nancy Gimenez CTRS Recreational Therapist                  HUGH Espinoza  4/11/2023

## 2023-04-12 PROCEDURE — 97130 THER IVNTJ EA ADDL 15 MIN: CPT | Performed by: SPEECH-LANGUAGE PATHOLOGIST

## 2023-04-12 PROCEDURE — 97129 THER IVNTJ 1ST 15 MIN: CPT | Performed by: SPEECH-LANGUAGE PATHOLOGIST

## 2023-04-12 PROCEDURE — 92526 ORAL FUNCTION THERAPY: CPT

## 2023-04-12 PROCEDURE — 97110 THERAPEUTIC EXERCISES: CPT

## 2023-04-12 PROCEDURE — 97535 SELF CARE MNGMENT TRAINING: CPT

## 2023-04-12 PROCEDURE — 25010000002 ENOXAPARIN PER 10 MG: Performed by: PHYSICAL MEDICINE & REHABILITATION

## 2023-04-12 PROCEDURE — 97530 THERAPEUTIC ACTIVITIES: CPT

## 2023-04-12 RX ADMIN — AMLODIPINE BESYLATE 10 MG: 10 TABLET ORAL at 07:40

## 2023-04-12 RX ADMIN — OXYCODONE HYDROCHLORIDE AND ACETAMINOPHEN 250 MG: 500 TABLET ORAL at 07:39

## 2023-04-12 RX ADMIN — LISINOPRIL 5 MG: 5 TABLET ORAL at 07:39

## 2023-04-12 RX ADMIN — ACETAMINOPHEN 650 MG: 325 TABLET, FILM COATED ORAL at 20:02

## 2023-04-12 RX ADMIN — ASPIRIN 81 MG: 81 TABLET, CHEWABLE ORAL at 07:40

## 2023-04-12 RX ADMIN — Medication 1000 UNITS: at 07:40

## 2023-04-12 RX ADMIN — ROSUVASTATIN CALCIUM 20 MG: 20 TABLET, FILM COATED ORAL at 20:02

## 2023-04-12 RX ADMIN — CLOPIDOGREL BISULFATE 75 MG: 75 TABLET, FILM COATED ORAL at 07:40

## 2023-04-12 RX ADMIN — ENOXAPARIN SODIUM 40 MG: 100 INJECTION SUBCUTANEOUS at 11:36

## 2023-04-12 RX ADMIN — ACETAMINOPHEN 650 MG: 325 TABLET, FILM COATED ORAL at 07:46

## 2023-04-12 NOTE — THERAPY TREATMENT NOTE
Inpatient Rehabilitation - Speech Language Pathology Treatment Note  [facility]     Patient Name: Melissa Gomez  : 1940  MRN: [id number]  Today's Date: 2023               Admit Date: 2023     Visit Dx:    ICD-10-CM ICD-9-CM   1. Impaired functional mobility, balance, gait, and endurance  Z74.09 V49.89   2. Follow-up exam  Z09 V67.9     Patient Active Problem List   Diagnosis   • Infarction of left basal ganglia     Past Medical History:   Diagnosis Date   • GERD (gastroesophageal reflux disease)    • Hyperlipidemia    • Hypertension    • Stroke      Past Surgical History:   Procedure Laterality Date   • COLONOSCOPY     • HYSTERECTOMY     • TONSILLECTOMY         SLP Recommendation and Plan                                                            SLP EVALUATION (last 72 hours)     SLP SLC Evaluation     Row Name 23 1100 23 0930 23 1100 23 0900 04/10/23 1100       Communication Assessment/Intervention    Document Type therapy note (daily note)  -KA therapy note (daily note)  -AL therapy note (daily note)  -AL therapy note (daily note)  -AL therapy note (daily note)  -AL    Subjective Information no complaints  -KA -- -- -- --    Patient Observations alert;cooperative  -KA -- -- -- --    Patient/Family/Caregiver Comments/Observations -- Pt participated well.  -AL Pt participated well.  -AL Pt participated well.  -AL Pt participated well.  -AL    Patient Effort adequate  -KA -- -- -- --    Symptoms Noted During/After Treatment none  -KA -- -- -- --       Pain Scale: Numbers Pre/Post-Treatment    Pretreatment Pain Rating -- 0/10 - no pain  -AL 0/10 - no pain  -AL 0/10 - no pain  -AL 0/10 - no pain  -AL    Row Name 04/10/23 0900                   Communication Assessment/Intervention    Document Type therapy note (daily note)  -AL        Patient/Family/Caregiver Comments/Observations Pt participated well.  -AL           Pain Scale: Numbers Pre/Post-Treatment    Pretreatment  Pain Rating 0/10 - no pain  -AL              User Key  (r) = Recorded By, (t) = Taken By, (c) = Cosigned By    Initials Name Effective Dates    Juan Barriga MA,CCC-SLP 06/02/22 -     Janell Erickson, MS CCC-SLP 06/16/21 -                    EDUCATION  The patient has been educated in the following areas:     Cognitive Impairment.           SLP GOALS     Row Name 04/12/23 1100 04/12/23 0930 04/11/23 1100       (STG) Pharyngeal Strengthening Exercise Goal 1 (SLP)    Progress/Outcomes (Pharyngeal Strengthening Goal 1, SLP) -- good progress toward goal  -AL --    Comment (Pharyngeal Strengthening Goal 1, SLP) -- Completed 10 repetitions of effortful swallow with MOD cues. Completed 5 sets of 5 reps of EMST75 at 1 turn pass 5 cmH20 with MIN-MOD cues. Pt exhibited no overt s/s of aspiration or penetration in 3/3 trials of ice chips and 3/4 trials of water by tsp. Pt exhibited no overt s/s of aspiration or penetration in 2/3 trials of water by cup; wet vocal quality and watery eyes x1.  -AL --       Attention Goal 1 (SLP)    Improve Attention by Goal 1 (SLP) -- -- complete selective attention task;complete sustained attention task;80%;independently (over 90% accuracy)  -AL    Time Frame (Attention Goal 1, SLP) 1 week  -KA -- 1 week  -AL    Progress/Outcomes (Attention Goal 1, SLP) -- -- good progress toward goal  -AL    Comment (Attention Goal 1, SLP) Attention to detail while following written directions=60% without cues  -KA -- Attention to detail for reading calendar: 7/8 with NO cues, 8/8 with MIN cues. Pt attempted writing information into calendar; however, her writing was micrographic with reduced legibility.  -AL       Memory Skills Goal 1 (SLP)    Improve Memory Skills Through Goal 1 (SLP) -- -- recalling related word lists with an imposed delay;listen to a paragraph and answer questions;recall details of the day;80%;independently (over 90% accuracy)  -AL    Time Frame (Memory Skills Goal 1, SLP) -- --  1 week  -AL    Progress/Outcomes (Memory Skills Goal 1, SLP) -- -- good progress toward goal  -AL    Comment (Memory Skills Goal 1, SLP) -- -- Recalled 3/3 errands after 25 minutes with NO cues. Goal met x2. Immediate memory for voicemails (complex paragraph): 60% with NO cues, 100% with MIN cues.  -AL       Executive Functional Skills Goal 1 (SLP)    Improve Executive Function Skills Goal 1 (SLP) home management activity;80%;with minimal cues (75-90%)  -KA -- --    Comment (Executive Function Skills Goal 1, SLP) Answering questions on about preparing Thankgiving dinner requiring attention to detail and some functional simple math=50% without cues and 100% with min to mod cues.  -KA -- --    Row Name 04/11/23 0900 04/10/23 1100 04/10/23 0900       ([facility]) Pharyngeal Strengthening Exercise Goal 1 (SLP)    Activity (Pharyngeal Strengthening Goal 1, SLP) -- -- increase timing;increase superior movement of the hyolaryngeal complex;increase anterior movement of the hyolaryngeal complex;increase epiglottic inversion and retroflexion;increase closure at entrance to airway/closure of airway at glottis  -AL    Increase Timing -- -- hard effortful swallow  -AL    Increase Superior Movement of the Hyolaryngeal Complex -- -- hard effortful swallow  -AL    Increase Anterior Movement of the Hyolaryngeal Complex -- -- EMST  -AL    Increase Epiglottic Inversion and Retroflexion -- -- Mendelsohn;hard effortful swallow;EMST  -AL    Increase Closure at Entrance to Airway/Closure of Airway at Glottis -- -- Mendelsohn;hard effortful swallow  -AL    [place]/Accuracy (Pharyngeal Strengthening Goal 1, SLP) -- -- with moderate cues (50-74% accuracy)  -AL    Time Frame (Pharyngeal Strengthening Goal 1, SLP) -- -- 1 week  -AL    Progress/Outcomes (Pharyngeal Strengthening Goal 1, SLP) -- -- good progress toward goal  -AL    Comment (Pharyngeal Strengthening Goal 1, SLP) Re-assessed swallow funciton. Pt exhibited no overt s/s of aspiration  or penetration with NTL by cup, puree, soft solid (drained peach), and regular. Mastication of solids appeared moderately prolonged. Pt exhibited coating of residue on her tongue after regular, requiring sips of liquid. Recommend trial mechanical soft (ground)/no mixed consistencies with NTL over lunch meal to assess tolerance and endurance. Pt in agreement with this plan.  -AL -- Pt completed 20 repetitions of effortful swallow with MOD cues. She completed 5 sets of 5 reps of EMST75 at 1 full turn past 5 cmH20 with MIN-MOD cues. Pt exhibited no overt s/s of aspiration or penetration in 2/6 trials of water by tsp; cough x1, strong throat clearing x3. Pt exhibited no overt s/s of aspiration or penetration in 3/5 trials of ice chips; throat clear x2.  -AL       Articulation Goal 1 (SLP)    Improve Articulation Goal 1 (SLP) -- -- by over-articulating at word level;by over-articulating at phrase level;by over-articulating in connected speech;80%;with minimal cues (75-90%)  -AL    Time Frame (Articulation Goal 1, SLP) -- -- 1 week  -AL    Progress/Outcomes (Articulation Goal 1, SLP) -- -- good progress toward goal  -AL    Comment (Articulation Goal 1, SLP) -- -- Pt was 90% intelligible with NO cues, 100% with MIN cues  -AL       Attention Goal 1 (SLP)    Improve Attention by Goal 1 (SLP) -- complete selective attention task;complete sustained attention task;80%;independently (over 90% accuracy)  -AL --    Time Frame (Attention Goal 1, SLP) -- 1 week  -AL --    Progress/Outcomes (Attention Goal 1, SLP) -- good progress toward goal  -AL --    Comment (Attention Goal 1, SLP) -- Attention to detail for written directions: 80% with NO cues, 100% with MIN-MOD cues  -AL --       Memory Skills Goal 1 (SLP)    Improve Memory Skills Through Goal 1 (SLP) -- recalling related word lists with an imposed delay;listen to a paragraph and answer questions;recall details of the day;80%;independently (over 90% accuracy)  -AL --    Time  Frame (Memory Skills Goal 1, SLP) -- 1 week  -AL --    Comment (Memory Skills Goal 1, SLP) -- Recalled 3/3 errands after 10 minutes with NO cues. Goal met x1.  -AL --       Organizational Skills Goal 1 (SLP)    Improve Thought Organization Through Goal 1 (SLP) -- completing a divergent naming task;80%;with minimal cues (75-90%)  -AL --    Time Frame (Thought Organization Skills Goal 1, SLP) -- 1 week  -AL --    Comment (Thought Organization Skills Goal 1, SLP) -- goal not yet addressed due to time constraints  -AL --       Reasoning Goal 1 (SLP)    Improve Reasoning Through Goal 1 (SLP) -- complete deductive reasoning task;80%;independently (over 90% accuracy)  -AL --    Time Frame (Reasoning Goal 1, SLP) -- 1 week  -AL --    Comment (Reasoning Goal 1, SLP) -- goal not yet addressed due to time constraints  -AL --       Executive Functional Skills Goal 1 (SLP)    Improve Executive Function Skills Goal 1 (SLP) -- home management activity;80%;with minimal cues (75-90%)  -AL --    Time Frame (Executive Function Skills Goal 1, SLP) -- 1 week  -AL --    Comment (Executive Function Skills Goal 1, SLP) -- goal not yet addressed due to time constraints  -AL --          User Key  (r) = Recorded By, (t) = Taken By, (c) = Cosigned By    Initials Name Provider Type    Juan Barriga MA,[facility]-SLP Speech and Language Pathologist    Janell Erickson, MS CCC-SLP Speech and Language Pathologist                        Time Calculation:      Time Calculation- SLP     Row Name 04/12/23 1201 04/12/23 1040          Time Calculation- SLP    SLP Start Time 1100  -KA 0930  -AL     SLP Stop Time 1130  -KA 1000  -AL     SLP Time Calculation (min) 30 min  -KA 30 min  -AL        Timed Charges    50189-SQ Dev of Cogn Skills Initial Minutes 15  -KA --     65531-TM Dev of Cogn Skills Add Minutes 15  -KA --        Total Minutes    Timed Charges Total Minutes 30  -KA --      Total Minutes 30  -KA --           User Key  (r) = Recorded By, (t) =  Taken By, (c) = Cosigned By    Initials Name Provider Type    Juan Barriga MA,CCC-SLP Speech and Language Pathologist    Janell Erickson, MS CCC-SLP Speech and Language Pathologist                Therapy Charges for Today     Code Description Service Date Service Provider Modifiers Qty    06722040420 HC ST DEV OF COGN SKILLS INITIAL 15 MIN 4/12/2023 Juan Tinajero MA,CCC-SLP  1    26260655073 HC ST DEV OF COGN SKILLS EACH ADDT'L 15 MIN 4/12/2023 Juan Tinajero MA,CCC-SLP  1                     uJan Tinajero MA,CCC-SLP  4/12/2023

## 2023-04-12 NOTE — PROGRESS NOTES
Recreational Therapy Note    Patient Name: Melissa Gomez   MRN: [id number]    Therapeutic Recreation Eval and Treat (last 12 hours)     Therapeutic Recreation Eval & Treat     Row Name 04/12/23 1500       Therapeutic Recreation Participation    Recreation Therapy Participation games  -TW    Games card games  [place]ummy  -TW    Objectives of Recreation Participation increase;sense of autonomy by choosing level of participation;positive attitudes leading to a healthy leisure lifestyle  -TW    Comment, Recreation Participation BUE use to hold cards; picked up/placed with R hand  -TW    Recreation Therapy Summary of Participation active participation  -TW          User Key  (r) = Recorded By, (t) = Taken By, (c) = Cosigned By    Initials Name Provider Type    TW Nancy Gimenez CTRS Recreational Therapist                  HUGH Espinoaz  4/12/2023

## 2023-04-12 NOTE — PROGRESS NOTES
LOS: 7 days   Patient Care Team:  Jayro Fountain MD as PCP - General (Internal Medicine)      Patient Name: PIERRE FREEMAN  Patient : 1940    ADMITTING DIAGNOSIS:  Diagnoses    1. Follow-up examination  2. IMPAIRED FUNCTIONAL MOBILITY, BALANCE, GAIT, AND ENDURANCE       Left basal ganglia stroke  Right hemiparesis with impaired motor control    SUBJECTIVE:  Patient seen and examined at bedside.  Continues to have urinary retention requiring ISC.  Tolerating therapies. Has not had a bowel movement since . She was administered a suppository at that time and reportedly had a large volume bowel movement multiple times afterward. No other acute complaints.    REVIEW OF SYSTEMS:    General: denies weight change, fatigue, weakness, fever, chills, night sweats.   Skin: denies itching, rashes, sores and bruises.   Neurologic: denies headache, nausea, vomiting, or visual changes.   HEENT:  denies discharge, sore throat, allergies, and congestion.   Respiratory: denies shortness of breath, wheeze, cough and hemoptysis.   Cardiac:  denies chest pain or palpitations.   Gastrointestinal:  denies changes in appetite, nausea, vomiting, dysphagia, abdominal pain, constipation, or diarrhea.   Urinary:  denies urgency and frequency.  Musculoskeletal:  denies muscle weakness, pain, or joint stiffness.    OBJECTIVE:    Vitals:    23 0504   BP: 123/71   Pulse: 80   Resp: 16   Temp: 98.2 °F (36.8 °C)   SpO2: 96%       PHYSICAL EXAM:   General: pleasant, in no acute distress  HEENT: NCAT, sclera anicteric, conjunctiva pink, mucoa moist  Cardiovascular: RRR, +S1+S2, no obvious m/g/r  Lungs: CTAB, no rhonchi or wheezing  Abdomen: normoactive bowel sounds, soft, non tender to palpation  Extremities: no peripheral edema  Skin: exposed surfaces of skin warm, intact, without erythema  Neuro: awake alert and oriented to person, place, time, and situation, CN II-XII grossly intact, Dysarthria.  MSK: Right shoulder  flexion 3/5, elbow flexion 4/5, finger flexion 4/5, knee extension 4/5, ankle dorsiflexion 4/5      MEDICATIONS  Scheduled Meds:  amLODIPine, 10 mg, Oral, Q24H  vitamin C, 250 mg, Oral, Daily  aspirin, 81 mg, Oral, Daily  cholecalciferol, 1,000 Units, Oral, Daily  clopidogrel, 75 mg, Oral, Daily  enoxaparin, 40 mg, Subcutaneous, Q24H  lisinopril, 5 mg, Oral, Q24H  rosuvastatin, 20 mg, Oral, Nightly        Continuous Infusions:       PRN Meds:  •  acetaminophen **OR** [DISCONTINUED] acetaminophen  •  [DISCONTINUED] senna-docusate sodium **AND** polyethylene glycol **AND** bisacodyl **AND** bisacodyl      RESULTS:  No results found for: POCGLU  Results from last 7 days   Lab Units 04/10/23  0614 04/07/23  0833 04/06/23  0602   WBC 10*3/mm3 6.85 6.29 6.73   HEMOGLOBIN g/dL 13.4 14.0 14.3   HEMATOCRIT % 41.5 41.6 42.4   PLATELETS 10*3/mm3 187 175 177     Results from last 7 days   Lab Units 04/10/23  0614 04/07/23  0833 04/06/23  0602   SODIUM mmol/L 143 140 141   POTASSIUM mmol/L 3.6 3.5 3.7   CHLORIDE mmol/L 107 105 107   CO2 mmol/L 26.6 25.6 24.3   BUN mg/dL 19 23 22   CREATININE mg/dL 0.82 0.59 0.71   CALCIUM mg/dL 9.5 9.2 9.6   GLUCOSE mg/dL 191* 130* 157*           ASSESSMENT and PLAN:    Infarction of left basal ganglia    Left basal ganglia stroke  Stroke prophylaxis- ASA and Plavix x 21 days from March 30, 2023, then ASA 81 mg daily. Crestor 20 mg.     Right hemiparesis with impaired motor control  Dysphagia   Dysarthria.     Hypertension - amlodipine/lisinopril     Check TSH, Vit B12 and Vit D levels-within normal limit     Urinary retention. Treated for UTI as Reggie Duran. Check bladder scan PVR and cath if >300 cc until three consecutive < 250 cc  April 7-requires intermittent straight cath 400-113-300 cc.  No spontaneous void.  Follow pattern.  April 10-requirement straight catheter 300 cc about every 6 or 8 hours.  - will extend interval to every 12 hours to see if she voids with a larger  volume.  4/12- has not required intermittent catheterization since last night.  Noted to have 4 voids with PVR less than 200.  May be recovering bladder function.  Will monitor.        DVT prophylaxis- on dual antiplatelet therapies.  SCDs.  Lovenox     Team Conference 4/11/2023  Nursing: continent/incontinent with bladder, requiring cathing each bladder scan, no safety issues  PT: bed mobiliy min assist, contact guard with transfers, ambulating 80 feet, fatigues quickly with low endurance, cues for sequencing, drags left foot, anticipated contact guard with walker  OT: mod assist for bathing, max/dependent with lower body dressing, toileting max assist due to balance  SLP: mildly impaired on CLQT WNL memory/visuospatial, and mild for attention/executive function, puree and nectar thick diet, mild to moderate dysarthria  Discharge Date: 1.5 weeks     CODE STATUS:  Code Status (Patient has no pulse and is not breathing): CPR (Attempt to Resuscitate)  Medical Interventions (Patient has pulse or is breathing): Full Support  Release to patient: Routine Release      Admission Status: Continues to meet requirements for inpatient admission.     Patient seen and evaluated with attending physician, Dr. Fajardo. Please see attestation.     Shannon Martinez, DO  Physical Medicine and Rehabilitation   PGY-2    During rounds, used appropriate personal protective equipment including mask and gloves.  Additional gown if indicated.  Mask used was standard procedure mask. Appropriate PPE was worn during the entire visit.  Hand hygiene was completed before and after.

## 2023-04-12 NOTE — PROGRESS NOTES
Inpatient Rehabilitation Plan of Care Note    Plan of Care  Care Plan Reviewed - No updates at this time.    Psychosocial    [RN] Coping/Adjustment(Active)  Current Status(04/12/2023): Lacks insight with current situation  Weekly Goal(04/19/2023): provide education material regarding stroke  Discharge Goal: Knowledgeable of care needs and stroke recovery    Performed Intervention(s)  Verbalizes needs and concerns  Therapeutic environmental set up      Sphincter Control    [RN] Bladder Management(Active)  Current Status(04/12/2023): Urinary Retention; BS if unable to void q 12; IC if  scan >300  Weekly Goal(04/19/2023): The patient is unable to void independently  Discharge Goal: The patient empties bladder completely    [RN] Bowel Management(Active)  Current Status(04/12/2023): Incontinent of stool  Weekly Goal(04/20/2023): Continent 50%  Discharge Goal: Continent 100%    Performed Intervention(s)  Bladder scan or I/O cath per order  Encourage fluids  Bowel/bladder training      Safety    [RN] Potential for Injury(Active)  Current Status(04/12/2023): Risk for falls  Weekly Goal(04/20/2023): Instruct family/caregivers regarding safety precautions  and need for close supervision  Discharge Goal: Family/caregiver will be knowledgeable of need for cueing and  supervision to avoid falls    Performed Intervention(s)  Safety Rounds  Bed alarm and/or chair alarm    Signed by: Emperatriz Mccoy RN

## 2023-04-12 NOTE — THERAPY TREATMENT NOTE
Inpatient Rehabilitation - Speech Language Pathology Treatment Note    [facility]     Patient Name: Melissa Gomez  : 1940  MRN: [id number]    Today's Date: 2023                   Admit Date: 2023       Visit Dx:      ICD-10-CM ICD-9-CM   1. Impaired functional mobility, balance, gait, and endurance  Z74.09 V49.89   2. Follow-up exam  Z09 V67.9       Patient Active Problem List   Diagnosis   • Infarction of left basal ganglia       Past Medical History:   Diagnosis Date   • GERD (gastroesophageal reflux disease)    • Hyperlipidemia    • Hypertension    • Stroke        Past Surgical History:   Procedure Laterality Date   • COLONOSCOPY     • HYSTERECTOMY     • TONSILLECTOMY         SLP Recommendation and Plan                                                            SLP EVALUATION (last 72 hours)     SLP SLC Evaluation     Row Name 04/12/23 0930 04/11/23 1100 04/11/23 0900 04/10/23 1100 04/10/23 0900       Communication Assessment/Intervention    Document Type therapy note (daily note)  -AL therapy note (daily note)  -AL therapy note (daily note)  -AL therapy note (daily note)  -AL therapy note (daily note)  -AL    Patient/Family/Caregiver Comments/Observations Pt participated well.  -AL Pt participated well.  -AL Pt participated well.  -AL Pt participated well.  -AL Pt participated well.  -AL       Pain Scale: Numbers Pre/Post-Treatment    Pretreatment Pain Rating 0/10 - no pain  -AL 0/10 - no pain  -AL 0/10 - no pain  -AL 0/10 - no pain  -AL 0/10 - no pain  -AL          User Key  (r) = Recorded By, (t) = Taken By, (c) = Cosigned By    Initials Name Effective Dates    Janell Erickson MS CCC-SLP 21 -                    EDUCATION    The patient has been educated in the following areas:       Cognitive Impairment Communication Impairment Dysphagia (Swallowing Impairment).             SLP GOALS     Row Name 23       (STG) Pharyngeal Strengthening  Exercise Goal 1 (SLP)    Progress/Outcomes (Pharyngeal Strengthening Goal 1, SLP) good progress toward goal  -AL -- --    Comment (Pharyngeal Strengthening Goal 1, SLP) Completed 10 repetitions of effortful swallow with MOD cues. Completed 5 sets of 5 reps of EMST75 at 1 turn pass 5 cmH20 with MIN-MOD cues. Pt exhibited no overt s/s of aspiration or penetration in 3/3 trials of ice chips and 3/4 trials of water by tsp. Pt exhibited no overt s/s of aspiration or penetration in 2/3 trials of water by cup; wet vocal quality and watery eyes x1.  -AL -- Re-assessed swallow funciton. Pt exhibited no overt s/s of aspiration or penetration with NTL by cup, puree, soft solid (drained peach), and regular. Mastication of solids appeared moderately prolonged. Pt exhibited coating of residue on her tongue after regular, requiring sips of liquid. Recommend trial mechanical soft (ground)/no mixed consistencies with NTL over lunch meal to assess tolerance and endurance. Pt in agreement with this plan.  -AL       Attention Goal 1 (SLP)    Improve Attention by Goal 1 (SLP) -- complete selective attention task;complete sustained attention task;80%;independently (over 90% accuracy)  -AL --    Time Frame (Attention Goal 1, SLP) -- 1 week  -AL --    Progress/Outcomes (Attention Goal 1, SLP) -- good progress toward goal  -AL --    Comment (Attention Goal 1, SLP) -- Attention to detail for reading calendar: 7/8 with NO cues, 8/8 with MIN cues. Pt attempted writing information into calendar; however, her writing was micrographic with reduced legibility.  -AL --       Memory Skills Goal 1 (SLP)    Improve Memory Skills Through Goal 1 (SLP) -- recalling related word lists with an imposed delay;listen to a paragraph and answer questions;recall details of the day;80%;independently (over 90% accuracy)  -AL --    Time Frame (Memory Skills Goal 1, SLP) -- 1 week  -AL --    Progress/Outcomes (Memory Skills Goal 1, SLP) -- good progress toward goal   -AL --    Comment (Memory Skills Goal 1, SLP) -- Recalled 3/3 errands after 25 minutes with NO cues. Goal met x2. Immediate memory for voicemails (complex paragraph): 60% with NO cues, 100% with MIN cues.  -AL --    Row Name 04/10/23 1100 04/10/23 0900          ([facility]) Pharyngeal Strengthening Exercise Goal 1 (SLP)    Activity (Pharyngeal Strengthening Goal 1, SLP) -- increase timing;increase superior movement of the hyolaryngeal complex;increase anterior movement of the hyolaryngeal complex;increase epiglottic inversion and retroflexion;increase closure at entrance to airway/closure of airway at glottis  -AL     Increase Timing -- hard effortful swallow  -AL     Increase Superior Movement of the Hyolaryngeal Complex -- hard effortful swallow  -AL     Increase Anterior Movement of the Hyolaryngeal Complex -- EMST  -AL     Increase Epiglottic Inversion and Retroflexion -- Mendelsohn;hard effortful swallow;EMST  -AL     Increase Closure at Entrance to Airway/Closure of Airway at Glottis -- Mendelsohn;hard effortful swallow  -AL     [place]/Accuracy (Pharyngeal Strengthening Goal 1, SLP) -- with moderate cues (50-74% accuracy)  -AL     Time Frame (Pharyngeal Strengthening Goal 1, SLP) -- 1 week  -AL     Progress/Outcomes (Pharyngeal Strengthening Goal 1, SLP) -- good progress toward goal  -AL     Comment (Pharyngeal Strengthening Goal 1, SLP) -- Pt completed 20 repetitions of effortful swallow with MOD cues. She completed 5 sets of 5 reps of EMST75 at 1 full turn past 5 cmH20 with MIN-MOD cues. Pt exhibited no overt s/s of aspiration or penetration in 2/6 trials of water by tsp; cough x1, strong throat clearing x3. Pt exhibited no overt s/s of aspiration or penetration in 3/5 trials of ice chips; throat clear x2.  -AL        Articulation Goal 1 (SLP)    Improve Articulation Goal 1 (SLP) -- by over-articulating at word level;by over-articulating at phrase level;by over-articulating in connected speech;80%;with  minimal cues (75-90%)  -AL     Time Frame (Articulation Goal 1, SLP) -- 1 week  -AL     Progress/Outcomes (Articulation Goal 1, SLP) -- good progress toward goal  -AL     Comment (Articulation Goal 1, SLP) -- Pt was 90% intelligible with NO cues, 100% with MIN cues  -AL        Attention Goal 1 (SLP)    Improve Attention by Goal 1 (SLP) complete selective attention task;complete sustained attention task;80%;independently (over 90% accuracy)  -AL --     Time Frame (Attention Goal 1, SLP) 1 week  -AL --     Progress/Outcomes (Attention Goal 1, SLP) good progress toward goal  -AL --     Comment (Attention Goal 1, SLP) Attention to detail for written directions: 80% with NO cues, 100% with MIN-MOD cues  -AL --        Memory Skills Goal 1 (SLP)    Improve Memory Skills Through Goal 1 (SLP) recalling related word lists with an imposed delay;listen to a paragraph and answer questions;recall details of the day;80%;independently (over 90% accuracy)  -AL --     Time Frame (Memory Skills Goal 1, SLP) 1 week  -AL --     Comment (Memory Skills Goal 1, SLP) Recalled 3/3 errands after 10 minutes with NO cues. Goal met x1.  -AL --        Organizational Skills Goal 1 (SLP)    Improve Thought Organization Through Goal 1 (SLP) completing a divergent naming task;80%;with minimal cues (75-90%)  -AL --     Time Frame (Thought Organization Skills Goal 1, SLP) 1 week  -AL --     Comment (Thought Organization Skills Goal 1, SLP) goal not yet addressed due to time constraints  -AL --        Reasoning Goal 1 (SLP)    Improve Reasoning Through Goal 1 (SLP) complete deductive reasoning task;80%;independently (over 90% accuracy)  -AL --     Time Frame (Reasoning Goal 1, SLP) 1 week  -AL --     Comment (Reasoning Goal 1, SLP) goal not yet addressed due to time constraints  -AL --        Executive Functional Skills Goal 1 (SLP)    Improve Executive Function Skills Goal 1 (SLP) home management activity;80%;with minimal cues (75-90%)  -AL --     Time  Frame (Executive Function Skills Goal 1, SLP) 1 week  -AL --     Comment (Executive Function Skills Goal 1, SLP) goal not yet addressed due to time constraints  -AL --           User Key  (r) = Recorded By, (t) = Taken By, (c) = Cosigned By    Initials Name Provider Type    Janell Erickson MS CCC-SLP Speech and Language Pathologist                            Time Calculation:        Time Calculation- SLP     Row Name 04/12/23 1040             Time Calculation- SLP    SLP Start Time 0930  -AL      SLP Stop Time 1000  -AL      SLP Time Calculation (min) 30 min  -AL            User Key  (r) = Recorded By, (t) = Taken By, (c) = Cosigned By    Initials Name Provider Type    Janell Erickson MS CCC-SLP Speech and Language Pathologist                  Therapy Charges for Today     Code Description Service Date Service Provider Modifiers Qty    14345897120 HC ST TREATMENT SWALLOW 2 4/11/2023 Janell Gallego MS CCC-SLP GN 1    32555098314 HC ST DEV OF COGN SKILLS INITIAL 15 MIN 4/11/2023 Janell Gallego MS CCC-SLP  1    71314554032 HC ST DEV OF COGN SKILLS EACH ADDT'L 15 MIN 4/11/2023 Janell Gallego, MS CCC-SLP  1    18730714913 HC ST TREATMENT SWALLOW 2 4/12/2023 Janell Gallego MS CCC-SLP GN 1                           Janell Gallego MS CCC-SLP  4/12/2023

## 2023-04-12 NOTE — THERAPY TREATMENT NOTE
Inpatient Rehabilitation - Occupational Therapy Treatment Note    [facility]     Patient Name: Melissa Gomez  : 1940  MRN: [id number]    Today's Date: 2023                 Admit Date: 2023         ICD-10-CM ICD-9-CM   1. Impaired functional mobility, balance, gait, and endurance  Z74.09 V49.89   2. Follow-up exam  Z09 V67.9       Patient Active Problem List   Diagnosis   • Infarction of left basal ganglia       Past Medical History:   Diagnosis Date   • GERD (gastroesophageal reflux disease)    • Hyperlipidemia    • Hypertension    • Stroke        Past Surgical History:   Procedure Laterality Date   • COLONOSCOPY     • HYSTERECTOMY     • TONSILLECTOMY               IRF OT ASSESSMENT FLOWSHEET (last 12 hours)     IRF OT Evaluation and Treatment     Row Name 23 1208          OT Time and Intention    Document Type daily treatment  -DN     Mode of Treatment occupational therapy  -DN     Patient Effort good  -DN     Symptoms Noted During/After Treatment fatigue  -DN     Row Name 23 1208          General Information    Existing Precautions/Restrictions swallow precautions;fall  -DN     Row Name 23 1208          Pain Assessment    Pretreatment Pain Rating 0/10 - no pain  -DN     Posttreatment Pain Rating 0/10 - no pain  -DN     Row Name 23 1208          Cognition/Psychosocial    Affect/Mental Status (Cognition) WFL  -DN     Orientation Status (Cognition) oriented x 4  -DN     Follows Commands (Cognition) follows one-step commands;over 90% accuracy  -DN     Personal Safety Interventions fall prevention program maintained;gait belt  -DN     Cognitive Function attention deficit  -DN     Attention Deficit (Cognition) minimal deficit  -DN     Safety Deficit (Cognition) minimal deficit  -DN     Row Name 23 1208          Bathing    [place] Level (Bathing) bathing skills;moderate assist (50% patient effort);verbal cues;nonverbal cues (demo/gesture)  -DN     Assistive Device  (Bathing) hand held shower spray hose;grab bar/tub rail;tub bench  -DN     Position (Bathing) supported sitting;supported standing  -DN     Set-up Assistance (Bathing) obtain supplies;open containers  -DN     Comment (Bathing) will get pt long handled bath sponge due to pt states she used at home before the stroke  -DN     Row Name 04/12/23 1208          Upper Body Dressing    [place] Level (Upper Body Dressing) upper body dressing skills;doff;don;pull over garment;supervision;verbal cues  -DN     Position (Upper Body Dressing) supported sitting  -DN     Set-up Assistance (Upper Body Dressing) obtain clothing  -DN     Row Name 04/12/23 1208          Lower Body Dressing    [place] Level (Lower Body Dressing) doff;don;pants/bottoms;shoes/slippers;socks;maximum assist (25% patient effort)  -DN     Position (Lower Body Dressing) supported sitting;supported standing  -DN     Set-up Assistance (Lower Body Dressing) obtain clothing  -DN     Row Name 04/12/23 1208          Grooming    Comment (Grooming) completed with nsg prior to OT tx  -DN     Row Name 04/12/23 1208          Toileting    Comment (Toileting) asked pt but did not need to use  -DN     Row Name 04/12/23 1208          Shower Transfer    Type (Shower Transfer) stand pivot/stand step  -DN     [place] Level (Shower Transfer) minimum assist (75% patient effort);verbal cues;nonverbal cues (demo/gesture)  -DN     Assistive Device (Shower Transfer) wheelchair;grab bar, tub/shower;tub bench  -DN     Row Name 04/12/23 1208          Balance    Static Sitting Balance standby assist  -DN     Dynamic Sitting Balance standby assist  -DN     Static Standing Balance minimal assist  -DN     Position/Device Used, Standing Balance --  grab bars in the shower  -DN     Row Name 04/12/23 1208          Positioning and Restraints    Pre-Treatment Position sitting in chair/recliner  -DN     Post Treatment Position wheelchair  -DN     In Bed sitting;call light within  reach;encouraged to call for assist;exit alarm on  -DN           User Key  (r) = Recorded By, (t) = Taken By, (c) = Cosigned By    Initials Name Effective Dates    Ismael Quintanilla OT 06/16/21 -                  Occupational Therapy Education     Title: PT OT SLP Therapies (Done)     Topic: Occupational Therapy (Done)     Point: ADL training (Done)     Description:   Instruct learner(s) on proper safety adaptation and remediation techniques during self care or transfers.   Instruct in proper use of assistive devices.              Learning Progress Summary           Patient Acceptance, E, VU by LS at 4/8/2023 1204    Acceptance, E,TB,D, VU,DU,NR by  at 4/6/2023 1230    Comment: ed pt on role of OT. benefit of therapy, POC w.  OT ed on safety w ADL and tsf. pt ed on UBD technique.                   Point: Home exercise program (Done)     Description:   Instruct learner(s) on appropriate technique for monitoring, assisting and/or progressing therapeutic exercises/activities.              Learning Progress Summary           Patient Acceptance, E, VU by  at 4/8/2023 1204    Acceptance, E,TB,D, VU,DU,NR by  at 4/6/2023 1230    Comment: ed pt on role of OT. benefit of therapy, POC w.  OT ed on safety w ADL and tsf. pt ed on UBD technique.                   Point: Precautions (Done)     Description:   Instruct learner(s) on prescribed precautions during self-care and functional transfers.              Learning Progress Summary           Patient Acceptance, E, VU by  at 4/8/2023 1204    Acceptance, E,TB,D, VU,DU,NR by  at 4/6/2023 1230    Comment: ed pt on role of OT. benefit of therapy, POC w.  OT ed on safety w ADL and tsf. pt ed on UBD technique.                   Point: Body mechanics (Done)     Description:   Instruct learner(s) on proper positioning and spine alignment during self-care, functional mobility activities and/or exercises.              Learning Progress Summary           Patient Acceptance, E,  VU by  at 4/8/2023 1204    Acceptance, E,TB,D, VU,DU,NR by  at 4/6/2023 1230    Comment: ed pt on role of OT. benefit of therapy, POC w.  OT ed on safety w ADL and tsf. pt ed on UBD technique.                               User Key     Initials Effective Dates Name Provider Type Discipline     06/16/21 -  Patsy Blank OTR Occupational Therapist OT    [place] 06/16/21 -  Yamila Wheat MS CCC-SLP Speech and Language Pathologist SLP                    OT Recommendation and Plan                         Time Calculation:      Time Calculation- OT     Row Name 04/12/23 0830             Time Calculation- OT    OT Start Time 0830  -DN      OT Stop Time 0930  -DN      OT Time Calculation (min) 60 min  -DN            User Key  (r) = Recorded By, (t) = Taken By, (c) = Cosigned By    Initials Name Provider Type    Ismael Quintanilla OT Occupational Therapist              Therapy Charges for Today     Code Description Service Date Service Provider Modifiers Qty    74130708595 HC OT SELF CARE/MGMT/TRAIN EA 15 MIN 4/11/2023 Ismael Lanier OT GO 2    69962760147 HC OT THER PROC EA 15 MIN 4/11/2023 Ismael Lanier OT GO 1    36463999099 HC OT NEUROMUSC RE EDUCATION EA 15 MIN 4/11/2023 Ismael Lanier OT GO 1    03560274293 HC OT SELF CARE/MGMT/TRAIN EA 15 MIN 4/12/2023 Ismael Lanier OT GO 4                   Ismael Lanier OT  4/12/2023

## 2023-04-12 NOTE — PLAN OF CARE
Goal Outcome Evaluation:              Outcome Evaluation: Stroke. NIH=2. A&OX4. Forgetful. R. facial droop. Slurred speech. Diet: pureed, NTL. Upright and one-on-one supervision for all meals. Tylenol for HA. Incontinent and continent B&B. Last BM 4/09. Bladder scan q 12 hours. Cath. if greater than 300 ml. Meds crushed in AS. Full code. 2L O2 at nite. Labs y/d. [place] area excoriated. Barrier cream applied. Wears a brief. Participated fully in therapy. Calm, cooperative, and pleasant. Assistx1-2 to wheelchair. Blurred vision in R. eye. Ate better a tmeals. Full code.

## 2023-04-12 NOTE — PLAN OF CARE
Goal Outcome Evaluation:  Plan of Care Reviewed With: patient             Problem: Rehabilitation (IRF) Plan of Care  Goal: Plan of Care Review  Outcome: Ongoing, Progressing  Flowsheets (Taken 4/12/2023 3712)  Plan of Care Reviewed With: patient  Outcome Evaluation: amrita is alert and oriented x 4. Can be forgetful, flat at times. Meds crushed with AS, NTL. Continues with slurred speech and dysphagia. Incontinent of bowel and bladder. Wears brief, checked and changed q2. Had incontinent episode beginning of shift with IC required. Has been dry since then. Wearing 2L nc at HS. PRN Tylenol at 1944 r/t HA. No unsafe behaviors. Has rested well with eyes closed. Call light within reach.

## 2023-04-13 PROCEDURE — 97530 THERAPEUTIC ACTIVITIES: CPT

## 2023-04-13 PROCEDURE — 97535 SELF CARE MNGMENT TRAINING: CPT

## 2023-04-13 PROCEDURE — 97129 THER IVNTJ 1ST 15 MIN: CPT

## 2023-04-13 PROCEDURE — 97130 THER IVNTJ EA ADDL 15 MIN: CPT

## 2023-04-13 PROCEDURE — 92526 ORAL FUNCTION THERAPY: CPT

## 2023-04-13 PROCEDURE — 25010000002 ENOXAPARIN PER 10 MG: Performed by: PHYSICAL MEDICINE & REHABILITATION

## 2023-04-13 PROCEDURE — 97110 THERAPEUTIC EXERCISES: CPT

## 2023-04-13 RX ADMIN — OXYCODONE HYDROCHLORIDE AND ACETAMINOPHEN 250 MG: 500 TABLET ORAL at 08:07

## 2023-04-13 RX ADMIN — AMLODIPINE BESYLATE 10 MG: 10 TABLET ORAL at 08:07

## 2023-04-13 RX ADMIN — ROSUVASTATIN CALCIUM 20 MG: 20 TABLET, FILM COATED ORAL at 21:19

## 2023-04-13 RX ADMIN — POLYETHYLENE GLYCOL 3350 17 G: 17 POWDER, FOR SOLUTION ORAL at 21:29

## 2023-04-13 RX ADMIN — ACETAMINOPHEN 650 MG: 325 TABLET, FILM COATED ORAL at 08:09

## 2023-04-13 RX ADMIN — ASPIRIN 81 MG: 81 TABLET, CHEWABLE ORAL at 08:07

## 2023-04-13 RX ADMIN — ENOXAPARIN SODIUM 40 MG: 100 INJECTION SUBCUTANEOUS at 11:58

## 2023-04-13 RX ADMIN — ACETAMINOPHEN 650 MG: 325 TABLET, FILM COATED ORAL at 21:19

## 2023-04-13 RX ADMIN — CLOPIDOGREL BISULFATE 75 MG: 75 TABLET, FILM COATED ORAL at 08:07

## 2023-04-13 RX ADMIN — Medication 1000 UNITS: at 08:07

## 2023-04-13 RX ADMIN — LISINOPRIL 5 MG: 5 TABLET ORAL at 08:07

## 2023-04-13 NOTE — THERAPY TREATMENT NOTE
Inpatient Rehabilitation - Occupational Therapy Treatment Note    [facility]     Patient Name: Melissa Gomez  : 1940  MRN: [id number]    Today's Date: 2023                 Admit Date: 2023         ICD-10-CM ICD-9-CM   1. Impaired functional mobility, balance, gait, and endurance  Z74.09 V49.89   2. Follow-up exam  Z09 V67.9       Patient Active Problem List   Diagnosis   • Infarction of left basal ganglia       Past Medical History:   Diagnosis Date   • GERD (gastroesophageal reflux disease)    • Hyperlipidemia    • Hypertension    • Stroke        Past Surgical History:   Procedure Laterality Date   • COLONOSCOPY     • HYSTERECTOMY     • TONSILLECTOMY               IRF OT ASSESSMENT FLOWSHEET (last 12 hours)     IRF OT Evaluation and Treatment     Row Name 23 1701 23 1219       OT Time and Intention    Document Type progress note  -DN progress note  -DN    Mode of Treatment occupational therapy  -DN occupational therapy  -DN    Patient Effort good  -DN good  -DN    Row Name 23 1701 23 1219       Pain Assessment    Pretreatment Pain Rating 3/10  -DN 0/10 - no pain  -DN    Posttreatment Pain Rating 3/10  -DN 0/10 - no pain  -DN    Pain Location - Side/Orientation Right  -DN --    Pain Location upper  -DN --    Pain Location - extremity  -DN --    Pre/Posttreatment Pain Comment with shoulder movements  -DN --    Row Name 23 1219          Cognition/Psychosocial    Affect/Mental Status (Cognition) WFL  -DN     Cognitive Function attention deficit  -DN     Attention Deficit (Cognition) minimal deficit  -DN     Safety Deficit (Cognition) moderate deficit  -DN     Row Name 23 1219          Bathing    [place] Level (Bathing) bathing skills;moderate assist (50% patient effort);verbal cues;nonverbal cues (demo/gesture)  -DN     Position (Bathing) sink side;supported sitting  -DN     Set-up Assistance (Bathing) obtain supplies  -DN     Row Name 23 9007           Upper Body Dressing    [place] Level (Upper Body Dressing) upper body dressing skills;doff;don;pull over garment;minimum assist (75% or more patient effort);verbal cues  -DN     Position (Upper Body Dressing) supported sitting  -DN     Set-up Assistance (Upper Body Dressing) obtain clothing  -DN     Row Name 04/13/23 1219          Lower Body Dressing    [place] Level (Lower Body Dressing) doff;don;pants/bottoms;shoes/slippers;socks;maximum assist (25% patient effort)  -DN     Position (Lower Body Dressing) supported sitting  -DN     Set-up Assistance (Lower Body Dressing) obtain clothing  -DN     Row Name 04/13/23 1219          Grooming    [place] Level (Grooming) grooming skills;deodorant application;hair care, combing/brushing;oral care regimen;minimum assist (75% patient effort);nonverbal cues (demo/gesture)  -DN     Position (Grooming) sink side;supported sitting  -DN     Set-up Assistance (Grooming) obtain supplies  -DN     Row Name 04/13/23 1701          Motor Skills    Coordination fine motor deficit  pt working min assist with placement of small pegs, washers, bushings.  Pt had to stop due to neck discomfort, so switched to yellow theraputty exercises and issued some to pt  -DN     Row Name 04/13/23 1701          Balance    Static Standing Balance contact guard;verbal cues;non-verbal cues (demo/gesture)  pt stood at table during BUE activity for 5 minutes which increases endurance for standing during adls  -DN     Row Name 04/13/23 1701 04/13/23 1219       Positioning and Restraints    Pre-Treatment Position sitting in chair/recliner  -DN sitting in chair/recliner  -DN    Post Treatment Position wheelchair  -DN wheelchair  -DN    In Bed sitting;encouraged to call for assist;call light within reach;exit alarm on  -DN sitting;call light within reach;encouraged to call for assist;exit alarm on  -DN          User Key  (r) = Recorded By, (t) = Taken By, (c) = Cosigned By    Initials Name  Effective Dates    DN DanieldawitIsmael OT 06/16/21 -                  Occupational Therapy Education     Title: PT OT SLP Therapies (Done)     Topic: Occupational Therapy (Done)     Point: ADL training (Done)     Description:   Instruct learner(s) on proper safety adaptation and remediation techniques during self care or transfers.   Instruct in proper use of assistive devices.              Learning Progress Summary           Patient Acceptance, E, VU by LS at 4/8/2023 1204    Acceptance, E,TB,D, VU,DU,NR by KP at 4/6/2023 1230    Comment: ed pt on role of OT. benefit of therapy, POC w.  OT ed on safety w ADL and tsf. pt ed on UBD technique.                   Point: Home exercise program (Done)     Description:   Instruct learner(s) on appropriate technique for monitoring, assisting and/or progressing therapeutic exercises/activities.              Learning Progress Summary           Patient Acceptance, E, VU by LS at 4/8/2023 1204    Acceptance, E,TB,D, VU,DU,NR by KP at 4/6/2023 1230    Comment: ed pt on role of OT. benefit of therapy, POC w.  OT ed on safety w ADL and tsf. pt ed on UBD technique.                   Point: Precautions (Done)     Description:   Instruct learner(s) on prescribed precautions during self-care and functional transfers.              Learning Progress Summary           Patient Acceptance, E, VU by LS at 4/8/2023 1204    Acceptance, E,TB,D, VU,DU,NR by KP at 4/6/2023 1230    Comment: ed pt on role of OT. benefit of therapy, POC w.  OT ed on safety w ADL and tsf. pt ed on UBD technique.                   Point: Body mechanics (Done)     Description:   Instruct learner(s) on proper positioning and spine alignment during self-care, functional mobility activities and/or exercises.              Learning Progress Summary           Patient Acceptance, E, VU by LS at 4/8/2023 1204    Acceptance, E,TB,D, VU,DU,NR by KP at 4/6/2023 1230    Comment: ed pt on role of OT. benefit of therapy, POC w.  OT  ed on safety w ADL and tsf. pt ed on UBD technique.                               User Key     Initials Effective Dates Name Provider Type Discipline     06/16/21 -  Patsy Blank OTR Occupational Therapist OT    LS 06/16/21 -  Yamila Wheat MS CCC-SLP Speech and Language Pathologist SLP                    OT Recommendation and Plan                         Time Calculation:      Time Calculation- OT     Row Name 04/13/23 1400 04/13/23 0900          Time Calculation- OT    OT Start Time 1400  -DN 0900  -DN     OT Stop Time 1430  -DN 0930  -DN     OT Time Calculation (min) 30 min  -DN 30 min  -DN           User Key  (r) = Recorded By, (t) = Taken By, (c) = Cosigned By    Initials Name Provider Type    Ismael Quintanilla OT Occupational Therapist              Therapy Charges for Today     Code Description Service Date Service Provider Modifiers Qty    25004430122 HC OT SELF CARE/MGMT/TRAIN EA 15 MIN 4/12/2023 Ismael Lanier OT GO 4    71429434430 HC OT SELF CARE/MGMT/TRAIN EA 15 MIN 4/13/2023 Ismael Lanier OT GO 2    33621436677 HC OT THER PROC EA 15 MIN 4/13/2023 Ismael Lanier OT GO 2                   Ismael Lanier OT  4/13/2023

## 2023-04-13 NOTE — PLAN OF CARE
Goal Outcome Evaluation:               Outcome Evaluation: Melissa is alert and oriented pleasant, worked hard in therapies today, continues with right hemiparesis, and right facial droop, complained of RUE pain from elbow to shoulder this AM tylenol given and helped, no unsafe behaivors, daughter visited at lunch time.

## 2023-04-13 NOTE — PROGRESS NOTES
LOS: 8 days   Patient Care Team:  Jayro Fountain MD as PCP - General (Internal Medicine)      Patient Name: PIERRE FREEMAN  Patient : 1940    ADMITTING DIAGNOSIS:  Diagnoses    1. Follow-up examination  2. IMPAIRED FUNCTIONAL MOBILITY, BALANCE, GAIT, AND ENDURANCE       Left basal ganglia stroke  Right hemiparesis with impaired motor control    SUBJECTIVE:  Tolerating therapies  Voiding on her own with postvoid residuals in the 190 range  Strength better on the right side      REVIEW OF SYSTEMS:        OBJECTIVE:    Vitals:    23 1145   BP: 120/66   Pulse: 91   Resp: 18   Temp: 97.6 °F (36.4 °C)   SpO2: 96%       PHYSICAL EXAM:   General: pleasant, in no acute distress  HEENT: NCAT, sclera anicteric, conjunctiva pink, mucoa moist  Cardiovascular: RRR, +S1+S2, no obvious m/g/r  Lungs: CTAB, no rhonchi or wheezing  Abdomen: normoactive bowel sounds, soft, non tender to palpation  Extremities: no peripheral edema  Skin: exposed surfaces of skin warm, intact, without erythema  Neuro: awake alert and oriented to person, place, time, and situation,  Right facial droop   dysarthria.  MSK: Right shoulder flexion 4/5, elbow flexion 4+/5, finger flexion 4+/5, knee extension 4+/5, ankle dorsiflexion 4/5      MEDICATIONS  Scheduled Meds:  amLODIPine, 10 mg, Oral, Q24H  vitamin C, 250 mg, Oral, Daily  aspirin, 81 mg, Oral, Daily  cholecalciferol, 1,000 Units, Oral, Daily  clopidogrel, 75 mg, Oral, Daily  enoxaparin, 40 mg, Subcutaneous, Q24H  lisinopril, 5 mg, Oral, Q24H  rosuvastatin, 20 mg, Oral, Nightly        Continuous Infusions:       PRN Meds:  •  acetaminophen **OR** [DISCONTINUED] acetaminophen  •  [DISCONTINUED] senna-docusate sodium **AND** polyethylene glycol **AND** bisacodyl **AND** bisacodyl      RESULTS:  No results found for: POCGLU  Results from last 7 days   Lab Units 04/10/23  0614 23  0833   WBC 10*3/mm3 6.85 6.29   HEMOGLOBIN g/dL 13.4 14.0   HEMATOCRIT % 41.5 41.6   PLATELETS  10*3/mm3 187 175     Results from last 7 days   Lab Units 04/10/23  0614 04/07/23  0833   SODIUM mmol/L 143 140   POTASSIUM mmol/L 3.6 3.5   CHLORIDE mmol/L 107 105   CO2 mmol/L 26.6 25.6   BUN mg/dL 19 23   CREATININE mg/dL 0.82 0.59   CALCIUM mg/dL 9.5 9.2   GLUCOSE mg/dL 191* 130*           ASSESSMENT and PLAN:    Infarction of left basal ganglia    Left basal ganglia stroke  Stroke prophylaxis- ASA and Plavix x 21 days from March 30, 2023, then ASA 81 mg daily. Crestor 20 mg.     Right hemiparesis with impaired motor control  Dysphagia   Dysarthria.     Hypertension - amlodipine/lisinopril     Check TSH, Vit B12 and Vit D levels-within normal limit     Urinary retention. Treated for UTI as Reggie Duran. Check bladder scan PVR and cath if >300 cc until three consecutive < 250 cc  April 7-requires intermittent straight cath 400-113-300 cc.  No spontaneous void.  Follow pattern.  April 10-requirement straight catheter 300 cc about every 6 or 8 hours.  - will extend interval to every 12 hours to see if she voids with a larger volume.  4/12- has not required intermittent catheterization since last night.  Noted to have 4 voids with PVR less than 200.  May be recovering bladder function.  Will monitor.  4/13-voiding on her own with postvoid residuals 190 range        DVT prophylaxis- on dual antiplatelet therapies.  SCDs.  Lovenox     Team Conference 4/11/2023  Nursing: continent/incontinent with bladder, requiring cathing each bladder scan, no safety issues  PT: bed mobiliy min assist, contact guard with transfers, ambulating 80 feet, fatigues quickly with low endurance, cues for sequencing, drags left foot, anticipated contact guard with walker  OT: mod assist for bathing, max/dependent with lower body dressing, toileting max assist due to balance  SLP: mildly impaired on CLQT WNL memory/visuospatial, and mild for attention/executive function, puree and nectar thick diet, mild to moderate dysarthria  Discharge  Date: 1.5 weeks     CODE STATUS:  Code Status (Patient has no pulse and is not breathing): CPR (Attempt to Resuscitate)  Medical Interventions (Patient has pulse or is breathing): Full Support  Release to patient: Routine Release      Admission Status: Continues to meet requirements for inpatient admission.       Anton Fajardo MD    During rounds, used appropriate personal protective equipment including mask and gloves.  Additional gown if indicated.  Mask used was standard procedure mask. Appropriate PPE was worn during the entire visit.  Hand hygiene was completed before and after.

## 2023-04-13 NOTE — THERAPY TREATMENT NOTE
Inpatient Rehabilitation - Speech Language Pathology Treatment Note    [facility]     Patient Name: Melissa Gomez  : 1940  MRN: [id number]    Today's Date: 2023                   Admit Date: 2023       Visit Dx:      ICD-10-CM ICD-9-CM   1. Impaired functional mobility, balance, gait, and endurance  Z74.09 V49.89   2. Follow-up exam  Z09 V67.9       Patient Active Problem List   Diagnosis   • Infarction of left basal ganglia       Past Medical History:   Diagnosis Date   • GERD (gastroesophageal reflux disease)    • Hyperlipidemia    • Hypertension    • Stroke        Past Surgical History:   Procedure Laterality Date   • COLONOSCOPY     • HYSTERECTOMY     • TONSILLECTOMY         SLP Recommendation and Plan                                                            SLP EVALUATION (last 72 hours)     SLP SLC Evaluation     Row Name 23 1330 23 1100 23 0930 23 1100 23 0900       Communication Assessment/Intervention    Document Type therapy note (daily note)  -AL therapy note (daily note)  -KA therapy note (daily note)  -AL therapy note (daily note)  -AL therapy note (daily note)  -AL    Subjective Information -- no complaints  -KA -- -- --    Patient Observations -- alert;cooperative  -KA -- -- --    Patient/Family/Caregiver Comments/Observations Pt participated well.  -AL -- Pt participated well.  -AL Pt participated well.  -AL Pt participated well.  -AL    Patient Effort -- adequate  -KA -- -- --    Symptoms Noted During/After Treatment -- none  -KA -- -- --       Pain Scale: Numbers Pre/Post-Treatment    Pretreatment Pain Rating 0/10 - no pain  -AL -- 0/10 - no pain  -AL 0/10 - no pain  -AL 0/10 - no pain  -AL          User Key  (r) = Recorded By, (t) = Taken By, (c) = Cosigned By    Initials Name Effective Dates    Juan Barriga MA,CCC-SLP 22 -     Janell Erickson, MS CCC-SLP 21 -                          EDUCATION    The patient has been  educated in the following areas:       Cognitive Impairment Communication Impairment Dysphagia (Swallowing Impairment).             SLP GOALS     Row Name 04/13/23 1330 04/13/23 1115 04/12/23 1100       (STG) Pharyngeal Strengthening Exercise Goal 1 (SLP)    Comment (Pharyngeal Strengthening Goal 1, SLP) -- Pt observed with test meal tray of soft (chopped)/no mixed consistencies with NTL by cup. She exhibited strong cough x1 following a bite of carrots. Intermittent light throat clear noted with soft solids. Mastication and initiation of swallow with soft solids (chopped chicken) appeared moderately prolonged and pt reported fatigue after eating ~20% of portion of meat and carrots. No overt s/s of aspiration or penetration observed with NTL and puree trials. Recommend continue puree with NTL. Will continue trials of soft (chopped meats)/no mixed consistencies with NTL with SLP only. Pt in agreement with plan.  -AL --       Articulation Goal 1 (SLP)    Progress/Outcomes (Articulation Goal 1, SLP) good progress toward goal  -AL -- --    Comment (Articulation Goal 1, SLP) Read story with 100% intelligibility with 1 MIN cue for articulation.  -AL -- --       Attention Goal 1 (SLP)    Time Frame (Attention Goal 1, SLP) -- -- 1 week  -KA    Comment (Attention Goal 1, SLP) -- -- Attention to detail while following written directions=60% without cues  -KA       Memory Skills Goal 1 (SLP)    Improve Memory Skills Through Goal 1 (SLP) recalling related word lists with an imposed delay;listen to a paragraph and answer questions;recall details of the day;80%;independently (over 90% accuracy)  -AL -- --    Time Frame (Memory Skills Goal 1, SLP) 1 week  -AL -- --    Progress/Outcomes (Memory Skills Goal 1, SLP) good progress toward goal  -AL -- --    Comment (Memory Skills Goal 1, SLP) Recalled 3/3 errands after 17 minutes with NO cues. Goal met x2. Voicemails: 13/16 with NO cues, 16/16 with MIN cues.  -AL -- --       Executive  Functional Skills Goal 1 (SLP)    Improve Executive Function Skills Goal 1 (SLP) -- -- home management activity;80%;with minimal cues (75-90%)  -KA    Comment (Executive Function Skills Goal 1, SLP) -- -- Answering questions on about preparing Thankgiving requiring attention to detail and some functional simple math=50% without cues and 100% with min to mod cues.  -NICK    Row Name 04/12/23 0930 04/11/23 1100 04/11/23 0900       ([facility]) Pharyngeal Strengthening Exercise Goal 1 (SLP)    Progress/Outcomes (Pharyngeal Strengthening Goal 1, SLP) good progress toward goal  -AL -- --    Comment (Pharyngeal Strengthening Goal 1, SLP) Completed 10 repetitions of effortful swallow with MOD cues. Completed 5 sets of 5 reps of EMST75 at 1 turn pass 5 cmH20 with MIN-MOD cues. Pt exhibited no overt s/s of aspiration or penetration in 3/3 trials of ice chips and 3/4 trials of water by tsp. Pt exhibited no overt s/s of aspiration or penetration in 2/3 trials of water by cup; wet vocal quality and watery eyes x1.  -AL -- Re-assessed swallow funciton. Pt exhibited no overt s/s of aspiration or penetration with NTL by cup, puree, soft solid (drained peach), and regular. Mastication of solids appeared moderately prolonged. Pt exhibited coating of residue on her tongue after regular, requiring sips of liquid. Recommend trial mechanical soft (ground)/no mixed consistencies with NTL over lunch meal to assess tolerance and endurance. Pt in agreement with this plan.  -AL       Attention Goal 1 (SLP)    Improve Attention by Goal 1 (SLP) -- complete selective attention task;complete sustained attention task;80%;independently (over 90% accuracy)  -AL --    Time Frame (Attention Goal 1, SLP) -- 1 week  -AL --    Progress/Outcomes (Attention Goal 1, SLP) -- good progress toward goal  -AL --    Comment (Attention Goal 1, SLP) -- Attention to detail for reading calendar: 7/8 with NO cues, 8/8 with MIN cues. Pt attempted writing information into  calendar; however, her writing was micrographic with reduced legibility.  -AL --       Memory Skills Goal 1 (SLP)    Improve Memory Skills Through Goal 1 (SLP) -- recalling related word lists with an imposed delay;listen to a paragraph and answer questions;recall details of the day;80%;independently (over 90% accuracy)  -AL --    Time Frame (Memory Skills Goal 1, SLP) -- 1 week  -AL --    Progress/Outcomes (Memory Skills Goal 1, SLP) -- good progress toward goal  -AL --    Comment (Memory Skills Goal 1, SLP) -- Recalled 3/3 errands after 25 minutes with NO cues. Goal met x2. Immediate memory for voicemails (complex paragraph): 60% with NO cues, 100% with MIN cues.  -AL --          User Key  (r) = Recorded By, (t) = Taken By, (c) = Cosigned By    Initials Name Provider Type    Juan Barriga MA,CCC-SLP Speech and Language Pathologist    Janell Erickson MS CCC-SLP Speech and Language Pathologist                            Time Calculation:        Time Calculation- SLP     Row Name 04/13/23 1358 04/13/23 1206          Time Calculation- SLP    SLP Start Time 1330  -AL 1115  -AL     SLP Stop Time 1400  -AL 1145  -AL     SLP Time Calculation (min) 30 min  -AL 30 min  -AL           User Key  (r) = Recorded By, (t) = Taken By, (c) = Cosigned By    Initials Name Provider Type    Janell Erickson MS CCC-SLP Speech and Language Pathologist                  Therapy Charges for Today     Code Description Service Date Service Provider Modifiers Qty    75737614238 HC ST TREATMENT SWALLOW 2 4/12/2023 Janell Gallego MS CCC-SLP GN 1    85243267489 HC ST TREATMENT SWALLOW 2 4/13/2023 Janell Gallego MS CCC-SLP GN 1    15845027675 HC ST DEV OF COGN SKILLS INITIAL 15 MIN 4/13/2023 Janell Gallego MS CCC-SLP  1    99303941945 HC ST DEV OF COGN SKILLS EACH ADDT'L 15 MIN 4/13/2023 Janell Gallego MS CCC-SLP  1                           Janell Gallego MS CCC-SLP  4/13/2023

## 2023-04-13 NOTE — PROGRESS NOTES
Recreational Therapy Note    Patient Name: Melissa Gomez   MRN: [id number]    Therapeutic Recreation Eval and Treat (last 12 hours)     Therapeutic Recreation Eval & Treat     Row Name 04/13/23 1400       Therapeutic Recreation Participation    Recreation Therapy Participation games  -TW    Games board games  Scrabble  -TW    Objectives of Recreation Participation increase;positive attitudes leading to a healthy leisure lifestyle;sense of autonomy by choosing level of participation  -TW    Comment, Recreation Participation BUE use to place tiles; scorekeeping WFL  -TW    Recreation Therapy Summary of Participation active participation  -TW          User Key  (r) = Recorded By, (t) = Taken By, (c) = Cosigned By    Initials Name Provider Type    TW Nancy Gimenez CTRS Recreational Therapist                  HUGH Espinoza  4/13/2023

## 2023-04-13 NOTE — THERAPY TREATMENT NOTE
Inpatient Rehabilitation - Occupational Therapy Treatment Note    [facility]     Patient Name: Melissa Gomez  : 1940  MRN: [id number]    Today's Date: 2023                 Admit Date: 2023         ICD-10-CM ICD-9-CM   1. Impaired functional mobility, balance, gait, and endurance  Z74.09 V49.89   2. Follow-up exam  Z09 V67.9       Patient Active Problem List   Diagnosis   • Infarction of left basal ganglia       Past Medical History:   Diagnosis Date   • GERD (gastroesophageal reflux disease)    • Hyperlipidemia    • Hypertension    • Stroke        Past Surgical History:   Procedure Laterality Date   • COLONOSCOPY     • HYSTERECTOMY     • TONSILLECTOMY               IRF OT ASSESSMENT FLOWSHEET (last 12 hours)     IRF OT Evaluation and Treatment     Row Name 23 1219          OT Time and Intention    Document Type progress note  -DN     Mode of Treatment occupational therapy  -DN     Patient Effort good  -DN     Row Name 23 1219          Pain Assessment    Pretreatment Pain Rating 0/10 - no pain  -DN     Posttreatment Pain Rating 0/10 - no pain  -DN     Row Name 23 1219          Cognition/Psychosocial    Affect/Mental Status (Cognition) WFL  -DN     Cognitive Function attention deficit  -DN     Attention Deficit (Cognition) minimal deficit  -DN     Safety Deficit (Cognition) moderate deficit  -DN     Row Name 23 1219          Bathing    [place] Level (Bathing) bathing skills;moderate assist (50% patient effort);verbal cues;nonverbal cues (demo/gesture)  -DN     Position (Bathing) sink side;supported sitting  -DN     Set-up Assistance (Bathing) obtain supplies  -DN     Row Name 23 1219          Upper Body Dressing    [place] Level (Upper Body Dressing) upper body dressing skills;doff;don;pull over garment;minimum assist (75% or more patient effort);verbal cues  -DN     Position (Upper Body Dressing) supported sitting  -DN     Set-up Assistance (Upper Body  Dressing) obtain clothing  -DN     Row Name 04/13/23 1219          Lower Body Dressing    [place] Level (Lower Body Dressing) doff;don;pants/bottoms;shoes/slippers;socks;maximum assist (25% patient effort)  -DN     Position (Lower Body Dressing) supported sitting  -DN     Set-up Assistance (Lower Body Dressing) obtain clothing  -DN     Row Name 04/13/23 1219          Grooming    [place] Level (Grooming) grooming skills;deodorant application;hair care, combing/brushing;oral care regimen;minimum assist (75% patient effort);nonverbal cues (demo/gesture)  -DN     Position (Grooming) sink side;supported sitting  -DN     Set-up Assistance (Grooming) obtain supplies  -DN     Row Name 04/13/23 1219          Positioning and Restraints    Pre-Treatment Position sitting in chair/recliner  -DN     Post Treatment Position wheelchair  -DN     In Bed sitting;call light within reach;encouraged to call for assist;exit alarm on  -DN           User Key  (r) = Recorded By, (t) = Taken By, (c) = Cosigned By    Initials Name Effective Dates    Ismael Quintanilla OT 06/16/21 -                  Occupational Therapy Education     Title: PT OT SLP Therapies (Done)     Topic: Occupational Therapy (Done)     Point: ADL training (Done)     Description:   Instruct learner(s) on proper safety adaptation and remediation techniques during self care or transfers.   Instruct in proper use of assistive devices.              Learning Progress Summary           Patient Acceptance, E, VU by LS at 4/8/2023 1204    Acceptance, E,TB,D, VU,DU,NR by  at 4/6/2023 1230    Comment: ed pt on role of OT. benefit of therapy, POC w.  OT ed on safety w ADL and tsf. pt ed on UBD technique.                   Point: Home exercise program (Done)     Description:   Instruct learner(s) on appropriate technique for monitoring, assisting and/or progressing therapeutic exercises/activities.              Learning Progress Summary           Patient Acceptance, E, VU  by  at 4/8/2023 1204    Acceptance, E,TB,D, VU,DU,NR by  at 4/6/2023 1230    Comment: ed pt on role of OT. benefit of therapy, POC w.  OT ed on safety w ADL and tsf. pt ed on UBD technique.                   Point: Precautions (Done)     Description:   Instruct learner(s) on prescribed precautions during self-care and functional transfers.              Learning Progress Summary           Patient Acceptance, E, VU by  at 4/8/2023 1204    Acceptance, E,TB,D, VU,DU,NR by  at 4/6/2023 1230    Comment: ed pt on role of OT. benefit of therapy, POC w.  OT ed on safety w ADL and tsf. pt ed on UBD technique.                   Point: Body mechanics (Done)     Description:   Instruct learner(s) on proper positioning and spine alignment during self-care, functional mobility activities and/or exercises.              Learning Progress Summary           Patient Acceptance, E, VU by  at 4/8/2023 1204    Acceptance, E,TB,D, VU,DU,NR by  at 4/6/2023 1230    Comment: ed pt on role of OT. benefit of therapy, POC w.  OT ed on safety w ADL and tsf. pt ed on UBD technique.                               User Key     Initials Effective Dates Name Provider Type Discipline     06/16/21 -  Patsy Blank OTR Occupational Therapist OT     06/16/21 -  Yamila Wheat MS CCC-SLP Speech and Language Pathologist SLP                    OT Recommendation and Plan                         Time Calculation:      Time Calculation- OT     Row Name 04/13/23 0900             Time Calculation- OT    OT Start Time 0900  -DN      OT Stop Time 0930  -DN      OT Time Calculation (min) 30 min  -DN            User Key  (r) = Recorded By, (t) = Taken By, (c) = Cosigned By    Initials Name Provider Type    Ismael Quintanilla OT Occupational Therapist              Therapy Charges for Today     Code Description Service Date Service Provider Modifiers Qty    60541279306 HC OT SELF CARE/MGMT/TRAIN EA 15 MIN 4/12/2023 Ismael Lanier OT GO 4     [id number]  OT SELF CARE/MGMT/TRAIN EA 15 MIN 4/13/2023 Ismael Lanier, OT GO 2                   Ismael Lanier OT  4/13/2023

## 2023-04-13 NOTE — PROGRESS NOTES
Inpatient Rehabilitation Plan of Care Note    Plan of Care  Care Plan Reviewed - No updates at this time.    Psychosocial    Performed Intervention(s)  Verbalizes needs and concerns  Therapeutic environmental set up      Sphincter Control    Performed Intervention(s)  Bladder scan or I/O cath per order  Encourage fluids  Bowel/bladder training      Safety    Performed Intervention(s)  Safety Rounds  Bed alarm and/or chair alarm    Signed by: April Barber RN

## 2023-04-13 NOTE — PROGRESS NOTES
Nutrition Services    Patient Name:  Melissa Gomez  YOB: 1940  MRN: [id number]  Admit Date:  4/5/2023    Visited pt for follow-up. Pt dislikes pureed diet. PO 50-75% (usually 50%) of most recent meals. Pt likes supplements of mighty shake and magic cups but has not yet received carnation instant breakfast, discussed at last review. RD confirmed CIB (mixed with nectar thick milk) with diet office. Encouraged pt to utilize supplements on unit, from nursing, if/when hungry between meals. Pt is hopeful for diet advancement; SLP following.   Last BM 4/9 (x4d); pt confirmed. Pt hesitant about stool softeners, stating they caused frequent loose/watery stools recently. If constipation persists, may need to consider small dose of bowel medication.     Electronically signed by:  Yue Ayala RD  04/13/23 13:32 EDT

## 2023-04-13 NOTE — THERAPY TREATMENT NOTE
Inpatient Rehabilitation - Physical Therapy Treatment Note       [facility]     Patient Name: Melissa Gomez  : 1940  MRN: [id number]    Today's Date: 2023                    Admit Date: 2023      Visit Dx:     ICD-10-CM ICD-9-CM   1. Impaired functional mobility, balance, gait, and endurance  Z74.09 V49.89   2. Follow-up exam  Z09 V67.9       Patient Active Problem List   Diagnosis   • Infarction of left basal ganglia       Past Medical History:   Diagnosis Date   • GERD (gastroesophageal reflux disease)    • Hyperlipidemia    • Hypertension    • Stroke        Past Surgical History:   Procedure Laterality Date   • COLONOSCOPY     • HYSTERECTOMY     • TONSILLECTOMY         PT ASSESSMENT (last 12 hours)     IRF PT Evaluation and Treatment    No documentation.                  Physical Therapy Education     Title: PT OT SLP Therapies (Done)     Topic: Physical Therapy (Done)     Point: Mobility training (Done)     Learning Progress Summary           Patient Acceptance, E,D, VU,DU by DP at 2023 1038    Acceptance, E, VU,NR by  at 4/10/2023 1043    Acceptance, E, VU by  at 2023 1204    Acceptance, E,TB, VU,NR by  at 2023 1054    Acceptance, E,TB, VU by  at 2023 1527                   Point: Home exercise program (Done)     Learning Progress Summary           Patient Acceptance, E,D, VU,DU by DP at 2023 1038    Acceptance, E, VU,NR by  at 4/10/2023 1043    Acceptance, E, VU by  at 2023 1204    Acceptance, E,TB, VU,NR by  at 2023 1054    Acceptance, E,TB, VU by  at 2023 1527                               User Key     Initials Effective Dates Name Provider Type Discipline    LS 21 -  Yamila Wheat MS CCC-SLP Speech and Language Pathologist SLP    EE 21 -  Diann Gonzáles, PT Physical Therapist PT    KP 21 -  Chen Centeno PT Physical Therapist PT    DP 21 -  Gen Gray, PT Physical Therapist PT                PT  Recommendation and Plan                          Time Calculation:         Therapy Charges for Today     Code Description Service Date Service Provider Modifiers Qty    58310596828  PT THER PROC EA 15 MIN 4/12/2023 Diann Gonzáles, PT GP 1    [id number]  PT THERAPEUTIC ACT EA 15 MIN 4/12/2023 Diann Gonzáles, PT GP 3                   Diann Gonzáles, PT  4/13/2023

## 2023-04-13 NOTE — PLAN OF CARE
Goal Outcome Evaluation:  Plan of Care Reviewed With: patient             Problem: Rehabilitation (IRF) Plan of Care  Goal: Plan of Care Review  Outcome: Ongoing, Progressing  Flowsheets (Taken 4/13/2023 9037)  Plan of Care Reviewed With: patient  Outcome Evaluation: Melissa is alert and oriented x 4. Can be forgetful at times. slurred speech noted. Meds crushed with AS. NTL. encouraging and providing pt with fluids throughout shift. Incontinent of bowel and bladder. Wears brief, checked and changed q 2. PVR after incontinent episode was 196cc. Urine continues with odor. PRN Tylenol administered at 2002 r/t HA. Wearing 2L oc nc at HS. No unsafe behaviors. Call light within reach.

## 2023-04-13 NOTE — THERAPY PROGRESS REPORT/RE-CERT
Inpatient Rehabilitation - Physical Therapy Progress Note       [facility]     Patient Name: Melissa Gomez  : 1940  MRN: [id number]    Today's Date: 2023                    Admit Date: 2023      Visit Dx:     ICD-10-CM ICD-9-CM   1. Impaired functional mobility, balance, gait, and endurance  Z74.09 V49.89   2. Follow-up exam  Z09 V67.9       Patient Active Problem List   Diagnosis   • Infarction of left basal ganglia       Past Medical History:   Diagnosis Date   • GERD (gastroesophageal reflux disease)    • Hyperlipidemia    • Hypertension    • Stroke        Past Surgical History:   Procedure Laterality Date   • COLONOSCOPY     • HYSTERECTOMY     • TONSILLECTOMY         PT ASSESSMENT (last 12 hours)     IRF PT Evaluation and Treatment     Row Name 23 1015          PT Time and Intention    Document Type progress note  -EE     Mode of Treatment physical therapy;individual therapy  -EE     Patient/Family/Caregiver Comments/Observations Pt sitting up in WC, agreeable to PT.  -EE     Row Name 23 1015          General Information    Existing Precautions/Restrictions fall;swallow precautions  -EE     Row Name 23 1015          Pain Assessment    Pretreatment Pain Rating 0/10 - no pain  -EE     Posttreatment Pain Rating 0/10 - no pain  -EE     Row Name 23 1015          Cognition/Psychosocial    Affect/Mental Status (Cognition) WFL  -EE     Orientation Status (Cognition) oriented x 4  -EE     Follows Commands (Cognition) follows one-step commands;over 90% accuracy;increased processing time needed;verbal cues/prompting required  -EE     Personal Safety Interventions fall prevention program maintained;muscle strengthening facilitated;gait belt;nonskid shoes/slippers when out of bed;supervised activity  -EE     Cognitive Function attention deficit  -EE     Attention Deficit (Cognition) minimal deficit  -EE     Safety Deficit (Cognition) minimal deficit;safety precautions awareness   -EE     Row Name 04/13/23 1015          Bed Mobility    Rolling Right [place] (Bed Mobility) minimum assist (75% patient effort);verbal cues  -EE     Supine-Sit [place] (Bed Mobility) moderate assist (50% patient effort);verbal cues  -EE     Bed Mobility, Safety Issues decreased use of arms for pushing/pulling;decreased use of legs for bridging/pushing;impaired trunk control for bed mobility  -EE     Comment, (Bed Mobility) mat mobility  -EE     Row Name 04/13/23 1015          Transfer Assessment/Treatment    Comment, (Transfers) 10x STS from varying height mat surface in PM, CGA/Min A. Max cues for sequencing with STS, does better after cues to scoot to edge of chair, position feet/hands, and lean forward  -EE     Row Name 04/13/23 1015          Bed-Chair Transfer    Bed-Chair [place] (Transfers) minimum assist (75% patient effort);contact guard;verbal cues  -EE     Assistive Device (Bed-Chair Transfers) wheelchair  -EE     Comment, (Bed-Chair Transfer) x2 EOM to WC  -EE     Row Name 04/13/23 1015          Chair-Bed Transfer    Chair-Bed [place] (Transfers) minimum assist (75% patient effort);contact guard;verbal cues  -EE     Assistive Device (Chair-Bed Transfers) wheelchair  -EE     Comment, (Chair-Bed Transfer) x2 WC to EOM  -EE     Row Name 04/13/23 1015          Sit-Stand Transfer    Sit-Stand [place] (Transfers) minimum assist (75% patient effort);contact guard;verbal cues  -EE     Assistive Device (Sit-Stand Transfers) walker, front-wheeled;wheelchair  -EE     Row Name 04/13/23 1015          Stand-Sit Transfer    Stand-Sit [place] (Transfers) contact guard;minimum assist (75% patient effort);verbal cues  -EE     Assistive Device (Stand-Sit Transfers) walker, front-wheeled;wheelchair  -EE     Row Name 04/13/23 1015          Gait/Stairs (Locomotion)    [place] Level (Gait) contact guard;verbal cues  -EE     Assistive Device (Gait) walker, front-wheeled  -EE     Distance in  "Feet (Gait) 80' x 2  -EE     Pattern (Gait) step-through  -EE     Deviations/Abnormal Patterns (Gait) lenard decreased;festinating/shuffling;stride length decreased  -EE     Bilateral Gait Deviations forward flexed posture;heel strike decreased  -EE     Right Sided Gait Deviations foot drop/toe drag;heel strike decreased  increased drag with fatigue  -EE     [place] Level (Stairs) contact guard;2 person assist;verbal cues  -EE     Handrail Location (Stairs) both sides  -EE     Number of Steps (Stairs) one 4\" step x 5 trials  -EE     Ascending Technique (Stairs) step-to-step  -EE     Descending Technique (Stairs) step-to-step  -EE     Stairs, Safety Issues sequencing ability decreased;weight-shifting ability decreased  -EE     Stairs, Impairments impaired balance;strength decreased  -EE     Comment, (Gait/Stairs) cues for sequencing/foot placement on stairs  -EE     Row Name 04/13/23 1015          Safety Issues, Functional Mobility    Impairments Affecting Function (Mobility) balance;endurance/activity tolerance;postural/trunk control;strength;pain  -EE     Row Name 04/13/23 1015          Motor Skills    Therapeutic Exercise core strength  -EE     Row Name 04/13/23 1015          Hip (Therapeutic Exercise)    Hip (Therapeutic Exercise) AAROM (active assistive range of motion)  -EE     Hip AROM (Therapeutic Exercise) supine;left;aBduction;aDduction;10 repetitions  -EE     Hip AAROM (Therapeutic Exercise) supine;right;aBduction;aDduction;10 repetitions  small ROM due to pt c/o R groin strain  -EE     Row Name 04/13/23 1015          Knee (Therapeutic Exercise)    Knee Strengthening (Therapeutic Exercise) bilateral;SLR (straight leg raise);10 repetitions  -EE     Row Name 04/13/23 1015          Core Strength (Therapeutic Exercise)    Core Strength (Therapeutic Exercise) bridging, bilateral lower extremities;10 repetitions;2 sets  -EE     Row Name 04/13/23 1015          Positioning and Restraints    Pre-Treatment " Position sitting in chair/recliner  -EE     Post Treatment Position wheelchair  -EE     In Wheelchair sitting;call light within reach;encouraged to call for assist;exit alarm on  -EE     Row Name 04/13/23 1015          Weekly Progress Summary (PT)    Functional Goal Overall Progress (PT) progressing toward functional goals as expected  -EE     Weekly Progress Summary (PT) Pt has demonstrated slow but steady progress with strength and endurance this week, as evidenced by increased independence with transfers and improved gait distance and mechanics. Pt continues with R LE and generalized weakness, and fatigues quickly with activity. Pt demonstrates the most difficulty with bed mobility due to core and LE weakness, and will continue to benefit from PT to address impairments and maximize independence with mobility prior to DC home.  -EE     Impairments Still Limiting Function (PT) balance impairment;cognitive impairment;postural control impaired;strength deficit;functional activity tolerance impairment  -EE     Row Name 04/13/23 1015          Bed Mobility Goal 1 (PT-IRF)    Activity/Assistive Device (Bed Mobility Goal 1, PT-IRF) bed mobility activities, all  -EE     [place] Level (Bed Mobility Goal 1, PT-IRF) contact guard required  -EE     Time Frame (Bed Mobility Goal 1, PT-IRF) long-term goal (LTG);2 weeks  -EE     Progress/Outcomes (Bed Mobility Goal 1, PT-IRF) goal revised this date;progress slower than expected  -EE     Row Name 04/13/23 1015          Transfer Goal 1 (PT-IRF)    Activity/Assistive Device (Transfer Goal 1, PT-IRF) all transfers  -EE     [place] Level (Transfer Goal 1, PT-IRF) contact guard required  -EE     Time Frame (Transfer Goal 1, PT-IRF) long-term goal (LTG);2 weeks  -EE     Progress/Outcomes (Transfer Goal 1, PT-IRF) goal ongoing;continuing progress toward goal  -EE     Row Name 04/13/23 1015          Gait/Walking Locomotion Goal 1 (PT-IRF)    Activity/Assistive Device  (Gait/Walking Locomotion Goal 1, PT-IRF) gait (walking locomotion);walker, rolling  -EE     Gait/Walking Locomotion Distance Goal 1 (PT-IRF) 100  -EE     [place] Level (Gait/Walking Locomotion Goal 1, PT-IRF) contact guard required  -EE     Time Frame (Gait/Walking Locomotion Goal 1, PT-IRF) long-term goal (LTG);2 weeks  -EE     Progress/Outcomes (Gait/Walking Locomotion Goal 1, PT-IRF) goal ongoing;continuing progress toward goal  -EE     Row Name 04/13/23 1015          Stairs Goal 1 (PT-IRF)    Activity/Assistive Device (Stairs Goal 1, PT-IRF) stairs, all skills;walker, rolling  -EE     Number of Stairs (Stairs Goal 1, PT-IRF) 2  -EE     [place] Level (Stairs Goal 1, PT-IRF) minimum assist (75% or more patient effort)  -EE     Progress/Outcomes (Stairs Goal 1, PT-IRF) goal ongoing;continuing progress toward goal  -EE           User Key  (r) = Recorded By, (t) = Taken By, (c) = Cosigned By    Initials Name Provider Type    Diann Batista, GIRISH Physical Therapist                 Physical Therapy Education     Title: PT OT SLP Therapies (Done)     Topic: Physical Therapy (Done)     Point: Mobility training (Done)     Learning Progress Summary           Patient Acceptance, TB,E, VU,NR by EE at 4/13/2023 1047    Acceptance, E,D, VU,DU by DP at 4/11/2023 1038    Acceptance, E, VU,NR by EE at 4/10/2023 1043    Acceptance, E, VU by LS at 4/8/2023 1204    Acceptance, E,TB, VU,NR by EE at 4/7/2023 1054    Acceptance, E,TB, VU by KP at 4/6/2023 1527                   Point: Home exercise program (Done)     Learning Progress Summary           Patient Acceptance, TB,E, VU,NR by EE at 4/13/2023 1047    Acceptance, E,D, VU,DU by DP at 4/11/2023 1038    Acceptance, E, VU,NR by EE at 4/10/2023 1043    Acceptance, E, VU by LS at 4/8/2023 1204    Acceptance, E,TB, VU,NR by EE at 4/7/2023 1054    Acceptance, E,TB, VU by KP at 4/6/2023 1527                               User Key     Initials Effective Dates Name Provider Type  Discipline    LS 06/16/21 -  Yamila Wheat MS CCC-SLP Speech and Language Pathologist SLP    EE 06/16/21 -  Diann Gonzáles, PT Physical Therapist PT    KP 06/16/21 -  Chen Centeno PT Physical Therapist PT    DP 08/24/21 -  Gen Gray, PT Physical Therapist PT                PT Recommendation and Plan                          Time Calculation:      PT Charges     Row Name 04/13/23 1336 04/13/23 1048          Time Calculation    Start Time 1230  -EE 1000  -EE     Stop Time 1300  -EE 1030  -EE     Time Calculation (min) 30 min  -EE 30 min  -EE     PT Received On 04/13/23  -EE 04/13/23  -EE     PT - Next Appointment 04/14/23  -EE 04/13/23  -EE     PT Goal Re-Cert Due Date -- 04/20/23  -EE        Time Calculation- PT    Total Timed Code Minutes- PT 30 minute(s)  -EE 30 minute(s)  -EE           User Key  (r) = Recorded By, (t) = Taken By, (c) = Cosigned By    Initials Name Provider Type    EE Diann Gonzáles, PT Physical Therapist                Therapy Charges for Today     Code Description Service Date Service Provider Modifiers Qty    88385111345 HC PT THER PROC EA 15 MIN 4/12/2023 Diann Gonzáles, PT GP 1    66957761210 HC PT THERAPEUTIC ACT EA 15 MIN 4/12/2023 Diann Gonzáles, PT GP 3    27105096886 HC PT THERAPEUTIC ACT EA 15 MIN 4/13/2023 Diann Gonzáles, PT GP 4                   Diann Gonzáles PT  4/13/2023

## 2023-04-14 LAB
ANION GAP SERPL CALCULATED.3IONS-SCNC: 8.8 MMOL/L (ref 5–15)
BASOPHILS # BLD AUTO: 0.03 10*3/MM3 (ref 0–0.2)
BASOPHILS NFR BLD AUTO: 0.4 % (ref 0–1.5)
BUN SERPL-MCNC: 19 MG/DL (ref 8–23)
BUN/CREAT SERPL: 30.2 (ref 7–25)
CALCIUM SPEC-SCNC: 9.5 MG/DL (ref 8.6–10.5)
CHLORIDE SERPL-SCNC: 107 MMOL/L (ref 98–107)
CO2 SERPL-SCNC: 25.2 MMOL/L (ref 22–29)
CREAT SERPL-MCNC: 0.63 MG/DL (ref 0.57–1)
DEPRECATED RDW RBC AUTO: 41 FL (ref 37–54)
EGFRCR SERPLBLD CKD-EPI 2021: 87.6 ML/MIN/1.73
EOSINOPHIL # BLD AUTO: 0.35 10*3/MM3 (ref 0–0.4)
EOSINOPHIL NFR BLD AUTO: 4.4 % (ref 0.3–6.2)
ERYTHROCYTE [DISTWIDTH] IN BLOOD BY AUTOMATED COUNT: 12.4 % (ref 12.3–15.4)
GLUCOSE SERPL-MCNC: 95 MG/DL (ref 65–99)
HCT VFR BLD AUTO: 40.1 % (ref 34–46.6)
HGB BLD-MCNC: 13.4 G/DL (ref 12–15.9)
IMM GRANULOCYTES # BLD AUTO: 0.02 10*3/MM3 (ref 0–0.05)
IMM GRANULOCYTES NFR BLD AUTO: 0.3 % (ref 0–0.5)
LYMPHOCYTES # BLD AUTO: 1.58 10*3/MM3 (ref 0.7–3.1)
LYMPHOCYTES NFR BLD AUTO: 19.8 % (ref 19.6–45.3)
MCH RBC QN AUTO: 30.3 PG (ref 26.6–33)
MCHC RBC AUTO-ENTMCNC: 33.4 G/DL (ref 31.5–35.7)
MCV RBC AUTO: 90.7 FL (ref 79–97)
MONOCYTES # BLD AUTO: 0.79 10*3/MM3 (ref 0.1–0.9)
MONOCYTES NFR BLD AUTO: 9.9 % (ref 5–12)
NEUTROPHILS NFR BLD AUTO: 5.19 10*3/MM3 (ref 1.7–7)
NEUTROPHILS NFR BLD AUTO: 65.2 % (ref 42.7–76)
NRBC BLD AUTO-RTO: 0 /100 WBC (ref 0–0.2)
PLATELET # BLD AUTO: 209 10*3/MM3 (ref 140–450)
PMV BLD AUTO: 9.9 FL (ref 6–12)
POTASSIUM SERPL-SCNC: 4.5 MMOL/L (ref 3.5–5.2)
RBC # BLD AUTO: 4.42 10*6/MM3 (ref 3.77–5.28)
SODIUM SERPL-SCNC: 141 MMOL/L (ref 136–145)
WBC NRBC COR # BLD: 7.96 10*3/MM3 (ref 3.4–10.8)

## 2023-04-14 PROCEDURE — 97130 THER IVNTJ EA ADDL 15 MIN: CPT

## 2023-04-14 PROCEDURE — 97530 THERAPEUTIC ACTIVITIES: CPT

## 2023-04-14 PROCEDURE — 80048 BASIC METABOLIC PNL TOTAL CA: CPT | Performed by: PHYSICAL MEDICINE & REHABILITATION

## 2023-04-14 PROCEDURE — 97110 THERAPEUTIC EXERCISES: CPT

## 2023-04-14 PROCEDURE — 97112 NEUROMUSCULAR REEDUCATION: CPT | Performed by: OCCUPATIONAL THERAPIST

## 2023-04-14 PROCEDURE — 25010000002 ENOXAPARIN PER 10 MG: Performed by: PHYSICAL MEDICINE & REHABILITATION

## 2023-04-14 PROCEDURE — 97129 THER IVNTJ 1ST 15 MIN: CPT

## 2023-04-14 PROCEDURE — 92526 ORAL FUNCTION THERAPY: CPT

## 2023-04-14 PROCEDURE — 85025 COMPLETE CBC W/AUTO DIFF WBC: CPT | Performed by: PHYSICAL MEDICINE & REHABILITATION

## 2023-04-14 PROCEDURE — 97535 SELF CARE MNGMENT TRAINING: CPT | Performed by: OCCUPATIONAL THERAPIST

## 2023-04-14 RX ORDER — ASCORBIC ACID 500 MG
250 TABLET ORAL ONCE
Status: COMPLETED | OUTPATIENT
Start: 2023-04-14 | End: 2023-04-14

## 2023-04-14 RX ORDER — AMOXICILLIN 250 MG
2 CAPSULE ORAL NIGHTLY
Status: DISCONTINUED | OUTPATIENT
Start: 2023-04-14 | End: 2023-04-29 | Stop reason: HOSPADM

## 2023-04-14 RX ADMIN — ENOXAPARIN SODIUM 40 MG: 100 INJECTION SUBCUTANEOUS at 12:04

## 2023-04-14 RX ADMIN — DOCUSATE SODIUM 50MG AND SENNOSIDES 8.6MG 2 TABLET: 8.6; 5 TABLET, FILM COATED ORAL at 12:03

## 2023-04-14 RX ADMIN — Medication 1000 UNITS: at 08:19

## 2023-04-14 RX ADMIN — ASPIRIN 81 MG: 81 TABLET, CHEWABLE ORAL at 08:23

## 2023-04-14 RX ADMIN — CLOPIDOGREL BISULFATE 75 MG: 75 TABLET, FILM COATED ORAL at 08:19

## 2023-04-14 RX ADMIN — AMLODIPINE BESYLATE 10 MG: 10 TABLET ORAL at 08:19

## 2023-04-14 RX ADMIN — ACETAMINOPHEN 650 MG: 325 TABLET, FILM COATED ORAL at 21:03

## 2023-04-14 RX ADMIN — LISINOPRIL 5 MG: 5 TABLET ORAL at 08:22

## 2023-04-14 RX ADMIN — OXYCODONE HYDROCHLORIDE AND ACETAMINOPHEN 250 MG: 500 TABLET ORAL at 08:19

## 2023-04-14 RX ADMIN — OXYCODONE HYDROCHLORIDE AND ACETAMINOPHEN 250 MG: 500 TABLET ORAL at 08:24

## 2023-04-14 RX ADMIN — ROSUVASTATIN CALCIUM 20 MG: 20 TABLET, FILM COATED ORAL at 21:03

## 2023-04-14 RX ADMIN — DOCUSATE SODIUM 50MG AND SENNOSIDES 8.6MG 2 TABLET: 8.6; 5 TABLET, FILM COATED ORAL at 21:03

## 2023-04-14 NOTE — THERAPY TREATMENT NOTE
Inpatient Rehabilitation - Physical Therapy Treatment Note       [facility]     Patient Name: Melissa Gomez  : 1940  MRN: [id number]    Today's Date: 2023                    Admit Date: 2023      Visit Dx:     ICD-10-CM ICD-9-CM   1. Impaired functional mobility, balance, gait, and endurance  Z74.09 V49.89   2. Follow-up exam  Z09 V67.9       Patient Active Problem List   Diagnosis   • Infarction of left basal ganglia       Past Medical History:   Diagnosis Date   • GERD (gastroesophageal reflux disease)    • Hyperlipidemia    • Hypertension    • Stroke        Past Surgical History:   Procedure Laterality Date   • COLONOSCOPY     • HYSTERECTOMY     • TONSILLECTOMY         PT ASSESSMENT (last 12 hours)     [facility] PT Evaluation and Treatment     Row Name 23 1045          PT Time and Intention    Document Type daily treatment  -EE     Mode of Treatment physical therapy;individual therapy  -EE     Patient/Family/Caregiver Comments/Observations Pt sitting up in WC, agreeable to PT.  -EE     Row Name 23 1045          General Information    Existing Precautions/Restrictions fall;swallow precautions  -EE     Comment, General Information Pt initially c/o increased pain R UE, unable to tolerate using R hand on walker. Therapist applied moist heat to R UE and pain decreased, pt then able to use walker with minimal discomfort.  -EE     Row Name 23 1045          Pain Assessment    Pretreatment Pain Rating 7/10  -EE     Posttreatment Pain Rating 3/10  -EE     Pain Location - Side/Orientation Right  -EE     Pain Location upper  -EE     Pain Location - extremity  -EE     Pre/Posttreatment Pain Comment Pain when using R UE on armrest for transfers, holding onto walker during ambulation. Moist heat applied and pain decreased somewhat.  -EE     Row Name 23 1045          Cognition/Psychosocial    Affect/Mental Status (Cognition) WFL  -EE     Orientation Status (Cognition) oriented x 4  -EE      Follows Commands (Cognition) follows one-step commands;over 90% accuracy;increased processing time needed;verbal cues/prompting required  -EE     Personal Safety Interventions fall prevention program maintained;gait belt;muscle strengthening facilitated;nonskid shoes/slippers when out of bed;supervised activity  -EE     Cognitive Function attention deficit  -EE     Attention Deficit (Cognition) minimal deficit  -EE     Safety Deficit (Cognition) awareness of need for assistance;safety precautions awareness;problem-solving  -EE     Row Name 04/14/23 1045          Transfer Assessment/Treatment    Comment, (Transfers) heavy cues for sequencing of all aspects of STS transfer  -EE     Row Name 04/14/23 1045          Sit-Stand Transfer    Sit-Stand [place] (Transfers) minimum assist (75% patient effort);contact guard;verbal cues  -EE     Assistive Device (Sit-Stand Transfers) walker, front-wheeled  -EE     Comment, (Sit-Stand Transfer) cues for sequencing; performed x5 during session  -EE     Row Name 04/14/23 1045          Stand-Sit Transfer    Stand-Sit [place] (Transfers) contact guard;minimum assist (75% patient effort);verbal cues  -EE     Assistive Device (Stand-Sit Transfers) walker, front-wheeled  -EE     Comment, (Stand-Sit Transfer) cues for hand placement  -EE     Row Name 04/14/23 1045          Gait/Stairs (Locomotion)    [place] Level (Gait) contact guard;verbal cues  -EE     Assistive Device (Gait) walker, front-wheeled  -EE     Distance in Feet (Gait) 80' x 2  -EE     Pattern (Gait) step-through  -EE     Deviations/Abnormal Patterns (Gait) base of support, narrow;lenard decreased;festinating/shuffling;stride length decreased;weight shifting decreased  -EE     Bilateral Gait Deviations forward flexed posture;heel strike decreased  -EE     Right Sided Gait Deviations foot drop/toe drag;heel strike decreased  increased shuffling noted on R once pt fatigued  -EE     Gait Assessment/Intervention  cues for upright posture and increased step length  -EE     Row Name 04/14/23 1045          Safety Issues, Functional Mobility    Impairments Affecting Function (Mobility) balance;endurance/activity tolerance;postural/trunk control;strength;pain  -EE     Row Name 04/14/23 1045          Hip (Therapeutic Exercise)    Hip Strengthening (Therapeutic Exercise) bilateral;marching while seated;aBduction;10 repetitions;2 sets  red tband for hip abd  -EE     Row Name 04/14/23 1045          Knee (Therapeutic Exercise)    Knee Strengthening (Therapeutic Exercise) bilateral;LAQ (long arc quad);hamstring curls;10 repetitions;2 sets  red tband for ham curls  -EE     Row Name 04/14/23 1045          Ankle (Therapeutic Exercise)    Ankle Strengthening (Therapeutic Exercise) bilateral;dorsiflexion;plantarflexion;10 repetitions;2 sets  -EE     Row Name 04/14/23 1045          Modality Intervention (Comprehensive)    Modality Treatment hot packs (thermal therapy)  -EE     Additional Documentation Modality Treatment (Row)  -EE     Row Name 04/14/23 1045          Hot Pack Treatment (Superficial Heat)    Location 1 (Hot Pack) upper extremity treatment area  -EE     Indications (Hot Pack) pain modulation  -EE     Treatment Details (Hot Packs) Moist heat applied to R UE x15 min at beginning of session due to pt c/o increased pain today.  -EE     Response (Hot Pack) pain decreased  -EE     Comment (Hot Pack) no skin issues or irritation noted upon removal of hot pack  -EE     Row Name 04/14/23 1045          Positioning and Restraints    Pre-Treatment Position sitting in chair/recliner  -EE     Post Treatment Position wheelchair  -EE     In Wheelchair sitting;exit alarm on;with SLP  -EE           User Key  (r) = Recorded By, (t) = Taken By, (c) = Cosigned By    Initials Name Provider Type    EE Diann Gonzáles PT Physical Therapist                 Physical Therapy Education     Title: PT OT SLP Therapies (Done)     Topic: Physical Therapy (Done)      Point: Mobility training (Done)     Learning Progress Summary           Patient Acceptance, TB,E, VU,NR by EE at 4/13/2023 1047    Acceptance, E,D, VU,DU by DP at 4/11/2023 1038    Acceptance, E, VU,NR by EE at 4/10/2023 1043    Acceptance, E, VU by LS at 4/8/2023 1204    Acceptance, E,TB, VU,NR by EE at 4/7/2023 1054    Acceptance, E,TB, VU by KP at 4/6/2023 1527                   Point: Home exercise program (Done)     Learning Progress Summary           Patient Acceptance, TB,E, VU,NR by EE at 4/13/2023 1047    Acceptance, E,D, VU,DU by DP at 4/11/2023 1038    Acceptance, E, VU,NR by EE at 4/10/2023 1043    Acceptance, E, VU by LS at 4/8/2023 1204    Acceptance, E,TB, VU,NR by EE at 4/7/2023 1054    Acceptance, E,TB, VU by  at 4/6/2023 1527                               User Key     Initials Effective Dates Name Provider Type Discipline     06/16/21 -  Yamila Wheat MS CCC-SLP Speech and Language Pathologist SLP    EE 06/16/21 -  Diann Gonzáles, PT Physical Therapist PT    KP 06/16/21 -  Chen Centeno, PT Physical Therapist PT    DP 08/24/21 -  Gen Gray, PT Physical Therapist PT                PT Recommendation and Plan                          Time Calculation:      PT Charges     Row Name 04/14/23 1154             Time Calculation    Start Time 1000  -EE      Stop Time 1100  -EE      Time Calculation (min) 60 min  -EE      PT Received On 04/14/23  -EE      PT - Next Appointment 04/15/23  -EE         Time Calculation- PT    Total Timed Code Minutes- PT 60 minute(s)  -EE            User Key  (r) = Recorded By, (t) = Taken By, (c) = Cosigned By    Initials Name Provider Type    EE Diann Gonzáles, PT Physical Therapist                Therapy Charges for Today     Code Description Service Date Service Provider Modifiers Qty    89122625062 HC PT THERAPEUTIC ACT EA 15 MIN 4/13/2023 Diann Gonzáles, PT GP 4    09677348396 HC PT THER PROC EA 15 MIN 4/14/2023 Diann Gonzáles, PT GP 2    [id number]  PT  THERAPEUTIC ACT EA 15 MIN 4/14/2023 Diann Gonzáles, PT GP 2                   Diann Gonzáles, PT  4/14/2023

## 2023-04-14 NOTE — PLAN OF CARE
Goal Outcome Evaluation:      Pt is A/Ox4, calm/cooperative, OOB x 1 stand/pivot. Meds taken crushed in puree. Oral care completed. Pt has been incontinent of urine overnight. Bladder scan completed x1. Pt c/o pain to R upper arm; prn Tylenol given x1. Prn Miralax given. O2 at 2L via nasal cannula worn overnight.

## 2023-04-14 NOTE — PLAN OF CARE
Goal Outcome Evaluation:  Plan of Care Reviewed With: patient        Progress: improving  Outcome Evaluation: Melissa is alert and oriented, quiet slow speech pattern, incontinent of bowel and bladder, NO BM for few days Senna ordered and started, patient did not want a supp or enema at this time, Right hemiparesis, right facial droop, worked hard in therapies today, daughter visited at lunch time, mild discomfort to RUE shoulder to elbow and patient did not want any tylenol, neuropyschology evaluating patient at this time.

## 2023-04-14 NOTE — PROGRESS NOTES
Inpatient Rehabilitation Plan of Care Note    Plan of Care  Care Plan Reviewed - No updates at this time.    Sphincter Control    [RN] Bladder Management(Active)  Current Status(04/13/2023): Incontinent of bladder, bladder scans no longer  ordered due to several < 200 scans.  Weekly Goal(04/19/2023): The patient is unable to void independently  Discharge Goal: The patient empties bladder completely    Performed Intervention(s)  Bladder scan or I/O cath per order  Encourage fluids  Bowel/bladder training      Psychosocial    Performed Intervention(s)  Verbalizes needs and concerns  Therapeutic environmental set up      Safety    Performed Intervention(s)  Safety Rounds  Bed alarm and/or chair alarm    Signed by: April Braber RN

## 2023-04-14 NOTE — PLAN OF CARE
Goal Outcome Evaluation:  Plan of Care Reviewed With: patient, daughter      Pt observed with lunch meal of soft solids (turkey sandwich), puree, frozen pudding, and NTL by cup. Pt exhibited cough x1 with NTL by cup. Mastication of soft solid appeared mild-moderately prolonged; however, pt independently cleared oral cavity. She was able to eat ~50% of sandwich. Pt with reduced appetite. Discussed with pt, who reported she would prefer soft (chopped meats) diet rather than puree. Daughter also present.     Recommend upgrade to soft (chopped meats)/no mixed with NTL. Pt must have 1:1 supervision for meals. Sit upright in wheelchair. Small bites/sips. Check for pocketing. Meds one at a time with applesauce or pudding.

## 2023-04-14 NOTE — THERAPY TREATMENT NOTE
Inpatient Rehabilitation - Speech Language Pathology Treatment Note    [facility]     Patient Name: Melissa Gomez  : 1940  MRN: [id number]    Today's Date: 2023                   Admit Date: 2023       Visit Dx:      ICD-10-CM ICD-9-CM   1. Impaired functional mobility, balance, gait, and endurance  Z74.09 V49.89   2. Follow-up exam  Z09 V67.9       Patient Active Problem List   Diagnosis   • Infarction of left basal ganglia       Past Medical History:   Diagnosis Date   • GERD (gastroesophageal reflux disease)    • Hyperlipidemia    • Hypertension    • Stroke        Past Surgical History:   Procedure Laterality Date   • COLONOSCOPY     • HYSTERECTOMY     • TONSILLECTOMY         SLP Recommendation and Plan                                           Plan of Care Reviewed With: patient, daughter ([phone])                SLP EVALUATION (last 72 hours)     SLP SLC Evaluation     Row Name 23 1330 23 1130 23 1330 23 1100 23 0930       Communication Assessment/Intervention    Document Type therapy note (daily note)  -AL therapy note (daily note)  -AL therapy note (daily note)  -AL therapy note (daily note)  -KA therapy note (daily note)  -AL    Subjective Information -- -- -- no complaints  -KA --    Patient Observations -- -- -- alert;cooperative  -KA --    Patient/Family/Caregiver Comments/Observations Pt participated well. Reports she would prefer to have soft (chopped) diet.  -AL Pt participated well. Daughter present. Pt reported coughing last evening when laying back in bed. Nurse reported they only gave NTL by spoon. Pt reports fatigue today.  -AL Pt participated well.  -AL -- Pt participated well.  -AL    Patient Effort -- -- -- adequate  -KA --    Symptoms Noted During/After Treatment -- -- -- none  -KA --       Pain Scale: Numbers Pre/Post-Treatment    Pretreatment Pain Rating 0/10 - no pain  -AL 0/10 - no pain  -AL 0/10 - no pain  -AL -- 0/10 - no pain   -AL          User Key  (r) = Recorded By, (t) = Taken By, (c) = Cosigned By    Initials Name Effective Dates    Juan Barriga MA,CCC-SLP 06/02/22 -     Janell Erickson, MS CCC-SLP 06/16/21 -                              EDUCATION    The patient has been educated in the following areas:       Cognitive Impairment Communication Impairment Dysphagia (Swallowing Impairment).             SLP GOALS     Row Name 04/14/23 1330 04/14/23 1130 04/13/23 1330       (STG) Pharyngeal Strengthening Exercise Goal 1 (SLP)    Progress/Outcomes (Pharyngeal Strengthening Goal 1, SLP) -- good progress toward goal  -AL --    Comment (Pharyngeal Strengthening Goal 1, SLP) -- Pt observed with lunch meal of soft solids (turkey sandwich), puree, frozen pudding, and NTL by cup. Pt exhibited cough x1 with NTL by cup. Mastication of soft solid appeared mild-moderately prolonged; however, pt independently cleared oral cavity. She was able to eat ~50% of sandwich. Pt with reduced appetite. Discussed with pt, who reported she would prefer soft (chopped meats) diet rather than puree. Daughter also present. Recommend upgrade to soft (chopped meats)/no mixed with NTL. Pt must have 1:1 supervision for meals. Sit upright in wheelchair. Small bites/sips. Check for pocketing. Meds one at a time with applesauce or pudding.  -AL --       Articulation Goal 1 (SLP)    Progress/Outcomes (Articulation Goal 1, SLP) -- -- good progress toward goal  -AL    Comment (Articulation Goal 1, SLP) -- -- Read story with 100% intelligibility with 1 MIN cue for articulation.  -AL       Attention Goal 1 (SLP)    Improve Attention by Goal 1 (SLP) complete selective attention task;complete sustained attention task;80%;independently (over 90% accuracy)  -AL -- --    Time Frame (Attention Goal 1, SLP) 1 week  -AL -- --    Progress/Outcomes (Attention Goal 1, SLP) good progress toward goal  -AL -- --    Comment (Attention Goal 1, SLP) Attention to detail for moderately  complex written directions: 30% with NO cues, 100% with MIN-MOD cues.  -AL -- --       Memory Skills Goal 1 (SLP)    Improve Memory Skills Through Goal 1 (SLP) -- -- recalling related word lists with an imposed delay;listen to a paragraph and answer questions;recall details of the day;80%;independently (over 90% accuracy)  -AL    Time Frame (Memory Skills Goal 1, SLP) -- -- 1 week  -AL    Progress/Outcomes (Memory Skills Goal 1, SLP) -- -- good progress toward goal  -AL    Comment (Memory Skills Goal 1, SLP) -- -- Recalled 3/3 errands after 17 minutes with NO cues. Goal met x2. Voicemails: 13/16 with NO cues, 16/16 with MIN cues.  -AL    Row Name 04/13/23 1115 04/12/23 1100 04/12/23 0930       (STG) Pharyngeal Strengthening Exercise Goal 1 (SLP)    Progress/Outcomes (Pharyngeal Strengthening Goal 1, SLP) -- -- good progress toward goal  -AL    Comment (Pharyngeal Strengthening Goal 1, SLP) Pt observed with test meal tray of soft (chopped)/no mixed consistencies with NTL by cup. She exhibited strong cough x1 following a bite of carrots. Intermittent light throat clear noted with soft solids. Mastication and initiation of swallow with soft solids (chopped chicken) appeared moderately prolonged and pt reported fatigue after eating ~20% of portion of meat and carrots. No overt s/s of aspiration or penetration observed with NTL and puree trials. Recommend continue puree with NTL. Will continue trials of soft (chopped meats)/no mixed consistencies with NTL with SLP only. Pt in agreement with plan.  -AL -- Completed 10 repetitions of effortful swallow with MOD cues. Completed 5 sets of 5 reps of EMST75 at 1 turn pass 5 cmH20 with MIN-MOD cues. Pt exhibited no overt s/s of aspiration or penetration in 3/3 trials of ice chips and 3/4 trials of water by tsp. Pt exhibited no overt s/s of aspiration or penetration in 2/3 trials of water by cup; wet vocal quality and watery eyes x1.  -AL       Attention Goal 1 (SLP)    Time  Frame (Attention Goal 1, SLP) -- 1 week  -KA --    Comment (Attention Goal 1, SLP) -- Attention to detail while following written directions=60% without cues  -KA --       Executive Functional Skills Goal 1 (SLP)    Improve Executive Function Skills Goal 1 (SLP) -- home management activity;80%;with minimal cues (75-90%)  -KA --    Comment (Executive Function Skills Goal 1, SLP) -- Answering questions on about preparing Thankgiving requiring attention to detail and some functional simple math=50% without cues and 100% with min to mod cues.  -KA --          User Key  (r) = Recorded By, (t) = Taken By, (c) = Cosigned By    Initials Name Provider Type    Juan Barriga MA,CCC-SLP Speech and Language Pathologist    Janell Erickson, MS CCC-SLP Speech and Language Pathologist                            Time Calculation:        Time Calculation- SLP     Row Name 04/14/23 1357 04/14/23 1210          Time Calculation- SLP    SLP Start Time 1300  -AL 1130  -AL     SLP Stop Time 1330  -AL 1200  -AL     SLP Time Calculation (min) 30 min  -AL 30 min  -AL           User Key  (r) = Recorded By, (t) = Taken By, (c) = Cosigned By    Initials Name Provider Type    Janell Erickson MS CCC-SLP Speech and Language Pathologist                  Therapy Charges for Today     Code Description Service Date Service Provider Modifiers Qty    96040690858 HC ST TREATMENT SWALLOW 2 4/13/2023 Janell Gallego, MS CCC-SLP GN 1    54660074072 HC ST DEV OF COGN SKILLS INITIAL 15 MIN 4/13/2023 Janell Gallego, MS CCC-SLP  1    82216181435 HC ST DEV OF COGN SKILLS EACH ADDT'L 15 MIN 4/13/2023 Janell Gallego, MS CCC-SLP  1    03470603895 HC ST DEV OF COGN SKILLS INITIAL 15 MIN 4/14/2023 Janell Gallego, MS CCC-SLP  1    86123992793 HC ST DEV OF COGN SKILLS EACH ADDT'L 15 MIN 4/14/2023 Janell Gallego, MS CCC-SLP  1    86971801437 HC ST TREATMENT SWALLOW 2 4/14/2023 Janell Gallego, MS CCC-SLP GN 1                           Janell  Lashonda MS CCC-SLP  4/14/2023

## 2023-04-14 NOTE — THERAPY TREATMENT NOTE
Inpatient Rehabilitation - Occupational Therapy Treatment Note    [facility]     Patient Name: Melissa Gomez  : 1940  MRN: [id number]    Today's Date: 2023                 Admit Date: 2023         ICD-10-CM ICD-9-CM   1. Impaired functional mobility, balance, gait, and endurance  Z74.09 V49.89   2. Follow-up exam  Z09 V67.9       Patient Active Problem List   Diagnosis   • Infarction of left basal ganglia       Past Medical History:   Diagnosis Date   • GERD (gastroesophageal reflux disease)    • Hyperlipidemia    • Hypertension    • Stroke        Past Surgical History:   Procedure Laterality Date   • COLONOSCOPY     • HYSTERECTOMY     • TONSILLECTOMY               IRF OT ASSESSMENT FLOWSHEET (last 12 hours)     IRF OT Evaluation and Treatment     Row Name 23 1204          OT Time and Intention    Document Type daily treatment  -     Mode of Treatment individual therapy;occupational therapy  -     Patient Effort good  -KP     Symptoms Noted During/After Treatment fatigue  -     Row Name 23 1204          General Information    Patient/Family/Caregiver Comments/Observations pt sitting in wc upon OT arrival  -     Existing Precautions/Restrictions fall;swallow precautions  -     Row Name 23 1204          Pain Assessment    Pretreatment Pain Rating 0/10 - no pain  -     Posttreatment Pain Rating 0/10 - no pain  -     Row Name 23 1204          Cognition/Psychosocial    Affect/Mental Status (Cognition) WFL  -     Orientation Status (Cognition) oriented x 4  -KP     Follows Commands (Cognition) follows one-step commands;over 90% accuracy;verbal cues/prompting required  -     Personal Safety Interventions fall prevention program maintained;nonskid shoes/slippers when out of bed;gait belt  -     Row Name 23 1204          Bathing    [place] Level (Bathing) bathing skills;moderate assist (50% patient effort);verbal cues;nonverbal cues (demo/gesture)   -KP     Assistive Device (Bathing) grab bar/tub rail  -KP     Position (Bathing) sink side;supported sitting;supported standing  -KP     Set-up Assistance (Bathing) obtain supplies  -KP     Row Name 04/14/23 1204          Upper Body Dressing    [place] Level (Upper Body Dressing) upper body dressing skills;doff;don;pull over garment;set up assistance;verbal cues;minimum assist (75% or more patient effort)  -KP     Position (Upper Body Dressing) supported sitting  -KP     Set-up Assistance (Upper Body Dressing) obtain clothing  -KP     Comment (Upper Body Dressing) VC to pull on past R elbow first  -KP     Row Name 04/14/23 1204          Lower Body Dressing    [place] Level (Lower Body Dressing) doff;don;pants/bottoms;shoes/slippers;socks;maximum assist (25% patient effort)  -KP     Assistive Device Use (Lower Body Dressing) reacher  -KP     Position (Lower Body Dressing) supported sitting;supported standing  -KP     Set-up Assistance (Lower Body Dressing) obtain clothing  -KP     Row Name 04/14/23 1204          Grooming    [place] Level (Grooming) grooming skills;deodorant application;hair care, combing/brushing;oral care regimen;minimum assist (75% patient effort);nonverbal cues (demo/gesture)  -KP     Position (Grooming) sink side;supported sitting  -KP     Set-up Assistance (Grooming) obtain supplies  -KP     Comment (Grooming) A w hair  -KP     Row Name 04/14/23 1204          Toileting    [place] Level (Toileting) toileting skills;adjust/manage clothing;dependent (less than 25% patient effort);perform perineal hygiene;minimum assist (75% patient effort)  -     Assistive Device Use (Toileting) grab bar/safety frame  -KP     Position (Toileting) supported sitting;supported standing  -KP     Set-up Assistance (Toileting) change pad/brief  -KP     Comment (Toileting) A to stand when pt completed hygiene, but total A to pull up pants over brief  -KP     Row Name 04/14/23 1204          Bed  Mobility    Comment, (Bed Mobility) NT UIC  -     Row Name 04/14/23 1204          Sit-Stand Transfer    Sit-Stand [place] (Transfers) set up;minimum assist (75% patient effort);verbal cues  -     Assistive Device (Sit-Stand Transfers) wheelchair  -     Row Name 04/14/23 1204          Stand-Sit Transfer    Stand-Sit [place] (Transfers) set up;minimum assist (75% patient effort)  -     Assistive Device (Stand-Sit Transfers) wheelchair  -     Row Name 04/14/23 1204          Toilet Transfer    Type (Toilet Transfer) sit-stand;stand-sit;stand pivot/stand step  -     [place] Level (Toilet Transfer) set up;minimum assist (75% patient effort)  -     Assistive Device (Toilet Transfer) wheelchair;grab bars/safety frame  -     Row Name 04/14/23 1204          Shower Transfer    Comment, (Shower Transfer) NT bath at sink  -     Row Name 04/14/23 1204          Positioning and Restraints    Pre-Treatment Position sitting in chair/recliner  -     Post Treatment Position wheelchair  -     In Wheelchair sitting;call light within reach;encouraged to call for assist;exit alarm on  -           User Key  (r) = Recorded By, (t) = Taken By, (c) = Cosigned By    Initials Name Effective Dates     Patsy Blank, OTR 06/16/21 -                  Occupational Therapy Education     Title: PT OT SLP Therapies (Done)     Topic: Occupational Therapy (Done)     Point: ADL training (Done)     Description:   Instruct learner(s) on proper safety adaptation and remediation techniques during self care or transfers.   Instruct in proper use of assistive devices.              Learning Progress Summary           Patient Acceptance, E, VU by  at 4/8/2023 1204    Acceptance, E,TB,D, VU,DU,NR by KRISH at 4/6/2023 1230    Comment: ed pt on role of OT. benefit of therapy, POC w.  OT ed on safety w ADL and tsf. pt ed on UBD technique.                   Point: Home exercise program (Done)     Description:   Instruct  learner(s) on appropriate technique for monitoring, assisting and/or progressing therapeutic exercises/activities.              Learning Progress Summary           Patient Acceptance, E, VU by  at 4/8/2023 1204    Acceptance, E,TB,D, VU,DU,NR by  at 4/6/2023 1230    Comment: ed pt on role of OT. benefit of therapy, POC w.  OT ed on safety w ADL and tsf. pt ed on UBD technique.                   Point: Precautions (Done)     Description:   Instruct learner(s) on prescribed precautions during self-care and functional transfers.              Learning Progress Summary           Patient Acceptance, E, VU by  at 4/8/2023 1204    Acceptance, E,TB,D, VU,DU,NR by  at 4/6/2023 1230    Comment: ed pt on role of OT. benefit of therapy, POC w.  OT ed on safety w ADL and tsf. pt ed on UBD technique.                   Point: Body mechanics (Done)     Description:   Instruct learner(s) on proper positioning and spine alignment during self-care, functional mobility activities and/or exercises.              Learning Progress Summary           Patient Acceptance, E, VU by  at 4/8/2023 1204    Acceptance, E,TB,D, VU,DU,NR by  at 4/6/2023 1230    Comment: ed pt on role of OT. benefit of therapy, POC w.  OT ed on safety w ADL and tsf. pt ed on UBD technique.                               User Key     Initials Effective Dates Name Provider Type Discipline     06/16/21 -  Patsy Blank OTR Occupational Therapist OT     06/16/21 -  Yamila Wheat MS CCC-SLP Speech and Language Pathologist SLP                    OT Recommendation and Plan    Planned Therapy Interventions (OT): activity tolerance training, adaptive equipment training, BADL retraining, functional balance retraining, neuromuscular control/coordination retraining, occupation/activity based interventions, patient/caregiver education/training, ROM/therapeutic exercise, strengthening exercise, transfer/mobility retraining       Daily Progress Summary  (OT)  Overall Progress Toward Functional Goals (OT): progressing toward functional goals as expected            Time Calculation:      Time Calculation- OT     Row Name 04/14/23 1209             Time Calculation- OT    OT Start Time 0900  -      OT Stop Time 0930  -      OT Time Calculation (min) 30 min  -            User Key  (r) = Recorded By, (t) = Taken By, (c) = Cosigned By    Initials Name Provider Type     Patsy Blank OTR Occupational Therapist              Therapy Charges for Today     Code Description Service Date Service Provider Modifiers Qty    [id number] HC OT SELF CARE/MGMT/TRAIN EA 15 MIN 4/14/2023 Patsy Blank OTR GO 2                   RIAN Barton  4/14/2023

## 2023-04-14 NOTE — THERAPY PROGRESS REPORT/RE-CERT
Inpatient Rehabilitation - Occupational Therapy Progress Note    [facility]     Patient Name: Melissa Gomez  : 1940  MRN: [id number]    Today's Date: 2023                 Admit Date: 2023         ICD-10-CM ICD-9-CM   1. Impaired functional mobility, balance, gait, and endurance  Z74.09 V49.89   2. Follow-up exam  Z09 V67.9       Patient Active Problem List   Diagnosis   • Infarction of left basal ganglia       Past Medical History:   Diagnosis Date   • GERD (gastroesophageal reflux disease)    • Hyperlipidemia    • Hypertension    • Stroke        Past Surgical History:   Procedure Laterality Date   • COLONOSCOPY     • HYSTERECTOMY     • TONSILLECTOMY               IRF OT ASSESSMENT FLOWSHEET (last 12 hours)     IRF OT Evaluation and Treatment     Row Name 23 1403 23 1204       OT Time and Intention    Document Type daily treatment;progress note  -KP daily treatment  -KP    Mode of Treatment individual therapy;occupational therapy  -KP individual therapy;occupational therapy  -KP    Patient Effort good  -KP good  -KP    Symptoms Noted During/After Treatment fatigue  -KP fatigue  -KP    Row Name 23 1403 23 1204       General Information    Patient/Family/Caregiver Comments/Observations pt supine in bed.  -KP pt sitting in wc upon OT arrival  -KP    Existing Precautions/Restrictions fall;swallow precautions  -KP fall;swallow precautions  -KP    Row Name 23 1403 23 1204       Pain Assessment    Pretreatment Pain Rating 3/10  -KP 0/10 - no pain  -KP    Posttreatment Pain Rating 3/10  -KP 0/10 - no pain  -KP    Pain Location - Side/Orientation Right  -KP --    Pain Location upper  -KP --    Pain Location - extremity  - --    Row Name 23 1403 23 1204       Cognition/Psychosocial    Affect/Mental Status (Cognition) WFL  -KP WFL  -KP    Orientation Status (Cognition) oriented x 4  -KP oriented x 4  -KP    Follows Commands (Cognition) follows one-step  commands;over 90% accuracy;verbal cues/prompting required  -KP follows one-step commands;over 90% accuracy;verbal cues/prompting required  -KP    Personal Safety Interventions fall prevention program maintained;gait belt;muscle strengthening facilitated;nonskid shoes/slippers when out of bed  -KP fall prevention program maintained;nonskid shoes/slippers when out of bed;gait belt  -KP    Cognitive Function attention deficit  -KP --    Attention Deficit (Cognition) minimal deficit  -KP --    Row Name 04/14/23 1204          Bathing    [place] Level (Bathing) bathing skills;moderate assist (50% patient effort);verbal cues;nonverbal cues (demo/gesture)  -     Assistive Device (Bathing) grab bar/tub rail  -KP     Position (Bathing) sink side;supported sitting;supported standing  -KP     Set-up Assistance (Bathing) obtain supplies  -KP     Row Name 04/14/23 1204          Upper Body Dressing    [place] Level (Upper Body Dressing) upper body dressing skills;doff;don;pull over garment;set up assistance;verbal cues;minimum assist (75% or more patient effort)  -KP     Position (Upper Body Dressing) supported sitting  -     Set-up Assistance (Upper Body Dressing) obtain clothing  -     Comment (Upper Body Dressing) VC to pull on past R elbow first  -KP     Row Name 04/14/23 1204          Lower Body Dressing    [place] Level (Lower Body Dressing) doff;don;pants/bottoms;shoes/slippers;socks;maximum assist (25% patient effort)  -     Assistive Device Use (Lower Body Dressing) reacher  -KP     Position (Lower Body Dressing) supported sitting;supported standing  -KP     Set-up Assistance (Lower Body Dressing) obtain clothing  -KP     Row Name 04/14/23 1204          Grooming    [place] Level (Grooming) grooming skills;deodorant application;hair care, combing/brushing;oral care regimen;minimum assist (75% patient effort);nonverbal cues (demo/gesture)  -KP     Position (Grooming) sink side;supported sitting   -KP     Set-up Assistance (Grooming) obtain supplies  -KP     Comment (Grooming) A w hair  -KP     Row Name 04/14/23 1204          Toileting    [place] Level (Toileting) toileting skills;adjust/manage clothing;dependent (less than 25% patient effort);perform perineal hygiene;minimum assist (75% patient effort)  -     Assistive Device Use (Toileting) grab bar/safety frame  -KP     Position (Toileting) supported sitting;supported standing  -     Set-up Assistance (Toileting) change pad/brief  -KP     Comment (Toileting) A to stand when pt completed hygiene, but total A to pull up pants over brief  -KP     Row Name 04/14/23 1403 04/14/23 1204       Bed Mobility    Supine-Sit [place] (Bed Mobility) set up;verbal cues;minimum assist (75% patient effort)  - --    Sit-Supine [place] (Bed Mobility) not tested  - --    Comment, (Bed Mobility) -- NT UIC  -    Row Name 04/14/23 1403          Bed-Chair Transfer    Bed-Chair [place] (Transfers) set up;verbal cues;minimum assist (75% patient effort)  -     Assistive Device (Bed-Chair Transfers) wheelchair  -KP     Row Name 04/14/23 1403 04/14/23 1204       Sit-Stand Transfer    Sit-Stand [place] (Transfers) set up;verbal cues;minimum assist (75% patient effort)  -KP set up;minimum assist (75% patient effort);verbal cues  -KP    Assistive Device (Sit-Stand Transfers) wheelchair  -KP wheelchair  -KP    Comment, (Sit-Stand Transfer) stood at counter top 4 times and to get to wc from bed  -KP --    Row Name 04/14/23 1403 04/14/23 1204       Stand-Sit Transfer    Stand-Sit [place] (Transfers) set up;verbal cues;minimum assist (75% patient effort)  -KP set up;minimum assist (75% patient effort)  -KP    Assistive Device (Stand-Sit Transfers) wheelchair  -KP wheelchair  -KP    Row Name 04/14/23 1204          Toilet Transfer    Type (Toilet Transfer) sit-stand;stand-sit;stand pivot/stand step  -     [place] Level (Toilet Transfer) set  up;minimum assist (75% patient effort)  -     Assistive Device (Toilet Transfer) wheelchair;grab bars/safety frame  -     Row Name 04/14/23 1204          Shower Transfer    Comment, (Shower Transfer) NT bath at sink  -     Row Name 04/14/23 1403          Motor Skills    Results, 9 Hole Peg Test of Fine Motor Coordination pt completed FMC task standing at counter top w square colored pegs and needed rest break after insering them into the board about 3-4 min standing. pt then completed close pins in standing at counter top for 15. then rest break then completed removing them w min difficulty w task.  -     Row Name 04/14/23 1403          Balance    Static Sitting Balance standby assist  -     Dynamic Sitting Balance standby assist  -     Static Standing Balance contact guard;minimal assist  -     Comment, Balance standing FMC tasks at counter top. pt self corrects her posture when slouching forward.  -     Row Name 04/14/23 1403 04/14/23 1204       Positioning and Restraints    Pre-Treatment Position in bed  - sitting in chair/recliner  -    Post Treatment Position wheelchair  - wheelchair  -    In Wheelchair sitting;exit alarm on;with SLP  - sitting;call light within reach;encouraged to call for assist;exit alarm on  -    Row Name 04/14/23 1403          Weekly Progress Summary (OT)    Overall Progress Toward Functional Goals (OT) progressing toward functional goals as expected  -     Row Name 04/14/23 1403          Transfer Goal 1 (OT-IRF)    Activity/Assistive Device (Transfer Goal 1, OT-IRF) toilet;sit-to-stand/stand-to-sit;shower chair;walk-in shower;walker, rolling  -     [place] Level (Transfer Goal 1, OT-IRF) set-up required;minimum assist (75% or more patient effort);moderate assist (50-74% patient effort)  -     Time Frame (Transfer Goal 1, OT-IRF) short-term goal (STG);1 week  -     Progress/Outcomes (Transfer Goal 1, OT-IRF) goal met  -     Row Name 04/14/23 1401           Transfer Goal 2 (OT-IRF)    Activity/Assistive Device (Transfer Goal 2, OT-IRF) sit-to-stand/stand-to-sit;toilet;walk-in shower;shower chair;walker, rolling  -KP     [place] Level (Transfer Goal 2, OT-IRF) set-up required;contact guard required  -KP     Time Frame (Transfer Goal 2, OT-IRF) long-term goal (LTG);by discharge  -KP     Progress/Outcomes (Transfer Goal 2, OT-IRF) good progress toward goal  -KP     Row Name 04/14/23 1403          Bathing Goal 1 (OT-IRF)    Activity/Device (Bathing Goal 1, OT-IRF) bathing skills, all;hand-held shower spray hose;grab bar, tub/shower;shower chair  -KP     [place] Level (Bathing Goal 1, OT-IRF) minimum assist (75% or more patient effort)  -KP     Time Frame (Bathing Goal 1, OT-IRF) short-term goal (STG);1 week  -KP     Progress/Outcomes (Bathing Goal 1, OT-IRF) good progress toward goal  -KP     Row Name 04/14/23 1403          Bathing Goal 2 (OT-IRF)    Activity/Device (Bathing Goal 2, OT-IRF) grab bar, tub/shower;hand-held shower spray hose;shower chair;bathing skills, all  -KP     [place] Level (Bathing Goal 2, OT-IRF) set-up required;contact guard required  -KP     Time Frame (Bathing Goal 2, OT-IRF) long-term goal (LTG);by discharge  -KP     Progress/Outcomes (Bathing Goal 2, OT-IRF) continuing progress toward goal  -KP     Row Name 04/14/23 1403          UB Dressing Goal 1 (OT-IRF)    Activity/Device (UB Dressing Goal 1, OT-IRF) upper body dressing  -KP     [place] (UB Dress Goal 1, OT-IRF) set-up required;standby assist  -KP     Time Frame (UB Dressing Goal 1, OT-IRF) short-term goal (STG);1 week  -KP     Progress/Outcomes (UB Dressing Goal 1, OT-IRF) good progress toward goal  -KP     Row Name 04/14/23 1403          UB Dressing Goal 2 (OT-IRF)    Activity/Device (UB Dressing Goal 2, OT-IRF) upper body dressing  -KP     [place] (UB Dress Goal 2, OT-IRF) set-up required  -KP     Time Frame (UB Dressing Goal 2, OT-IRF) long-term goal  (LTG);by discharge  -KP     Progress/Outcomes (UB Dressing Goal 2, OT-IRF) good progress toward goal  -KP     Row Name 04/14/23 1403          LB Dressing Goal 1 (OT-IRF)    Activity/Device (LB Dressing Goal 1, OT-IRF) lower body dressing;elastic laces;sock aid;reacher  -KP     [place] (LB Dressing Goal 1, OT-IRF) moderate assist (50-74% patient effort)  -KP     Time Frame (LB Dressing Goal 1, OT-IRF) short-term goal (STG);1 week  -KP     Progress/Outcomes (LB Dressing Goal 1, OT-IRF) continuing progress toward goal  -KP     Row Name 04/14/23 1403          LB Dressing Goal 2 (OT-IRF)    Activity/Device (LB Dressing Goal 2, OT-IRF) lower body dressing;reacher;sock aid;elastic laces  -KP     [place] (LB Dressing Goal 2, OT-IRF) minimum assist (75% or more patient effort)  -KP     Time Frame (LB Dressing Goal 2, OT-IRF) long-term goal (LTG);by discharge  -KP     Progress/Outcomes (LB Dressing Goal 2, OT-IRF) continuing progress toward goal  -KP     Row Name 04/14/23 1403          Grooming Goal 1 (OT-IRF)    Activity/Device (Grooming Goal 1, OT-IRF) grooming skills, all  -KP     [place] (Grooming Goal 1, OT-IRF) set-up required;supervision required  -KP     Time Frame (Grooming Goal 1, OT-IRF) short-term goal (STG);1 week  -KP     Progress/Outcomes (Grooming Goal 1, OT-IRF) good progress toward goal  -KP     Row Name 04/14/23 1403          Grooming Goal 2 (OT-IRF)    Activity/Device (Grooming Goal 2, OT-IRF) grooming skills, all  -KP     [place] (Grooming Goal 2, OT-IRF) set-up required  -KP     Time Frame (Grooming Goal 2, OT-IRF) long-term goal (LTG);by discharge  -KP     Progress/Outcomes (Grooming Goal 2, OT-IRF) continuing progress toward goal  -KP     Row Name 04/14/23 1403          Toileting Goal 1 (OT-IRF)    Activity/Device (Toileting Goal 1, OT-IRF) toileting skills, all;grab bar/safety frame  -KP     [place] Level (Toileting Goal 1, OT-IRF) moderate assist (50-74% patient effort)  -KP      Progress/Outcomes (Toileting Goal 1, OT-IRF) continuing progress toward goal  -KP     Time Frame (Toileting Goal 1, OT-IRF) short-term goal (STG);1 week  -     Row Name 04/14/23 1403          Toileting Goal 2 (OT-IRF)    Activity/Device (Toileting Goal 2, OT-IRF) toileting skills, all;grab bar/safety frame  -KP     [place] Level (Toileting Goal 2, OT-IRF) set-up required;contact guard required;minimum assist (75% or more patient effort)  -KP     Progress/Outcomes (Toileting Goal 2, OT-IRF) continuing progress toward goal  -KP     Time Frame (Toileting Goal 2, OT-IRF) long-term goal (LTG);by discharge  -KP     Row Name 04/14/23 1403          Strength Goal 1 (OT-IRF)    Strength Goal 1 (OT-IRF) incr R hand  to 20 and R UE to 4-/5  -KP     Time Frame (Strength Goal 1, OT-IRF) long-term goal (LTG);by discharge  -KP     Progress/Outcomes (Strength Goal 1, OT-IRF) continuing progress toward goal  -KP     Row Name 04/14/23 1403          Balance Goal 1 (OT)    Activity/Assistive Device (Balance Goal 1, OT) standing, dynamic;walker, rolling  -KP     [place] Level/Cues Needed (Balance Goal 1, OT) contact guard assist  -KP     Time Frame (Balance Goal 1, OT) long term goal (LTG);by discharge  -KP     Progress/Outcomes (Balance Goal 1, OT) good progress toward goal  -KP     Row Name 04/14/23 1403          Functional Mobility Goal 1 (OT)    Activity/Assistive Device (Functional Mobility Goal 1, OT) walker, rolling  -KP     [place] Level/Cues Needed (Functional Mobility Goal 1, OT) set-up required;contact guard assist  -KP     Distance Goal 1 (Functional Mobility, OT) --  to BR, to shower  -KP     Progress/Outcome (Functional Mobility Goal 1, OT) continuing progress toward goal  -KP     Row Name 04/14/23 1403          Coordination Goal 1 (OT)    Activity/Assistive Device (Coordination Goal 1, OT) FM task;GM task;FM written ex program  pt to complete HWT task w R hand w fair formation and complete 9 hole  peg test in 1 min w R hand  -KP     [place] Level/Cues Needed (Coordination Goal 1, OT) set-up required  -KP     Time Frame (Coordination Goal 1, OT) long term goal (LTG);by discharge  -KP     Progress/Outcomes (Coordination Goal 1, OT) continuing progress toward goal  -KP     Row Name 04/14/23 1403          Caregiver Training Goal 1 (OT-IRF)    Caregiver Training Goal 1 (OT-IRF) pt family ed on safety w tsf w pt and ADLs. pt ed on HEP for FMC and GMC  -KP     Time Frame (Caregiver Training Goal 1, OT-IRF) long-term goal (LTG);by discharge  -KP     Progress/Outcomes (Caregiver Training Goal 1, OT-IRF) continuing progress toward goal  -KP           User Key  (r) = Recorded By, (t) = Taken By, (c) = Cosigned By    Initials Name Effective Dates     Rosamaria, Patsy Kruger, OTR 06/16/21 -                  Occupational Therapy Education     Title: PT OT SLP Therapies (Done)     Topic: Occupational Therapy (Done)     Point: ADL training (Done)     Description:   Instruct learner(s) on proper safety adaptation and remediation techniques during self care or transfers.   Instruct in proper use of assistive devices.              Learning Progress Summary           Patient Acceptance, E, VU by  at 4/8/2023 1204    Acceptance, E,TB,D, VU,DU,NR by  at 4/6/2023 1230    Comment: ed pt on role of OT. benefit of therapy, POC w.  OT ed on safety w ADL and tsf. pt ed on UBD technique.                   Point: Home exercise program (Done)     Description:   Instruct learner(s) on appropriate technique for monitoring, assisting and/or progressing therapeutic exercises/activities.              Learning Progress Summary           Patient Acceptance, E, VU by  at 4/8/2023 1204    Acceptance, E,TB,D, VU,DU,NR by  at 4/6/2023 1230    Comment: ed pt on role of OT. benefit of therapy, POC w.  OT ed on safety w ADL and tsf. pt ed on UBD technique.                   Point: Precautions (Done)     Description:   Instruct learner(s)  on prescribed precautions during self-care and functional transfers.              Learning Progress Summary           Patient Acceptance, E, VU by  at 4/8/2023 1204    Acceptance, E,TB,D, VU,DU,NR by  at 4/6/2023 1230    Comment: ed pt on role of OT. benefit of therapy, POC w.  OT ed on safety w ADL and tsf. pt ed on UBD technique.                   Point: Body mechanics (Done)     Description:   Instruct learner(s) on proper positioning and spine alignment during self-care, functional mobility activities and/or exercises.              Learning Progress Summary           Patient Acceptance, E, VU by  at 4/8/2023 1204    Acceptance, E,TB,D, VU,DU,NR by  at 4/6/2023 1230    Comment: ed pt on role of OT. benefit of therapy, POC w.  OT ed on safety w ADL and tsf. pt ed on UBD technique.                               User Key     Initials Effective Dates Name Provider Type Discipline     06/16/21 -  Patsy Blank OTR Occupational Therapist OT     06/16/21 -  Yamila Wheat MS CCC-SLP Speech and Language Pathologist SLP                    OT Recommendation and Plan    Planned Therapy Interventions (OT): activity tolerance training, adaptive equipment training, BADL retraining, functional balance retraining, neuromuscular control/coordination retraining, occupation/activity based interventions, patient/caregiver education/training, ROM/therapeutic exercise, strengthening exercise, transfer/mobility retraining       Daily Progress Summary (OT)  Overall Progress Toward Functional Goals (OT): progressing toward functional goals as expected            Time Calculation:      Time Calculation- OT     Row Name 04/14/23 1413 04/14/23 1209          Time Calculation- OT    OT Start Time 1300  - 0900  -     OT Stop Time 1330  - 0930  -     OT Time Calculation (min) 30 min  - 30 min  -           User Key  (r) = Recorded By, (t) = Taken By, (c) = Cosigned By    Initials Name Provider Type      Patsy Blank, OTGABRIELLE Occupational Therapist              Therapy Charges for Today     Code Description Service Date Service Provider Modifiers Qty    71127121528 HC OT SELF CARE/MGMT/TRAIN EA 15 MIN 4/14/2023 Patsy Blank, RIAN GO 2    [id number] HC OT NEUROMUSC RE EDUCATION EA 15 MIN 4/14/2023 Patsy Blank, RIAN GO 2                   RIAN Barton  4/14/2023

## 2023-04-14 NOTE — PROGRESS NOTES
LOS: 9 days   Patient Care Team:  Jayro Fountain MD as PCP - General (Internal Medicine)      Patient Name: PIERRE FREEMAN  Patient : 1940    ADMITTING DIAGNOSIS:  Diagnoses    1. Follow-up examination  2. IMPAIRED FUNCTIONAL MOBILITY, BALANCE, GAIT, AND ENDURANCE       Left basal ganglia stroke  Right hemiparesis with impaired motor control    SUBJECTIVE:  Tolerates therapies  Strength unchanged       REVIEW OF SYSTEMS:        OBJECTIVE:    Vitals:    23 0526   BP: 125/60   Pulse: 100   Resp: 18   Temp: 98.5 °F (36.9 °C)   SpO2: 95%       PHYSICAL EXAM:   General: pleasant, in no acute distress  HEENT: NCAT, sclera anicteric, conjunctiva pink, mucoa moist  Cardiovascular: RRR, +S1+S2, no obvious m/g/r  Lungs: CTAB, no rhonchi or wheezing  Abdomen: normoactive bowel sounds, soft, non tender to palpation  Extremities: no peripheral edema  Skin: exposed surfaces of skin warm, intact, without erythema  Neuro: awake alert and oriented to person, place, time, and situation,  Right facial droop   dysarthria.  MSK: Right shoulder flexion 4/5, elbow flexion 4+/5, finger flexion 4+/5, knee extension 4+/5, ankle dorsiflexion 4/5      MEDICATIONS  Scheduled Meds:  amLODIPine, 10 mg, Oral, Q24H  vitamin C, 250 mg, Oral, Daily  aspirin, 81 mg, Oral, Daily  cholecalciferol, 1,000 Units, Oral, Daily  clopidogrel, 75 mg, Oral, Daily  enoxaparin, 40 mg, Subcutaneous, Q24H  lisinopril, 5 mg, Oral, Q24H  rosuvastatin, 20 mg, Oral, Nightly  senna-docusate sodium, 2 tablet, Oral, Nightly        Continuous Infusions:       PRN Meds:  •  acetaminophen **OR** [DISCONTINUED] acetaminophen  •  [DISCONTINUED] senna-docusate sodium **AND** polyethylene glycol **AND** bisacodyl **AND** bisacodyl      RESULTS:  No results found for: POCGLU  Results from last 7 days   Lab Units 23  0725 04/10/23  0614   WBC 10*3/mm3 7.96 6.85   HEMOGLOBIN g/dL 13.4 13.4   HEMATOCRIT % 40.1 41.5   PLATELETS 10*3/mm3 209 187      Results from last 7 days   Lab Units 04/14/23  0725 04/10/23  0614   SODIUM mmol/L 141 143   POTASSIUM mmol/L 4.5 3.6   CHLORIDE mmol/L 107 107   CO2 mmol/L 25.2 26.6   BUN mg/dL 19 19   CREATININE mg/dL 0.63 0.82   CALCIUM mg/dL 9.5 9.5   GLUCOSE mg/dL 95 191*           ASSESSMENT and PLAN:    Infarction of left basal ganglia    Left basal ganglia stroke  Stroke prophylaxis- ASA and Plavix x 21 days from March 30, 2023, then ASA 81 mg daily. Crestor 20 mg.     Right hemiparesis with impaired motor control    Dysphagia   April 14 - upgrade to soft (chopped meats)/no mixed consistencies with NTL. Recommend sit upright in wheelchair with supervision. Check intermittently for pocketing. Meds one at a time with applesauce or pudding    Dysarthria.     Hypertension - amlodipine/lisinopril     Check TSH, Vit B12 and Vit D levels-within normal limit     Urinary retention. Treated for UTI as Reggie Duran. Check bladder scan PVR and cath if >300 cc until three consecutive < 250 cc  April 7-requires intermittent straight cath 400-113-300 cc.  No spontaneous void.  Follow pattern.  April 10-requirement straight catheter 300 cc about every 6 or 8 hours.  - will extend interval to every 12 hours to see if she voids with a larger volume.  4/12- has not required intermittent catheterization since last night.  Noted to have 4 voids with PVR less than 200.  May be recovering bladder function.  Will monitor.  4/13-voiding on her own with postvoid residuals 190 range        DVT prophylaxis- on dual antiplatelet therapies.  SCDs.  Lovenox     Team Conference 4/11/2023  Nursing: continent/incontinent with bladder, requiring cathing each bladder scan, no safety issues  PT: bed mobiliy min assist, contact guard with transfers, ambulating 80 feet, fatigues quickly with low endurance, cues for sequencing, drags left foot, anticipated contact guard with walker  OT: mod assist for bathing, max/dependent with lower body dressing,  toileting max assist due to balance  SLP: mildly impaired on CLQT WNL memory/visuospatial, and mild for attention/executive function, puree and nectar thick diet, mild to moderate dysarthria  Discharge Date: 1.5 weeks     CODE STATUS:  Code Status (Patient has no pulse and is not breathing): CPR (Attempt to Resuscitate)  Medical Interventions (Patient has pulse or is breathing): Full Support  Release to patient: Routine Release      Admission Status: Continues to meet requirements for inpatient admission.       Anton Fajardo MD    During rounds, used appropriate personal protective equipment including mask and gloves.  Additional gown if indicated.  Mask used was standard procedure mask. Appropriate PPE was worn during the entire visit.  Hand hygiene was completed before and after.

## 2023-04-15 PROCEDURE — 97530 THERAPEUTIC ACTIVITIES: CPT

## 2023-04-15 PROCEDURE — 25010000002 ENOXAPARIN PER 10 MG: Performed by: PHYSICAL MEDICINE & REHABILITATION

## 2023-04-15 PROCEDURE — 97116 GAIT TRAINING THERAPY: CPT

## 2023-04-15 RX ADMIN — LISINOPRIL 5 MG: 5 TABLET ORAL at 09:44

## 2023-04-15 RX ADMIN — ASPIRIN 81 MG: 81 TABLET, CHEWABLE ORAL at 09:44

## 2023-04-15 RX ADMIN — POLYETHYLENE GLYCOL 3350 17 G: 17 POWDER, FOR SOLUTION ORAL at 10:06

## 2023-04-15 RX ADMIN — OXYCODONE HYDROCHLORIDE AND ACETAMINOPHEN 250 MG: 500 TABLET ORAL at 09:44

## 2023-04-15 RX ADMIN — ENOXAPARIN SODIUM 40 MG: 100 INJECTION SUBCUTANEOUS at 12:49

## 2023-04-15 RX ADMIN — Medication 1000 UNITS: at 09:44

## 2023-04-15 RX ADMIN — CLOPIDOGREL BISULFATE 75 MG: 75 TABLET, FILM COATED ORAL at 09:44

## 2023-04-15 RX ADMIN — ACETAMINOPHEN 650 MG: 325 TABLET, FILM COATED ORAL at 19:50

## 2023-04-15 RX ADMIN — BISACODYL 5 MG: 5 TABLET ORAL at 14:54

## 2023-04-15 RX ADMIN — ROSUVASTATIN CALCIUM 20 MG: 20 TABLET, FILM COATED ORAL at 19:50

## 2023-04-15 RX ADMIN — AMLODIPINE BESYLATE 10 MG: 10 TABLET ORAL at 09:44

## 2023-04-15 NOTE — PLAN OF CARE
Goal Outcome Evaluation:  Plan of Care Reviewed With: patient           Outcome Evaluation: Ms. Gomez, Alert and oriented x3. Very pleasant, calm and cooperative. Meds crushed in apple sauce. Thicken liquids must be taken by spoon only, to prevent coughing. On RA. Right facial droop/ R hemiparsis. 2 Daughters visited today. No B/M since 4/9. PRN Miralax and Bisacodyl given. MD aware. No c/o pain, no safety concerns this shift. Will continue to monitor.

## 2023-04-15 NOTE — PLAN OF CARE
Goal Outcome Evaluation:       Pt is A/Ox4, calm/cooperative, OOB x 1 stand/pivot. Meds taken crushed in puree. NTL given by spoon. Oral care completed. Pt has been incontinent of urine overnight. Pt c/o headache pain; prn Tylenol given x1. O2 at 1.5-2L via nasal cannula worn overnight.

## 2023-04-15 NOTE — PROGRESS NOTES
LOS: 10 days   Patient Care Team:  Jayro Fountain MD as PCP - General (Internal Medicine)      Patient Name: PIERRE FREEMAN  Patient : 1940    ADMITTING DIAGNOSIS:  Diagnoses    1. Follow-up examination  2. IMPAIRED FUNCTIONAL MOBILITY, BALANCE, GAIT, AND ENDURANCE       Left basal ganglia stroke  Right hemiparesis with impaired motor control    SUBJECTIVE:  Tolerates therapies  Strength unchanged       REVIEW OF SYSTEMS:        OBJECTIVE:    Vitals:    04/15/23 1252   BP: 124/76   Pulse: 100   Resp: 20   Temp: 98.2 °F (36.8 °C)   SpO2: 93%       PHYSICAL EXAM:   General: pleasant, in no acute distress  HEENT: NCAT, sclera anicteric, conjunctiva pink, mucoa moist  Cardiovascular: RRR, +S1+S2, no obvious m/g/r  Lungs: CTAB, no rhonchi or wheezing  Abdomen: normoactive bowel sounds, soft, non tender to palpation  Extremities: no peripheral edema  Skin: exposed surfaces of skin warm, intact, without erythema  Neuro: awake alert and oriented to person, place, time, and situation,  Right facial droop   dysarthria.  MSK: Right shoulder flexion 4/5, elbow flexion 4+/5, finger flexion 4+/5, knee extension 4+/5, ankle dorsiflexion 4/5      MEDICATIONS  Scheduled Meds:  amLODIPine, 10 mg, Oral, Q24H  vitamin C, 250 mg, Oral, Daily  aspirin, 81 mg, Oral, Daily  cholecalciferol, 1,000 Units, Oral, Daily  clopidogrel, 75 mg, Oral, Daily  enoxaparin, 40 mg, Subcutaneous, Q24H  lisinopril, 5 mg, Oral, Q24H  rosuvastatin, 20 mg, Oral, Nightly  senna-docusate sodium, 2 tablet, Oral, Nightly        Continuous Infusions:       PRN Meds:  •  acetaminophen **OR** [DISCONTINUED] acetaminophen  •  [DISCONTINUED] senna-docusate sodium **AND** polyethylene glycol **AND** bisacodyl **AND** bisacodyl      RESULTS:  No results found for: POCGLU  Results from last 7 days   Lab Units 23  0725 04/10/23  0614   WBC 10*3/mm3 7.96 6.85   HEMOGLOBIN g/dL 13.4 13.4   HEMATOCRIT % 40.1 41.5   PLATELETS 10*3/mm3 209 187      Results from last 7 days   Lab Units 04/14/23  0725 04/10/23  0614   SODIUM mmol/L 141 143   POTASSIUM mmol/L 4.5 3.6   CHLORIDE mmol/L 107 107   CO2 mmol/L 25.2 26.6   BUN mg/dL 19 19   CREATININE mg/dL 0.63 0.82   CALCIUM mg/dL 9.5 9.5   GLUCOSE mg/dL 95 191*           ASSESSMENT and PLAN:    Infarction of left basal ganglia    Left basal ganglia stroke  Stroke prophylaxis- ASA and Plavix x 21 days from March 30, 2023, then ASA 81 mg daily. Crestor 20 mg.     Right hemiparesis with impaired motor control    Dysphagia   April 14 - upgrade to soft (chopped meats)/no mixed consistencies with NTL. Recommend sit upright in wheelchair with supervision. Check intermittently for pocketing. Meds one at a time with applesauce or pudding    Dysarthria.     Hypertension - amlodipine/lisinopril     Check TSH, Vit B12 and Vit D levels-within normal limit     Urinary retention. Treated for UTI as Reggie Duran. Check bladder scan PVR and cath if >300 cc until three consecutive < 250 cc  April 7-requires intermittent straight cath 400-113-300 cc.  No spontaneous void.  Follow pattern.  April 10-requirement straight catheter 300 cc about every 6 or 8 hours.  - will extend interval to every 12 hours to see if she voids with a larger volume.  4/12- has not required intermittent catheterization since last night.  Noted to have 4 voids with PVR less than 200.  May be recovering bladder function.  Will monitor.  4/13-voiding on her own with postvoid residuals 190 range        DVT prophylaxis- on dual antiplatelet therapies.  SCDs.  Lovenox     Team Conference 4/11/2023  Nursing: continent/incontinent with bladder, requiring cathing each bladder scan, no safety issues  PT: bed mobiliy min assist, contact guard with transfers, ambulating 80 feet, fatigues quickly with low endurance, cues for sequencing, drags left foot, anticipated contact guard with walker  OT: mod assist for bathing, max/dependent with lower body dressing,  toileting max assist due to balance  SLP: mildly impaired on CLQT WNL memory/visuospatial, and mild for attention/executive function, puree and nectar thick diet, mild to moderate dysarthria  Discharge Date: 1.5 weeks     CODE STATUS:  Code Status (Patient has no pulse and is not breathing): CPR (Attempt to Resuscitate)  Medical Interventions (Patient has pulse or is breathing): Full Support  Release to patient: Routine Release      Admission Status: Continues to meet requirements for inpatient admission.       Anton Fajardo MD    During rounds, used appropriate personal protective equipment including mask and gloves.  Additional gown if indicated.  Mask used was standard procedure mask. Appropriate PPE was worn during the entire visit.  Hand hygiene was completed before and after.

## 2023-04-15 NOTE — THERAPY TREATMENT NOTE
Inpatient Rehabilitation - Physical Therapy Treatment Note       [facility]     Patient Name: Melissa Gomez  : 1940  MRN: [id number]    Today's Date: 4/15/2023                    Admit Date: 2023      Visit Dx:     ICD-10-CM ICD-9-CM   1. Impaired functional mobility, balance, gait, and endurance  Z74.09 V49.89   2. Follow-up exam  Z09 V67.9       Patient Active Problem List   Diagnosis   • Infarction of left basal ganglia       Past Medical History:   Diagnosis Date   • GERD (gastroesophageal reflux disease)    • Hyperlipidemia    • Hypertension    • Stroke        Past Surgical History:   Procedure Laterality Date   • COLONOSCOPY     • HYSTERECTOMY     • TONSILLECTOMY         PT ASSESSMENT (last 12 hours)     [facility] PT Evaluation and Treatment     Row Name 04/15/23 08          PT Time and Intention    Document Type daily treatment  -AE     Mode of Treatment physical therapy  -AE     Patient/Family/Caregiver Comments/Observations supine in bed. pleasant and agreeable to PT  -AE     Row Name 04/15/23 0800          General Information    Patient Profile Reviewed yes  -AE     General Observations of Patient still complaining of mild RUE pain, relieved with MHP  -AE     Existing Precautions/Restrictions fall;swallow precautions  -AE     Comment, General Information complained of RUE pain, relieved with MHP  -AE     Row Name 04/15/23 0800          Pain Assessment    Pretreatment Pain Rating 3/10  -AE     Posttreatment Pain Rating 3/10  -AE     Pain Location - Side/Orientation Right  -AE     Pain Location upper  -AE     Pain Location - extremity  -AE     Row Name 04/15/23 0800          Cognition/Psychosocial    Affect/Mental Status (Cognition) WFL  -AE     Orientation Status (Cognition) oriented x 4  -AE     Follows Commands (Cognition) follows one-step commands;over 90% accuracy;verbal cues/prompting required  -AE     Personal Safety Interventions fall prevention program maintained;gait belt;muscle  strengthening facilitated;nonskid shoes/slippers when out of bed;supervised activity  -AE     Cognitive Function attention deficit  -AE     Attention Deficit (Cognition) minimal deficit  -AE     Safety Deficit (Cognition) awareness of need for assistance;safety precautions awareness;safety precautions follow-through/compliance  -AE     Row Name 04/15/23 0800          Bed Mobility    Supine-Sit [place] (Bed Mobility) minimum assist (75% patient effort);moderate assist (50% patient effort);1 person assist  -AE     Assistive Device (Bed Mobility) bed rails;head of bed elevated  -AE     Comment, (Bed Mobility) max cues for sequencing, had difficulty using RUE. heavy R lateral lean, constant cues to lean forward and L while sitting EOB  -AE     Row Name 04/15/23 0800          Transfer Assessment/Treatment    Comment, (Transfers) max cues for sequencing  -AE     Row Name 04/15/23 0800          Bed-Chair Transfer    Bed-Chair [place] (Transfers) set up;verbal cues;minimum assist (75% patient effort)  -AE     Assistive Device (Bed-Chair Transfers) wheelchair  -AE     Row Name 04/15/23 0800          Chair-Bed Transfer    Chair-Bed [place] (Transfers) minimum assist (75% patient effort);contact guard;verbal cues  -AE     Assistive Device (Chair-Bed Transfers) wheelchair  -AE     Row Name 04/15/23 0800          Sit-Stand Transfer    Sit-Stand [place] (Transfers) set up;verbal cues;minimum assist (75% patient effort)  -AE     Assistive Device (Sit-Stand Transfers) wheelchair  -AE     Row Name 04/15/23 0800          Stand-Sit Transfer    Stand-Sit [place] (Transfers) set up;verbal cues;minimum assist (75% patient effort)  -AE     Assistive Device (Stand-Sit Transfers) wheelchair  -AE     Row Name 04/15/23 0800          Gait/Stairs (Locomotion)    [place] Level (Gait) contact guard;verbal cues  -AE     Assistive Device (Gait) walker, front-wheeled  -AE     Distance in Feet (Gait) 80ft  -AE      Pattern (Gait) step-through  -AE     Deviations/Abnormal Patterns (Gait) base of support, narrow;lenard decreased;festinating/shuffling;stride length decreased;weight shifting decreased  -AE     Bilateral Gait Deviations forward flexed posture;heel strike decreased  -AE     Right Sided Gait Deviations foot drop/toe drag;heel strike decreased  increased R foot drag when fatigued  -AE     Gait Assessment/Intervention cues for upright posture and increased stride length  -AE     Row Name 04/15/23 0800          Hip (Therapeutic Exercise)    Hip Strengthening (Therapeutic Exercise) --  -AE     Row Name 04/15/23 0800          Knee (Therapeutic Exercise)    Knee Strengthening (Therapeutic Exercise) --  -AE     Row Name 04/15/23 0800          Ankle (Therapeutic Exercise)    Ankle Strengthening (Therapeutic Exercise) --  -AE     Row Name 04/15/23 0800          Modality Treatment    Treatment Location 1 (Modality) R forearm  -AE     Modality Performed thermotherapy (hydrocollator packs)  -AE     Modality Treatment Results reduced pain  -AE     Comment, Modality Treatment 10 min  -AE     Row Name 04/15/23 0800          Positioning and Restraints    Pre-Treatment Position in bed  -AE     Post Treatment Position wheelchair  -AE     In Wheelchair sitting;call light within reach;encouraged to call for assist;exit alarm on  -AE           User Key  (r) = Recorded By, (t) = Taken By, (c) = Cosigned By    Initials Name Provider Type    AE Valeri Colby PT Physical Therapist                 Physical Therapy Education     Title: PT OT SLP Therapies (Done)     Topic: Physical Therapy (Done)     Point: Mobility training (Done)     Learning Progress Summary           Patient Acceptance, TB,E, VU,NR by EE at 4/13/2023 1047    Acceptance, E,D, VU,DU by DP at 4/11/2023 1038    Acceptance, E, VU,NR by EE at 4/10/2023 1043    Acceptance, E, VU by LS at 4/8/2023 1204    Acceptance, E,TB, VU,NR by EE at 4/7/2023 1054    Acceptance, E,TB, VU  by KP at 4/6/2023 1527                   Point: Home exercise program (Done)     Learning Progress Summary           Patient Acceptance, TB,E, VU,NR by EE at 4/13/2023 1047    Acceptance, E,D, VU,DU by DP at 4/11/2023 1038    Acceptance, E, VU,NR by EE at 4/10/2023 1043    Acceptance, E, VU by LS at 4/8/2023 1204    Acceptance, E,TB, VU,NR by EE at 4/7/2023 1054    Acceptance, E,TB, VU by KP at 4/6/2023 1527                               User Key     Initials Effective Dates Name Provider Type Discipline     06/16/21 -  Yamila Wheat, MS CCC-SLP Speech and Language Pathologist SLP    EE 06/16/21 -  Diann Gonzáles, PT Physical Therapist PT     06/16/21 -  Chen Centeno, PT Physical Therapist PT    DP 08/24/21 -  Gen Gray, PT Physical Therapist PT                PT Recommendation and Plan                          Time Calculation:      PT Charges     Row Name 04/15/23 1319             Time Calculation    Start Time 1100  -AE      Stop Time 1130  -AE      Time Calculation (min) 30 min  -AE      PT Received On 04/15/23  -AE      PT - Next Appointment 04/16/23  -AE         Time Calculation- PT    Total Timed Code Minutes- PT 30 minute(s)  -AE            User Key  (r) = Recorded By, (t) = Taken By, (c) = Cosigned By    Initials Name Provider Type    AE Valeri Colby, PT Physical Therapist                Therapy Charges for Today     Code Description Service Date Service Provider Modifiers Qty    50324844258 HC GAIT TRAINING EA 15 MIN 4/15/2023 Valeri Colby, PT GP 1    26252349487 HC PT THERAPEUTIC ACT EA 15 MIN 4/15/2023 Valeri Colby, PT GP 1                   Valeri Colby PT  4/15/2023

## 2023-04-15 NOTE — PROGRESS NOTES
Inpatient Rehabilitation Plan of Care Note    Plan of Care  Care Plan Reviewed - No updates at this time.    Psychosocial    Performed Intervention(s)  Verbalizes needs and concerns  Therapeutic environmental set up      Sphincter Control    Performed Intervention(s)  Bladder scan or I/O cath per order  Encourage fluids  Bowel/bladder training      Safety    Performed Intervention(s)  Safety Rounds  Bed alarm and/or chair alarm    Signed by: Ketty Farah RN

## 2023-04-15 NOTE — PROGRESS NOTES
Inpatient Rehabilitation Plan of Care Note    Plan of Care  Care Plan Reviewed - No updates at this time.    Psychosocial    Performed Intervention(s)  Verbalizes needs and concerns  Therapeutic environmental set up      Sphincter Control    Performed Intervention(s)  Bladder scan or I/O cath per order  Encourage fluids  Bowel/bladder training      Safety    Performed Intervention(s)  Safety Rounds  Bed alarm and/or chair alarm    Signed by: Alycia Kelley RN

## 2023-04-16 PROCEDURE — 25010000002 ENOXAPARIN PER 10 MG: Performed by: PHYSICAL MEDICINE & REHABILITATION

## 2023-04-16 RX ADMIN — ACETAMINOPHEN 650 MG: 325 TABLET, FILM COATED ORAL at 20:38

## 2023-04-16 RX ADMIN — ASPIRIN 81 MG: 81 TABLET, CHEWABLE ORAL at 08:08

## 2023-04-16 RX ADMIN — ENOXAPARIN SODIUM 40 MG: 100 INJECTION SUBCUTANEOUS at 11:56

## 2023-04-16 RX ADMIN — ROSUVASTATIN CALCIUM 20 MG: 20 TABLET, FILM COATED ORAL at 20:38

## 2023-04-16 RX ADMIN — CLOPIDOGREL BISULFATE 75 MG: 75 TABLET, FILM COATED ORAL at 08:08

## 2023-04-16 RX ADMIN — LISINOPRIL 5 MG: 5 TABLET ORAL at 08:08

## 2023-04-16 RX ADMIN — AMLODIPINE BESYLATE 10 MG: 10 TABLET ORAL at 08:08

## 2023-04-16 RX ADMIN — Medication 1000 UNITS: at 08:08

## 2023-04-16 RX ADMIN — OXYCODONE HYDROCHLORIDE AND ACETAMINOPHEN 250 MG: 500 TABLET ORAL at 08:08

## 2023-04-16 NOTE — PROGRESS NOTES
Inpatient Rehabilitation Plan of Care Note    Plan of Care  Care Plan Reviewed - No updates at this time.    Psychosocial    Performed Intervention(s)  Verbalizes needs and concerns  Therapeutic environmental set up      Sphincter Control    Performed Intervention(s)  Bladder scan or I/O cath per order  Encourage fluids  Bowel/bladder training      Safety    Performed Intervention(s)  Safety Rounds  Bed alarm and/or chair alarm    Signed by: Lisa Solis RN

## 2023-04-16 NOTE — PROGRESS NOTES
LOS: 11 days   Patient Care Team:  Jayro Fountain MD as PCP - General (Internal Medicine)      Patient Name: PIERRE FREEMAN  Patient : 1940    ADMITTING DIAGNOSIS:  Diagnoses    1. Follow-up examination  2. IMPAIRED FUNCTIONAL MOBILITY, BALANCE, GAIT, AND ENDURANCE       Left basal ganglia stroke  Right hemiparesis with impaired motor control    SUBJECTIVE:  Tolerates therapies  Strength unchanged       REVIEW OF SYSTEMS:        OBJECTIVE:    Vitals:    23 1244   BP: 119/62   Pulse: 95   Resp: 18   Temp: 98 °F (36.7 °C)   SpO2: 95%       PHYSICAL EXAM:   General: pleasant, in no acute distress  HEENT: NCAT, sclera anicteric, conjunctiva pink, mucoa moist  Cardiovascular: RRR, +S1+S2, no obvious m/g/r  Lungs: CTAB, no rhonchi or wheezing  Abdomen: normoactive bowel sounds, soft, non tender to palpation  Extremities: no peripheral edema  Skin: exposed surfaces of skin warm, intact, without erythema  Neuro: awake alert and oriented to person, place, time, and situation,  Right facial droop   dysarthria.  MSK: Right shoulder flexion 4/5, elbow flexion 4+/5, finger flexion 4+/5, knee extension 4+/5, ankle dorsiflexion 4/5      MEDICATIONS  Scheduled Meds:  amLODIPine, 10 mg, Oral, Q24H  vitamin C, 250 mg, Oral, Daily  aspirin, 81 mg, Oral, Daily  cholecalciferol, 1,000 Units, Oral, Daily  clopidogrel, 75 mg, Oral, Daily  enoxaparin, 40 mg, Subcutaneous, Q24H  lisinopril, 5 mg, Oral, Q24H  rosuvastatin, 20 mg, Oral, Nightly  senna-docusate sodium, 2 tablet, Oral, Nightly        Continuous Infusions:       PRN Meds:  •  acetaminophen **OR** [DISCONTINUED] acetaminophen  •  [DISCONTINUED] senna-docusate sodium **AND** polyethylene glycol **AND** bisacodyl **AND** bisacodyl      RESULTS:  No results found for: POCGLU  Results from last 7 days   Lab Units 23  0725 04/10/23  0614   WBC 10*3/mm3 7.96 6.85   HEMOGLOBIN g/dL 13.4 13.4   HEMATOCRIT % 40.1 41.5   PLATELETS 10*3/mm3 209 187     Results  from last 7 days   Lab Units 04/14/23  0725 04/10/23  0614   SODIUM mmol/L 141 143   POTASSIUM mmol/L 4.5 3.6   CHLORIDE mmol/L 107 107   CO2 mmol/L 25.2 26.6   BUN mg/dL 19 19   CREATININE mg/dL 0.63 0.82   CALCIUM mg/dL 9.5 9.5   GLUCOSE mg/dL 95 191*           ASSESSMENT and PLAN:    Infarction of left basal ganglia    Left basal ganglia stroke  Stroke prophylaxis- ASA and Plavix x 21 days from March 30, 2023, then ASA 81 mg daily. Crestor 20 mg.     Right hemiparesis with impaired motor control    Dysphagia   April 14 - upgrade to soft (chopped meats)/no mixed consistencies with NTL. Recommend sit upright in wheelchair with supervision. Check intermittently for pocketing. Meds one at a time with applesauce or pudding    Dysarthria.     Hypertension - amlodipine/lisinopril     Check TSH, Vit B12 and Vit D levels-within normal limit     Urinary retention. Treated for UTI as Reggie Duran. Check bladder scan PVR and cath if >300 cc until three consecutive < 250 cc  April 7-requires intermittent straight cath 400-113-300 cc.  No spontaneous void.  Follow pattern.  April 10-requirement straight catheter 300 cc about every 6 or 8 hours.  - will extend interval to every 12 hours to see if she voids with a larger volume.  4/12- has not required intermittent catheterization since last night.  Noted to have 4 voids with PVR less than 200.  May be recovering bladder function.  Will monitor.  4/13-voiding on her own with postvoid residuals 190 range        DVT prophylaxis- on dual antiplatelet therapies.  SCDs.  Lovenox     Team Conference 4/11/2023  Nursing: continent/incontinent with bladder, requiring cathing each bladder scan, no safety issues  PT: bed mobiliy min assist, contact guard with transfers, ambulating 80 feet, fatigues quickly with low endurance, cues for sequencing, drags left foot, anticipated contact guard with walker  OT: mod assist for bathing, max/dependent with lower body dressing, toileting max  assist due to balance  SLP: mildly impaired on CLQT WNL memory/visuospatial, and mild for attention/executive function, puree and nectar thick diet, mild to moderate dysarthria  Discharge Date: 1.5 weeks     CODE STATUS:  Code Status (Patient has no pulse and is not breathing): CPR (Attempt to Resuscitate)  Medical Interventions (Patient has pulse or is breathing): Full Support  Release to patient: Routine Release      Admission Status: Continues to meet requirements for inpatient admission.       Anton Fajardo MD    During rounds, used appropriate personal protective equipment including mask and gloves.  Additional gown if indicated.  Mask used was standard procedure mask. Appropriate PPE was worn during the entire visit.  Hand hygiene was completed before and after.

## 2023-04-16 NOTE — PLAN OF CARE
Goal Outcome Evaluation:  Plan of Care Reviewed With: patient        Progress: improving  Outcome Evaluation: Pt is A&OX4, calm and cooperative. Pt can be forgetful at times. Family visited today. Meds given crushed in AC. Mech soft/NTL. Up for meals. Assist X1 to wc. Incon of B&B, last BM 4/16. 2L of O2 via NC at HS. Pt educated on use of call light for assistance.

## 2023-04-16 NOTE — PLAN OF CARE
Goal Outcome Evaluation:  Slept well. Meds crushed & given with applesauce. Incontinent of B&B. O2 2L/NC, while sleeping Rt. Hemiparesis. Slurred speech. Tylenol given for Rt. Arm pain, with some relief. Turned.

## 2023-04-17 LAB
ANION GAP SERPL CALCULATED.3IONS-SCNC: 8.8 MMOL/L (ref 5–15)
BASOPHILS # BLD AUTO: 0.03 10*3/MM3 (ref 0–0.2)
BASOPHILS NFR BLD AUTO: 0.4 % (ref 0–1.5)
BUN SERPL-MCNC: 25 MG/DL (ref 8–23)
BUN/CREAT SERPL: 30.1 (ref 7–25)
CALCIUM SPEC-SCNC: 9.3 MG/DL (ref 8.6–10.5)
CHLORIDE SERPL-SCNC: 107 MMOL/L (ref 98–107)
CO2 SERPL-SCNC: 25.2 MMOL/L (ref 22–29)
CREAT SERPL-MCNC: 0.83 MG/DL (ref 0.57–1)
DEPRECATED RDW RBC AUTO: 39.2 FL (ref 37–54)
EGFRCR SERPLBLD CKD-EPI 2021: 69.6 ML/MIN/1.73
EOSINOPHIL # BLD AUTO: 0.29 10*3/MM3 (ref 0–0.4)
EOSINOPHIL NFR BLD AUTO: 4.3 % (ref 0.3–6.2)
ERYTHROCYTE [DISTWIDTH] IN BLOOD BY AUTOMATED COUNT: 11.9 % (ref 12.3–15.4)
GLUCOSE SERPL-MCNC: 102 MG/DL (ref 65–99)
HCT VFR BLD AUTO: 38.4 % (ref 34–46.6)
HGB BLD-MCNC: 13.2 G/DL (ref 12–15.9)
IMM GRANULOCYTES # BLD AUTO: 0.01 10*3/MM3 (ref 0–0.05)
IMM GRANULOCYTES NFR BLD AUTO: 0.1 % (ref 0–0.5)
LYMPHOCYTES # BLD AUTO: 1.72 10*3/MM3 (ref 0.7–3.1)
LYMPHOCYTES NFR BLD AUTO: 25.6 % (ref 19.6–45.3)
MCH RBC QN AUTO: 30.4 PG (ref 26.6–33)
MCHC RBC AUTO-ENTMCNC: 34.4 G/DL (ref 31.5–35.7)
MCV RBC AUTO: 88.5 FL (ref 79–97)
MONOCYTES # BLD AUTO: 0.71 10*3/MM3 (ref 0.1–0.9)
MONOCYTES NFR BLD AUTO: 10.6 % (ref 5–12)
NEUTROPHILS NFR BLD AUTO: 3.95 10*3/MM3 (ref 1.7–7)
NEUTROPHILS NFR BLD AUTO: 59 % (ref 42.7–76)
NRBC BLD AUTO-RTO: 0 /100 WBC (ref 0–0.2)
PLATELET # BLD AUTO: 198 10*3/MM3 (ref 140–450)
PMV BLD AUTO: 9.7 FL (ref 6–12)
POTASSIUM SERPL-SCNC: 4.4 MMOL/L (ref 3.5–5.2)
RBC # BLD AUTO: 4.34 10*6/MM3 (ref 3.77–5.28)
SODIUM SERPL-SCNC: 141 MMOL/L (ref 136–145)
WBC NRBC COR # BLD: 6.71 10*3/MM3 (ref 3.4–10.8)

## 2023-04-17 PROCEDURE — 80048 BASIC METABOLIC PNL TOTAL CA: CPT | Performed by: PHYSICAL MEDICINE & REHABILITATION

## 2023-04-17 PROCEDURE — 97130 THER IVNTJ EA ADDL 15 MIN: CPT

## 2023-04-17 PROCEDURE — 92526 ORAL FUNCTION THERAPY: CPT

## 2023-04-17 PROCEDURE — 97535 SELF CARE MNGMENT TRAINING: CPT

## 2023-04-17 PROCEDURE — 97110 THERAPEUTIC EXERCISES: CPT

## 2023-04-17 PROCEDURE — 97129 THER IVNTJ 1ST 15 MIN: CPT

## 2023-04-17 PROCEDURE — 97530 THERAPEUTIC ACTIVITIES: CPT

## 2023-04-17 PROCEDURE — 85025 COMPLETE CBC W/AUTO DIFF WBC: CPT | Performed by: PHYSICAL MEDICINE & REHABILITATION

## 2023-04-17 PROCEDURE — 25010000002 ENOXAPARIN PER 10 MG: Performed by: PHYSICAL MEDICINE & REHABILITATION

## 2023-04-17 RX ADMIN — OXYCODONE HYDROCHLORIDE AND ACETAMINOPHEN 250 MG: 500 TABLET ORAL at 08:11

## 2023-04-17 RX ADMIN — ACETAMINOPHEN 650 MG: 325 TABLET, FILM COATED ORAL at 14:13

## 2023-04-17 RX ADMIN — ASPIRIN 81 MG: 81 TABLET, CHEWABLE ORAL at 08:11

## 2023-04-17 RX ADMIN — ENOXAPARIN SODIUM 40 MG: 100 INJECTION SUBCUTANEOUS at 12:36

## 2023-04-17 RX ADMIN — CLOPIDOGREL BISULFATE 75 MG: 75 TABLET, FILM COATED ORAL at 08:11

## 2023-04-17 RX ADMIN — ACETAMINOPHEN 650 MG: 325 TABLET, FILM COATED ORAL at 19:21

## 2023-04-17 RX ADMIN — AMLODIPINE BESYLATE 10 MG: 10 TABLET ORAL at 08:11

## 2023-04-17 RX ADMIN — Medication 1000 UNITS: at 08:11

## 2023-04-17 RX ADMIN — ACETAMINOPHEN 650 MG: 325 TABLET, FILM COATED ORAL at 06:59

## 2023-04-17 RX ADMIN — ROSUVASTATIN CALCIUM 20 MG: 20 TABLET, FILM COATED ORAL at 19:21

## 2023-04-17 RX ADMIN — LISINOPRIL 5 MG: 5 TABLET ORAL at 08:11

## 2023-04-17 NOTE — THERAPY TREATMENT NOTE
Inpatient Rehabilitation - Speech Language Pathology Treatment Note    [facility]     Patient Name: Melissa Gomez  : 1940  MRN: [id number]    Today's Date: 2023                   Admit Date: 2023       Visit Dx:      ICD-10-CM ICD-9-CM   1. Impaired functional mobility, balance, gait, and endurance  Z74.09 V49.89   2. Follow-up exam  Z09 V67.9       Patient Active Problem List   Diagnosis   • Infarction of left basal ganglia       Past Medical History:   Diagnosis Date   • GERD (gastroesophageal reflux disease)    • Hyperlipidemia    • Hypertension    • Stroke        Past Surgical History:   Procedure Laterality Date   • COLONOSCOPY     • HYSTERECTOMY     • TONSILLECTOMY         SLP Recommendation and Plan                                                            SLP EVALUATION (last 72 hours)     SLP SLC Evaluation     Row Name 23 1330 23 1100                Communication Assessment/Intervention    Document Type therapy note (daily note)  -AL therapy note (daily note)  -AL       Patient/Family/Caregiver Comments/Observations Pt is pleasant and cooperative.  -AL Pt participated well.  -AL          Pain Scale: Numbers Pre/Post-Treatment    Pretreatment Pain Rating 0/10 - no pain  -AL 0/10 - no pain  -AL             User Key  (r) = Recorded By, (t) = Taken By, (c) = Cosigned By    Initials Name Effective Dates    Janell Erickson, MS CCC-SLP 21 -                    EDUCATION    The patient has been educated in the following areas:       Cognitive Impairment Communication Impairment.             SLP GOALS     Row Name 23 1330 23 1100          (STG) Pharyngeal Strengthening Exercise Goal 1 (SLP)    Activity (Pharyngeal Strengthening Goal 1, SLP) -- increase timing;increase superior movement of the hyolaryngeal complex;increase anterior movement of the hyolaryngeal complex;increase epiglottic inversion and retroflexion;increase closure at entrance to airway/closure  of airway at glottis  -AL     Increase Timing -- hard effortful swallow  -AL     Increase Superior Movement of the Hyolaryngeal Complex -- hard effortful swallow  -AL     Increase Anterior Movement of the Hyolaryngeal Complex -- EMST  -AL     Increase Epiglottic Inversion and Retroflexion -- Mendelsohn;hard effortful swallow;EMST  -AL     Increase Closure at Entrance to Airway/Closure of Airway at Glottis -- Mendelsohn;hard effortful swallow  -AL     [place]/Accuracy (Pharyngeal Strengthening Goal 1, SLP) -- with moderate cues (50-74% accuracy)  -AL     Time Frame (Pharyngeal Strengthening Goal 1, SLP) -- 1 week  -AL     Progress/Outcomes (Pharyngeal Strengthening Goal 1, SLP) -- good progress toward goal  -AL     Comment (Pharyngeal Strengthening Goal 1, SLP) -- Pt now tolerating soft (chopped meats) diet with NTL. Pt completed 30 repetitions of effortful swallow with MOD cues. Pt completed 2 sets of 5 reps of EMST at 1.25 turns past 5 cmH20. Pt exhibited no overt s/s of aspiration or penetration in 8/10 trials of ice chips; throat clear x1, delayed throat clear x1. Pt exhibited no overt s/s of aspiration or penetration in 5/9 trials of water by cup; immediate cough x1, immediate throat clear x1, delayed throat clear x1, delayed coughing x1. Pt required MIN cues for small sips. Plan to complete repeat VFSS in ~2-3 days.  -AL        Articulation Goal 1 (SLP)    Improve Articulation Goal 1 (SLP) -- by over-articulating at word level;by over-articulating at phrase level;by over-articulating in connected speech;80%;with minimal cues (75-90%)  -AL     Time Frame (Articulation Goal 1, SLP) -- 1 week  -AL     Progress/Outcomes (Articulation Goal 1, SLP) -- good progress toward goal  -AL     Comment (Articulation Goal 1, SLP) -- Pt now judged to be 100%intelligible in complex conversation with a familiar listener. She presents with mild mixed (flaccid/hypokinetic) dysarthria. She often self-corrects and repeats  utterances with improved clarity.  -AL        Attention Goal 1 (SLP)    Improve Attention by Goal 1 (SLP) complete selective attention task;complete sustained attention task;80%;independently (over 90% accuracy)  -AL complete selective attention task;complete sustained attention task;80%;independently (over 90% accuracy)  -AL     Time Frame (Attention Goal 1, SLP) -- 1 week  -AL     Progress/Outcomes (Attention Goal 1, SLP) good progress toward goal  -AL good progress toward goal  -AL     Comment (Attention Goal 1, SLP) Calendar: 5/6 with NOcues, 6/6 with MIN cues.  -AL --        Memory Skills Goal 1 (SLP)    Improve Memory Skills Through Goal 1 (SLP) -- recalling related word lists with an imposed delay;listen to a paragraph and answer questions;recall details of the day;80%;independently (over 90% accuracy)  -AL     Time Frame (Memory Skills Goal 1, SLP) -- 1 week  -AL     Progress/Outcomes (Memory Skills Goal 1, SLP) -- goal met  -AL     Comment (Memory Skills Goal 1, SLP) Voicemails: 11/15 with NO cues, 15/15 with MIN cues.  -AL --        Organizational Skills Goal 1 (SLP)    Improve Thought Organization Through Goal 1 (SLP) -- completing a divergent naming task;80%;with minimal cues (75-90%)  -AL     Time Frame (Thought Organization Skills Goal 1, SLP) -- 1 week  -AL     Progress/Outcomes (Thought Organization Skills Goal 1, SLP) -- goal no longer appropriate  -AL        Reasoning Goal 1 (SLP)    Improve Reasoning Through Goal 1 (SLP) -- complete deductive reasoning task;80%;independently (over 90% accuracy)  -AL     Time Frame (Reasoning Goal 1, SLP) -- 1 week  -AL     Progress/Outcomes (Reasoning Goal 1, SLP) -- goal no longer appropriate  -AL        Executive Functional Skills Goal 1 (SLP)    Improve Executive Function Skills Goal 1 (SLP) -- home management activity;80%;with minimal cues (75-90%)  -AL     Time Frame (Executive Function Skills Goal 1, SLP) -- 1 week  -AL     Progress/Outcomes (Executive  Function Skills Goal 1, SLP) -- goal ongoing  -AL           User Key  (r) = Recorded By, (t) = Taken By, (c) = Cosigned By    Initials Name Provider Type    Janell Erickson MS CCC-SLP Speech and Language Pathologist                            Time Calculation:        Time Calculation- SLP     Row Name 04/17/23 1357 04/17/23 1151          Time Calculation- SLP    SLP Start Time 1330  -AL 1100  -AL     SLP Stop Time 1400  -AL 1130  -AL     SLP Time Calculation (min) 30 min  -AL 30 min  -AL           User Key  (r) = Recorded By, (t) = Taken By, (c) = Cosigned By    Initials Name Provider Type    Janell Erickson MS CCC-SLP Speech and Language Pathologist                  Therapy Charges for Today     Code Description Service Date Service Provider Modifiers Qty    90655810836 HC ST TREATMENT SWALLOW 2 4/17/2023 Janell Gallego MS CCC-SLP GN 1    79635723504 HC ST DEV OF COGN SKILLS INITIAL 15 MIN 4/17/2023 Janell Gallego MS CCC-SLP  1    60174707480 HC ST DEV OF COGN SKILLS EACH ADDT'L 15 MIN 4/17/2023 Janell Gallego MS CCC-SLP  1                           MS FE Cam  4/17/2023

## 2023-04-17 NOTE — THERAPY TREATMENT NOTE
Inpatient Rehabilitation - Physical Therapy Treatment Note       [facility]     Patient Name: Melissa Gomez  : 1940  MRN: [id number]    Today's Date: 2023                    Admit Date: 2023      Visit Dx:     ICD-10-CM ICD-9-CM   1. Impaired functional mobility, balance, gait, and endurance  Z74.09 V49.89   2. Follow-up exam  Z09 V67.9       Patient Active Problem List   Diagnosis   • Infarction of left basal ganglia       Past Medical History:   Diagnosis Date   • GERD (gastroesophageal reflux disease)    • Hyperlipidemia    • Hypertension    • Stroke        Past Surgical History:   Procedure Laterality Date   • COLONOSCOPY     • HYSTERECTOMY     • TONSILLECTOMY         PT ASSESSMENT (last 12 hours)     IR[facility] PT Evaluation and Treatment     Row Name 23 1246 23 1045       PT Time and Intention    Document Type daily treatment  -EE daily treatment  -EE    Mode of Treatment physical therapy  -EE physical therapy;individual therapy  -EE    Patient/Family/Caregiver Comments/Observations Pt sitting up in WC, agreeable to PT despite c/o fatigue.  -EE Pt sitting up in WC, agreeable to PT.  -EE    Row Name 23 1246 23 1045       General Information    Existing Precautions/Restrictions fall;swallow precautions  -EE fall;swallow precautions  -EE    Row Name 23 1246 23 1045       Pain Assessment    Pretreatment Pain Rating 0/10 - no pain  -EE 0/10 - no pain  -EE    Posttreatment Pain Rating 0/10 - no pain  -EE 0/10 - no pain  -EE    Row Name 23 1246 23 1045       Cognition/Psychosocial    Affect/Mental Status (Cognition) WFL  -EE WFL  -EE    Orientation Status (Cognition) oriented x 4  -EE oriented x 4  -EE    Follows Commands (Cognition) follows one-step commands;repetition of directions required;verbal cues/prompting required  -EE follows one-step commands;verbal cues/prompting required  -EE    Personal Safety Interventions fall prevention program  maintained;gait belt;muscle strengthening facilitated;nonskid shoes/slippers when out of bed;supervised activity  -EE fall prevention program maintained;gait belt;muscle strengthening facilitated;nonskid shoes/slippers when out of bed;supervised activity  -EE    Cognitive Function attention deficit  -EE attention deficit  -EE    Attention Deficit (Cognition) minimal deficit  -EE minimal deficit  -EE    Safety Deficit (Cognition) insight into deficits/self-awareness;safety precautions awareness  -EE insight into deficits/self-awareness;safety precautions awareness  -EE    Row Name 04/17/23 1045          Transfer Assessment/Treatment    Comment, (Transfers) 5x STS from WC for functional strengthening, cues for sequencing.  -EE     Row Name 04/17/23 1246 04/17/23 1045       Sit-Stand Transfer    Sit-Stand Kay (Transfers) contact guard;minimum assist (75% patient effort);verbal cues  -EE contact guard;verbal cues  -EE    Assistive Device (Sit-Stand Transfers) walker, front-wheeled  -EE walker, front-wheeled;wheelchair  -EE    Comment, (Sit-Stand Transfer) cues for hand placement and sequencing; increased assist required when fatigued  -EE cues for hand placement  -EE    Row Name 04/17/23 1246 04/17/23 1045       Stand-Sit Transfer    Stand-Sit Kay (Transfers) contact guard;verbal cues  -EE contact guard;verbal cues  -EE    Assistive Device (Stand-Sit Transfers) walker, front-wheeled  -EE walker, front-wheeled;wheelchair  -EE    Comment, (Stand-Sit Transfer) cues for hand placement  -EE cues for hand placement and eccentric control  -EE    Row Name 04/17/23 1246 04/17/23 1045       Gait/Stairs (Locomotion)    Kay Level (Gait) -- contact guard;verbal cues  -EE    Assistive Device (Gait) -- walker, front-wheeled  -EE    Distance in Feet (Gait) -- 80' x 2  -EE    Pattern (Gait) -- step-through  -EE    Deviations/Abnormal Patterns (Gait) -- lenard decreased;stride length decreased  -EE     "Bilateral Gait Deviations -- forward flexed posture;heel strike decreased  -EE    Right Sided Gait Deviations -- heel strike decreased  decreased R heel strike, worsened with fatigue  -EE    Gait Assessment/Intervention -- cues for upright posture  -EE    Keith Level (Stairs) contact guard;verbal cues  -EE --    Handrail Location (Stairs) both sides  -EE --    Number of Steps (Stairs) 4  -EE --    Ascending Technique (Stairs) step-to-step  -EE --    Descending Technique (Stairs) step-to-step  -EE --    Stairs, Safety Issues weight-shifting ability decreased;balance decreased during turns  -EE --    Stairs, Impairments strength decreased;impaired balance  -EE --    Stairs Assessment/Intervention Up/down seven 4\" steps with CGA, cues for sequencing and foot placement. Performed step to step.  -EE --    Row Name 04/17/23 1246 04/17/23 1045       Safety Issues, Functional Mobility    Impairments Affecting Function (Mobility) balance;endurance/activity tolerance;strength;pain  -EE balance;endurance/activity tolerance;postural/trunk control;strength;pain  -EE    Row Name 04/17/23 1246 04/17/23 1045       Hip (Therapeutic Exercise)    Hip Strengthening (Therapeutic Exercise) bilateral;marching while seated;10 repetitions  -EE bilateral;marching while seated;10 repetitions  -EE    Row Name 04/17/23 1246 04/17/23 1045       Knee (Therapeutic Exercise)    Knee Strengthening (Therapeutic Exercise) bilateral;LAQ (long arc quad);10 repetitions  -EE bilateral;LAQ (long arc quad);10 repetitions  -EE    Row Name 04/17/23 1246          Core Strength (Therapeutic Exercise)    Comment (Core Strength Therapeutic Exercise) Partial situps in WC x10 reps, no UE support.  -EE     Row Name 04/17/23 1246 04/17/23 1045       Positioning and Restraints    Pre-Treatment Position sitting in chair/recliner  -EE sitting in chair/recliner  -EE    Post Treatment Position wheelchair  -EE wheelchair  -EE    In Wheelchair sitting;exit alarm " on;with OT  -EE sitting;exit alarm on;with SLP  -EE          User Key  (r) = Recorded By, (t) = Taken By, (c) = Cosigned By    Initials Name Provider Type    EE Diann Gonzáles, PT Physical Therapist                 Physical Therapy Education     Title: PT OT SLP Therapies (Done)     Topic: Physical Therapy (Done)     Point: Mobility training (Done)     Learning Progress Summary           Patient Acceptance, E,D,TB, VU,NR by EE at 4/17/2023 1129    Acceptance, TB,E, VU,NR by EE at 4/13/2023 1047    Acceptance, E,D, VU,DU by DP at 4/11/2023 1038    Acceptance, E, VU,NR by EE at 4/10/2023 1043    Acceptance, E, VU by LS at 4/8/2023 1204    Acceptance, E,TB, VU,NR by EE at 4/7/2023 1054    Acceptance, E,TB, VU by  at 4/6/2023 1527                   Point: Home exercise program (Done)     Learning Progress Summary           Patient Acceptance, E,D,TB, VU,NR by EE at 4/17/2023 1129    Acceptance, TB,E, VU,NR by EE at 4/13/2023 1047    Acceptance, E,D, VU,DU by DP at 4/11/2023 1038    Acceptance, E, VU,NR by EE at 4/10/2023 1043    Acceptance, E, VU by LS at 4/8/2023 1204    Acceptance, E,TB, VU,NR by EE at 4/7/2023 1054    Acceptance, E,TB, VU by  at 4/6/2023 1527                               User Key     Initials Effective Dates Name Provider Type Discipline     06/16/21 -  Yamila Wheat MS CCC-SLP Speech and Language Pathologist SLP    EE 06/16/21 -  Diann Gonzáles, PT Physical Therapist PT    KP 06/16/21 -  Chen Centeno, PT Physical Therapist PT    DP 08/24/21 -  Gen Gray, PT Physical Therapist PT                PT Recommendation and Plan                          Time Calculation:      PT Charges     Row Name 04/17/23 1310 04/17/23 1129          Time Calculation    Start Time 1230  -EE 1030  -EE     Stop Time 1300  -EE 1100  -EE     Time Calculation (min) 30 min  -EE 30 min  -EE     PT Received On 04/17/23  -EE 04/17/23  -EE     PT - Next Appointment 04/18/23  -EE 04/17/23  -EE        Time Calculation-  PT    Total Timed Code Minutes- PT 30 minute(s)  -EE 30 minute(s)  -EE           User Key  (r) = Recorded By, (t) = Taken By, (c) = Cosigned By    Initials Name Provider Type    Diann Batista, PT Physical Therapist                Therapy Charges for Today     Code Description Service Date Service Provider Modifiers Qty    18285461577  PT THERAPEUTIC ACT EA 15 MIN 4/17/2023 Diann Gonzáles, PT GP 2    [id number]  PT THERAPEUTIC ACT EA 15 MIN 4/17/2023 Diann Gonzáles, PT GP 2                   Diann Gonzáles PT  4/17/2023

## 2023-04-17 NOTE — PROGRESS NOTES
LOS: 12 days   Patient Care Team:  Jayro Fountain MD as PCP - General (Internal Medicine)      Patient Name: PIERRE FREEMAN  Patient : 1940    ADMITTING DIAGNOSIS:  Diagnoses    1. Follow-up examination  2. IMPAIRED FUNCTIONAL MOBILITY, BALANCE, GAIT, AND ENDURANCE       Left basal ganglia stroke  Right hemiparesis with impaired motor control    SUBJECTIVE:  Patient states she is having some right arm pain which she describes as achy and radiating from her shoulder down her arm into her hand.  Feels she is making good progress in therapy.  She is working on her compensation strategies with her speaking but she often forgets to project her voice.       REVIEW OF SYSTEMS:    General: denies weight change, fatigue, weakness, fever, chills, night sweats.   Skin: denies itching, rashes, sores and bruises.   Neurologic: denies headache, nausea, vomiting, or visual changes.   HEENT:  denies discharge, sore throat, allergies, and congestion.   Respiratory: denies shortness of breath, wheeze, cough and hemoptysis.   Cardiac:  denies chest pain or palpitations.   Gastrointestinal:  denies changes in appetite, nausea, vomiting, dysphagia, abdominal pain, constipation, or diarrhea.   Urinary:  denies urgency and frequency.  Musculoskeletal:  denies muscle weakness, pain, or joint stiffness.     OBJECTIVE:    Vitals:    23 0622   BP: 115/73   Pulse: 80   Resp: 16   Temp: 97 °F (36.1 °C)   SpO2: 93%       PHYSICAL EXAM:   General: pleasant, in no acute distress  HEENT: NCAT, sclera anicteric, conjunctiva pink, mucoa moist  Cardiovascular: RRR, +S1+S2, no obvious m/g/r  Lungs: CTAB, no rhonchi or wheezing  Abdomen: normoactive bowel sounds, soft, non tender to palpation  Extremities: no peripheral edema  Skin: exposed surfaces of skin warm, intact, without erythema  Neuro: awake alert and oriented to person, place, time, and situation,  Right facial droop   dysarthria.  MSK: Right shoulder flexion 4/5,  elbow flexion 4+/5, finger flexion 4+/5, knee extension 4+/5, ankle dorsiflexion 4/5,  weak on the RUE, LUE 5/5 BLE 5/5      MEDICATIONS  Scheduled Meds:  amLODIPine, 10 mg, Oral, Q24H  vitamin C, 250 mg, Oral, Daily  aspirin, 81 mg, Oral, Daily  cholecalciferol, 1,000 Units, Oral, Daily  clopidogrel, 75 mg, Oral, Daily  enoxaparin, 40 mg, Subcutaneous, Q24H  lisinopril, 5 mg, Oral, Q24H  rosuvastatin, 20 mg, Oral, Nightly  senna-docusate sodium, 2 tablet, Oral, Nightly        Continuous Infusions:       PRN Meds:  •  acetaminophen **OR** [DISCONTINUED] acetaminophen  •  [DISCONTINUED] senna-docusate sodium **AND** polyethylene glycol **AND** bisacodyl **AND** bisacodyl      RESULTS:  No results found for: POCGLU  Results from last 7 days   Lab Units 04/17/23  0723 04/14/23  0725   WBC 10*3/mm3 6.71 7.96   HEMOGLOBIN g/dL 13.2 13.4   HEMATOCRIT % 38.4 40.1   PLATELETS 10*3/mm3 198 209     Results from last 7 days   Lab Units 04/17/23  0723 04/14/23  0725   SODIUM mmol/L 141 141   POTASSIUM mmol/L 4.4 4.5   CHLORIDE mmol/L 107 107   CO2 mmol/L 25.2 25.2   BUN mg/dL 25* 19   CREATININE mg/dL 0.83 0.63   CALCIUM mg/dL 9.3 9.5   GLUCOSE mg/dL 102* 95           ASSESSMENT and PLAN:    Infarction of left basal ganglia    Left basal ganglia stroke  Stroke prophylaxis- ASA and Plavix x 21 days from March 30, 2023, then ASA 81 mg daily. Crestor 20 mg.     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  We will continue to monitor.    Dysphagia   April 14 - upgrade to soft (chopped meats)/no mixed consistencies with NTL. Recommend sit upright in wheelchair with supervision. Check intermittently for pocketing. Meds one at a time with applesauce or pudding    Dysarthria.     Hypertension - amlodipine/lisinopril     Check TSH, Vit B12 and Vit D levels-within normal limit     Urinary retention. Treated for UTI as  Reggie Ortegaboro. Check bladder scan PVR and cath if >300 cc until three consecutive < 250 cc  April 7-requires intermittent straight cath 400-113-300 cc.  No spontaneous void.  Follow pattern.  April 10-requirement straight catheter 300 cc about every 6 or 8 hours.  - will extend interval to every 12 hours to see if she voids with a larger volume.  4/12- has not required intermittent catheterization since last night.  Noted to have 4 voids with PVR less than 200.  May be recovering bladder function.  Will monitor.  4/13-voiding on her own with postvoid residuals 190 range     DVT prophylaxis- on dual antiplatelet therapies.  SCDs.  Lovenox     Team Conference 4/11/2023  Nursing: continent/incontinent with bladder, requiring cathing each bladder scan, no safety issues  PT: bed mobiliy min assist, contact guard with transfers, ambulating 80 feet, fatigues quickly with low endurance, cues for sequencing, drags left foot, anticipated contact guard with walker  OT: mod assist for bathing, max/dependent with lower body dressing, toileting max assist due to balance  SLP: mildly impaired on CLQT WNL memory/visuospatial, and mild for attention/executive function, puree and nectar thick diet, mild to moderate dysarthria  Discharge Date: 1.5 weeks     CODE STATUS:  Code Status (Patient has no pulse and is not breathing): CPR (Attempt to Resuscitate)  Medical Interventions (Patient has pulse or is breathing): Full Support  Release to patient: Routine Release      Admission Status: Continues to meet requirements for inpatient admission.     Patient seen and evaluated with attending physician, Dr. Fajardo. Please see attestation.     Shannon Martinez, DO  Physical Medicine and Rehabilitation   PGY-2     During rounds, used appropriate personal protective equipment including mask and gloves.  Additional gown if indicated.  Mask used was standard procedure mask. Appropriate PPE was worn during the entire visit.  Hand hygiene was  completed before and after.

## 2023-04-17 NOTE — THERAPY TREATMENT NOTE
Inpatient Rehabilitation - Occupational Therapy Treatment Note    [facility]     Patient Name: Melissa Gomez  : 1940  MRN: [id number]    Today's Date: 2023                 Admit Date: 2023         ICD-10-CM ICD-9-CM   1. Impaired functional mobility, balance, gait, and endurance  Z74.09 V49.89   2. Follow-up exam  Z09 V67.9       Patient Active Problem List   Diagnosis   • Infarction of left basal ganglia       Past Medical History:   Diagnosis Date   • GERD (gastroesophageal reflux disease)    • Hyperlipidemia    • Hypertension    • Stroke        Past Surgical History:   Procedure Laterality Date   • COLONOSCOPY     • HYSTERECTOMY     • TONSILLECTOMY               IRF OT ASSESSMENT FLOWSHEET (last 12 hours)     IRF OT Evaluation and Treatment     Row Name 23 1607 23 1146       OT Time and Intention    Document Type daily treatment  -DN daily treatment  -DN    Mode of Treatment occupational therapy  -DN occupational therapy  -DN    Patient Effort good  -DN good  -DN    Row Name 23 1146          Pain Assessment    Pretreatment Pain Rating 4/10  -DN     Posttreatment Pain Rating 4/10  -DN     Pain Location - Side/Orientation Right  -DN     Pain Location upper  -DN     Pain Location - extremity  -DN     Row Name 23 1146          Cognition/Psychosocial    Affect/Mental Status (Cognition) WFL  -DN     Follows Commands (Cognition) follows one-step commands;over 90% accuracy;physical/tactile prompts required;verbal cues/prompting required  -DN     Personal Safety Interventions fall prevention program maintained;gait belt  -DN     Cognitive Function attention deficit  -DN     Attention Deficit (Cognition) minimal deficit  -DN     Safety Deficit (Cognition) insight into deficits/self-awareness  -DN     Row Name 23 1146          Bathing    [place] Level (Bathing) bathing skills;moderate assist (50% patient effort)  -DN     Position (Bathing) sink side;supported  standing;supported sitting  -DN     Set-up Assistance (Bathing) obtain supplies  -DN     Row Name 04/17/23 1146          Upper Body Dressing    [place] Level (Upper Body Dressing) upper body dressing skills;doff;don;pull over garment;supervision;verbal cues  -DN     Position (Upper Body Dressing) supported sitting  -DN     Set-up Assistance (Upper Body Dressing) obtain clothing  -DN     Row Name 04/17/23 1146          Lower Body Dressing    [place] Level (Lower Body Dressing) doff;don;pants/bottoms;shoes/slippers;socks;underwear;maximum assist (25% patient effort)  -DN     Assistive Device Use (Lower Body Dressing) reacher  -DN     Position (Lower Body Dressing) supported sitting;supported standing  -DN     Set-up Assistance (Lower Body Dressing) obtain clothing  -DN     Row Name 04/17/23 1146          Grooming    [place] Level (Grooming) grooming skills;hair care, combing/brushing;oral care regimen;supervision;verbal cues  -DN     Position (Grooming) sink side;supported sitting  -DN     Set-up Assistance (Grooming) obtain supplies  -DN     Row Name 04/17/23 1146          Toileting    [place] Level (Toileting) toileting skills;perform perineal hygiene;adjust/manage clothing;maximum assist (25% patient effort)  -DN     Assistive Device Use (Toileting) grab bar/safety frame;raised toilet seat  -DN     Position (Toileting) supported sitting;supported standing  -DN     Set-up Assistance (Toileting) change pad/brief;obtain supplies  -DN     Row Name 04/17/23 1146          Self-Feeding    [place] Level (Self-Feeding) feeding skills;supervision;verbal cues;nonverbal cues (demo/gesture)  -DN     Position (Self-Feeding) supported sitting  -DN     Row Name 04/17/23 1146          Toilet Transfer    Type (Toilet Transfer) stand pivot/stand step  -DN     [place] Level (Toilet Transfer) minimum assist (75% patient effort);verbal cues;nonverbal cues (demo/gesture)  -DN     Assistive Device (Toilet  Transfer) wheelchair;walker, front-wheeled  -DN     Comment, (Toilet Transfer) stand and pivot from w/c to commode with use of BUE grab bars  -DN     Row Name 04/17/23 1146          Safety Issues, Functional Mobility    Impairments Affecting Function (Mobility) balance;endurance/activity tolerance;strength;pain  -DN     Row Name 04/17/23 1607          Motor Skills    Coordination fine motor deficit;right;upper extremity  pt sitting at table working on C with large irregular pegs to be placed in mat, then small peg design with RUE with lateral and 3 point pinch.  Pt able to complete with extended time.  Shoulder pain 3/5  -DN     Row Name 04/17/23 1607          Shoulder (Therapeutic Exercise)    Shoulder (Therapeutic Exercise) AAROM (active assistive range of motion)  -DN     Shoulder AROM (Therapeutic Exercise) right;10 repetitions  -DN     Row Name 04/17/23 1607          Elbow/Forearm (Therapeutic Exercise)    Elbow/Forearm (Therapeutic Exercise) AAROM (active assistive range of motion)  -DN     Elbow/Forearm AROM (Therapeutic Exercise) right;10 repetitions  -DN     Row Name 04/17/23 1607          Wrist (Therapeutic Exercise)    Wrist (Therapeutic Exercise) AAROM (active assistive range of motion)  -DN     Wrist AROM (Therapeutic Exercise) right;10 repetitions  -DN     Row Name 04/17/23 1607          Hand (Therapeutic Exercise)    Hand (Therapeutic Exercise) AROM (active range of motion)  -DN     Hand AROM/AAROM (Therapeutic Exercise) right;10 repetitions  -DN     Row Name 04/17/23 1607 04/17/23 1146       Positioning and Restraints    Pre-Treatment Position sitting in chair/recliner  -DN sitting in chair/recliner  -DN    Post Treatment Position wheelchair  -DN wheelchair  -DN    In Bed sitting;call light within reach;encouraged to call for assist  -DN sitting;call light within reach;encouraged to call for assist;exit alarm on  -DN          User Key  (r) = Recorded By, (t) = Taken By, (c) = Cosigned By     Initials Name Effective Dates    DN Ismael Lanier, OT 06/16/21 -                  Occupational Therapy Education     Title: PT OT SLP Therapies (Done)     Topic: Occupational Therapy (Done)     Point: ADL training (Done)     Description:   Instruct learner(s) on proper safety adaptation and remediation techniques during self care or transfers.   Instruct in proper use of assistive devices.              Learning Progress Summary           Patient Acceptance, E, VU by LS at 4/8/2023 1204    Acceptance, E,TB,D, VU,DU,NR by KP at 4/6/2023 1230    Comment: ed pt on role of OT. benefit of therapy, POC w.  OT ed on safety w ADL and tsf. pt ed on UBD technique.                   Point: Home exercise program (Done)     Description:   Instruct learner(s) on appropriate technique for monitoring, assisting and/or progressing therapeutic exercises/activities.              Learning Progress Summary           Patient Acceptance, E, VU by LS at 4/8/2023 1204    Acceptance, E,TB,D, VU,DU,NR by KP at 4/6/2023 1230    Comment: ed pt on role of OT. benefit of therapy, POC w.  OT ed on safety w ADL and tsf. pt ed on UBD technique.                   Point: Precautions (Done)     Description:   Instruct learner(s) on prescribed precautions during self-care and functional transfers.              Learning Progress Summary           Patient Acceptance, E, VU by LS at 4/8/2023 1204    Acceptance, E,TB,D, VU,DU,NR by KP at 4/6/2023 1230    Comment: ed pt on role of OT. benefit of therapy, POC w.  OT ed on safety w ADL and tsf. pt ed on UBD technique.                   Point: Body mechanics (Done)     Description:   Instruct learner(s) on proper positioning and spine alignment during self-care, functional mobility activities and/or exercises.              Learning Progress Summary           Patient Acceptance, E, VU by LS at 4/8/2023 1204    Acceptance, E,TB,D, VU,DU,NR by KP at 4/6/2023 1230    Comment: ed pt on role of OT. benefit of  therapy, POC w.  OT ed on safety w ADL and tsf. pt ed on UBD technique.                               User Key     Initials Effective Dates Name Provider Type Discipline    KP 06/16/21 -  Patsy Blank OTR Occupational Therapist OT    LS 06/16/21 -  Yamila Wheat MS CCC-SLP Speech and Language Pathologist SLP                    OT Recommendation and Plan                         Time Calculation:      Time Calculation- OT     Row Name 04/17/23 1300 04/17/23 0830          Time Calculation- OT    OT Start Time 1300  -DN 0830  -DN     OT Stop Time 1330  -DN 0900  -DN     OT Time Calculation (min) 30 min  -DN 30 min  -DN           User Key  (r) = Recorded By, (t) = Taken By, (c) = Cosigned By    Initials Name Provider Type    DN Ismael Lanier OT Occupational Therapist              Therapy Charges for Today     Code Description Service Date Service Provider Modifiers Qty    32148785520 HC OT SELF CARE/MGMT/TRAIN EA 15 MIN 4/17/2023 Ismael Lanier OT GO 2    [id number]  OT THER PROC EA 15 MIN 4/17/2023 Ismael Lanier OT GO 2                   Ismael Lanier OT  4/17/2023

## 2023-04-17 NOTE — PROGRESS NOTES
Inpatient Rehabilitation Functional Measures Assessment and Plan of Care    Plan of Care  Updated Problems/Interventions  Mobility    [OT] Toilet Transfers(Active)  Current Status(04/17/2023): Min A  Weekly Goal(04/25/2023): CGA  Discharge Goal: CGA    [OT] Tub/Shower Transfers(Active)  Current Status(04/17/2023): Min  Weekly Goal(04/25/2023): CGA  Discharge Goal: CGA        Self Care    [OT] Bathing(Active)  Current Status(04/17/2023): mod  Weekly Goal(04/25/2023): min  Discharge Goal: CGA    [OT] Dressing (Lower)(Active)  Current Status(04/17/2023): max  Weekly Goal(04/25/2023): mod  Discharge Goal: min    [OT] Dressing (Upper)(Active)  Current Status(04/17/2023): min/SBA  Weekly Goal(04/25/2023): SBA  Discharge Goal:  set up    [OT] Grooming(Active)  Current Status(04/17/2023): SBA  Weekly Goal(04/25/2023): set up  Discharge Goal: supervision    [OT] Toileting(Active)  Current Status(04/17/2023): dep/Max  Weekly Goal(04/25/2023): mod  Discharge Goal: CGA    Functional Measures  MESHA Eating:  Branch  MESHA Grooming: Branch  MESHA Bathing:  Branch  MESHA Upper Body Dressing:  Branch  MESHA Lower Body Dressing:  Branch  MESHA Toileting:  Branch    MESHA Bladder Management  Level of Assistance:  Branch  Frequency/Number of Accidents this Shift:  Branch    MESHA Bowel Management  Level of Assistance: Branch  Frequency/Number of Accidents this Shift: Branch    MESHA Bed/Chair/Wheelchair Transfer:  Branch  MESHA Toilet Transfer:  Branch  MESHA Tub/Shower Transfer:  Branch    Previously Documented Mode of Locomotion at Discharge: Field  MESHA Expected Mode of Locomotion at Discharge: Branch  MESHA Walk/Wheelchair:  Branch  MESHA Stairs:  Branch    MESHA Comprehension:  Branch  MESHA Expression:  Branch  MESHA Social Interaction:  Branch  MESHA Problem Solving:  Branch  MESHA Memory:  Branch    Therapy Mode Minutes  Occupational Therapy: Branch  Physical Therapy: Branch  Speech Language Pathology:  Branch    Signed by: Ismael Lanier OTR/L

## 2023-04-17 NOTE — THERAPY TREATMENT NOTE
Inpatient Rehabilitation - Speech Language Pathology Treatment Note    [facility]     Patient Name: Melissa Gomez  : 1940  MRN: [id number]    Today's Date: 2023                   Admit Date: 2023       Visit Dx:      ICD-10-CM ICD-9-CM   1. Impaired functional mobility, balance, gait, and endurance  Z74.09 V49.89   2. Follow-up exam  Z09 V67.9       Patient Active Problem List   Diagnosis   • Infarction of left basal ganglia       Past Medical History:   Diagnosis Date   • GERD (gastroesophageal reflux disease)    • Hyperlipidemia    • Hypertension    • Stroke        Past Surgical History:   Procedure Laterality Date   • COLONOSCOPY     • HYSTERECTOMY     • TONSILLECTOMY         SLP Recommendation and Plan                                                            SLP EVALUATION (last 72 hours)     SLP SLC Evaluation     Row Name 23 1100 23 8163                Communication Assessment/Intervention    Document Type therapy note (daily note)  -AL therapy note (daily note)  -AL       Patient/Family/Caregiver Comments/Observations Pt participated well.  -AL Pt participated well. Reports she would prefer to have soft (chopped) diet.  -AL          Pain Scale: Numbers Pre/Post-Treatment    Pretreatment Pain Rating 0/10 - no pain  -AL 0/10 - no pain  -AL             User Key  (r) = Recorded By, (t) = Taken By, (c) = Cosigned By    Initials Name Effective Dates    Janell Erickson, MS CCC-SLP 21 -                    EDUCATION    The patient has been educated in the following areas:       Cognitive Impairment Communication Impairment Dysphagia (Swallowing Impairment).             SLP GOALS     Row Name 23 1100 23 1335          ([facility]) Pharyngeal Strengthening Exercise Goal 1 (SLP)    Activity (Pharyngeal Strengthening Goal 1, SLP) increase timing;increase superior movement of the hyolaryngeal complex;increase anterior movement of the hyolaryngeal complex;increase  epiglottic inversion and retroflexion;increase closure at entrance to airway/closure of airway at glottis  -AL --     Increase Timing hard effortful swallow  -AL --     Increase Superior Movement of the Hyolaryngeal Complex hard effortful swallow  -AL --     Increase Anterior Movement of the Hyolaryngeal Complex EMST  -AL --     Increase Epiglottic Inversion and Retroflexion Mendelsohn;hard effortful swallow;EMST  -AL --     Increase Closure at Entrance to Airway/Closure of Airway at Glottis Mendelsohn;hard effortful swallow  -AL --     Waldo/Accuracy (Pharyngeal Strengthening Goal 1, SLP) with moderate cues (50-74% accuracy)  -AL --     Time Frame (Pharyngeal Strengthening Goal 1, SLP) 1 week  -AL --     Progress/Outcomes (Pharyngeal Strengthening Goal 1, SLP) good progress toward goal  -AL --     Comment (Pharyngeal Strengthening Goal 1, SLP) Pt now tolerating soft (chopped meats) diet with NTL. Pt completed 30 repetitions of effortful swallow with MOD cues. Pt completed 2 sets of 5 reps of EMST at 1.25 turns past 5 cmH20. Pt exhibited no overt s/s of aspiration or penetration in 8/10 trials of ice chips; throat clear x1, delayed throat clear x1. Pt exhibited no overt s/s of aspiration or penetration in 5/9 trials of water by cup; immediate cough x1, immediate throat clear x1, delayed throat clear x1, delayed coughing x1. Pt required MIN cues for small sips. Plan to complete repeat VFSS in ~2-3 days.  -AL --        Articulation Goal 1 (SLP)    Improve Articulation Goal 1 (SLP) by over-articulating at word level;by over-articulating at phrase level;by over-articulating in connected speech;80%;with minimal cues (75-90%)  -AL --     Time Frame (Articulation Goal 1, SLP) 1 week  -AL --     Progress/Outcomes (Articulation Goal 1, SLP) good progress toward goal  -AL --     Comment (Articulation Goal 1, SLP) Pt now judged to be 100%intelligible in complex conversation with a familiar listener. She presents with  mild mixed (flaccid/hypokinetic) dysarthria. She often self-corrects and repeats utterances with improved clarity.  -AL --        Attention Goal 1 (SLP)    Improve Attention by Goal 1 (SLP) complete selective attention task;complete sustained attention task;80%;independently (over 90% accuracy)  -AL complete selective attention task;complete sustained attention task;80%;independently (over 90% accuracy)  -AL     Time Frame (Attention Goal 1, SLP) 1 week  -AL 1 week  -AL     Progress/Outcomes (Attention Goal 1, SLP) good progress toward goal  -AL good progress toward goal  -AL     Comment (Attention Goal 1, SLP) -- Attention to detail for moderately complex written directions: 30% with NO cues, 100% with MIN-MOD cues.  -AL        Memory Skills Goal 1 (SLP)    Improve Memory Skills Through Goal 1 (SLP) recalling related word lists with an imposed delay;listen to a paragraph and answer questions;recall details of the day;80%;independently (over 90% accuracy)  -AL --     Time Frame (Memory Skills Goal 1, SLP) 1 week  -AL --     Progress/Outcomes (Memory Skills Goal 1, SLP) goal met  -AL --        Organizational Skills Goal 1 (SLP)    Improve Thought Organization Through Goal 1 (SLP) completing a divergent naming task;80%;with minimal cues (75-90%)  -AL --     Time Frame (Thought Organization Skills Goal 1, SLP) 1 week  -AL --     Progress/Outcomes (Thought Organization Skills Goal 1, SLP) goal no longer appropriate  -AL --        Reasoning Goal 1 (SLP)    Improve Reasoning Through Goal 1 (SLP) complete deductive reasoning task;80%;independently (over 90% accuracy)  -AL --     Time Frame (Reasoning Goal 1, SLP) 1 week  -AL --     Progress/Outcomes (Reasoning Goal 1, SLP) goal no longer appropriate  -AL --        Executive Functional Skills Goal 1 (SLP)    Improve Executive Function Skills Goal 1 (SLP) home management activity;80%;with minimal cues (75-90%)  -AL --     Time Frame (Executive Function Skills Goal 1, SLP) 1  week  -AL --     Progress/Outcomes (Executive Function Skills Goal 1, SLP) goal ongoing  -AL --           User Key  (r) = Recorded By, (t) = Taken By, (c) = Cosigned By    Initials Name Provider Type    Janell Erickson MS CCC-SLP Speech and Language Pathologist                            Time Calculation:        Time Calculation- SLP     Row Name 04/17/23 1151             Time Calculation- SLP    SLP Start Time 1100  -AL      SLP Stop Time 1130  -AL      SLP Time Calculation (min) 30 min  -AL            User Key  (r) = Recorded By, (t) = Taken By, (c) = Cosigned By    Initials Name Provider Type    Janell Erickson MS CCC-SLP Speech and Language Pathologist                  Therapy Charges for Today     Code Description Service Date Service Provider Modifiers Qty    [id number]  ST TREATMENT SWALLOW 2 4/17/2023 Janell Gallego MS CCC-SLP GN 1                           Janell Gallego MS CCC-KAVITA  4/17/2023

## 2023-04-17 NOTE — THERAPY TREATMENT NOTE
Inpatient Rehabilitation - Physical Therapy Treatment Note       [facility]     Patient Name: Melissa Gomez  : 1940  MRN: [id number]    Today's Date: 2023                    Admit Date: 2023      Visit Dx:     ICD-10-CM ICD-9-CM   1. Impaired functional mobility, balance, gait, and endurance  Z74.09 V49.89   2. Follow-up exam  Z09 V67.9       Patient Active Problem List   Diagnosis   • Infarction of left basal ganglia       Past Medical History:   Diagnosis Date   • GERD (gastroesophageal reflux disease)    • Hyperlipidemia    • Hypertension    • Stroke        Past Surgical History:   Procedure Laterality Date   • COLONOSCOPY     • HYSTERECTOMY     • TONSILLECTOMY         PT ASSESSMENT (last 12 hours)     IRF PT Evaluation and Treatment     Row Name 23 1045          PT Time and Intention    Document Type daily treatment  -EE     Mode of Treatment physical therapy;individual therapy  -EE     Patient/Family/Caregiver Comments/Observations Pt sitting up in WC, agreeable to PT.  -EE     Row Name 23 1045          General Information    Existing Precautions/Restrictions fall;swallow precautions  -EE     Row Name 23 1045          Pain Assessment    Pretreatment Pain Rating 0/10 - no pain  -EE     Posttreatment Pain Rating 0/10 - no pain  -EE     Row Name 23 1045          Cognition/Psychosocial    Affect/Mental Status (Cognition) WFL  -EE     Orientation Status (Cognition) oriented x 4  -EE     Follows Commands (Cognition) follows one-step commands;verbal cues/prompting required  -EE     Personal Safety Interventions fall prevention program maintained;gait belt;muscle strengthening facilitated;nonskid shoes/slippers when out of bed;supervised activity  -EE     Cognitive Function attention deficit  -EE     Attention Deficit (Cognition) minimal deficit  -EE     Safety Deficit (Cognition) insight into deficits/self-awareness;safety precautions awareness  -EE     Row Name 23  1045          Transfer Assessment/Treatment    Comment, (Transfers) 5x STS from WC for functional strengthening, cues for sequencing.  -EE     Row Name 04/17/23 1045          Sit-Stand Transfer    Sit-Stand [place] (Transfers) contact guard;verbal cues  -EE     Assistive Device (Sit-Stand Transfers) walker, front-wheeled;wheelchair  -EE     Comment, (Sit-Stand Transfer) cues for hand placement  -EE     Row Name 04/17/23 1045          Stand-Sit Transfer    Stand-Sit [place] (Transfers) contact guard;verbal cues  -EE     Assistive Device (Stand-Sit Transfers) walker, front-wheeled;wheelchair  -EE     Comment, (Stand-Sit Transfer) cues for hand placement and eccentric control  -EE     Row Name 04/17/23 1045          Gait/Stairs (Locomotion)    [place] Level (Gait) contact guard;verbal cues  -EE     Assistive Device (Gait) walker, front-wheeled  -EE     Distance in Feet (Gait) 80' x 2  -EE     Pattern (Gait) step-through  -EE     Deviations/Abnormal Patterns (Gait) lenard decreased;stride length decreased  -EE     Bilateral Gait Deviations forward flexed posture;heel strike decreased  -EE     Right Sided Gait Deviations heel strike decreased  decreased R heel strike, worsened with fatigue  -EE     Gait Assessment/Intervention cues for upright posture  -EE     Row Name 04/17/23 1045          Safety Issues, Functional Mobility    Impairments Affecting Function (Mobility) balance;endurance/activity tolerance;postural/trunk control;strength;pain  -EE     Row Name 04/17/23 1045          Hip (Therapeutic Exercise)    Hip Strengthening (Therapeutic Exercise) bilateral;marching while seated;10 repetitions  -EE     Row Name 04/17/23 1045          Knee (Therapeutic Exercise)    Knee Strengthening (Therapeutic Exercise) bilateral;LAQ (long arc quad);10 repetitions  -EE     Row Name 04/17/23 1045          Positioning and Restraints    Pre-Treatment Position sitting in chair/recliner  -EE     Post Treatment Position  wheelchair  -EE     In Wheelchair sitting;exit alarm on;with SLP  -EE           User Key  (r) = Recorded By, (t) = Taken By, (c) = Cosigned By    Initials Name Provider Type    EE Diann Gonzáles, PT Physical Therapist                 Physical Therapy Education     Title: PT OT SLP Therapies (Done)     Topic: Physical Therapy (Done)     Point: Mobility training (Done)     Learning Progress Summary           Patient Acceptance, E,D,TB, VU,NR by EE at 4/17/2023 1129    Acceptance, TB,E, VU,NR by EE at 4/13/2023 1047    Acceptance, E,D, VU,DU by DP at 4/11/2023 1038    Acceptance, E, VU,NR by EE at 4/10/2023 1043    Acceptance, E, VU by LS at 4/8/2023 1204    Acceptance, E,TB, VU,NR by EE at 4/7/2023 1054    Acceptance, E,TB, VU by  at 4/6/2023 1527                   Point: Home exercise program (Done)     Learning Progress Summary           Patient Acceptance, E,D,TB, VU,NR by EE at 4/17/2023 1129    Acceptance, TB,E, VU,NR by EE at 4/13/2023 1047    Acceptance, E,D, VU,DU by DP at 4/11/2023 1038    Acceptance, E, VU,NR by EE at 4/10/2023 1043    Acceptance, E, VU by LS at 4/8/2023 1204    Acceptance, E,TB, VU,NR by EE at 4/7/2023 1054    Acceptance, E,TB, VU by  at 4/6/2023 1527                               User Key     Initials Effective Dates Name Provider Type Discipline     06/16/21 -  Yamila Wheat MS CCC-SLP Speech and Language Pathologist SLP    EE 06/16/21 -  Diann Gonzáles, PT Physical Therapist PT    KRISH 06/16/21 -  Chen Centeno PT Physical Therapist PT    DP 08/24/21 -  Gen Gray, PT Physical Therapist PT                PT Recommendation and Plan                          Time Calculation:      PT Charges     Row Name 04/17/23 1129             Time Calculation    Start Time 1030  -EE      Stop Time 1100  -EE      Time Calculation (min) 30 min  -EE      PT Received On 04/17/23  -EE      PT - Next Appointment 04/17/23  -EE         Time Calculation- PT    Total Timed Code Minutes- PT 30 minute(s)   -EE            User Key  (r) = Recorded By, (t) = Taken By, (c) = Cosigned By    Initials Name Provider Type    EE Diann Gonzáles, PT Physical Therapist                Therapy Charges for Today     Code Description Service Date Service Provider Modifiers Qty    [id number]  PT THERAPEUTIC ACT EA 15 MIN 4/17/2023 Diann Gonzáles, PT GP 2                   Diann Gonzáles, PT  4/17/2023

## 2023-04-17 NOTE — PLAN OF CARE
Goal Outcome Evaluation:       Pt is A/Ox4, calm/cooperative, OOB x 1 stand/pivot to w/c . Meds taken crushed in puree. Oral care completed. Pt has been incontinent of urine overnight. Pt c/o headache pain and RUE pain this morning ; prn Tylenol given x2. O2 at 2L via nasal cannula worn overnight.

## 2023-04-17 NOTE — PLAN OF CARE
Goal Outcome Evaluation:  Plan of Care Reviewed With: patient           Outcome Evaluation: Pt is AOX4, calm and cooperative. Pt is forgetful at times. Meds given crushed in Applesauce. Mech soft/NTL. Up for meals. Assist X1 to WC. Incont BB, last BM 3/16.

## 2023-04-17 NOTE — THERAPY TREATMENT NOTE
Inpatient Rehabilitation - Occupational Therapy Treatment Note    [facility]     Patient Name: Melissa Gomez  : 1940  MRN: [id number]    Today's Date: 2023                 Admit Date: 2023         ICD-10-CM ICD-9-CM   1. Impaired functional mobility, balance, gait, and endurance  Z74.09 V49.89   2. Follow-up exam  Z09 V67.9       Patient Active Problem List   Diagnosis   • Infarction of left basal ganglia       Past Medical History:   Diagnosis Date   • GERD (gastroesophageal reflux disease)    • Hyperlipidemia    • Hypertension    • Stroke        Past Surgical History:   Procedure Laterality Date   • COLONOSCOPY     • HYSTERECTOMY     • TONSILLECTOMY               IRF OT ASSESSMENT FLOWSHEET (last 12 hours)     IRF OT Evaluation and Treatment     Row Name 23 1146          OT Time and Intention    Document Type daily treatment  -DN     Mode of Treatment occupational therapy  -DN     Patient Effort good  -DN     Row Name 23 1146          Pain Assessment    Pretreatment Pain Rating 4/10  -DN     Posttreatment Pain Rating 4/10  -DN     Pain Location - Side/Orientation Right  -DN     Pain Location upper  -DN     Pain Location - extremity  -DN     Row Name 23 1146          Cognition/Psychosocial    Affect/Mental Status (Cognition) WFL  -DN     Follows Commands (Cognition) follows one-step commands;over 90% accuracy;physical/tactile prompts required;verbal cues/prompting required  -DN     Personal Safety Interventions fall prevention program maintained;gait belt  -DN     Cognitive Function attention deficit  -DN     Attention Deficit (Cognition) minimal deficit  -DN     Safety Deficit (Cognition) insight into deficits/self-awareness  -DN     Row Name 23 1146          Bathing    [place] Level (Bathing) bathing skills;moderate assist (50% patient effort)  -DN     Position (Bathing) sink side;supported standing;supported sitting  -DN     Set-up Assistance (Bathing) obtain  supplies  -DN     Row Name 04/17/23 1146          Upper Body Dressing    [place] Level (Upper Body Dressing) upper body dressing skills;doff;don;pull over garment;supervision;verbal cues  -DN     Position (Upper Body Dressing) supported sitting  -DN     Set-up Assistance (Upper Body Dressing) obtain clothing  -DN     Row Name 04/17/23 1146          Lower Body Dressing    [place] Level (Lower Body Dressing) doff;don;pants/bottoms;shoes/slippers;socks;underwear;maximum assist (25% patient effort)  -DN     Assistive Device Use (Lower Body Dressing) reacher  -DN     Position (Lower Body Dressing) supported sitting;supported standing  -DN     Set-up Assistance (Lower Body Dressing) obtain clothing  -DN     Row Name 04/17/23 1146          Grooming    [place] Level (Grooming) grooming skills;hair care, combing/brushing;oral care regimen;supervision;verbal cues  -DN     Position (Grooming) sink side;supported sitting  -DN     Set-up Assistance (Grooming) obtain supplies  -DN     Row Name 04/17/23 1146          Toileting    [place] Level (Toileting) toileting skills;perform perineal hygiene;adjust/manage clothing;maximum assist (25% patient effort)  -DN     Assistive Device Use (Toileting) grab bar/safety frame;raised toilet seat  -DN     Position (Toileting) supported sitting;supported standing  -DN     Set-up Assistance (Toileting) change pad/brief;obtain supplies  -DN     Row Name 04/17/23 1146          Self-Feeding    [place] Level (Self-Feeding) feeding skills;supervision;verbal cues;nonverbal cues (demo/gesture)  -DN     Position (Self-Feeding) supported sitting  -DN     Row Name 04/17/23 1146          Toilet Transfer    Type (Toilet Transfer) stand pivot/stand step  -DN     [place] Level (Toilet Transfer) minimum assist (75% patient effort);verbal cues;nonverbal cues (demo/gesture)  -DN     Assistive Device (Toilet Transfer) wheelchair;walker, front-wheeled  -DN     Comment, (Toilet  Transfer) stand and pivot from w/c to commode with use of BUE grab bars  -DN     Row Name 04/17/23 1146          Safety Issues, Functional Mobility    Impairments Affecting Function (Mobility) balance;endurance/activity tolerance;strength;pain  -DN     Row Name 04/17/23 1146          Positioning and Restraints    Pre-Treatment Position sitting in chair/recliner  -DN     Post Treatment Position wheelchair  -DN     In Bed sitting;call light within reach;encouraged to call for assist;exit alarm on  -DN           User Key  (r) = Recorded By, (t) = Taken By, (c) = Cosigned By    Initials Name Effective Dates    DN Ismael Lanier OT 06/16/21 -                  Occupational Therapy Education     Title: PT OT SLP Therapies (Done)     Topic: Occupational Therapy (Done)     Point: ADL training (Done)     Description:   Instruct learner(s) on proper safety adaptation and remediation techniques during self care or transfers.   Instruct in proper use of assistive devices.              Learning Progress Summary           Patient Acceptance, E, VU by  at 4/8/2023 1204    Acceptance, E,TB,D, VU,DU,NR by  at 4/6/2023 1230    Comment: ed pt on role of OT. benefit of therapy, POC w.  OT ed on safety w ADL and tsf. pt ed on UBD technique.                   Point: Home exercise program (Done)     Description:   Instruct learner(s) on appropriate technique for monitoring, assisting and/or progressing therapeutic exercises/activities.              Learning Progress Summary           Patient Acceptance, E, VU by LS at 4/8/2023 1204    Acceptance, E,TB,D, VU,DU,NR by  at 4/6/2023 1230    Comment: ed pt on role of OT. benefit of therapy, POC w.  OT ed on safety w ADL and tsf. pt ed on UBD technique.                   Point: Precautions (Done)     Description:   Instruct learner(s) on prescribed precautions during self-care and functional transfers.              Learning Progress Summary           Patient Acceptance, E, VU by LS at  4/8/2023 1204    Acceptance, E,TB,D, VU,DU,NR by  at 4/6/2023 1230    Comment: ed pt on role of OT. benefit of therapy, POC w.  OT ed on safety w ADL and tsf. pt ed on UBD technique.                   Point: Body mechanics (Done)     Description:   Instruct learner(s) on proper positioning and spine alignment during self-care, functional mobility activities and/or exercises.              Learning Progress Summary           Patient Acceptance, E, VU by  at 4/8/2023 1204    Acceptance, E,TB,D, VU,DU,NR by  at 4/6/2023 1230    Comment: ed pt on role of OT. benefit of therapy, POC w.  OT ed on safety w ADL and tsf. pt ed on UBD technique.                               User Key     Initials Effective Dates Name Provider Type Discipline     06/16/21 -  Patsy Blank OTR Occupational Therapist OT     06/16/21 -  Yamila Wheat MS CCC-SLP Speech and Language Pathologist SLP                    OT Recommendation and Plan                         Time Calculation:      Time Calculation- OT     Row Name 04/17/23 0830             Time Calculation- OT    OT Start Time 0830  -DN      OT Stop Time 0900  -DN      OT Time Calculation (min) 30 min  -DN            User Key  (r) = Recorded By, (t) = Taken By, (c) = Cosigned By    Initials Name Provider Type    Ismael Quintanilla OT Occupational Therapist              Therapy Charges for Today     Code Description Service Date Service Provider Modifiers Qty    87964389381 HC OT SELF CARE/MGMT/TRAIN EA 15 MIN 4/17/2023 Ismael Lanier OT GO 2                   Ismael Lanier OT  4/17/2023

## 2023-04-17 NOTE — PROGRESS NOTES
"Nutrition Services    Patient Name:  Melissa Gomez  YOB: 1940  MRN: [id number]  Admit Date:  4/5/2023      Assessment Date:  04/17/23    FOLLOW UP NOTE - CLINICAL NUTRITION    Comments:  Since last review, pt's diet advanced to Soft-to-chew/chopped meats/nectar thick liquids. Visited pt in room who reported low intakes. Per EMR, PO 50-75% of recent meals. Pt stated magic cup supplements were too hard and would like d/c'd. Likes Mighty Shakes - pam/straw flavors preferred. Pt tried [place] instant breakfast (vanilla, though chocolate is preferred) and stated it was ok but would like more if chocolate flavor. Encouraged Po intakes, supplement acceptance. Will confirm supplement orders and preferred flavors today and will continue to monitor intakes on advanced diet.     RD will continue to follow-up, per protocol.    Encounter Information        Reason for Encounter Follow-up    Current Issues Infarction of L basal ganglia     Current Nutrition Orders & Evaluation of Intake       Oral Nutrition     Current PO Diet Diet: Regular/House Diet; Texture: Soft to Chew (NDD 3); Soft to Chew: Chopped Meat; Fluid Consistency: Nectar Thick   Supplement Magic Cup BID, Mighty Shake BID   PO Evaluation    % PO Intake/# of days  50-75%.    Factors Affecting Intake  dislikes modified diet     Anthropometrics         Height   Weight Height: 162.6 cm (64\")  Weight: 86.9 kg (191 lb 9.6 oz) (04/08/23 1312)   BMI kg/m2 Body mass index is 32.89 kg/m².   Weight trend Stable     Physical Findings          Physical Appearance alert, oriented   Oral/Mouth Cavity teeth missing   Edema  1+ (trace), 2+ (mild)   Gastrointestinal normoactive, last bowel movement:4/16   Skin  skin intact   Tubes/Drains none     Labs       Pertinent Labs Reviewed, listed below     Results from last 7 days   Lab Units 04/17/23  0723 04/14/23  0725   SODIUM mmol/L 141 141   POTASSIUM mmol/L 4.4 4.5   CHLORIDE mmol/L 107 107   CO2 mmol/L 25.2 25.2 "   BUN mg/dL 25* 19   CREATININE mg/dL 0.83 0.63   CALCIUM mg/dL 9.3 9.5   GLUCOSE mg/dL 102* 95     Results from last 7 days   Lab Units 04/17/23  0723   HEMOGLOBIN g/dL 13.2   HEMATOCRIT % 38.4   WBC 10*3/mm3 6.71     Results from last 7 days   Lab Units 04/17/23  0723 04/14/23  0725   PLATELETS 10*3/mm3 198 209     No results found for: COVID19  Lab Results   Component Value Date    HGBA1C 5.3 03/30/2023          Medications           Scheduled Medications amLODIPine, 10 mg, Oral, Q24H  vitamin C, 250 mg, Oral, Daily  aspirin, 81 mg, Oral, Daily  cholecalciferol, 1,000 Units, Oral, Daily  clopidogrel, 75 mg, Oral, Daily  enoxaparin, 40 mg, Subcutaneous, Q24H  lisinopril, 5 mg, Oral, Q24H  rosuvastatin, 20 mg, Oral, Nightly  senna-docusate sodium, 2 tablet, Oral, Nightly       Infusions     PRN Medications •  acetaminophen **OR** [DISCONTINUED] acetaminophen  •  [DISCONTINUED] senna-docusate sodium **AND** polyethylene glycol **AND** bisacodyl **AND** bisacodyl     PLAN OF CARE  Intervention Goal        Intervention Goal(s) Maintain nutrition status, Maintain intake, Continue positive trend, Advance diet, Maintain weight and No significant weight loss     Nutrition Intervention        RD Action Adjusted supplement, Encourage intake, Follow Tx Progress and Care plan reviewed     Prescription        Diet Prescription    Supplement Prescription D/c Magic Cups; Confirmed Mighty Shakes strawberry/chocolate; confirmed CIB chocolate.    EN/PN Prescription    Prescription Ordered Yes   --  Monitor/Evaluation        Monitor Per protocol, PO intake, Supplement intake, Weight, Skin status, GI status, Symptoms, Swallow function   Discharge Plan Pending clinical course   Education Will educate as needed       Electronically signed by:  Yue Ayala RD  04/17/23 15:44 EDT

## 2023-04-17 NOTE — PROGRESS NOTES
Inpatient Rehabilitation Plan of Care Note    Plan of Care  Care Plan Reviewed - Updates as Follows    Psychosocial    [RN] Coping/Adjustment(Active)  Current Status(04/13/2023): Lacks insight with current situation  Weekly Goal(04/25/2023): provide education material regarding stroke  Discharge Goal: Knowledgeable of care needs and stroke recovery    Performed Intervention(s)  Verbalizes needs and concerns  Therapeutic environmental set up      Sphincter Control    [RN] Bladder Management(Active)  Current Status(04/13/2023): Incontinent of bladder, bladder scans no longer  ordered due to several < 200 scans.  Weekly Goal(04/25/2023): The patient is unable to void independently  Discharge Goal: The patient empties bladder completely    [RN] Bowel Management(Active)  Current Status(04/13/2023): Incontinent of stool  Weekly Goal(04/24/2023): Continent 50%  Discharge Goal: Continent 100%    Performed Intervention(s)  Bladder scan or I/O cath per order  Encourage fluids  Bowel/bladder training      Safety    [RN] Potential for Injury(Active)  Current Status(04/13/2023): Risk for falls  Weekly Goal(04/24/2023): Instruct family/caregivers regarding safety precautions  and need for close supervision  Discharge Goal: Family/caregiver will be knowledgeable of need for cueing and  supervision to avoid falls    Performed Intervention(s)  Safety Rounds  Bed alarm and/or chair alarm    Signed by: Sharon Diop RN

## 2023-04-17 NOTE — PROGRESS NOTES
Inpatient Rehabilitation Plan of Care Note    Plan of Care  Updated Problems/Interventions  Mobility    [PT] Walk(Active)  Current Status(04/17/2023): 80' CGA RWX  Weekly Goal(04/25/2023): CGA to BR with RWX  Discharge Goal: 100' CGA RWX    [PT] Bed/Chair/Wheelchair(Active)  Current Status(04/17/2023): Min/CGA RWX  Weekly Goal(04/25/2023): CGA RWX  Discharge Goal: CGA RWX    [PT] Bed Mobility(Active)  Current Status(04/17/2023): Mod  Weekly Goal(04/25/2023): Min/CGA  Discharge Goal: SBA    [PT] Stairs(Active)  Current Status(04/17/2023): 4, B rabrian, CGA  Weekly Goal(04/25/2023): PT only  Discharge Goal: 4, B rails, CGA    Signed by: EDSON ReisT

## 2023-04-18 LAB — GLUCOSE BLDC GLUCOMTR-MCNC: 115 MG/DL (ref 70–130)

## 2023-04-18 PROCEDURE — 82962 GLUCOSE BLOOD TEST: CPT

## 2023-04-18 PROCEDURE — 92526 ORAL FUNCTION THERAPY: CPT

## 2023-04-18 PROCEDURE — 97130 THER IVNTJ EA ADDL 15 MIN: CPT

## 2023-04-18 PROCEDURE — 97129 THER IVNTJ 1ST 15 MIN: CPT

## 2023-04-18 PROCEDURE — 97535 SELF CARE MNGMENT TRAINING: CPT | Performed by: OCCUPATIONAL THERAPIST

## 2023-04-18 PROCEDURE — 25010000002 ENOXAPARIN PER 10 MG: Performed by: PHYSICAL MEDICINE & REHABILITATION

## 2023-04-18 PROCEDURE — 97110 THERAPEUTIC EXERCISES: CPT

## 2023-04-18 PROCEDURE — 97112 NEUROMUSCULAR REEDUCATION: CPT | Performed by: OCCUPATIONAL THERAPIST

## 2023-04-18 PROCEDURE — 97530 THERAPEUTIC ACTIVITIES: CPT

## 2023-04-18 RX ADMIN — AMLODIPINE BESYLATE 10 MG: 10 TABLET ORAL at 07:57

## 2023-04-18 RX ADMIN — CLOPIDOGREL BISULFATE 75 MG: 75 TABLET, FILM COATED ORAL at 07:57

## 2023-04-18 RX ADMIN — ACETAMINOPHEN 650 MG: 325 TABLET, FILM COATED ORAL at 19:51

## 2023-04-18 RX ADMIN — LISINOPRIL 5 MG: 5 TABLET ORAL at 07:57

## 2023-04-18 RX ADMIN — Medication 1000 UNITS: at 07:57

## 2023-04-18 RX ADMIN — DOCUSATE SODIUM 50MG AND SENNOSIDES 8.6MG 2 TABLET: 8.6; 5 TABLET, FILM COATED ORAL at 19:51

## 2023-04-18 RX ADMIN — ENOXAPARIN SODIUM 40 MG: 100 INJECTION SUBCUTANEOUS at 11:53

## 2023-04-18 RX ADMIN — ROSUVASTATIN CALCIUM 20 MG: 20 TABLET, FILM COATED ORAL at 19:51

## 2023-04-18 RX ADMIN — OXYCODONE HYDROCHLORIDE AND ACETAMINOPHEN 250 MG: 500 TABLET ORAL at 07:56

## 2023-04-18 RX ADMIN — ASPIRIN 81 MG: 81 TABLET, CHEWABLE ORAL at 07:57

## 2023-04-18 RX ADMIN — ACETAMINOPHEN 650 MG: 325 TABLET, FILM COATED ORAL at 07:56

## 2023-04-18 NOTE — PROGRESS NOTES
TE[employer]F - April 18 - PROGRESS SLOWER THAN INITIAL FIRST DAYS SO ANTICIPATE EXTENDING LENGTH OF STAY - STRONGER BUT STILL IMPAIRED MOTOR CONTROL. FATIGUE. BED MOD. TRANSFERS MIN CTG. GAIT 80 FEET CTG RW , LESS DRAG RIGHT FEET. 4 STAIRS CTG. BATH MOD. LBD MAX. UBD MIN SBA. COGNITION - CUES FOR ATTENTION TO DETAIL. SWALLOW - COUGH WITH WATER BY CUP. MECH SOFT, NTL. VOICING/ARTICULATION- MILD DYSARTHRIA AND WILL SELF CORRECT.  BNE YESTERDAY. SEE REPORT IN [facility]. Attention: WNL  Executive Functioning: Mildly Impaired  Abstract Reasoning: WNL  Arithmetic: Mildly Impaired  Visuospatial Perception: WNL  Visuospatial Praxis: Moderately to Severely Impaired  Verbal Memory: Immediate - Moderately Impaired; Delayed - WNL for Stories, Severely Impaired for Lists  Visual Memory: Mildly Impaired  Emotional: Mild anxiety and depression related to worries about her quality-of-life following discharge from the hospital.  O2 AT NIGHT. STILL INCONTINENT- TIMED VOIDS. ALLERGIES - SNEEZES.   ELOS - 1.5 WEEKS

## 2023-04-18 NOTE — THERAPY TREATMENT NOTE
Inpatient Rehabilitation - Physical Therapy Treatment Note       [facility]     Patient Name: Melissa Gomez  : 1940  MRN: [id number]    Today's Date: 2023                    Admit Date: 2023      Visit Dx:     ICD-10-CM ICD-9-CM   1. Impaired functional mobility, balance, gait, and endurance  Z74.09 V49.89   2. Follow-up exam  Z09 V67.9       Patient Active Problem List   Diagnosis   • Infarction of left basal ganglia       Past Medical History:   Diagnosis Date   • GERD (gastroesophageal reflux disease)    • Hyperlipidemia    • Hypertension    • Stroke        Past Surgical History:   Procedure Laterality Date   • COLONOSCOPY     • HYSTERECTOMY     • TONSILLECTOMY         PT ASSESSMENT (last 12 hours)     IRF PT Evaluation and Treatment     Row Name 23 1059          PT Time and Intention    Document Type daily treatment  -EE     Mode of Treatment physical therapy;individual therapy  -EE     Patient/Family/Caregiver Comments/Observations Pt sitting up in WC, agreeable to PT.  -EE     Row Name 23 1059          General Information    Existing Precautions/Restrictions fall;swallow precautions  -EE     Row Name 23 1059          Cognition/Psychosocial    Affect/Mental Status (Cognition) WFL  -EE     Orientation Status (Cognition) oriented x 4  -EE     Follows Commands (Cognition) follows one-step commands;verbal cues/prompting required  -EE     Personal Safety Interventions fall prevention program maintained;gait belt;muscle strengthening facilitated;nonskid shoes/slippers when out of bed;supervised activity  -EE     Safety Deficit (Cognition) insight into deficits/self-awareness  -EE     Row Name 23 1059          Bed Mobility    Supine-Sit [place] (Bed Mobility) minimum assist (75% patient effort)  -EE     Sit-Supine [place] (Bed Mobility) moderate assist (50% patient effort);minimum assist (75% patient effort);verbal cues  -EE     Bed Mobility, Safety Issues  decreased use of arms for pushing/pulling;decreased use of legs for bridging/pushing;impaired trunk control for bed mobility  -EE     Comment, (Bed Mobility) mat mobility;  cues for sequencing  -EE     Row Name 04/18/23 1059          Transfer Assessment/Treatment    Comment, (Transfers) 10x STS from elevated mat surface with limited use of UEs to allow for LE strengthening. Cues for forward weight shifting.  -EE     Row Name 04/18/23 1059          Bed-Chair Transfer    Bed-Chair [place] (Transfers) contact guard;verbal cues  -EE     Assistive Device (Bed-Chair Transfers) walker, front-wheeled  -EE     Row Name 04/18/23 1059          Chair-Bed Transfer    Chair-Bed [place] (Transfers) minimum assist (75% patient effort);contact guard;verbal cues  -EE     Assistive Device (Chair-Bed Transfers) walker, front-wheeled  -EE     Row Name 04/18/23 1059          Sit-Stand Transfer    Sit-Stand [place] (Transfers) contact guard;minimum assist (75% patient effort);verbal cues  -EE     Assistive Device (Sit-Stand Transfers) walker, front-wheeled  -EE     Comment, (Sit-Stand Transfer) cues for sequencing and hand placement, can perform at times with CGA but requires min A when fatigued or if setup is not optimized  -EE     Row Name 04/18/23 1059          Stand-Sit Transfer    Stand-Sit [place] (Transfers) contact guard;minimum assist (75% patient effort);verbal cues  -EE     Assistive Device (Stand-Sit Transfers) walker, front-wheeled  -EE     Comment, (Stand-Sit Transfer) cues for hand placement and eccentric control  -EE     Row Name 04/18/23 1059          Gait/Stairs (Locomotion)    [place] Level (Gait) contact guard;verbal cues  -EE     Assistive Device (Gait) walker, front-wheeled  -EE     Distance in Feet (Gait) 80' x 3  -EE     Pattern (Gait) step-through  -EE     Deviations/Abnormal Patterns (Gait) lenard decreased;festinating/shuffling;stride length decreased  -EE     Bilateral Gait Deviations  forward flexed posture;heel strike decreased  -EE     Gait Assessment/Intervention cues for upright posture and increased B foot clearance  -EE     Row Name 04/18/23 1059          Safety Issues, Functional Mobility    Impairments Affecting Function (Mobility) balance;endurance/activity tolerance;postural/trunk control;strength  -EE     Row Name 04/18/23 1059          Hip (Therapeutic Exercise)    Hip Strengthening (Therapeutic Exercise) bilateral;marching while seated;aBduction;aDduction;10 repetitions;2 sets  -EE     Row Name 04/18/23 1059          Knee (Therapeutic Exercise)    Knee (Therapeutic Exercise) AROM (active range of motion)  -EE     Knee AROM (Therapeutic Exercise) bilateral;LAQ (long arc quad);10 repetitions;2 sets  -EE     Knee Strengthening (Therapeutic Exercise) bilateral;SLR (straight leg raise);3 sets;5 repetitions  -EE     Row Name 04/18/23 1059          Ankle (Therapeutic Exercise)    Ankle Strengthening (Therapeutic Exercise) bilateral;dorsiflexion;inversion;eversion;10 repetitions;2 sets  -EE     Row Name 04/18/23 1059          Core Strength (Therapeutic Exercise)    Core Strength (Therapeutic Exercise) bridging, bilateral lower extremities;2 sets;10 repetitions  -EE     Row Name 04/18/23 1059          Positioning and Restraints    Pre-Treatment Position sitting in chair/recliner  -EE     Post Treatment Position wheelchair  -EE     In Wheelchair sitting;exit alarm on;with OT  -EE           User Key  (r) = Recorded By, (t) = Taken By, (c) = Cosigned By    Initials Name Provider Type    EE Diann Gonzáles PT Physical Therapist                 Physical Therapy Education     Title: PT OT SLP Therapies (Done)     Topic: Physical Therapy (Done)     Point: Mobility training (Done)     Learning Progress Summary           Patient Acceptance, E,TB, VU,NR by EE at 4/18/2023 1102    Acceptance, E,D,TB, VU,NR by EE at 4/17/2023 1129    Acceptance, TB,E, VU,NR by EE at 4/13/2023 1047    Acceptance, E,D, VU,DU  by DP at 4/11/2023 1038    Acceptance, E, VU,NR by EE at 4/10/2023 1043    Acceptance, E, VU by LS at 4/8/2023 1204    Acceptance, E,TB, VU,NR by EE at 4/7/2023 1054    Acceptance, E,TB, VU by KP at 4/6/2023 1527                   Point: Home exercise program (Done)     Learning Progress Summary           Patient Acceptance, E,TB, VU,NR by EE at 4/18/2023 1102    Acceptance, E,D,TB, VU,NR by EE at 4/17/2023 1129    Acceptance, TB,E, VU,NR by EE at 4/13/2023 1047    Acceptance, E,D, VU,DU by DP at 4/11/2023 1038    Acceptance, E, VU,NR by EE at 4/10/2023 1043    Acceptance, E, VU by LS at 4/8/2023 1204    Acceptance, E,TB, VU,NR by EE at 4/7/2023 1054    Acceptance, E,TB, VU by KP at 4/6/2023 1527                               User Key     Initials Effective Dates Name Provider Type Discipline     06/16/21 -  Yamila Wheat MS CCC-SLP Speech and Language Pathologist SLP    EE 06/16/21 -  Diann Gonzáles, PT Physical Therapist PT    KP 06/16/21 -  Chen Centeno, PT Physical Therapist PT    DP 08/24/21 -  Gen Gray, PT Physical Therapist PT                PT Recommendation and Plan                          Time Calculation:      PT Charges     Row Name 04/18/23 1304 04/18/23 1102          Time Calculation    Start Time 1230  -EE 1030  -EE     Stop Time 1300  -EE 1100  -EE     Time Calculation (min) 30 min  -EE 30 min  -EE     PT Received On 04/18/23  -EE 04/18/23  -EE     PT - Next Appointment 04/19/23  -EE 04/18/23  -EE        Time Calculation- PT    Total Timed Code Minutes- PT 30 minute(s)  -EE 30 minute(s)  -EE           User Key  (r) = Recorded By, (t) = Taken By, (c) = Cosigned By    Initials Name Provider Type    EE Diann Gonzáles, PT Physical Therapist                Therapy Charges for Today     Code Description Service Date Service Provider Modifiers Qty    30212604749  PT THERAPEUTIC ACT EA 15 MIN 4/17/2023 Diann Gonzáles, PT GP 2    [id number]  PT THERAPEUTIC ACT EA 15 MIN 4/17/2023 Diann Gonzáles,  PT GP 2    60750303570  PT THER PROC EA 15 MIN 4/18/2023 Diann Gonzáles, PT GP 2    [id number] HC PT THERAPEUTIC ACT EA 15 MIN 4/18/2023 Diann Gonzáles, PT GP 2                   Diann Gonzáles, PT  4/18/2023

## 2023-04-18 NOTE — PROGRESS NOTES
Recreational Therapy Note    Patient Name: Melissa Gomez   MRN: [id number]    Therapeutic Recreation Eval and Treat (last 12 hours)     Therapeutic Recreation Eval & Treat     Row Name 04/18/23 1400       Therapeutic Recreation Participation    Recreation Therapy Participation games  -TW    Games board games  [place]  -TW    Objectives of Recreation Participation maintain;positive attitudes leading to a healthy leisure lifestyle;sense of autonomy by choosing level of participation  -TW    Comment, Recreation Participation BUE use to roll dice; RUE use to  dice; scorekeeping occ assist  -TW    Recreation Therapy Summary of Participation active participation  -TW          User Key  (r) = Recorded By, (t) = Taken By, (c) = Cosigned By    Initials Name Provider Type    Nancy Hess CTRS Recreational Therapist                  HUGH Espinoza  4/18/2023

## 2023-04-18 NOTE — PROGRESS NOTES
LOS: 13 days   Patient Care Team:  Jayro Fountain MD as PCP - General (Internal Medicine)      Patient Name: PIERRE FREEMAN  Patient : 1940    ADMITTING DIAGNOSIS:  Diagnoses    1. Follow-up examination  2. IMPAIRED FUNCTIONAL MOBILITY, BALANCE, GAIT, AND ENDURANCE       Left basal ganglia stroke  Right hemiparesis with impaired motor control    SUBJECTIVE:    Tolerating therapies.  Doing better with articulation  strategies.  Motor control deficits are persistent on the right side       REVIEW OF SYSTEMS:    General: denies weight change, fatigue, weakness, fever, chills, night sweats.   Skin: denies itching, rashes, sores and bruises.   Neurologic: denies headache, nausea, vomiting, or visual changes.   HEENT:  denies discharge, sore throat, allergies, and congestion.   Respiratory: denies shortness of breath, wheeze, cough and hemoptysis.   Cardiac:  denies chest pain or palpitations.   Gastrointestinal:  denies changes in appetite, nausea, vomiting, dysphagia, abdominal pain, constipation, or diarrhea.   Urinary:  denies urgency and frequency.  Musculoskeletal:  denies muscle weakness, pain, or joint stiffness.     OBJECTIVE:    Vitals:    23 1423   BP: 115/64   Pulse: 100   Resp: 18   Temp: 97.6 °F (36.4 °C)   SpO2: 95%       PHYSICAL EXAM:   General: pleasant, in no acute distress  HEENT: NCAT, sclera anicteric, conjunctiva pink, mucoa moist  Cardiovascular: RRR, +S1+S2, no obvious m/g/r  Lungs: CTAB, no rhonchi or wheezing  Abdomen: normoactive bowel sounds, soft, non tender to palpation  Extremities: no peripheral edema  Skin: exposed surfaces of skin warm, intact, without erythema  Neuro: awake alert and oriented to person, place, time, and situation,  Right facial droop   dysarthria.  MSK: Right shoulder flexion 4/5, elbow flexion 4+/5, finger flexion 4+/5, knee extension 4+/5, ankle dorsiflexion 4/5,  weak on the RUE, LUE 5/5 BLE 5/5      MEDICATIONS  Scheduled  Meds:  amLODIPine, 10 mg, Oral, Q24H  vitamin C, 250 mg, Oral, Daily  aspirin, 81 mg, Oral, Daily  cholecalciferol, 1,000 Units, Oral, Daily  clopidogrel, 75 mg, Oral, Daily  enoxaparin, 40 mg, Subcutaneous, Q24H  lisinopril, 5 mg, Oral, Q24H  rosuvastatin, 20 mg, Oral, Nightly  senna-docusate sodium, 2 tablet, Oral, Nightly        Continuous Infusions:       PRN Meds:  •  acetaminophen **OR** [DISCONTINUED] acetaminophen  •  [DISCONTINUED] senna-docusate sodium **AND** polyethylene glycol **AND** bisacodyl **AND** bisacodyl      RESULTS:  No results found for: POCGLU  Results from last 7 days   Lab Units 04/17/23  0723 04/14/23  0725   WBC 10*3/mm3 6.71 7.96   HEMOGLOBIN g/dL 13.2 13.4   HEMATOCRIT % 38.4 40.1   PLATELETS 10*3/mm3 198 209     Results from last 7 days   Lab Units 04/17/23  0723 04/14/23  0725   SODIUM mmol/L 141 141   POTASSIUM mmol/L 4.4 4.5   CHLORIDE mmol/L 107 107   CO2 mmol/L 25.2 25.2   BUN mg/dL 25* 19   CREATININE mg/dL 0.83 0.63   CALCIUM mg/dL 9.3 9.5   GLUCOSE mg/dL 102* 95           ASSESSMENT and PLAN:    Infarction of left basal ganglia    Left basal ganglia stroke  Stroke prophylaxis- ASA and Plavix x 21 days from March 30, 2023, then ASA 81 mg daily. Crestor 20 mg.     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  We will continue to monitor.    Dysphagia   April 14 - upgrade to soft (chopped meats)/no mixed consistencies with NTL. Recommend sit upright in wheelchair with supervision. Check intermittently for pocketing. Meds one at a time with applesauce or pudding    Dysarthria.     Hypertension - amlodipine/lisinopril     Check TSH, Vit B12 and Vit D levels-within normal limit     Urinary retention. Treated for UTI as Reggie Duran. Check bladder scan PVR and cath if >300 cc until three consecutive < 250 cc  April 7-requires intermittent straight cath 400-121-300  cc.  No spontaneous void.  Follow pattern.  April 10-requirement straight catheter 300 cc about every 6 or 8 hours.  - will extend interval to every 12 hours to see if she voids with a larger volume.  4/12- has not required intermittent catheterization since last night.  Noted to have 4 voids with PVR less than 200.  May be recovering bladder function.  Will monitor.  4/13-voiding on her own with postvoid residuals 190 range    Right upper extremity discomfort-April 17-Has some diffuse pain in the right upper extremity.  Distal right upper extremity slightly warmer than the left.  No allodynia.  Reviewed possible post stroke pain/central pain syndrome versus sympathetic mediated pain versus shoulder subluxation.  Will monitor.     DVT prophylaxis- on dual antiplatelet therapies.  SCDs.  Lovenox     Team Conference 4/11/2023  Nursing: continent/incontinent with bladder, requiring cathing each bladder scan, no safety issues  PT: bed mobiliy min assist, contact guard with transfers, ambulating 80 feet, fatigues quickly with low endurance, cues for sequencing, drags left foot, anticipated contact guard with walker  OT: mod assist for bathing, max/dependent with lower body dressing, toileting max assist due to balance  SLP: mildly impaired on CLQT WNL memory/visuospatial, and mild for attention/executive function, puree and nectar thick diet, mild to moderate dysarthria  Discharge Date: 1.5 weeks     TEAM CONF - April 18 - PROGRESS SLOWER THAN INITIAL FIRST DAYS SO ANTICIPATE EXTENDING LENGTH OF STAY - STRONGER BUT STILL IMPAIRED MOTOR CONTROL. FATIGUE. BED MOD. TRANSFERS MIN CTG. GAIT 80 FEET CTG RW , LESS DRAG RIGHT FEET. 4 STAIRS CTG. BATH MOD. LBD MAX. UBD MIN SBA. COGNITION - CUES FOR ATTENTION TO DETAIL. SWALLOW - COUGH WITH WATER BY CUP. MECH SOFT, NTL. VOICING/ARTICULATION- MILD DYSARTHRIA AND WILL SELF CORRECT.  BNE YESTERDAY. SEE REPORT IN [facility]. Attention: WNL  Executive Functioning: Mildly  Impaired  Abstract Reasoning: WNL  Arithmetic: Mildly Impaired  Visuospatial Perception: WNL  Visuospatial Praxis: Moderately to Severely Impaired  Verbal Memory: Immediate - Moderately Impaired; Delayed - WNL for Stories, Severely Impaired for Lists  Visual Memory: Mildly Impaired  Emotional: Mild anxiety and depression related to worries about her quality-of-life following discharge from the hospital.  O2 AT NIGHT. STILL INCONTINENT- TIMED VOIDS. ALLERGIES - SNEEZES.   ELOS - 1.5 WEEKS    CODE STATUS:  Code Status (Patient has no pulse and is not breathing): CPR (Attempt to Resuscitate)  Medical Interventions (Patient has pulse or is breathing): Full Support  Release to patient: Routine Release      Admission Status: Continues to meet requirements for inpatient admission.     Anton Fajardo MD        During rounds, used appropriate personal protective equipment including mask and gloves.  Additional gown if indicated.  Mask used was standard procedure mask. Appropriate PPE was worn during the entire visit.  Hand hygiene was completed before and after.

## 2023-04-18 NOTE — PROGRESS NOTES
Inpatient Rehabilitation Plan of Care Note    Plan of Care  Care Plan Reviewed - Updates as Follows    Psychosocial    [RN] Coping/Adjustment(Active)  Current Status(04/18/2023): Lacks insight with current situation  Weekly Goal(04/25/2023): provide education material regarding stroke  Discharge Goal: Knowledgeable of care needs and stroke recovery    Performed Intervention(s)  Verbalizes needs and concerns  Therapeutic environmental set up      Sphincter Control    [RN] Bladder Management(Active)  Current Status(04/18/2023): Incontinent of bladder, bladder scans no longer  ordered due to several < 200 scans.  Weekly Goal(04/25/2023): The patient is unable to void independently  Discharge Goal: The patient empties bladder completely    [RN] Bowel Management(Active)  Current Status(04/18/2023): Incontinent of stool  Weekly Goal(04/24/2023): Continent 50%  Discharge Goal: Continent 100%    Performed Intervention(s)  Bladder scan or I/O cath per order  Encourage fluids  Bowel/bladder training      Safety    [RN] Potential for Injury(Active)  Current Status(04/18/2023): Risk for falls  Weekly Goal(04/24/2023): Instruct family/caregivers regarding safety precautions  and need for close supervision  Discharge Goal: Family/caregiver will be knowledgeable of need for cueing and  supervision to avoid falls    Performed Intervention(s)  Safety Rounds  Bed alarm and/or chair alarm    Signed by: Lisa Solis RN

## 2023-04-18 NOTE — THERAPY TREATMENT NOTE
Inpatient Rehabilitation - Speech Language Pathology Treatment Note    [facility]     Patient Name: Melissa Gomez  : 1940  MRN: [id number]    Today's Date: 2023                   Admit Date: 2023       Visit Dx:      ICD-10-CM ICD-9-CM   1. Impaired functional mobility, balance, gait, and endurance  Z74.09 V49.89   2. Follow-up exam  Z09 V67.9       Patient Active Problem List   Diagnosis   • Infarction of left basal ganglia       Past Medical History:   Diagnosis Date   • GERD (gastroesophageal reflux disease)    • Hyperlipidemia    • Hypertension    • Stroke        Past Surgical History:   Procedure Laterality Date   • COLONOSCOPY     • HYSTERECTOMY     • TONSILLECTOMY         SLP Recommendation and Plan                                                            SLP EVALUATION (last 72 hours)     SLP SLC Evaluation     Row Name 23 13323 1100 23 1100          Communication Assessment/Intervention    Document Type therapy note (daily note)  -AL therapy note (daily note)  -AL therapy note (daily note)  -AL therapy note (daily note)  -AL     Patient/Family/Caregiver Comments/Observations Pt participated well.  -AL Pt participated well.  -AL Pt is pleasant and cooperative.  -AL Pt participated well.  -AL        Pain Scale: Numbers Pre/Post-Treatment    Pretreatment Pain Rating 0/10 - no pain  -AL 0/10 - no pain  -AL 0/10 - no pain  -AL 0/10 - no pain  -AL           User Key  (r) = Recorded By, (t) = Taken By, (c) = Cosigned By    Initials Name Effective Dates    Janell Erickson MS CCC-SLP 21 -                    EDUCATION    The patient has been educated in the following areas:       Cognitive Impairment Communication Impairment Dysphagia (Swallowing Impairment).             SLP GOALS     Row Name 23 13323 1100 23 133       (STG) Pharyngeal Strengthening Exercise Goal 1 (SLP)    Progress/Outcomes (Pharyngeal Strengthening Goal 1,  "SLP) -- good progress toward goal  -AL --    Comment (Pharyngeal Strengthening Goal 1, SLP) -- Pt exhibited no overt s/s of aspiration or penetration in 12/15 trials of water by cup; delayed cough x1, delayed throat clear x1, immediate throat clear x1. Pt required initial MIN cue only for small sips. Pt completed 30 repetitions of effortful swallow with MIN-MOD cues. EMST: 5 sets of 5 reps of EMST 75 at 1.25 turns past 5 cmH20 with MOD cues.  -AL --       Attention Goal 1 (SLP)    Improve Attention by Goal 1 (SLP) complete selective attention task;complete sustained attention task;80%;independently (over 90% accuracy)  -AL -- complete selective attention task;complete sustained attention task;80%;independently (over 90% accuracy)  -AL    Time Frame (Attention Goal 1, SLP) 1 week  -AL -- --    Progress/Outcomes (Attention Goal 1, SLP) good progress toward goal  -AL -- good progress toward goal  -AL    Comment (Attention Goal 1, SLP) Attention to detail for \"Bank Hours\" task: 6/7 with NO cues, 7/7 with MIN cues.  -AL -- Calendar: 5/6 with NOcues, 6/6 with MIN cues.  -AL       Memory Skills Goal 1 (SLP)    Improve Memory Skills Through Goal 1 (SLP) recalling related word lists with an imposed delay;listen to a paragraph and answer questions;recall details of the day;80%;independently (over 90% accuracy)  -AL -- --    Time Frame (Memory Skills Goal 1, SLP) 1 week  -AL -- --    Progress/Outcomes (Memory Skills Goal 1, SLP) good progress toward goal  -AL -- --    Comment (Memory Skills Goal 1, SLP) Recalled 3/3 unrelated words after 20 minutes with NO cues. Immediate memory for voicemails: 70% with NO cues, 100% with MIN cues.  -AL -- Voicemails: 11/15 with NO cues, 15/15 with MIN cues.  -AL       Organizational Skills Goal 1 (SLP)    Improve Thought Organization Through Goal 1 (SLP) completing a divergent naming task;80%;with minimal cues (75-90%)  -AL -- --    Time Frame (Thought Organization Skills Goal 1, SLP) 1 week  " -AL -- --    Progress/Outcomes (Thought Organization Skills Goal 1, SLP) goal ongoing  -AL -- --    Comment (Thought Organization Skills Goal 1, SLP) Red items: 2 with NO cues, 7 with MIN-MOD cues  -AL -- --    Row Name 04/17/23 1100             ([facility]) Pharyngeal Strengthening Exercise Goal 1 (SLP)    Activity (Pharyngeal Strengthening Goal 1, SLP) increase timing;increase superior movement of the hyolaryngeal complex;increase anterior movement of the hyolaryngeal complex;increase epiglottic inversion and retroflexion;increase closure at entrance to airway/closure of airway at glottis  -AL      Increase Timing hard effortful swallow  -AL      Increase Superior Movement of the Hyolaryngeal Complex hard effortful swallow  -AL      Increase Anterior Movement of the Hyolaryngeal Complex EMST  -AL      Increase Epiglottic Inversion and Retroflexion Mendelsohn;hard effortful swallow;EMST  -AL      Increase Closure at Entrance to Airway/Closure of Airway at Glottis Mendelsohn;hard effortful swallow  -AL      [place]/Accuracy (Pharyngeal Strengthening Goal 1, SLP) with moderate cues (50-74% accuracy)  -AL      Time Frame (Pharyngeal Strengthening Goal 1, SLP) 1 week  -AL      Progress/Outcomes (Pharyngeal Strengthening Goal 1, SLP) good progress toward goal  -AL      Comment (Pharyngeal Strengthening Goal 1, SLP) Pt now tolerating soft (chopped meats) diet with NTL. Pt completed 30 repetitions of effortful swallow with MOD cues. Pt completed 2 sets of 5 reps of EMST at 1.25 turns past 5 cmH20. Pt exhibited no overt s/s of aspiration or penetration in 8/10 trials of ice chips; throat clear x1, delayed throat clear x1. Pt exhibited no overt s/s of aspiration or penetration in 5/9 trials of water by cup; immediate cough x1, immediate throat clear x1, delayed throat clear x1, delayed coughing x1. Pt required MIN cues for small sips. Plan to complete repeat VFSS in ~2-3 days.  -AL         Articulation Goal 1 (SLP)    Improve  Articulation Goal 1 (SLP) by over-articulating at word level;by over-articulating at phrase level;by over-articulating in connected speech;80%;with minimal cues (75-90%)  -AL      Time Frame (Articulation Goal 1, SLP) 1 week  -AL      Progress/Outcomes (Articulation Goal 1, SLP) good progress toward goal  -AL      Comment (Articulation Goal 1, SLP) Pt now judged to be 100%intelligible in complex conversation with a familiar listener. She presents with mild mixed (flaccid/hypokinetic) dysarthria. She often self-corrects and repeats utterances with improved clarity.  -AL         Attention Goal 1 (SLP)    Improve Attention by Goal 1 (SLP) complete selective attention task;complete sustained attention task;80%;independently (over 90% accuracy)  -AL      Time Frame (Attention Goal 1, SLP) 1 week  -AL      Progress/Outcomes (Attention Goal 1, SLP) good progress toward goal  -AL         Memory Skills Goal 1 (SLP)    Improve Memory Skills Through Goal 1 (SLP) recalling related word lists with an imposed delay;listen to a paragraph and answer questions;recall details of the day;80%;independently (over 90% accuracy)  -AL      Time Frame (Memory Skills Goal 1, SLP) 1 week  -AL      Progress/Outcomes (Memory Skills Goal 1, SLP) goal met  -AL         Organizational Skills Goal 1 (SLP)    Improve Thought Organization Through Goal 1 (SLP) completing a divergent naming task;80%;with minimal cues (75-90%)  -AL      Time Frame (Thought Organization Skills Goal 1, SLP) 1 week  -AL      Progress/Outcomes (Thought Organization Skills Goal 1, SLP) goal no longer appropriate  -AL         Reasoning Goal 1 (SLP)    Improve Reasoning Through Goal 1 (SLP) complete deductive reasoning task;80%;independently (over 90% accuracy)  -AL      Time Frame (Reasoning Goal 1, SLP) 1 week  -AL      Progress/Outcomes (Reasoning Goal 1, SLP) goal no longer appropriate  -AL         Executive Functional Skills Goal 1 (SLP)    Improve Executive Function  Skills Goal 1 (SLP) home management activity;80%;with minimal cues (75-90%)  -AL      Time Frame (Executive Function Skills Goal 1, SLP) 1 week  -AL      Progress/Outcomes (Executive Function Skills Goal 1, SLP) goal ongoing  -AL            User Key  (r) = Recorded By, (t) = Taken By, (c) = Cosigned By    Initials Name Provider Type    Janell Erickson MS CCC-SLP Speech and Language Pathologist                            Time Calculation:        Time Calculation- SLP     Row Name 04/18/23 1546 04/18/23 1239          Time Calculation- SLP    SLP Start Time 1330  -AL 1100  -AL     SLP Stop Time 1400  -AL 1130  -AL     SLP Time Calculation (min) 30 min  -AL 30 min  -AL           User Key  (r) = Recorded By, (t) = Taken By, (c) = Cosigned By    Initials Name Provider Type    Janell Erickson MS CCC-SLP Speech and Language Pathologist                  Therapy Charges for Today     Code Description Service Date Service Provider Modifiers Qty    81136054345 HC ST TREATMENT SWALLOW 2 4/17/2023 Janell Gallego MS CCC-SLP GN 1    70060231332 HC ST DEV OF COGN SKILLS INITIAL 15 MIN 4/17/2023 Janell Gallego, MS CCC-SLP  1    00932844543 HC ST DEV OF COGN SKILLS EACH ADDT'L 15 MIN 4/17/2023 Janell Gallego, MS CCC-SLP  1    77630010832 HC ST TREATMENT SWALLOW 2 4/18/2023 Janell Gallego MS CCC-SLP GN 1    92229204371 HC ST DEV OF COGN SKILLS INITIAL 15 MIN 4/18/2023 Janell Gallego MS CCC-SLP  1    16844063156 HC ST DEV OF COGN SKILLS EACH ADDT'L 15 MIN 4/18/2023 Janell Gallego MS CCC-SLP  1                           Janell Gallego MS CCC-SLP  4/18/2023

## 2023-04-18 NOTE — PLAN OF CARE
Goal Outcome Evaluation:   Slept fairly well. Meds crushed & put in applesauce. Declined pericolace & SCD's. Incontinent of bladder. Turned several times. Tylenol given for back & Rt. Arm pain, with some relief.

## 2023-04-18 NOTE — THERAPY TREATMENT NOTE
Inpatient Rehabilitation - Occupational Therapy Treatment Note    [facility]     Patient Name: Melissa Gomez  : 1940  MRN: [id number]    Today's Date: 2023                 Admit Date: 2023         ICD-10-CM ICD-9-CM   1. Impaired functional mobility, balance, gait, and endurance  Z74.09 V49.89   2. Follow-up exam  Z09 V67.9       Patient Active Problem List   Diagnosis   • Infarction of left basal ganglia       Past Medical History:   Diagnosis Date   • GERD (gastroesophageal reflux disease)    • Hyperlipidemia    • Hypertension    • Stroke        Past Surgical History:   Procedure Laterality Date   • COLONOSCOPY     • HYSTERECTOMY     • TONSILLECTOMY               IRF OT ASSESSMENT FLOWSHEET (last 12 hours)     IRF OT Evaluation and Treatment     Row Name 23 1159          OT Time and Intention    Document Type daily treatment  -     Mode of Treatment individual therapy;occupational therapy  -     Patient Effort good  -KP     Symptoms Noted During/After Treatment none  -     Row Name 23 1159          General Information    Patient/Family/Caregiver Comments/Observations pt sitting in   -     Existing Precautions/Restrictions fall;swallow precautions  use mouth swab to rinse mouth after brushing teeth  -KP     Row Name 23 1159          Pain Assessment    Pretreatment Pain Rating 0/10 - no pain  -     Posttreatment Pain Rating 0/10 - no pain  -     Row Name 23 1159          Cognition/Psychosocial    Affect/Mental Status (Cognition) WFL  -     Orientation Status (Cognition) oriented x 4  -KP     Follows Commands (Cognition) follows one-step commands;verbal cues/prompting required;over 90% accuracy  -     Personal Safety Interventions fall prevention program maintained;gait belt;nonskid shoes/slippers when out of bed  -     Cognitive Function attention deficit  -     Attention Deficit (Cognition) minimal deficit  -     Row Name 23 1150           Bathing    [place] Level (Bathing) bathing skills;set up;moderate assist (50% patient effort)  -     Assistive Device (Bathing) grab bar/tub rail  -     Position (Bathing) sink side;supported sitting;supported standing  -     Set-up Assistance (Bathing) obtain supplies  -     Row Name 04/18/23 1159          Upper Body Dressing    [place] Level (Upper Body Dressing) upper body dressing skills;doff;don;pull over garment;set up assistance;moderate assist (50-74% patient effort)  -     Position (Upper Body Dressing) supported sitting  -     Set-up Assistance (Upper Body Dressing) obtain clothing  -     Row Name 04/18/23 1159          Lower Body Dressing    [place] Level (Lower Body Dressing) doff;don;pants/bottoms;shoes/slippers;socks;set up;verbal cues;moderate assist (50% patient effort)  -     Assistive Device Use (Lower Body Dressing) reacher  -     Position (Lower Body Dressing) supported sitting;supported standing  -     Set-up Assistance (Lower Body Dressing) obtain clothing  -     Row Name 04/18/23 1159          Grooming    [place] Level (Grooming) grooming skills;deodorant application;oral care regimen;wash face, hands;standby assist;set up;hair care, combing/brushing;minimum assist (75% patient effort)  -     Position (Grooming) sink side;supported sitting  -     Set-up Assistance (Grooming) obtain supplies  -     Comment (Grooming) A w hair  -     Row Name 04/18/23 1159          Bed Mobility    Comment, (Bed Mobility) NT  -     Row Name 04/18/23 1159          Sit-Stand Transfer    Sit-Stand [place] (Transfers) set up;verbal cues;minimum assist (75% patient effort);moderate assist (50% patient effort)  -     Assistive Device (Sit-Stand Transfers) wheelchair  -     Comment, (Sit-Stand Transfer) at sink  -     Row Name 04/18/23 1159          Stand-Sit Transfer    Stand-Sit [place] (Transfers) set up;verbal cues;minimum assist (75% patient  effort);moderate assist (50% patient effort)  -     Assistive Device (Stand-Sit Transfers) wheelchair  -     Comment, (Stand-Sit Transfer) at sink  -     Row Name 04/18/23 1159          Toilet Transfer    Comment, (Toilet Transfer) NT  -     Row Name 04/18/23 1159          Balance    Static Standing Balance set-up;minimal assist;verbal cues  -     Row Name 04/18/23 1159          Positioning and Restraints    Pre-Treatment Position sitting in chair/recliner  -     Post Treatment Position wheelchair  -     In Wheelchair sitting;call light within reach;encouraged to call for assist;exit alarm on  -           User Key  (r) = Recorded By, (t) = Taken By, (c) = Cosigned By    Initials Name Effective Dates     Patsy Blank, OTR 06/16/21 -                  Occupational Therapy Education     Title: PT OT SLP Therapies (Done)     Topic: Occupational Therapy (Done)     Point: ADL training (Done)     Description:   Instruct learner(s) on proper safety adaptation and remediation techniques during self care or transfers.   Instruct in proper use of assistive devices.              Learning Progress Summary           Patient Acceptance, E, VU by  at 4/8/2023 1204    Acceptance, E,TB,D, VU,DU,NR by  at 4/6/2023 1230    Comment: ed pt on role of OT. benefit of therapy, POC w.  OT ed on safety w ADL and tsf. pt ed on UBD technique.                   Point: Home exercise program (Done)     Description:   Instruct learner(s) on appropriate technique for monitoring, assisting and/or progressing therapeutic exercises/activities.              Learning Progress Summary           Patient Acceptance, E, VU by  at 4/8/2023 1204    Acceptance, E,TB,D, VU,DU,NR by  at 4/6/2023 1230    Comment: ed pt on role of OT. benefit of therapy, POC w.  OT ed on safety w ADL and tsf. pt ed on UBD technique.                   Point: Precautions (Done)     Description:   Instruct learner(s) on prescribed precautions during  self-care and functional transfers.              Learning Progress Summary           Patient Acceptance, E, VU by  at 4/8/2023 1204    Acceptance, E,TB,D, VU,DU,NR by  at 4/6/2023 1230    Comment: ed pt on role of OT. benefit of therapy, POC w.  OT ed on safety w ADL and tsf. pt ed on UBD technique.                   Point: Body mechanics (Done)     Description:   Instruct learner(s) on proper positioning and spine alignment during self-care, functional mobility activities and/or exercises.              Learning Progress Summary           Patient Acceptance, E, VU by  at 4/8/2023 1204    Acceptance, E,TB,D, VU,DU,NR by  at 4/6/2023 1230    Comment: ed pt on role of OT. benefit of therapy, POC w.  OT ed on safety w ADL and tsf. pt ed on UBD technique.                               User Key     Initials Effective Dates Name Provider Type [facility] 06/16/21 -  Patsy Blank OTR Occupational Therapist OT     06/16/21 -  Yamila Wheat MS CCC-SLP Speech and Language Pathologist SLP                    OT Recommendation and Plan    Planned Therapy Interventions (OT): activity tolerance training, adaptive equipment training, BADL retraining, functional balance retraining, neuromuscular control/coordination retraining, occupation/activity based interventions, patient/caregiver education/training, ROM/therapeutic exercise, strengthening exercise, transfer/mobility retraining       Daily Progress Summary (OT)  Overall Progress Toward Functional Goals (OT): progressing toward functional goals as expected            Time Calculation:      Time Calculation- OT     Row Name 04/18/23 1205             Time Calculation- OT    OT Start Time 0830  -      OT Stop Time 0900  -      OT Time Calculation (min) 30 min  -            User Key  (r) = Recorded By, (t) = Taken By, (c) = Cosigned By    Initials Name Provider Type     Patsy Blank OTR Occupational Therapist              Therapy  Charges for Today     Code Description Service Date Service Provider Modifiers Qty    83179571157 HC OT SELF CARE/MGMT/TRAIN EA 15 MIN 4/18/2023 Patsy Blank, OTR GO 2                   Patsy Blank, OTR  4/18/2023

## 2023-04-18 NOTE — PLAN OF CARE
Goal Outcome Evaluation:  Plan of Care Reviewed With: patient        Progress: improving  Outcome Evaluation: Melissa is alert and oriented pleasant and cooperative, continues with mild right hemiparesis, worked hard in therapies, eating meals in the dinning room for supervision with swallow precautions, complained of RUE pain from elbow to shoulder took Tylenol this AM did not need any this afternoon, no unsafe behaivors calls out appropriately for assitance.

## 2023-04-18 NOTE — THERAPY TREATMENT NOTE
Inpatient Rehabilitation - Occupational Therapy Treatment Note    [facility]     Patient Name: Melissa Gomez  : 1940  MRN: [id number]    Today's Date: 2023                 Admit Date: 2023         ICD-10-CM ICD-9-CM   1. Impaired functional mobility, balance, gait, and endurance  Z74.09 V49.89   2. Follow-up exam  Z09 V67.9       Patient Active Problem List   Diagnosis   • Infarction of left basal ganglia       Past Medical History:   Diagnosis Date   • GERD (gastroesophageal reflux disease)    • Hyperlipidemia    • Hypertension    • Stroke        Past Surgical History:   Procedure Laterality Date   • COLONOSCOPY     • HYSTERECTOMY     • TONSILLECTOMY               IRF OT ASSESSMENT FLOWSHEET (last 12 hours)     IRF OT Evaluation and Treatment     Row Name 23 1548 23 1159       OT Time and Intention    Document Type daily treatment  - daily treatment  -    Mode of Treatment individual therapy;occupational therapy  - individual therapy;occupational therapy  -    Patient Effort good  -KP good  -    Symptoms Noted During/After Treatment none  -KP none  -    Row Name 23 1548 23 1159       General Information    Patient/Family/Caregiver Comments/Observations -- pt sitting in   -    General Observations of Patient pt sitting in [facility] --    Existing Precautions/Restrictions fall;swallow precautions  - fall;swallow precautions  use mouth swab to rinse mouth after brushing teeth  -    Row Name 23 1548 23 1159       Pain Assessment    Pretreatment Pain Rating 8/10  -KP 0/10 - no pain  -KP    Posttreatment Pain Rating 8/10  -KP 0/10 - no pain  -KP    Pain Location - Side/Orientation Right  - --    Pain Location upper  - --    Pain Location - shoulder  - --    Pre/Posttreatment Pain Comment moist heat applied  - --    Row Name 23 1548 23 1159       Cognition/Psychosocial    Affect/Mental Status (Cognition) WFL  -KP WFL  -KP     Orientation Status (Cognition) oriented x 4  -KP oriented x 4  -KP    Follows Commands (Cognition) follows one-step commands;over 90% accuracy;verbal cues/prompting required  -KP follows one-step commands;verbal cues/prompting required;over 90% accuracy  -KP    Personal Safety Interventions fall prevention program maintained;gait belt;muscle strengthening facilitated;nonskid shoes/slippers when out of bed  -KP fall prevention program maintained;gait belt;nonskid shoes/slippers when out of bed  -KP    Cognitive Function attention deficit  -KP attention deficit  -KP    Attention Deficit (Cognition) minimal deficit  -KP minimal deficit  -KP    Safety Deficit (Cognition) insight into deficits/self-awareness  - --    Row Name 04/18/23 1159          Bathing    [place] Level (Bathing) bathing skills;set up;moderate assist (50% patient effort)  -     Assistive Device (Bathing) grab bar/tub rail  -     Position (Bathing) sink side;supported sitting;supported standing  -     Set-up Assistance (Bathing) obtain supplies  -     Row Name 04/18/23 1159          Upper Body Dressing    [place] Level (Upper Body Dressing) upper body dressing skills;doff;don;pull over garment;set up assistance;moderate assist (50-74% patient effort)  -     Position (Upper Body Dressing) supported sitting  -     Set-up Assistance (Upper Body Dressing) obtain clothing  -     Row Name 04/18/23 1159          Lower Body Dressing    [place] Level (Lower Body Dressing) doff;don;pants/bottoms;shoes/slippers;socks;set up;verbal cues;moderate assist (50% patient effort)  -     Assistive Device Use (Lower Body Dressing) reacher  -     Position (Lower Body Dressing) supported sitting;supported standing  -     Set-up Assistance (Lower Body Dressing) obtain clothing  -     Row Name 04/18/23 1159          Grooming    [place] Level (Grooming) grooming skills;deodorant application;oral care regimen;wash face, hands;standby  assist;set up;hair care, combing/brushing;minimum assist (75% patient effort)  -     Position (Grooming) sink side;supported sitting  -     Set-up Assistance (Grooming) obtain supplies  -     Comment (Grooming) A w hair  -     Row Name 04/18/23 1548 04/18/23 1159       Bed Mobility    Comment, (Bed Mobility) NT  [facility] NT  [facility]    Row Name 04/18/23 1548 04/18/23 1159       Sit-Stand Transfer    Sit-Stand [place] (Transfers) set up;minimum assist (75% patient effort);verbal cues  - set up;verbal cues;minimum assist (75% patient effort);moderate assist (50% patient effort)  -    Assistive Device (Sit-Stand Transfers) wheelchair  - wheelchair  -    Comment, (Sit-Stand Transfer) at counter top  - at sink  [facility] Name 04/18/23 1548 04/18/23 1159       Stand-Sit Transfer    Stand-Sit [place] (Transfers) set up;minimum assist (75% patient effort);verbal cues  - set up;verbal cues;minimum assist (75% patient effort);moderate assist (50% patient effort)  -    Assistive Device (Stand-Sit Transfers) wheelchair  - wheelchair  -    Comment, (Stand-Sit Transfer) at counter top  - at sink  -    Row Name 04/18/23 1159          Toilet Transfer    Comment, (Toilet Transfer) [facility] Name 04/18/23 1548          Motor Skills    Results, 9 Hole Peg Test of Fine Motor Coordination completed small color peg design using R hand w little to no diff but incr in pain in R shoulder. completed stacking act at counter top to incr GMC and standing balance skills.  -     Row Name 04/18/23 1548 04/18/23 1159       Balance    Static Sitting Balance standby assist  - --    Dynamic Sitting Balance standby assist  - --    Static Standing Balance contact guard  - set-up;minimal assist;verbal cues  -    Comment, Balance standing at counter top completed stacking cones x 3 min standing to incr endurance and act tolerance for ADLs and IADLs  - --    [facility] Name 04/18/23 1548          Hot Pack  Treatment (Superficial Heat)    Location 1 (Hot Pack) upper extremity treatment area  -     Indications (Hot Pack) pain modulation  -     Treatment Details (Hot Packs) moist heat applied  -     Row Name 04/18/23 1548 04/18/23 1159       Positioning and Restraints    Pre-Treatment Position sitting in chair/recliner  -KP sitting in chair/recliner  -KP    Post Treatment Position wheelchair  -KP wheelchair  -KP    In Wheelchair sitting;exit alarm on;with SLP  -KP sitting;call light within reach;encouraged to call for assist;exit alarm on  -          User Key  (r) = Recorded By, (t) = Taken By, (c) = Cosigned By    Initials Name Effective Dates     Patsy Blank, OTR 06/16/21 -                  Occupational Therapy Education     Title: PT OT SLP Therapies (Done)     Topic: Occupational Therapy (Done)     Point: ADL training (Done)     Description:   Instruct learner(s) on proper safety adaptation and remediation techniques during self care or transfers.   Instruct in proper use of assistive devices.              Learning Progress Summary           Patient Acceptance, E, VU by  at 4/8/2023 1204    Acceptance, E,TB,D, VU,DU,NR by  at 4/6/2023 1230    Comment: ed pt on role of OT. benefit of therapy, POC w.  OT ed on safety w ADL and tsf. pt ed on UBD technique.                   Point: Home exercise program (Done)     Description:   Instruct learner(s) on appropriate technique for monitoring, assisting and/or progressing therapeutic exercises/activities.              Learning Progress Summary           Patient Acceptance, E, VU by  at 4/8/2023 1204    Acceptance, E,TB,D, VU,DU,NR by  at 4/6/2023 1230    Comment: ed pt on role of OT. benefit of therapy, POC w.  OT ed on safety w ADL and tsf. pt ed on UBD technique.                   Point: Precautions (Done)     Description:   Instruct learner(s) on prescribed precautions during self-care and functional transfers.              Learning Progress  Summary           Patient Acceptance, E, VU by  at 4/8/2023 1204    Acceptance, E,TB,D, VU,DU,NR by  at 4/6/2023 1230    Comment: ed pt on role of OT. benefit of therapy, POC w.  OT ed on safety w ADL and tsf. pt ed on UBD technique.                   Point: Body mechanics (Done)     Description:   Instruct learner(s) on proper positioning and spine alignment during self-care, functional mobility activities and/or exercises.              Learning Progress Summary           Patient Acceptance, E, VU by  at 4/8/2023 1204    Acceptance, E,TB,D, VU,DU,NR by  at 4/6/2023 1230    Comment: ed pt on role of OT. benefit of therapy, POC w.  OT ed on safety w ADL and tsf. pt ed on UBD technique.                               User Key     Initials Effective Dates Name Provider Type [facility] 06/16/21 -  Patsy Blank OTR Occupational Therapist OT     06/16/21 -  Yamila Wheat MS CCC-SLP Speech and Language Pathologist SLP                    OT Recommendation and Plan    Planned Therapy Interventions (OT): activity tolerance training, adaptive equipment training, BADL retraining, functional balance retraining, neuromuscular control/coordination retraining, occupation/activity based interventions, patient/caregiver education/training, ROM/therapeutic exercise, strengthening exercise, transfer/mobility retraining       Daily Progress Summary (OT)  Overall Progress Toward Functional Goals (OT): progressing toward functional goals as expected            Time Calculation:      Time Calculation- OT     Row Name 04/18/23 1553 04/18/23 1205          Time Calculation- OT    OT Start Time 1300  - 0830  -     OT Stop Time 1330  - 0900  -     OT Time Calculation (min) 30 min  - 30 min  -           User Key  (r) = Recorded By, (t) = Taken By, (c) = Cosigned By    Initials Name Provider Type     Patsy Blank OTR Occupational Therapist              Therapy Charges for Today     Code  Description Service Date Service Provider Modifiers Qty    99792104580 HC OT SELF CARE/MGMT/TRAIN EA 15 MIN 4/18/2023 Patsy Blank, OTR GO 2    37904507315 HC OT NEUROMUSC RE EDUCATION EA 15 MIN 4/18/2023 Patsy Blank, OTR GO 2                   RIAN Barton  4/18/2023

## 2023-04-18 NOTE — PROGRESS NOTES
Inpatient Rehabilitation Functional Measures Assessment and Plan of Care    Plan of Care  Updated Problems/Interventions  Swallow Function    [ST] Swallowing(Active)  Current Status(04/18/2023): Now tolerating soft (chopped meats) with NTL.  Exhibits intermittent coughing with water by cup.  Weekly Goal(04/21/2023): Will tolerate trials of water by cup with no overt s/s  of aspiration or penetration with MIN cues for small sips.  Discharge Goal: Will tolerate the least restrictive diet with 1:1 supervision  for meals.        Cognition    [ST] Attention(Active)  Current Status(04/18/2023): Now completes attention to detail tasks with  intermittent MIN cues.  Weekly Goal(04/21/2023): Will complete basic attention to detail tasks with NO  cues.  Discharge Goal: Improved attention skills for home environment.        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  Weekly Goal(04/21/2023): Will use speech intelligibility strategies in  conversation with NO cues.  Discharge Goal: Will be able to participate in a complex conversation with NO  cues for use of speech intelligibility strategies.    Signed by: Janell Gallego, SLP

## 2023-04-18 NOTE — PROGRESS NOTES
Case Management  Inpatient Rehabilitation Team Conference    Conference Date/Time: 4/18/2023 8:18:56 AM    Team Conference Attendees:  Dr. Anton Ramos, Jaron.martin Carranza, Pharmacist  Yue Robert, RENETTA Gonzáles, PT  Patsy Blank, OT  Janell Gallego, SLP  Nancy Gimenez, CTRS  Chelsea Ortega, RN  April Barber, RN    Demographics            Age: 82Y            Gender: Female    Admission Date: 4/5/2023 7:52:00 PM  Rehabilitation Diagnosis:  CVA of Left Basal Ganglia  Past Medical History: Past Medical History:  DiagnosisDate  ?GERD (gastroesophageal reflux disease)?  ?Hyperlipidemia?  ?Hypertension?  ?Stroke?    ?  ?  ?  PAST SURGICAL HISTORY:  Surgical History  Past Surgical History:  ProcedureLateralityDate  ?COLONOSCOPY??  ?HYSTERECTOMY??  ?TONSILLECTOMY      Plan of Care  Anticipated Discharge Date/Estimated Length of Stay: ELOS: 1 1/2 weeks  Anticipated Discharge Destination: Community discharge with assistance  Discharge Plan : Patient plans to d/c home with home health and possible private  caregivers. Daughter can assist intermittently.  Medical Necessity Expected Level Rationale: Goals are to achieve a level of SBA  / CGA with  mobility and self-care and improved ADLs and swallowing.  Rehabilitation prognosis good.  Medical prognosis good.  Intensity and Duration: an average of 3 hours/5 days per week  Medical Supervision and 24 Hour Rehab Nursing: x  Physical Therapy: x  PT Intensity/Duration: 1 hour / day, 5 days / week, for approximately 2 weeks.  Occupational Therapy: x  OT Intensity/Duration: 1 hour / day, 5 days / week, for approximately 2 weeks.  Speech and Language Therapy: x  SLP Intensity/Duration: 1 hour / day, 5 days / week, for approximately 2 weeks.  Social Work: x  Therapeutic Recreation: x  Psychology: x  Registered Dietician: x  Updated (if changes indicated)    Anticipated Discharge Date/Estimated Length of Stay:   ELOS: 1 1/2 weeks    Based on the patient's  medical and functional status, their prognosis and  expected level of functional improvement is: Goals are to achieve a level of SBA  / CGA with  mobility and self-care and improved ADLs and swallowing.  Rehabilitation prognosis good.  Medical prognosis good.      Interdisciplinary Problem/Goals/Status    All Rehab Problems:  Mobility    [OT] Toilet Transfers(Active)  Current Status(04/17/2023): Min A  Weekly Goal(04/25/2023): CGA  Discharge Goal: CGA    [OT] Tub/Shower Transfers(Active)  Current Status(04/17/2023): Min  Weekly Goal(04/25/2023): CGA  Discharge Goal: CGA    [PT] Walk(Active)  Current Status(04/17/2023): 80' CGA RWX  Weekly Goal(04/25/2023): CGA to BR with RWX  Discharge Goal: 100' CGA RWX    [PT] Bed/Chair/Wheelchair(Active)  Current Status(04/17/2023): Min/CGA RWX  Weekly Goal(04/25/2023): CGA RWX  Discharge Goal: CGA RWX    [PT] Bed Mobility(Active)  Current Status(04/17/2023): Mod  Weekly Goal(04/25/2023): Min/CGA  Discharge Goal: SBA    [PT] Stairs(Active)  Current Status(04/17/2023): 4, B rails, CGA  Weekly Goal(04/25/2023): PT only  Discharge Goal: 4, B rails, CGA        Self Care    [OT] Bathing(Active)  Current Status(04/17/2023): mod  Weekly Goal(04/25/2023): min  Discharge Goal: CGA    [OT] Dressing (Lower)(Active)  Current Status(04/17/2023): max  Weekly Goal(04/25/2023): mod  Discharge Goal: min    [OT] Dressing (Upper)(Active)  Current Status(04/17/2023): min/SBA  Weekly Goal(04/25/2023): SBA  Discharge Goal:  set up    [OT] Grooming(Active)  Current Status(04/17/2023): SBA  Weekly Goal(04/25/2023): set up  Discharge Goal: supervision    [OT] Toileting(Active)  Current Status(04/17/2023): dep/Max  Weekly Goal(04/25/2023): mod  Discharge Goal: CGA        Psychosocial    [RN] Coping/Adjustment(Active)  Current Status(04/18/2023): Lacks insight with current situation  Weekly Goal(04/25/2023): provide education material regarding stroke  Discharge Goal: Knowledgeable of care needs and stroke  recovery        Sphincter Control    [RN] Bladder Management(Active)  Current Status(04/18/2023): Incontinent of bladder, bladder scans no longer  ordered due to several < 200 scans.  Weekly Goal(04/25/2023): The patient is unable to void independently  Discharge Goal: The patient empties bladder completely    [RN] Bowel Management(Active)  Current Status(04/18/2023): Incontinent of stool  Weekly Goal(04/24/2023): Continent 50%  Discharge Goal: Continent 100%        Safety    [RN] Potential for Injury(Active)  Current Status(04/18/2023): Risk for falls  Weekly Goal(04/24/2023): Instruct family/caregivers regarding safety precautions  and need for close supervision  Discharge Goal: Family/caregiver will be knowledgeable of need for cueing and  supervision to avoid falls        Swallow Function    [ST] Swallowing(Active)  Current Status(04/18/2023): Now tolerating soft (chopped meats) with NTL.  Exhibits intermittent coughing with water by cup.  Weekly Goal(04/21/2023): Will tolerate trials of water by cup with no overt s/s  of aspiration or penetration with MIN cues for small sips.  Discharge Goal: Will tolerate the least restrictive diet with 1:1 supervision  for meals.        Cognition    [ST] Attention(Active)  Current Status(04/18/2023): Now completes attention to detail tasks with  intermittent MIN cues.  Weekly Goal(04/21/2023): Will complete basic attention to detail tasks with NO  cues.  Discharge Goal: Improved attention skills for home environment.        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  Weekly Goal(04/21/2023): Will use speech intelligibility strategies in  conversation with NO cues.  Discharge Goal: Will be able to participate in a complex conversation with NO  cues for use of speech intelligibility strategies.        Comments: 4/11 Bed mob Mod I, transfers Min A rwx, amb 80' Min / CGA rwx, Min A  toilet transfer, est  Min A for shower transfer.  Dep w/ toileting.  Mod A w/  bathing.  Mildly impaired attention skills on CLQT.  Mildly impaired language  skills, occasional word finding delays in conversation.  Tolerating puree diet  NTL.  Intermittent coughing.  Urinary retention, ISC as needed.  Incontinent of  bowel.  Tylenol for headaches.    4/18 Bed mob Mod A, transfers Min / CGA rwx, completing 4 steps w/ bilat rails  CGA.  Amb 80' CGA w/ rwx.  Min A for toilet and shower transfer.  Toileting dep  / max.  Intermittent min cues for attention to detail tasks.  Mild mixed  dysarthria.  Plan video swallow next week.    Signed by: Tiffanie Antunez PT    Physician CoSigned By: Anton Fajardo 04/18/2023 08:56:28

## 2023-04-18 NOTE — CONSULTS
Orientation: WNL  Attention: WNL  Executive Functioning: Mildly Impaired  Abstract Reasoning: WNL  Arithmetic: Mildly Impaired  Visuospatial Perception: WNL  Visuospatial Praxis: Moderately to Severely Impaired  Verbal Memory: Immediate - Moderately Impaired; Delayed - WNL for Stories, Severely Impaired for Lists  Visual Memory: Mildly Impaired  Emotional: Mild anxiety and depression related to worries about her quality-of-life following discharge from the hospital.    Summary and Recommendations:  Motor and speech deficits consistent with stroke, but also seeing moderate to severe rote verbal memory deficits, as well as weaknesses in aspects of visual memory and executive functioning.  No history of psychological problems, but currently reporting mild anxiety and depression; the patient is quite worried about how she will continue to lead an active lifestyle following this stroke.  In addition to ongoing outpatient therapies at discharge, consider brief psychotherapy and/or medication utilization for mood symptoms if they persist.

## 2023-04-19 LAB
BACTERIA UR QL AUTO: ABNORMAL /HPF
BILIRUB UR QL STRIP: NEGATIVE
CLARITY UR: ABNORMAL
COLOR UR: YELLOW
GLUCOSE UR STRIP-MCNC: NEGATIVE MG/DL
HGB UR QL STRIP.AUTO: NEGATIVE
HYALINE CASTS UR QL AUTO: ABNORMAL /LPF
KETONES UR QL STRIP: NEGATIVE
LEUKOCYTE ESTERASE UR QL STRIP.AUTO: ABNORMAL
NITRITE UR QL STRIP: NEGATIVE
PH UR STRIP.AUTO: 8 [PH] (ref 5–8)
PROT UR QL STRIP: ABNORMAL
RBC # UR STRIP: ABNORMAL /HPF
REF LAB TEST METHOD: ABNORMAL
SP GR UR STRIP: 1.02 (ref 1–1.03)
SQUAMOUS #/AREA URNS HPF: ABNORMAL /HPF
UROBILINOGEN UR QL STRIP: ABNORMAL
WBC # UR STRIP: ABNORMAL /HPF

## 2023-04-19 PROCEDURE — 97130 THER IVNTJ EA ADDL 15 MIN: CPT

## 2023-04-19 PROCEDURE — 97129 THER IVNTJ 1ST 15 MIN: CPT

## 2023-04-19 PROCEDURE — 97110 THERAPEUTIC EXERCISES: CPT

## 2023-04-19 PROCEDURE — 81001 URINALYSIS AUTO W/SCOPE: CPT | Performed by: PHYSICAL MEDICINE & REHABILITATION

## 2023-04-19 PROCEDURE — 25010000002 ENOXAPARIN PER 10 MG: Performed by: PHYSICAL MEDICINE & REHABILITATION

## 2023-04-19 PROCEDURE — 87086 URINE CULTURE/COLONY COUNT: CPT | Performed by: PHYSICAL MEDICINE & REHABILITATION

## 2023-04-19 PROCEDURE — 92526 ORAL FUNCTION THERAPY: CPT

## 2023-04-19 PROCEDURE — 87186 SC STD MICRODIL/AGAR DIL: CPT | Performed by: PHYSICAL MEDICINE & REHABILITATION

## 2023-04-19 PROCEDURE — 97530 THERAPEUTIC ACTIVITIES: CPT

## 2023-04-19 PROCEDURE — 97535 SELF CARE MNGMENT TRAINING: CPT

## 2023-04-19 PROCEDURE — 87077 CULTURE AEROBIC IDENTIFY: CPT | Performed by: PHYSICAL MEDICINE & REHABILITATION

## 2023-04-19 RX ORDER — CEFDINIR 300 MG/1
300 CAPSULE ORAL EVERY 12 HOURS SCHEDULED
Status: DISCONTINUED | OUTPATIENT
Start: 2023-04-19 | End: 2023-04-19

## 2023-04-19 RX ORDER — CEFDINIR 300 MG/1
300 CAPSULE ORAL EVERY 12 HOURS SCHEDULED
Status: COMPLETED | OUTPATIENT
Start: 2023-04-19 | End: 2023-04-26

## 2023-04-19 RX ADMIN — ASPIRIN 81 MG: 81 TABLET, CHEWABLE ORAL at 07:16

## 2023-04-19 RX ADMIN — CLOPIDOGREL BISULFATE 75 MG: 75 TABLET, FILM COATED ORAL at 07:15

## 2023-04-19 RX ADMIN — AMLODIPINE BESYLATE 10 MG: 10 TABLET ORAL at 07:15

## 2023-04-19 RX ADMIN — OXYCODONE HYDROCHLORIDE AND ACETAMINOPHEN 250 MG: 500 TABLET ORAL at 07:16

## 2023-04-19 RX ADMIN — Medication 1000 UNITS: at 07:16

## 2023-04-19 RX ADMIN — CEFDINIR 300 MG: 300 CAPSULE ORAL at 21:54

## 2023-04-19 RX ADMIN — ROSUVASTATIN CALCIUM 20 MG: 20 TABLET, FILM COATED ORAL at 21:54

## 2023-04-19 RX ADMIN — ENOXAPARIN SODIUM 40 MG: 100 INJECTION SUBCUTANEOUS at 11:45

## 2023-04-19 RX ADMIN — LISINOPRIL 5 MG: 5 TABLET ORAL at 07:16

## 2023-04-19 NOTE — THERAPY TREATMENT NOTE
Inpatient Rehabilitation - Physical Therapy Treatment Note       [facility]     Patient Name: Melissa Gomez  : 1940  MRN: [id number]    Today's Date: 2023                    Admit Date: 2023      Visit Dx:     ICD-10-CM ICD-9-CM   1. Impaired functional mobility, balance, gait, and endurance  Z74.09 V49.89   2. Follow-up exam  Z09 V67.9       Patient Active Problem List   Diagnosis   • Infarction of left basal ganglia       Past Medical History:   Diagnosis Date   • GERD (gastroesophageal reflux disease)    • Hyperlipidemia    • Hypertension    • Stroke        Past Surgical History:   Procedure Laterality Date   • COLONOSCOPY     • HYSTERECTOMY     • TONSILLECTOMY         PT ASSESSMENT (last 12 hours)     IRF PT Evaluation and Treatment     Row Name 23 1046          PT Time and Intention    Document Type daily treatment  -EE     Mode of Treatment physical therapy;individual therapy  -EE     Patient/Family/Caregiver Comments/Observations Pt sitting up in WC, agreeable to PT.  -EE     Row Name 23 1046          General Information    Existing Precautions/Restrictions fall;swallow precautions  -EE     Row Name 23 1046          Pain Assessment    Pretreatment Pain Rating 0/10 - no pain  -EE     Posttreatment Pain Rating 0/10 - no pain  -EE     Row Name 23 1046          Cognition/Psychosocial    Affect/Mental Status (Cognition) WFL  -EE     Orientation Status (Cognition) oriented x 4  -EE     Follows Commands (Cognition) follows one-step commands;repetition of directions required;verbal cues/prompting required  -EE     Personal Safety Interventions fall prevention program maintained;gait belt;muscle strengthening facilitated;nonskid shoes/slippers when out of bed;supervised activity  -EE     Cognitive Function attention deficit  -EE     Attention Deficit (Cognition) minimal deficit  -EE     Safety Deficit (Cognition) insight into deficits/self-awareness;safety precautions  awareness  -EE     Row Name 04/19/23 1046          Bed Mobility    Supine-Sit [place] (Bed Mobility) minimum assist (75% patient effort);verbal cues  -EE     Sit-Supine [place] (Bed Mobility) minimum assist (75% patient effort);verbal cues  -EE     Bed Mobility, Safety Issues decreased use of arms for pushing/pulling;decreased use of legs for bridging/pushing;impaired trunk control for bed mobility  -EE     Comment, (Bed Mobility) mat mobility  -EE     Row Name 04/19/23 1046          Transfer Assessment/Treatment    Comment, (Transfers) 6x STS from  for functional training, cues for sequencing  -EE     Row Name 04/19/23 1046          Bed-Chair Transfer    Bed-Chair [place] (Transfers) contact guard;minimum assist (75% patient effort);verbal cues  -EE     Assistive Device (Bed-Chair Transfers) wheelchair  -EE     Comment, (Bed-Chair Transfer) stand pivot  -EE     Row Name 04/19/23 1046          Chair-Bed Transfer    Chair-Bed [place] (Transfers) contact guard;verbal cues  -EE     Assistive Device (Chair-Bed Transfers) wheelchair  -EE     Comment, (Chair-Bed Transfer) stand pivot  -EE     Row Name 04/19/23 1046          Sit-Stand Transfer    Sit-Stand [place] (Transfers) minimum assist (75% patient effort);contact guard;verbal cues  -EE     Assistive Device (Sit-Stand Transfers) walker, front-wheeled  -EE     Comment, (Sit-Stand Transfer) cues for sequencing, 1 LOB posteriorly upon initial stand.  -EE     Row Name 04/19/23 1046          Stand-Sit Transfer    Stand-Sit [place] (Transfers) contact guard;minimum assist (75% patient effort);verbal cues  -EE     Assistive Device (Stand-Sit Transfers) walker, front-wheeled;wheelchair  -EE     Comment, (Stand-Sit Transfer) cues for hand placement and eccentric control  -EE     Row Name 04/19/23 1046          Gait/Stairs (Locomotion)    [place] Level (Gait) contact guard;verbal cues  -EE     Assistive Device (Gait) walker, front-wheeled   -EE     Distance in Feet (Gait) 160' x 1, 80' x 1  -EE     Pattern (Gait) step-through  -EE     Deviations/Abnormal Patterns (Gait) lenard decreased;festinating/shuffling;stride length decreased  -EE     Bilateral Gait Deviations forward flexed posture;heel strike decreased  -EE     Gait Assessment/Intervention cues for upright posture  -EE     Row Name 04/19/23 1046          Safety Issues, Functional Mobility    Impairments Affecting Function (Mobility) balance;endurance/activity tolerance;postural/trunk control;strength  -EE     Row Name 04/19/23 1046          Hip (Therapeutic Exercise)    Hip Strengthening (Therapeutic Exercise) --  B marching in hooklying; 2 x 20 reps  -EE     Row Name 04/19/23 1046          Knee (Therapeutic Exercise)    Knee Strengthening (Therapeutic Exercise) bilateral;SLR (straight leg raise);5 repetitions;3 sets  -EE     Row Name 04/19/23 1046          Core Strength (Therapeutic Exercise)    Core Strength (Therapeutic Exercise) bridging, bilateral lower extremities;10 repetitions;2 sets  -EE     Row Name 04/19/23 1046          Positioning and Restraints    Pre-Treatment Position sitting in chair/recliner  -EE     Post Treatment Position wheelchair  -EE     In Wheelchair sitting;exit alarm on;with SLP  -EE           User Key  (r) = Recorded By, (t) = Taken By, (c) = Cosigned By    Initials Name Provider Type    Diann Batista, PT Physical Therapist                 Physical Therapy Education     Title: PT OT SLP Therapies (Done)     Topic: Physical Therapy (Done)     Point: Mobility training (Done)     Learning Progress Summary           Patient Acceptance, E,TB,D, VU,NR by EE at 4/19/2023 1121    Acceptance, E,TB, VU,NR by EE at 4/18/2023 1102    Acceptance, E,D,TB, VU,NR by EE at 4/17/2023 1129    Acceptance, TB,E, VU,NR by EE at 4/13/2023 1047    Acceptance, E,D, VU,DU by DP at 4/11/2023 1038    Acceptance, E, VU,NR by EE at 4/10/2023 1043    Acceptance, E, VU by LS at 4/8/2023 1204     Acceptance, E,TB, VU,NR by EE at 4/7/2023 1054    Acceptance, E,TB, VU by KP at 4/6/2023 1527                   Point: Home exercise program (Done)     Learning Progress Summary           Patient Acceptance, E,TB,D, VU,NR by EE at 4/19/2023 1121    Acceptance, E,TB, VU,NR by EE at 4/18/2023 1102    Acceptance, E,D,TB, VU,NR by EE at 4/17/2023 1129    Acceptance, TB,E, VU,NR by EE at 4/13/2023 1047    Acceptance, E,D, VU,DU by DP at 4/11/2023 1038    Acceptance, E, VU,NR by EE at 4/10/2023 1043    Acceptance, E, VU by LS at 4/8/2023 1204    Acceptance, E,TB, VU,NR by EE at 4/7/2023 1054    Acceptance, E,TB, VU by KP at 4/6/2023 1527                               User Key     Initials Effective Dates Name Provider Type Discipline    LS 06/16/21 -  Yamila Wheat MS CCC-SLP Speech and Language Pathologist SLP    EE 06/16/21 -  Diann Gonzáles, PT Physical Therapist PT    KP 06/16/21 -  Chen Centeno, PT Physical Therapist PT    DP 08/24/21 -  Gen Gray, PT Physical Therapist PT                PT Recommendation and Plan                          Time Calculation:      PT Charges     Row Name 04/19/23 1301 04/19/23 1120          Time Calculation    Start Time 1230  -EE 1030  -EE     Stop Time 1300  -EE 1100  -EE     Time Calculation (min) 30 min  -EE 30 min  -EE     PT Received On 04/19/23  -EE 04/19/23  -EE     PT - Next Appointment 04/20/23  -EE 04/19/23  -EE        Time Calculation- PT    Total Timed Code Minutes- PT 30 minute(s)  -EE 30 minute(s)  -EE           User Key  (r) = Recorded By, (t) = Taken By, (c) = Cosigned By    Initials Name Provider Type    EE Diann Gnozáles, PT Physical Therapist                Therapy Charges for Today     Code Description Service Date Service Provider Modifiers Qty    42453365324 HC PT THER PROC EA 15 MIN 4/18/2023 Diann Gonzáles, PT GP 2    [id number]  PT THERAPEUTIC ACT EA 15 MIN 4/18/2023 Diann Gonzáles, PT GP 2    [id number]  PT THERAPEUTIC ACT EA 15 MIN 4/19/2023 Eliecer  Diann, PT GP 3    [id number]  PT THER PROC EA 15 MIN 4/19/2023 Diann Gonzáles, PT GP 1                   Diann Gonzáles, PT  4/19/2023

## 2023-04-19 NOTE — PROGRESS NOTES
LOS: 14 days   Patient Care Team:  Jayro Fountain MD as PCP - General (Internal Medicine)      Patient Name: PIERRE FREEMAN  Patient : 1940    ADMITTING DIAGNOSIS:  Diagnoses    1. Follow-up examination  2. IMPAIRED FUNCTIONAL MOBILITY, BALANCE, GAIT, AND ENDURANCE       Left basal ganglia stroke  Right hemiparesis with impaired motor control    SUBJECTIVE:    Tolerating therapies.Motor control deficits are persistent on the right side.  Complains of dysuria.       REVIEW OF SYSTEMS:    General: denies weight change, fatigue, weakness, fever, chills, night sweats.   Skin: denies itching, rashes, sores and bruises.   Neurologic: denies headache, nausea, vomiting, or visual changes.   HEENT:  denies discharge, sore throat, allergies, and congestion.   Respiratory: denies shortness of breath, wheeze, cough and hemoptysis.   Cardiac:  denies chest pain or palpitations.   Gastrointestinal:  denies changes in appetite, nausea, vomiting, dysphagia, abdominal pain, constipation, or diarrhea.   Urinary: Complains of dysuria  Musculoskeletal:  denies muscle weakness, pain, or joint stiffness.     OBJECTIVE:    Vitals:    23 0500   BP: 114/73   Pulse: 86   Resp: 18   Temp: 97.8 °F (36.6 °C)   SpO2: 96%       PHYSICAL EXAM:   General: pleasant, in no acute distress  HEENT: NCAT, sclera anicteric, conjunctiva pink, mucoa moist  Cardiovascular: RRR, +S1+S2, no obvious m/g/r  Lungs: CTAB, no rhonchi or wheezing  Abdomen: normoactive bowel sounds, soft, non tender to palpation  Extremities: no peripheral edema  Skin: exposed surfaces of skin warm, intact, without erythema  Neuro: awake alert and oriented to person, place, time, and situation,  Right facial droop   dysarthria.  MSK: Right shoulder flexion 4/5, elbow flexion 4+/5, finger flexion 4+/5, knee extension 4+/5, ankle dorsiflexion 4/5,  weak on the RUE, LUE 5/5 BLE 5/5      MEDICATIONS  Scheduled Meds:  amLODIPine, 10 mg, Oral, Q24H  vitamin C,  250 mg, Oral, Daily  aspirin, 81 mg, Oral, Daily  cholecalciferol, 1,000 Units, Oral, Daily  enoxaparin, 40 mg, Subcutaneous, Q24H  lisinopril, 5 mg, Oral, Q24H  rosuvastatin, 20 mg, Oral, Nightly  senna-docusate sodium, 2 tablet, Oral, Nightly        Continuous Infusions:       PRN Meds:  •  acetaminophen **OR** [DISCONTINUED] acetaminophen  •  [DISCONTINUED] senna-docusate sodium **AND** polyethylene glycol **AND** bisacodyl **AND** bisacodyl      RESULTS:  Glucose   Date/Time Value Ref Range Status   04/18/2023 1608 115 70 - 130 mg/dL Final     Comment:     Meter: [id number] : [id number] Julio Trejokaitlynn COLIN     Results from last 7 days   Lab Units 04/17/23  0723 04/14/23  0725   WBC 10*3/mm3 6.71 7.96   HEMOGLOBIN g/dL 13.2 13.4   HEMATOCRIT % 38.4 40.1   PLATELETS 10*3/mm3 198 209     Results from last 7 days   Lab Units 04/17/23  0723 04/14/23  0725   SODIUM mmol/L 141 141   POTASSIUM mmol/L 4.4 4.5   CHLORIDE mmol/L 107 107   CO2 mmol/L 25.2 25.2   BUN mg/dL 25* 19   CREATININE mg/dL 0.83 0.63   CALCIUM mg/dL 9.3 9.5   GLUCOSE mg/dL 102* 95           ASSESSMENT and PLAN:    Infarction of left basal ganglia    Left basal ganglia stroke  Stroke prophylaxis- ASA and Plavix x 21 days from March 30, 2023, then ASA 81 mg daily. Crestor 20 mg.     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  We will continue to monitor.    Dysphagia   April 14 - upgrade to soft (chopped meats)/no mixed consistencies with NTL. Recommend sit upright in wheelchair with supervision. Check intermittently for pocketing. Meds one at a time with applesauce or pudding    Dysarthria.     Hypertension - amlodipine/lisinopril     Check TSH, Vit B12 and Vit D levels-within normal limit     Urinary retention. Treated for UTI as Reggie Duran. Check bladder scan PVR and cath if >300 cc until three consecutive < 250 cc  April  7-requires intermittent straight cath 400-113-300 cc.  No spontaneous void.  Follow pattern.  April 10-requirement straight catheter 300 cc about every 6 or 8 hours.  - will extend interval to every 12 hours to see if she voids with a larger volume.  4/12- has not required intermittent catheterization since last night.  Noted to have 4 voids with PVR less than 200.  May be recovering bladder function.  Will monitor.  4/13-voiding on her own with postvoid residuals 190 range  4/19-complaints of dysuria-urinalysis ordered    Right upper extremity discomfort-April 17-Has some diffuse pain in the right upper extremity.  Distal right upper extremity slightly warmer than the left.  No allodynia.  Reviewed possible post stroke pain/central pain syndrome versus sympathetic mediated pain versus shoulder subluxation.  Will monitor.     DVT prophylaxis- on dual antiplatelet therapies.  SCDs.  Lovenox     Team Conference 4/11/2023  Nursing: continent/incontinent with bladder, requiring cathing each bladder scan, no safety issues  PT: bed mobiliy min assist, contact guard with transfers, ambulating 80 feet, fatigues quickly with low endurance, cues for sequencing, drags left foot, anticipated contact guard with walker  OT: mod assist for bathing, max/dependent with lower body dressing, toileting max assist due to balance  SLP: mildly impaired on CLQT WNL memory/visuospatial, and mild for attention/executive function, puree and nectar thick diet, mild to moderate dysarthria  Discharge Date: 1.5 weeks     TEAM CONF - April 18 - PROGRESS SLOWER THAN INITIAL FIRST DAYS SO ANTICIPATE EXTENDING LENGTH OF STAY - STRONGER BUT STILL IMPAIRED MOTOR CONTROL. FATIGUE. BED MOD. TRANSFERS MIN CTG. GAIT 80 FEET CTG RW , LESS DRAG RIGHT FEET. 4 STAIRS CTG. BATH MOD. LBD MAX. UBD MIN SBA. COGNITION - CUES FOR ATTENTION TO DETAIL. SWALLOW - COUGH WITH WATER BY CUP. MECH SOFT, NTL. VOICING/ARTICULATION- MILD DYSARTHRIA AND WILL SELF CORRECT.  BNE  YESTERDAY. SEE REPORT IN [facility]. Attention: WNL  Executive Functioning: Mildly Impaired  Abstract Reasoning: WNL  Arithmetic: Mildly Impaired  Visuospatial Perception: WNL  Visuospatial Praxis: Moderately to Severely Impaired  Verbal Memory: Immediate - Moderately Impaired; Delayed - WNL for Stories, Severely Impaired for Lists  Visual Memory: Mildly Impaired  Emotional: Mild anxiety and depression related to worries about her quality-of-life following discharge from the hospital.  O2 AT NIGHT. STILL INCONTINENT- TIMED VOIDS. ALLERGIES - SNEEZES.   ELOS - 1.5 WEEKS    CODE STATUS:  Code Status (Patient has no pulse and is not breathing): CPR (Attempt to Resuscitate)  Medical Interventions (Patient has pulse or is breathing): Full Support  Release to patient: Routine Release      Admission Status: Continues to meet requirements for inpatient admission.     Anton Fajardo MD        During rounds, used appropriate personal protective equipment including mask and gloves.  Additional gown if indicated.  Mask used was standard procedure mask. Appropriate PPE was worn during the entire visit.  Hand hygiene was completed before and after.

## 2023-04-19 NOTE — PLAN OF CARE
Goal Outcome Evaluation:  Plan of Care Reviewed With: patient        Progress: improving  Outcome Evaluation: Pt is A&OX4, calm and cooperative. Assist X1 to wc. Meds crushed in AC. NTL. Up in dining room for meals. Incon of B&B, Last BM 4/19. Pt c/o of burning when urinating, urine with foul odor, Dr. Fajardo notified, UA ordered. Attempted to collect urine specimen X2 this shift via potty hat. Pt stated she wanted to attempt to urinate in potty hat again this evening before having to be straight cath. Scattered bruising noted. No unsafe behaviors noted. Pt uses call light for assistance. Pt participated with all therapies today.

## 2023-04-19 NOTE — THERAPY TREATMENT NOTE
Inpatient Rehabilitation - Occupational Therapy Treatment Note    [facility]     Patient Name: Melissa Gomez  : 1940  MRN: [id number]    Today's Date: 2023                 Admit Date: 2023         ICD-10-CM ICD-9-CM   1. Impaired functional mobility, balance, gait, and endurance  Z74.09 V49.89   2. Follow-up exam  Z09 V67.9       Patient Active Problem List   Diagnosis   • Infarction of left basal ganglia       Past Medical History:   Diagnosis Date   • GERD (gastroesophageal reflux disease)    • Hyperlipidemia    • Hypertension    • Stroke        Past Surgical History:   Procedure Laterality Date   • COLONOSCOPY     • HYSTERECTOMY     • TONSILLECTOMY               IRF OT ASSESSMENT FLOWSHEET (last 12 hours)     IRF OT Evaluation and Treatment     Row Name 23 1539          OT Time and Intention    Document Type daily treatment  -DN     Mode of Treatment occupational therapy  -DN     Patient Effort good  -DN     Row Name 23 1539          Pain Assessment    Pretreatment Pain Rating 2/10  -DN     Posttreatment Pain Rating 2/10  -DN     Pain Location - Side/Orientation Right  -DN     Pain Location upper  -DN     Pain Location - shoulder  -DN     Row Name 23 1539          Cognition/Psychosocial    Affect/Mental Status (Cognition) WFL  -DN     Orientation Status (Cognition) oriented x 4  -DN     Follows Commands (Cognition) follows one-step commands;over 90% accuracy  -DN     Personal Safety Interventions fall prevention program maintained;gait belt  -DN     Cognitive Function attention deficit  -DN     Attention Deficit (Cognition) minimal deficit  -DN     Safety Deficit (Cognition) insight into deficits/self-awareness  -DN     Row Name 23 1539          Bathing    [place] Level (Bathing) bathing skills;minimum assist (75% patient effort)  -DN     Assistive Device (Bathing) tub bench  -DN     Position (Bathing) supported sitting;supported standing  -DN     Set-up  Assistance (Bathing) obtain supplies  -DN     Row Name 04/19/23 1539          Upper Body Dressing    [place] Level (Upper Body Dressing) upper body dressing skills;doff;don;pull over garment;supervision;verbal cues;nonverbal cues (demo/gesture)  -DN     Position (Upper Body Dressing) supported sitting;supported standing  -DN     Set-up Assistance (Upper Body Dressing) obtain clothing  -DN     Row Name 04/19/23 1539          Lower Body Dressing    [place] Level (Lower Body Dressing) doff;don;pants/bottoms;shoes/slippers;socks;underwear;maximum assist (25% patient effort);verbal cues;nonverbal cues (demo/gesture)  -DN     Position (Lower Body Dressing) supported sitting;supported standing  -DN     Set-up Assistance (Lower Body Dressing) obtain clothing  -DN     Row Name 04/19/23 1539          Grooming    [place] Level (Grooming) grooming skills;oral care regimen;supervision;set up  -DN     Position (Grooming) sink side;supported sitting  -DN     Set-up Assistance (Grooming) obtain supplies  -DN     Row Name 04/19/23 1539          Shower Transfer    Type (Shower Transfer) stand pivot/stand step  -DN     [place] Level (Shower Transfer) minimum assist (75% patient effort);verbal cues;nonverbal cues (demo/gesture)  -DN     Assistive Device (Shower Transfer) wheelchair;grab bar, tub/shower;tub bench  -DN     Row Name 04/19/23 1539          Safety Issues, Functional Mobility    Impairments Affecting Function (Mobility) balance;endurance/activity tolerance;strength  -DN     Row Name 04/19/23 1539          Motor Skills    Coordination fine motor deficit;upper extremity;right  -DN     Results, 9 Hole Peg Test of Fine Motor Coordination pt working on in hand and lateral/three point finger manipulation with string beads, place irregular pegs in board, coordination seemed better just slow, 9 hole peg test score for R hand is1 minute 20 seconds; initial score was 2 minutes 15 seconds with the right hand  -DN      Row Name 04/19/23 1539          Positioning and Restraints    Pre-Treatment Position sitting in chair/recliner  -DN     Post Treatment Position wheelchair  -DN     In Bed sitting;call light within reach;encouraged to call for assist;exit alarm on  -DN           User Key  (r) = Recorded By, (t) = Taken By, (c) = Cosigned By    Initials Name Effective Dates    Ismael Quintanilla OT 06/16/21 -                  Occupational Therapy Education     Title: PT OT SLP Therapies (Done)     Topic: Occupational Therapy (Done)     Point: ADL training (Done)     Description:   Instruct learner(s) on proper safety adaptation and remediation techniques during self care or transfers.   Instruct in proper use of assistive devices.              Learning Progress Summary           Patient Acceptance, E, VU by  at 4/8/2023 1204    Acceptance, E,TB,D, VU,DU,NR by  at 4/6/2023 1230    Comment: ed pt on role of OT. benefit of therapy, POC w.  OT ed on safety w ADL and tsf. pt ed on UBD technique.                   Point: Home exercise program (Done)     Description:   Instruct learner(s) on appropriate technique for monitoring, assisting and/or progressing therapeutic exercises/activities.              Learning Progress Summary           Patient Acceptance, E, VU by LS at 4/8/2023 1204    Acceptance, E,TB,D, VU,DU,NR by  at 4/6/2023 1230    Comment: ed pt on role of OT. benefit of therapy, POC w.  OT ed on safety w ADL and tsf. pt ed on UBD technique.                   Point: Precautions (Done)     Description:   Instruct learner(s) on prescribed precautions during self-care and functional transfers.              Learning Progress Summary           Patient Acceptance, E, VU by LS at 4/8/2023 1204    Acceptance, E,TB,D, VU,DU,NR by  at 4/6/2023 1230    Comment: ed pt on role of OT. benefit of therapy, POC w.  OT ed on safety w ADL and tsf. pt ed on UBD technique.                   Point: Body mechanics (Done)     Description:    Instruct learner(s) on proper positioning and spine alignment during self-care, functional mobility activities and/or exercises.              Learning Progress Summary           Patient Acceptance, E, VU by  at 4/8/2023 1204    Acceptance, E,TB,D, VU,DU,NR by  at 4/6/2023 1230    Comment: ed pt on role of OT. benefit of therapy, POC w.  OT ed on safety w ADL and tsf. pt ed on UBD technique.                               User Key     Initials Effective Dates Name Provider Type Discipline     06/16/21 -  Patsy Blank, OTR Occupational Therapist OT     06/16/21 -  Yamila Wheat MS CCC-SLP Speech and Language Pathologist SLP                    OT Recommendation and Plan                         Time Calculation:      Time Calculation- OT     Row Name 04/19/23 1400 04/19/23 0800          Time Calculation- OT    OT Start Time 1400  -DN 0800  -DN     OT Stop Time 1430  -DN 0830  -DN     OT Time Calculation (min) 30 min  -DN 30 min  -DN           User Key  (r) = Recorded By, (t) = Taken By, (c) = Cosigned By    Initials Name Provider Type    Ismael Quintanilla OT Occupational Therapist              Therapy Charges for Today     Code Description Service Date Service Provider Modifiers Qty    84354423863 HC OT SELF CARE/MGMT/TRAIN EA 15 MIN 4/19/2023 Ismael Lanier OT GO 2    00578057912 HC OT THER PROC EA 15 MIN 4/19/2023 Ismael Lanier OT GO 2                   Ismael Lanier OT  4/19/2023

## 2023-04-19 NOTE — PLAN OF CARE
Goal Outcome Evaluation:       Pt is A/Ox4, calm/cooperative, OOB x 1 stand/pivot to w/c . Meds taken crushed in puree. Pt has been incontinent of urine overnight. Pt c/o headache pain ; prn Tylenol given x1. O2 at 2L via nasal cannula worn overnight.

## 2023-04-19 NOTE — THERAPY TREATMENT NOTE
Inpatient Rehabilitation - Speech Language Pathology Treatment Note    [facility]     Patient Name: Melissa Gomez  : 1940  MRN: [id number]    Today's Date: 2023                   Admit Date: 2023       Visit Dx:      ICD-10-CM ICD-9-CM   1. Impaired functional mobility, balance, gait, and endurance  Z74.09 V49.89   2. Follow-up exam  Z09 V67.9       Patient Active Problem List   Diagnosis   • Infarction of left basal ganglia       Past Medical History:   Diagnosis Date   • GERD (gastroesophageal reflux disease)    • Hyperlipidemia    • Hypertension    • Stroke        Past Surgical History:   Procedure Laterality Date   • COLONOSCOPY     • HYSTERECTOMY     • TONSILLECTOMY         SLP Recommendation and Plan                                                            SLP EVALUATION (last 72 hours)     SLP SLC Evaluation     Row Name 23 1330 23 1000 23 1330 23 1100 23 1330       Communication Assessment/Intervention    Document Type therapy note (daily note)  -AL therapy note (daily note)  -AL therapy note (daily note)  -AL therapy note (daily note)  -AL therapy note (daily note)  -AL    Patient/Family/Caregiver Comments/Observations Pt participated well.  -AL Pt participated well.  -AL Pt participated well.  -AL Pt participated well.  -AL Pt is pleasant and cooperative.  -AL       Pain Scale: Numbers Pre/Post-Treatment    Pretreatment Pain Rating 0/10 - no pain  -AL 0/10 - no pain  -AL 0/10 - no pain  -AL 0/10 - no pain  -AL 0/10 - no pain  -AL    Row Name 23 1100                   Communication Assessment/Intervention    Document Type therapy note (daily note)  -AL        Patient/Family/Caregiver Comments/Observations Pt participated well.  -AL           Pain Scale: Numbers Pre/Post-Treatment    Pretreatment Pain Rating 0/10 - no pain  -AL              User Key  (r) = Recorded By, (t) = Taken By, (c) = Cosigned By    Initials Name Effective Dates    GÓMEZ Gallego  "Janell Carr, MS CCC-SLP 06/16/21 -                    EDUCATION    The patient has been educated in the following areas:       Cognitive Impairment Communication Impairment Dysphagia (Swallowing Impairment).             SLP GOALS     Row Name 04/19/23 1330 04/19/23 1000 04/18/23 1330       ([facility]) Labial Strengthening Goal 1 (SLP)    Comment (Labial Strengthening Goal 1, SLP) -- Minimal right facial weakness noted upon smiling.  -AL --       ([facility]) Pharyngeal Strengthening Exercise Goal 1 (SLP)    Progress/Outcomes (Pharyngeal Strengthening Goal 1, SLP) -- good progress toward goal  -AL --    Comment (Pharyngeal Strengthening Goal 1, SLP) Pt with strong coughing x1 during session; suspect possible coughing on saliva. Encouraged pt to consciously swallow saliva when having longer conversation. Vocal quality remained clear.  -AL No overt s/s of aspiration or penetration observed in 10/10 trials of NTL by cup. Effortful swallow completed x20 with mild-moderately swallow delay noted on \"dry swallows.\" Pt completed 5 sets of 5 reps of EMST75 at 1.25 turns past 5 cmH20 with MIN-MOD cues.  -AL --       Articulation Goal 1 (SLP)    Progress/Outcomes (Articulation Goal 1, SLP) good progress toward goal  -AL -- --    Comment (Articulation Goal 1, SLP) Pt read aloud a multiple paragraph story with initial MIN cues for slow rate and over-articulation. Pt was 100% intelligible to a familiar listener.  -AL Pt 100% intelligible in conversation today. Noted to have 4 moments of word finding delays during conversation.  -AL --       Attention Goal 1 (SLP)    Improve Attention by Goal 1 (SLP) -- -- complete selective attention task;complete sustained attention task;80%;independently (over 90% accuracy)  -AL    Time Frame (Attention Goal 1, SLP) -- -- 1 week  -AL    Progress/Outcomes (Attention Goal 1, SLP) -- -- good progress toward goal  -AL    Comment (Attention Goal 1, SLP) -- -- Attention to detail for \"Bank Hours\" task: 6/7 with NO " cues, 7/7 with MIN cues.  -AL       Memory Skills Goal 1 (SLP)    Improve Memory Skills Through Goal 1 (SLP) -- -- recalling related word lists with an imposed delay;listen to a paragraph and answer questions;recall details of the day;80%;independently (over 90% accuracy)  -AL    Time Frame (Memory Skills Goal 1, SLP) -- -- 1 week  -AL    Progress/Outcomes (Memory Skills Goal 1, SLP) good progress toward goal  -AL -- good progress toward goal  -AL    Comment (Memory Skills Goal 1, SLP) Immediate memory for voicemails: 90% with NO cues, 100% with MIN cues  -AL -- Recalled 3/3 unrelated words after 20 minutes with NO cues. Immediate memory for voicemails: 70% with NO cues, 100% with MIN cues.  -AL       Organizational Skills Goal 1 (SLP)    Improve Thought Organization Through Goal 1 (SLP) -- -- completing a divergent naming task;80%;with minimal cues (75-90%)  -AL    Time Frame (Thought Organization Skills Goal 1, SLP) -- -- 1 week  -AL    Progress/Outcomes (Thought Organization Skills Goal 1, SLP) -- -- goal ongoing  -AL    Comment (Thought Organization Skills Goal 1, SLP) -- -- Red items: 2 with NO cues, 7 with MIN-MOD cues  -AL    Row Name 04/18/23 1100 04/17/23 1330 04/17/23 1100       ([facility]) Pharyngeal Strengthening Exercise Goal 1 (SLP)    Activity (Pharyngeal Strengthening Goal 1, SLP) -- -- increase timing;increase superior movement of the hyolaryngeal complex;increase anterior movement of the hyolaryngeal complex;increase epiglottic inversion and retroflexion;increase closure at entrance to airway/closure of airway at glottis  -AL    Increase Timing -- -- hard effortful swallow  -AL    Increase Superior Movement of the Hyolaryngeal Complex -- -- hard effortful swallow  -AL    Increase Anterior Movement of the Hyolaryngeal Complex -- -- EMST  -AL    Increase Epiglottic Inversion and Retroflexion -- -- Mendelsohn;hard effortful swallow;EMST  -AL    Increase Closure at Entrance to Airway/Closure of Airway at  Glottis -- -- Mendelsohn;hard effortful swallow  -AL    [place]/Accuracy (Pharyngeal Strengthening Goal 1, SLP) -- -- with moderate cues (50-74% accuracy)  -AL    Time Frame (Pharyngeal Strengthening Goal 1, SLP) -- -- 1 week  -AL    Progress/Outcomes (Pharyngeal Strengthening Goal 1, SLP) good progress toward goal  -AL -- good progress toward goal  -AL    Comment (Pharyngeal Strengthening Goal 1, SLP) Pt exhibited no overt s/s of aspiration or penetration in 12/15 trials of water by cup; delayed cough x1, delayed throat clear x1, immediate throat clear x1. Pt required initial MIN cue only for small sips. Pt completed 30 repetitions of effortful swallow with MIN-MOD cues. EMST: 5 sets of 5 reps of EMST 75 at 1.25 turns past 5 cmH20 with MOD cues.  -AL -- Pt now tolerating soft (chopped meats) diet with NTL. Pt completed 30 repetitions of effortful swallow with MOD cues. Pt completed 2 sets of 5 reps of EMST at 1.25 turns past 5 cmH20. Pt exhibited no overt s/s of aspiration or penetration in 8/10 trials of ice chips; throat clear x1, delayed throat clear x1. Pt exhibited no overt s/s of aspiration or penetration in 5/9 trials of water by cup; immediate cough x1, immediate throat clear x1, delayed throat clear x1, delayed coughing x1. Pt required MIN cues for small sips. Plan to complete repeat VFSS in ~2-3 days.  -AL       Articulation Goal 1 (SLP)    Improve Articulation Goal 1 (SLP) -- -- by over-articulating at word level;by over-articulating at phrase level;by over-articulating in connected speech;80%;with minimal cues (75-90%)  -AL    Time Frame (Articulation Goal 1, SLP) -- -- 1 week  -AL    Progress/Outcomes (Articulation Goal 1, SLP) -- -- good progress toward goal  -AL    Comment (Articulation Goal 1, SLP) -- -- Pt now judged to be 100%intelligible in complex conversation with a familiar listener. She presents with mild mixed (flaccid/hypokinetic) dysarthria. She often self-corrects and repeats  utterances with improved clarity.  -AL       Attention Goal 1 (SLP)    Improve Attention by Goal 1 (SLP) -- complete selective attention task;complete sustained attention task;80%;independently (over 90% accuracy)  -AL complete selective attention task;complete sustained attention task;80%;independently (over 90% accuracy)  -AL    Time Frame (Attention Goal 1, SLP) -- -- 1 week  -AL    Progress/Outcomes (Attention Goal 1, SLP) -- good progress toward goal  -AL good progress toward goal  -AL    Comment (Attention Goal 1, SLP) -- Calendar: 5/6 with NOcues, 6/6 with MIN cues.  -AL --       Memory Skills Goal 1 (SLP)    Improve Memory Skills Through Goal 1 (SLP) -- -- recalling related word lists with an imposed delay;listen to a paragraph and answer questions;recall details of the day;80%;independently (over 90% accuracy)  -AL    Time Frame (Memory Skills Goal 1, SLP) -- -- 1 week  -AL    Progress/Outcomes (Memory Skills Goal 1, SLP) -- -- goal met  -AL    Comment (Memory Skills Goal 1, SLP) -- Voicemails: 11/15 with NO cues, 15/15 with MIN cues.  -AL --       Organizational Skills Goal 1 (SLP)    Improve Thought Organization Through Goal 1 (SLP) -- -- completing a divergent naming task;80%;with minimal cues (75-90%)  -AL    Time Frame (Thought Organization Skills Goal 1, SLP) -- -- 1 week  -AL    Progress/Outcomes (Thought Organization Skills Goal 1, SLP) -- -- goal no longer appropriate  -AL       Reasoning Goal 1 (SLP)    Improve Reasoning Through Goal 1 (SLP) -- -- complete deductive reasoning task;80%;independently (over 90% accuracy)  -AL    Time Frame (Reasoning Goal 1, SLP) -- -- 1 week  -AL    Progress/Outcomes (Reasoning Goal 1, SLP) -- -- goal no longer appropriate  -AL       Executive Functional Skills Goal 1 (SLP)    Improve Executive Function Skills Goal 1 (SLP) -- -- home management activity;80%;with minimal cues (75-90%)  -AL    Time Frame (Executive Function Skills Goal 1, SLP) -- -- 1 week  -AL     Progress/Outcomes (Executive Function Skills Goal 1, SLP) -- -- goal ongoing  -AL          User Key  (r) = Recorded By, (t) = Taken By, (c) = Cosigned By    Initials Name Provider Type    Janell Erickson MS CCC-SLP Speech and Language Pathologist                            Time Calculation:        Time Calculation- SLP     Row Name 04/19/23 1441 04/19/23 1041          Time Calculation- SLP    SLP Start Time 1330  -AL 1000  -AL     SLP Stop Time 1400  -AL 1030  -AL     SLP Time Calculation (min) 30 min  -AL 30 min  -AL           User Key  (r) = Recorded By, (t) = Taken By, (c) = Cosigned By    Initials Name Provider Type    Janell Erickson MS CCC-SLP Speech and Language Pathologist                  Therapy Charges for Today     Code Description Service Date Service Provider Modifiers Qty    47425041665 HC ST TREATMENT SWALLOW 2 4/18/2023 Janell Gallego MS CCC-SLP GN 1    43775487026 HC ST DEV OF COGN SKILLS INITIAL 15 MIN 4/18/2023 Janell Gallego MS CCC-SLP  1    92615486174 HC ST DEV OF COGN SKILLS EACH ADDT'L 15 MIN 4/18/2023 Janell Gallego, MS CCC-SLP  1    58600758760 HC ST DEV OF COGN SKILLS INITIAL 15 MIN 4/19/2023 Janell Gallego, MS CCC-SLP  1    11173012484 HC ST DEV OF COGN SKILLS EACH ADDT'L 15 MIN 4/19/2023 Janell Gallego, MS CCC-SLP  1    29188157510 HC ST TREATMENT SWALLOW 2 4/19/2023 Janell Gallego MS CCC-SLP GN 1                           Janell Gallego MS CCC-SLP  4/19/2023

## 2023-04-19 NOTE — PROGRESS NOTES
Recreational Therapy Note    Patient Name: Melissa Gomez   MRN: [id number]    Therapeutic Recreation Eval and Treat (last 12 hours)     Therapeutic Recreation Eval & Treat     Row Name 04/19/23 1500       Therapeutic Recreation Participation    Recreation Therapy Participation games  -TW    Games card games  Phase 10  -TW    Objectives of Recreation Participation maintain;positive attitudes leading to a healthy leisure lifestyle;sense of autonomy by choosing level of participation  -TW    Comment, Recreation Participation BUE use to hold cards; scorekeeping WFL; occ assist with directions  -TW    Recreation Therapy Summary of Participation active participation  -TW          User Key  (r) = Recorded By, (t) = Taken By, (c) = Cosigned By    Initials Name Provider Type    Nancy Hess, HUGH Recreational Therapist                  HUGH Espinoza  4/19/2023

## 2023-04-19 NOTE — PROGRESS NOTES
Met with patient to review progress and goals after team conference. Also, talked with patient's daughter. Patient is showing progress with her strength and endurance during therapy. Her core is still weak so bed mobility is Min-Mod. Patient is walking 80 ft CGA with walker. Patient struggles the most with lower body ADL's. Her cognition is improving. She has mild dysarthria but she will self correct. Patient still needing O2 at night. Daughter reported that her sister would be coming to town on Sunday and will be staying with patient for a month when she goes home. Daughters are aware that patient will need 24/7 assist at home so they are figuring out options between them and possible private caregivers. Daughters would like to do some teaching next week with therapists and SW will touch base with family next week to schedule family conference. SW will continue to assess for d/c needs.

## 2023-04-19 NOTE — PROGRESS NOTES
Inpatient Rehabilitation Plan of Care Note    Plan of Care  Care Plan Reviewed - Updates as Follows    Psychosocial    Performed Intervention(s)  Verbalizes needs and concerns  Therapeutic environmental set up      Sphincter Control    Performed Intervention(s)  Bladder scan or I/O cath per order  Encourage fluids  Bowel/bladder training      Safety    Performed Intervention(s)  Safety Rounds  Bed alarm and/or chair alarm    Signed by: Heavenly Garcia RN

## 2023-04-20 PROCEDURE — 97110 THERAPEUTIC EXERCISES: CPT

## 2023-04-20 PROCEDURE — 97129 THER IVNTJ 1ST 15 MIN: CPT

## 2023-04-20 PROCEDURE — 92526 ORAL FUNCTION THERAPY: CPT

## 2023-04-20 PROCEDURE — 25010000002 ENOXAPARIN PER 10 MG: Performed by: PHYSICAL MEDICINE & REHABILITATION

## 2023-04-20 PROCEDURE — 97535 SELF CARE MNGMENT TRAINING: CPT

## 2023-04-20 PROCEDURE — 97530 THERAPEUTIC ACTIVITIES: CPT

## 2023-04-20 PROCEDURE — 97130 THER IVNTJ EA ADDL 15 MIN: CPT

## 2023-04-20 RX ADMIN — LISINOPRIL 5 MG: 5 TABLET ORAL at 09:12

## 2023-04-20 RX ADMIN — AMLODIPINE BESYLATE 10 MG: 10 TABLET ORAL at 09:12

## 2023-04-20 RX ADMIN — CEFDINIR 300 MG: 300 CAPSULE ORAL at 09:12

## 2023-04-20 RX ADMIN — ROSUVASTATIN CALCIUM 20 MG: 20 TABLET, FILM COATED ORAL at 19:40

## 2023-04-20 RX ADMIN — ENOXAPARIN SODIUM 40 MG: 100 INJECTION SUBCUTANEOUS at 12:23

## 2023-04-20 RX ADMIN — ASPIRIN 81 MG: 81 TABLET, CHEWABLE ORAL at 09:12

## 2023-04-20 RX ADMIN — ACETAMINOPHEN 650 MG: 325 TABLET, FILM COATED ORAL at 19:40

## 2023-04-20 RX ADMIN — OXYCODONE HYDROCHLORIDE AND ACETAMINOPHEN 250 MG: 500 TABLET ORAL at 09:12

## 2023-04-20 RX ADMIN — CEFDINIR 300 MG: 300 CAPSULE ORAL at 22:43

## 2023-04-20 RX ADMIN — Medication 1000 UNITS: at 09:12

## 2023-04-20 NOTE — PLAN OF CARE
Goal Outcome Evaluation:      Slept fairly well. Meds crushed & put in applesauce. Incontinent of B&B. Rt. Side weaker. Slurred speech. Assisted with turns. O2 2L/NC, while sleeping. Declined SCD's, but on Lovenox.

## 2023-04-20 NOTE — PLAN OF CARE
Goal Outcome Evaluation:  Plan of Care Reviewed With: patient        Progress: improving  Outcome Evaluation: Pt is A&OX4, calm and cooperative. Assist X1 to wc. Meds crushed in AC. NTL. Up in dining room for meals. Incon of B&B, Last BM 4/20 Pt c/o of burning when urinating, urine with foul odor, Dr. Fajardo notified, Scattered bruising noted. No unsafe behaviors noted. Pt uses call light for assistance. Pt participated with all therapies today.

## 2023-04-20 NOTE — PROGRESS NOTES
Recreational Therapy Note    Patient Name: Melissa Gomez   MRN: [id number]    Therapeutic Recreation Eval and Treat (last 12 hours)     Therapeutic Recreation Eval & Treat     Row Name 04/20/23 1500       Therapeutic Recreation Participation    Recreation Therapy Participation games  -TW    Games card games  Phase 10  -TW    Objectives of Recreation Participation maintain;positive attitudes leading to a healthy leisure lifestyle;sense of autonomy by choosing level of participation  -TW    Comment, Recreation Participation BUE use to hold cards; scorekeeping WFL; occ assist with directions  -TW    Recreation Therapy Summary of Participation active participation  -TW          User Key  (r) = Recorded By, (t) = Taken By, (c) = Cosigned By    Initials Name Provider Type    Nancy Hess CTRS Recreational Therapist                  HUGH Espinoza  4/20/2023

## 2023-04-20 NOTE — THERAPY TREATMENT NOTE
Inpatient Rehabilitation - Occupational Therapy Treatment Note    [facility]     Patient Name: Melissa Gomez  : 1940  MRN: [id number]    Today's Date: 2023                 Admit Date: 2023         ICD-10-CM ICD-9-CM   1. Impaired functional mobility, balance, gait, and endurance  Z74.09 V49.89   2. Follow-up exam  Z09 V67.9       Patient Active Problem List   Diagnosis   • Infarction of left basal ganglia       Past Medical History:   Diagnosis Date   • GERD (gastroesophageal reflux disease)    • Hyperlipidemia    • Hypertension    • Stroke        Past Surgical History:   Procedure Laterality Date   • COLONOSCOPY     • HYSTERECTOMY     • TONSILLECTOMY               IRF OT ASSESSMENT FLOWSHEET (last 12 hours)     IRF OT Evaluation and Treatment     Row Name 23 1447          OT Time and Intention    Document Type progress note  -DN     Mode of Treatment occupational therapy  -DN     Patient Effort good  -DN     Symptoms Noted During/After Treatment none  -DN     Row Name 23 1447          Pain Assessment    Pretreatment Pain Rating 0/10 - no pain  -DN     Posttreatment Pain Rating 0/10 - no pain  -DN     Pain Location - Side/Orientation Right  -DN     Pain Location upper  -DN     Pain Location - shoulder  -DN     Row Name 23 1447          Cognition/Psychosocial    Affect/Mental Status (Cognition) WFL  -DN     Orientation Status (Cognition) oriented x 4  -DN     Follows Commands (Cognition) follows one-step commands;over 90% accuracy  -DN     Personal Safety Interventions fall prevention program maintained;gait belt  -DN     Cognitive Function attention deficit;memory deficit  -DN     Attention Deficit (Cognition) minimal deficit;moderate deficit  -DN     Row Name 23 1447          Bathing    [place] Level (Bathing) moderate assist (50% patient effort);verbal cues;nonverbal cues (demo/gesture)  -DN     Position (Bathing) supported sitting;supported standing  -DN      Set-up Assistance (Bathing) obtain supplies  -DN     Row Name 04/20/23 1447          Upper Body Dressing    [place] Level (Upper Body Dressing) upper body dressing skills;doff;don;pull over garment;supervision;verbal cues;nonverbal cues (demo/gesture)  -DN     Position (Upper Body Dressing) supported sitting  -DN     Set-up Assistance (Upper Body Dressing) obtain clothing  -DN     Row Name 04/20/23 1447          Lower Body Dressing    [place] Level (Lower Body Dressing) doff;don;pants/bottoms;shoes/slippers;socks;underwear;maximum assist (25% patient effort)  -DN     Assistive Device Use (Lower Body Dressing) reacher  -DN     Position (Lower Body Dressing) supported sitting;supported standing  -DN     Set-up Assistance (Lower Body Dressing) obtain clothing  -DN     Row Name 04/20/23 1447          Grooming    [place] Level (Grooming) grooming skills;deodorant application;oral care regimen;wash face, hands;supervision  -DN     Position (Grooming) sink side;supported sitting  -DN     Set-up Assistance (Grooming) obtain supplies  -DN     Row Name 04/20/23 1447          Toileting    [place] Level (Toileting) toileting skills;adjust/manage clothing;perform perineal hygiene;maximum assist (25% patient effort);verbal cues;nonverbal cues (demo/gesture)  -DN     Assistive Device Use (Toileting) grab bar/safety frame;raised toilet seat  -DN     Position (Toileting) supported sitting;supported standing  -DN     Set-up Assistance (Toileting) change pad/brief  -DN     Row Name 04/20/23 1447          Toilet Transfer    Type (Toilet Transfer) stand pivot/stand step  -DN     [place] Level (Toilet Transfer) minimum assist (75% patient effort);nonverbal cues (demo/gesture)  -DN     Assistive Device (Toilet Transfer) wheelchair;grab bars/safety frame;raised toilet seat  -DN     Comment, (Toilet Transfer) more difficulty with sit to stand  -DN     Row Name 04/20/23 1447          Safety Issues, Functional  Mobility    Impairments Affecting Function (Mobility) balance;cognition;endurance/activity tolerance;strength  -DN     Row Name 04/20/23 1447          Elbow/Forearm (Therapeutic Exercise)    Elbow/Forearm (Therapeutic Exercise) strengthening exercise  -DN     Elbow/Forearm AROM (Therapeutic Exercise) bilateral;10 repetitions;2 sets  -DN     Elbow/Forearm Strengthening (Therapeutic Exercise) 1 lb free weight  -DN     Row Name 04/20/23 1447          Wrist (Therapeutic Exercise)    Wrist (Therapeutic Exercise) strengthening exercise  -DN     Wrist AROM (Therapeutic Exercise) bilateral;10 repetitions;2 sets  -DN     Wrist Strengthening (Therapeutic Exercise) 1 lb free weight  -DN     Row Name 04/20/23 1447          Hand (Therapeutic Exercise)    Hand (Therapeutic Exercise) strengthening exercise  -DN     Hand AROM/AAROM (Therapeutic Exercise) bilateral  -DN     Hand Strengthening (Therapeutic Exercise) hand gripper  -DN     Row Name 04/20/23 1447          Balance    Static Sitting Balance --  standing this am pulling up pants after toileting  -DN     Static Standing Balance supervision;verbal cues;non-verbal cues (demo/gesture)  stnding at table in gym with use of RUE for grabbing clothepins and turning to put on side table, also working on standing tolerance for increased toileting function  -DN     Row Name 04/20/23 1447          Positioning and Restraints    Pre-Treatment Position sitting in chair/recliner  -DN     Post Treatment Position wheelchair  -DN     In Bed sitting;call light within reach;encouraged to call for assist;exit alarm on  -DN           User Key  (r) = Recorded By, (t) = Taken By, (c) = Cosigned By    Initials Name Effective Dates    Ismael Quintanilla OT 06/16/21 -                  Occupational Therapy Education     Title: PT OT SLP Therapies (Done)     Topic: Occupational Therapy (Done)     Point: ADL training (Done)     Description:   Instruct learner(s) on proper safety adaptation and  remediation techniques during self care or transfers.   Instruct in proper use of assistive devices.              Learning Progress Summary           Patient Acceptance, E, VU by  at 4/8/2023 1204    Acceptance, E,TB,D, VU,DU,NR by  at 4/6/2023 1230    Comment: ed pt on role of OT. benefit of therapy, POC w.  OT ed on safety w ADL and tsf. pt ed on UBD technique.                   Point: Home exercise program (Done)     Description:   Instruct learner(s) on appropriate technique for monitoring, assisting and/or progressing therapeutic exercises/activities.              Learning Progress Summary           Patient Acceptance, E, VU by LS at 4/8/2023 1204    Acceptance, E,TB,D, VU,DU,NR by  at 4/6/2023 1230    Comment: ed pt on role of OT. benefit of therapy, POC w.  OT ed on safety w ADL and tsf. pt ed on UBD technique.                   Point: Precautions (Done)     Description:   Instruct learner(s) on prescribed precautions during self-care and functional transfers.              Learning Progress Summary           Patient Acceptance, E, VU by  at 4/8/2023 1204    Acceptance, E,TB,D, VU,DU,NR by  at 4/6/2023 1230    Comment: ed pt on role of OT. benefit of therapy, POC w.  OT ed on safety w ADL and tsf. pt ed on UBD technique.                   Point: Body mechanics (Done)     Description:   Instruct learner(s) on proper positioning and spine alignment during self-care, functional mobility activities and/or exercises.              Learning Progress Summary           Patient Acceptance, E, VU by  at 4/8/2023 1204    Acceptance, E,TB,D, VU,DU,NR by  at 4/6/2023 1230    Comment: ed pt on role of OT. benefit of therapy, POC w.  OT ed on safety w ADL and tsf. pt ed on UBD technique.                               User Key     Initials Effective Dates Name Provider Type Discipline     06/16/21 -  Patsy Blank, OTR Occupational Therapist OT     06/16/21 -  Yamila Wheat MS CCC-SLP Speech and  Language Pathologist SLP                    OT Recommendation and Plan                         Time Calculation:      Time Calculation- OT     Row Name 04/20/23 1400 04/20/23 0830          Time Calculation- OT    OT Start Time 1400  -DN 0830  -DN     OT Stop Time 1430  -DN 0900  -DN     OT Time Calculation (min) 30 min  -DN 30 min  -DN           User Key  (r) = Recorded By, (t) = Taken By, (c) = Cosigned By    Initials Name Provider Type    Ismael Quintanilla OT Occupational Therapist              Therapy Charges for Today     Code Description Service Date Service Provider Modifiers Qty    61671900818 HC OT SELF CARE/MGMT/TRAIN EA 15 MIN 4/19/2023 Ismael Lanier OT GO 2    00617970627 HC OT THER PROC EA 15 MIN 4/19/2023 Ismael Lanier OT GO 2    99809294261 HC OT SELF CARE/MGMT/TRAIN EA 15 MIN 4/20/2023 Ismael Lanier OT GO 2    44027877463 HC OT THER PROC EA 15 MIN 4/20/2023 Ismael Lanier OT GO 2                   Ismael Lanier OT  4/20/2023

## 2023-04-20 NOTE — PROGRESS NOTES
Nutrition Services    Patient Name:  Melissa Gomez  YOB: 1940  MRN: [id number]  Admit Date:  4/5/2023    Visited pt in room to confirm she is receiving strawberry carnation breakfast essentials (CBE) mixed with nectar thick milk at breakfast, per pt preference and as requested by RD 4/17. Pt stated she received it once and liked it much more than vanilla flavor she had previously tried. RD also left with packets of CBE - strawberry + thickeners in room with instructions on how to mix. This is to be used if pt did not receive CBE on tray. Advised pt to consume only if intakes are low. Pt reports good PO; per EMR, 50-75%.     RD will continue to follow-up, per protocol.    Electronically signed by:  Yue Ayala RD  04/20/23 15:19 EDT

## 2023-04-20 NOTE — PROGRESS NOTES
LOS: 15 days   Patient Care Team:  Jayro Fountain MD as PCP - General (Internal Medicine)      Patient Name: PIERRE FREEMAN  Patient : 1940    ADMITTING DIAGNOSIS:  Diagnoses    1. Follow-up examination  2. IMPAIRED FUNCTIONAL MOBILITY, BALANCE, GAIT, AND ENDURANCE       Left basal ganglia stroke  Right hemiparesis with impaired motor control    SUBJECTIVE:  Patient seen and examined with therapies. She is tolerating therapies but feels weaker today than usual. No further urinary complaints.     OBJECTIVE:    Vitals:    23 0500   BP: 108/67   Pulse: 90   Resp: 18   Temp: 97.1 °F (36.2 °C)   SpO2: 93%       PHYSICAL EXAM:   General: pleasant, in no acute distress  HEENT: NCAT, sclera anicteric, conjunctiva pink, mucoa moist  Cardiovascular: RRR, +S1+S2, no obvious m/g/r  Lungs: CTAB, no rhonchi or wheezing  Abdomen: normoactive bowel sounds, soft, non tender to palpation  Extremities: no peripheral edema  Skin: exposed surfaces of skin warm, intact, without erythema  Neuro: awake alert and oriented to person, place, time, and situation,  Right facial droop   dysarthria.  MSK: Right shoulder flexion 4/5, elbow flexion 4+/5, finger flexion 4+/5, knee extension 4+/5, ankle dorsiflexion 4/5,  weak on the RUE, LUE 5/5 BLE 5/5      MEDICATIONS  Scheduled Meds:  amLODIPine, 10 mg, Oral, Q24H  vitamin C, 250 mg, Oral, Daily  aspirin, 81 mg, Oral, Daily  cefdinir, 300 mg, Oral, Q12H  cholecalciferol, 1,000 Units, Oral, Daily  enoxaparin, 40 mg, Subcutaneous, Q24H  lisinopril, 5 mg, Oral, Q24H  rosuvastatin, 20 mg, Oral, Nightly  senna-docusate sodium, 2 tablet, Oral, Nightly        Continuous Infusions:       PRN Meds:  •  acetaminophen **OR** [DISCONTINUED] acetaminophen  •  [DISCONTINUED] senna-docusate sodium **AND** polyethylene glycol **AND** bisacodyl **AND** bisacodyl      RESULTS:  Glucose   Date/Time Value Ref Range Status   2023 1608 115 70 - 130 mg/dL Final     Comment:     Meter:  [id number] : [id number] Julio COLIN     Results from last 7 days   Lab Units 04/17/23  0723 04/14/23  0725   WBC 10*3/mm3 6.71 7.96   HEMOGLOBIN g/dL 13.2 13.4   HEMATOCRIT % 38.4 40.1   PLATELETS 10*3/mm3 198 209     Results from last 7 days   Lab Units 04/17/23  0723 04/14/23  0725   SODIUM mmol/L 141 141   POTASSIUM mmol/L 4.4 4.5   CHLORIDE mmol/L 107 107   CO2 mmol/L 25.2 25.2   BUN mg/dL 25* 19   CREATININE mg/dL 0.83 0.63   CALCIUM mg/dL 9.3 9.5   GLUCOSE mg/dL 102* 95     Urine Culture   Date Value Ref Range Status   04/19/2023 Growth present, too young to evaluate  Preliminary           ASSESSMENT and PLAN:    Infarction of left basal ganglia    Left basal ganglia stroke  Stroke prophylaxis- ASA and Plavix x 21 days from March 30, 2023, then ASA 81 mg daily. Crestor 20 mg.     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  We will continue to monitor.    Dysphagia   April 14 - upgrade to soft (chopped meats)/no mixed consistencies with NTL. Recommend sit upright in wheelchair with supervision. Check intermittently for pocketing. Meds one at a time with applesauce or pudding    Dysarthria.     Hypertension - amlodipine/lisinopril     Check TSH, Vit B12 and Vit D levels-within normal limit     Urinary retention. Treated for UTI as Reggie Duran. Check bladder scan PVR and cath if >300 cc until three consecutive < 250 cc  April 7-requires intermittent straight cath 400-113-300 cc.  No spontaneous void.  Follow pattern.  April 10-requirement straight catheter 300 cc about every 6 or 8 hours.  - will extend interval to every 12 hours to see if she voids with a larger volume.  4/12- has not required intermittent catheterization since last night.  Noted to have 4 voids with PVR less than 200.  May be recovering bladder function.  Will monitor.  4/13-voiding on her own with postvoid  residuals 190 range  4/19-complaints of dysuria-urinalysis ordered    Urinary Tract Infection  -complaint of dysuria on 4/19 with positive UA. Culture pending.   -cefdinir 300mg BID for 5 days     Right upper extremity discomfort-April 17-Has some diffuse pain in the right upper extremity.  Distal right upper extremity slightly warmer than the left.  No allodynia.  Reviewed possible post stroke pain/central pain syndrome versus sympathetic mediated pain versus shoulder subluxation.  Will monitor.     DVT prophylaxis- on dual antiplatelet therapies.  SCDs.  Lovenox     Team Conference 4/11/2023  Nursing: continent/incontinent with bladder, requiring cathing each bladder scan, no safety issues  PT: bed mobiliy min assist, contact guard with transfers, ambulating 80 feet, fatigues quickly with low endurance, cues for sequencing, drags left foot, anticipated contact guard with walker  OT: mod assist for bathing, max/dependent with lower body dressing, toileting max assist due to balance  SLP: mildly impaired on CLQT WNL memory/visuospatial, and mild for attention/executive function, puree and nectar thick diet, mild to moderate dysarthria  Discharge Date: 1.5 weeks     TEAM CONF - April 18 - PROGRESS SLOWER THAN INITIAL FIRST DAYS SO ANTICIPATE EXTENDING LENGTH OF STAY - STRONGER BUT STILL IMPAIRED MOTOR CONTROL. FATIGUE. BED MOD. TRANSFERS MIN CTG. GAIT 80 FEET CTG RW , LESS DRAG RIGHT FEET. 4 STAIRS CTG. BATH MOD. LBD MAX. UBD MIN SBA. COGNITION - CUES FOR ATTENTION TO DETAIL. SWALLOW - COUGH WITH WATER BY CUP. MECH SOFT, NTL. VOICING/ARTICULATION- MILD DYSARTHRIA AND WILL SELF CORRECT.  BNE YESTERDAY. SEE REPORT IN [facility]. Attention: WNL  Executive Functioning: Mildly Impaired  Abstract Reasoning: WNL  Arithmetic: Mildly Impaired  Visuospatial Perception: WNL  Visuospatial Praxis: Moderately to Severely Impaired  Verbal Memory: Immediate - Moderately Impaired; Delayed - WNL for Stories, Severely Impaired for  Lists  Visual Memory: Mildly Impaired  Emotional: Mild anxiety and depression related to worries about her quality-of-life following discharge from the hospital.  O2 AT NIGHT. STILL INCONTINENT- TIMED VOIDS. ALLERGIES - SNEEZES.   ELOS - 1.5 WEEKS    CODE STATUS:  Code Status (Patient has no pulse and is not breathing): CPR (Attempt to Resuscitate)  Medical Interventions (Patient has pulse or is breathing): Full Support  Release to patient: Routine Release      Admission Status: Continues to meet requirements for inpatient admission.     Shannon Martinez DO        During rounds, used appropriate personal protective equipment including mask and gloves.  Additional gown if indicated.  Mask used was standard procedure mask. Appropriate PPE was worn during the entire visit.  Hand hygiene was completed before and after.

## 2023-04-20 NOTE — PROGRESS NOTES
Inpatient Rehabilitation Plan of Care Note    Plan of Care  Care Plan Reviewed - Updates as Follows    Psychosocial    [RN] Coping/Adjustment(Active)  Current Status(04/20/2023): Lacks insight with current situation  Weekly Goal(04/25/2023): provide education material regarding stroke  Discharge Goal: Knowledgeable of care needs and stroke recovery    Performed Intervention(s)  Verbalizes needs and concerns  Therapeutic environmental set up      Sphincter Control    [RN] Bladder Management(Active)  Current Status(04/20/2023): Incontinent of bladder, bladder scans no longer  ordered due to several < 200 scans.  Weekly Goal(04/25/2023): The patient is unable to void independently  Discharge Goal: The patient empties bladder completely    [RN] Bowel Management(Active)  Current Status(04/20/2023): Incontinent of stool  Weekly Goal(04/24/2023): Continent 50%  Discharge Goal: Continent 100%    Performed Intervention(s)  Bladder scan or I/O cath per order  Encourage fluids  Bowel/bladder training      Safety    [RN] Potential for Injury(Active)  Current Status(04/20/2023): Risk for falls  Weekly Goal(04/24/2023): Instruct family/caregivers regarding safety precautions  and need for close supervision  Discharge Goal: Family/caregiver will be knowledgeable of need for cueing and  supervision to avoid falls    Performed Intervention(s)  Safety Rounds  Bed alarm and/or chair alarm    Signed by: Lisa Solis RN

## 2023-04-20 NOTE — THERAPY TREATMENT NOTE
Inpatient Rehabilitation - Speech Language Pathology Treatment Note    [facility]     Patient Name: Melissa Gomez  : 1940  MRN: [id number]    Today's Date: 2023                   Admit Date: 2023       Visit Dx:      ICD-10-CM ICD-9-CM   1. Impaired functional mobility, balance, gait, and endurance  Z74.09 V49.89   2. Follow-up exam  Z09 V67.9       Patient Active Problem List   Diagnosis   • Infarction of left basal ganglia       Past Medical History:   Diagnosis Date   • GERD (gastroesophageal reflux disease)    • Hyperlipidemia    • Hypertension    • Stroke        Past Surgical History:   Procedure Laterality Date   • COLONOSCOPY     • HYSTERECTOMY     • TONSILLECTOMY         SLP Recommendation and Plan                                                            SLP EVALUATION (last 72 hours)     SLP SLC Evaluation     Row Name 23 1330 23 1100 23 1330 23 1000 23 1330       Communication Assessment/Intervention    Document Type therapy note (daily note)  -AL therapy note (daily note)  -AL therapy note (daily note)  -AL therapy note (daily note)  -AL therapy note (daily note)  -AL    Patient/Family/Caregiver Comments/Observations Pt participated well. She reported feeling tired today due to UTI.  -AL Pt participated well.  -AL Pt participated well.  -AL Pt participated well.  -AL Pt participated well.  -AL       Pain Scale: Numbers Pre/Post-Treatment    Pretreatment Pain Rating 0/10 - no pain  -AL 0/10 - no pain  -AL 0/10 - no pain  -AL 0/10 - no pain  -AL 0/10 - no pain  -AL    Row Name 23 1100                   Communication Assessment/Intervention    Document Type therapy note (daily note)  -AL        Patient/Family/Caregiver Comments/Observations Pt participated well.  -AL           Pain Scale: Numbers Pre/Post-Treatment    Pretreatment Pain Rating 0/10 - no pain  -AL              User Key  (r) = Recorded By, (t) = Taken By, (c) = Cosigned By    Initials  Name Effective Dates    GÓMEZ Gallego Janell Carr, MS CCC-SLP 06/16/21 -                    EDUCATION    The patient has been educated in the following areas:       Cognitive Impairment Communication Impairment Dysphagia (Swallowing Impairment).             SLP GOALS     Row Name 04/20/23 1330 04/20/23 1100 04/19/23 1330       (STG) Pharyngeal Strengthening Exercise Goal 1 (SLP)    Progress/Outcomes (Pharyngeal Strengthening Goal 1, SLP) -- good progress toward goal  -AL --    Comment (Pharyngeal Strengthening Goal 1, SLP) -- No overt s/s of aspiration or penetration observed in 8/11 trials of water by cup; delayed cough x1, delayed throat clear x2. Overall, improved swallow timing noted today. Pt completed 30 repetitions of effortful swallow with MIN cues. Pt completed 5 sets of 5 reps of EMST75 at 1.25 turns past 5 cmH20 with MIN-MOD cues. Improved endurance noted.  -AL Pt with strong coughing x1 during session; suspect possible coughing on saliva. Encouraged pt to consciously swallow saliva when having longer conversation. Vocal quality remained clear.  -AL       Articulation Goal 1 (SLP)    Progress/Outcomes (Articulation Goal 1, SLP) -- -- good progress toward goal  -AL    Comment (Articulation Goal 1, SLP) -- -- Pt read aloud a multiple paragraph story with initial MIN cues for slow rate and over-articulation. Pt was 100% intelligible to a familiar listener.  -AL       Attention Goal 1 (SLP)    Progress/Outcomes (Attention Goal 1, SLP) good progress toward goal  -AL -- --    Comment (Attention Goal 1, SLP) Initiated moderately complex written directions task: required MIN cues for 2 trials. Plan to complete in next session.  -AL -- --       Memory Skills Goal 1 (SLP)    Progress/Outcomes (Memory Skills Goal 1, SLP) -- -- good progress toward goal  -AL    Comment (Memory Skills Goal 1, SLP) -- -- Immediate memory for voicemails: 90% with NO cues, 100% with MIN cues  -AL       Executive Functional Skills Goal 1 (SLP)  "   Improve Executive Function Skills Goal 1 (SLP) home management activity;80%;with minimal cues (75-90%)  -AL -- --    Time Frame (Executive Function Skills Goal 1, SLP) 1 week  -AL -- --    Progress/Outcomes (Executive Function Skills Goal 1, SLP) good progress toward goal  -AL -- --    Comment (Executive Function Skills Goal 1, SLP) Medicine management with pill box and pt's own medicine list: 100% with NO cues.  -AL -- --    Row Name 04/19/23 1000 04/18/23 1330 04/18/23 1100       (STG) Labial Strengthening Goal 1 (SLP)    Comment (Labial Strengthening Goal 1, SLP) Minimal right facial weakness noted upon smiling.  -AL -- --       (STG) Pharyngeal Strengthening Exercise Goal 1 (SLP)    Progress/Outcomes (Pharyngeal Strengthening Goal 1, SLP) good progress toward goal  -AL -- good progress toward goal  -AL    Comment (Pharyngeal Strengthening Goal 1, SLP) No overt s/s of aspiration or penetration observed in 10/10 trials of NTL by cup. Effortful swallow completed x20 with mild-moderately swallow delay noted on \"dry swallows.\" Pt completed 5 sets of 5 reps of EMST75 at 1.25 turns past 5 cmH20 with MIN-MOD cues.  -AL -- Pt exhibited no overt s/s of aspiration or penetration in 12/15 trials of water by cup; delayed cough x1, delayed throat clear x1, immediate throat clear x1. Pt required initial MIN cue only for small sips. Pt completed 30 repetitions of effortful swallow with MIN-MOD cues. EMST: 5 sets of 5 reps of EMST 75 at 1.25 turns past 5 cmH20 with MOD cues.  -AL       Articulation Goal 1 (SLP)    Comment (Articulation Goal 1, SLP) Pt 100% intelligible in conversation today. Noted to have 4 moments of word finding delays during conversation.  -AL -- --       Attention Goal 1 (SLP)    Improve Attention by Goal 1 (SLP) -- complete selective attention task;complete sustained attention task;80%;independently (over 90% accuracy)  -AL --    Time Frame (Attention Goal 1, SLP) -- 1 week  -AL --    Progress/Outcomes " "(Attention Goal 1, SLP) -- good progress toward goal  -AL --    Comment (Attention Goal 1, SLP) -- Attention to detail for \"Bank Hours\" task: 6/7 with NO cues, 7/7 with MIN cues.  -AL --       Memory Skills Goal 1 (SLP)    Improve Memory Skills Through Goal 1 (SLP) -- recalling related word lists with an imposed delay;listen to a paragraph and answer questions;recall details of the day;80%;independently (over 90% accuracy)  -AL --    Time Frame (Memory Skills Goal 1, SLP) -- 1 week  -AL --    Progress/Outcomes (Memory Skills Goal 1, SLP) -- good progress toward goal  -AL --    Comment (Memory Skills Goal 1, SLP) -- Recalled 3/3 unrelated words after 20 minutes with NO cues. Immediate memory for voicemails: 70% with NO cues, 100% with MIN cues.  -AL --       Organizational Skills Goal 1 (SLP)    Improve Thought Organization Through Goal 1 (SLP) -- completing a divergent naming task;80%;with minimal cues (75-90%)  -AL --    Time Frame (Thought Organization Skills Goal 1, SLP) -- 1 week  -AL --    Progress/Outcomes (Thought Organization Skills Goal 1, SLP) -- goal ongoing  -AL --    Comment (Thought Organization Skills Goal 1, SLP) -- Red items: 2 with NO cues, 7 with MIN-MOD cues  -AL --          User Key  (r) = Recorded By, (t) = Taken By, (c) = Cosigned By    Initials Name Provider Type    Janell Erickson MS CCC-SLP Speech and Language Pathologist                            Time Calculation:        Time Calculation- SLP     Row Name 04/20/23 1535 04/20/23 1250          Time Calculation- SLP    SLP Start Time 1330  -AL 1100  -AL     SLP Stop Time 1400  -AL 1130  -AL     SLP Time Calculation (min) 30 min  -AL 30 min  -AL           User Key  (r) = Recorded By, (t) = Taken By, (c) = Cosigned By    Initials Name Provider Type    Janell Erickson MS CCC-SLP Speech and Language Pathologist                  Therapy Charges for Today     Code Description Service Date Service Provider Modifiers Qty    87680203834 " HC ST DEV OF COGN SKILLS INITIAL 15 MIN 4/19/2023 Janell Gallego, MS CCC-SLP  1    48665853701 HC ST DEV OF COGN SKILLS EACH ADDT'L 15 MIN 4/19/2023 Janell Gallego, MS CCC-SLP  1    20839137974 HC ST TREATMENT SWALLOW 2 4/19/2023 Janell Gallego, MS CCC-SLP GN 1    [id number] HC ST DEV OF COGN SKILLS INITIAL 15 MIN 4/20/2023 Janell Gallego, MS CCC-SLP  1    72001624172 HC ST DEV OF COGN SKILLS EACH ADDT'L 15 MIN 4/20/2023 Janell Gallego, MS CCC-SLP  1    [id number] HC ST TREATMENT SWALLOW 2 4/20/2023 Janell Gallego, MS CCC-SLP GN 1                           Janell Gallego MS CCC-SLP  4/20/2023

## 2023-04-21 LAB
ANION GAP SERPL CALCULATED.3IONS-SCNC: 11.2 MMOL/L (ref 5–15)
BASOPHILS # BLD AUTO: 0.03 10*3/MM3 (ref 0–0.2)
BASOPHILS NFR BLD AUTO: 0.5 % (ref 0–1.5)
BUN SERPL-MCNC: 31 MG/DL (ref 8–23)
BUN/CREAT SERPL: 32 (ref 7–25)
CALCIUM SPEC-SCNC: 9.7 MG/DL (ref 8.6–10.5)
CHLORIDE SERPL-SCNC: 109 MMOL/L (ref 98–107)
CO2 SERPL-SCNC: 23.8 MMOL/L (ref 22–29)
CREAT SERPL-MCNC: 0.97 MG/DL (ref 0.57–1)
DEPRECATED RDW RBC AUTO: 41.7 FL (ref 37–54)
EGFRCR SERPLBLD CKD-EPI 2021: 57.7 ML/MIN/1.73
EOSINOPHIL # BLD AUTO: 0.27 10*3/MM3 (ref 0–0.4)
EOSINOPHIL NFR BLD AUTO: 4.3 % (ref 0.3–6.2)
ERYTHROCYTE [DISTWIDTH] IN BLOOD BY AUTOMATED COUNT: 12.3 % (ref 12.3–15.4)
GLUCOSE SERPL-MCNC: 150 MG/DL (ref 65–99)
HCT VFR BLD AUTO: 40.9 % (ref 34–46.6)
HGB BLD-MCNC: 13 G/DL (ref 12–15.9)
IMM GRANULOCYTES # BLD AUTO: 0.02 10*3/MM3 (ref 0–0.05)
IMM GRANULOCYTES NFR BLD AUTO: 0.3 % (ref 0–0.5)
LYMPHOCYTES # BLD AUTO: 1.31 10*3/MM3 (ref 0.7–3.1)
LYMPHOCYTES NFR BLD AUTO: 20.8 % (ref 19.6–45.3)
MCH RBC QN AUTO: 29.2 PG (ref 26.6–33)
MCHC RBC AUTO-ENTMCNC: 31.8 G/DL (ref 31.5–35.7)
MCV RBC AUTO: 91.9 FL (ref 79–97)
MONOCYTES # BLD AUTO: 0.58 10*3/MM3 (ref 0.1–0.9)
MONOCYTES NFR BLD AUTO: 9.2 % (ref 5–12)
NEUTROPHILS NFR BLD AUTO: 4.08 10*3/MM3 (ref 1.7–7)
NEUTROPHILS NFR BLD AUTO: 64.9 % (ref 42.7–76)
NRBC BLD AUTO-RTO: 0 /100 WBC (ref 0–0.2)
PLATELET # BLD AUTO: 222 10*3/MM3 (ref 140–450)
PMV BLD AUTO: 10.1 FL (ref 6–12)
POTASSIUM SERPL-SCNC: 4.3 MMOL/L (ref 3.5–5.2)
RBC # BLD AUTO: 4.45 10*6/MM3 (ref 3.77–5.28)
SODIUM SERPL-SCNC: 144 MMOL/L (ref 136–145)
WBC NRBC COR # BLD: 6.29 10*3/MM3 (ref 3.4–10.8)

## 2023-04-21 PROCEDURE — 97110 THERAPEUTIC EXERCISES: CPT

## 2023-04-21 PROCEDURE — 25010000002 ENOXAPARIN PER 10 MG: Performed by: PHYSICAL MEDICINE & REHABILITATION

## 2023-04-21 PROCEDURE — 92526 ORAL FUNCTION THERAPY: CPT

## 2023-04-21 PROCEDURE — 80048 BASIC METABOLIC PNL TOTAL CA: CPT | Performed by: PHYSICAL MEDICINE & REHABILITATION

## 2023-04-21 PROCEDURE — 97130 THER IVNTJ EA ADDL 15 MIN: CPT

## 2023-04-21 PROCEDURE — 97535 SELF CARE MNGMENT TRAINING: CPT

## 2023-04-21 PROCEDURE — 85025 COMPLETE CBC W/AUTO DIFF WBC: CPT | Performed by: PHYSICAL MEDICINE & REHABILITATION

## 2023-04-21 PROCEDURE — 97129 THER IVNTJ 1ST 15 MIN: CPT

## 2023-04-21 PROCEDURE — 97530 THERAPEUTIC ACTIVITIES: CPT

## 2023-04-21 RX ADMIN — CEFDINIR 300 MG: 300 CAPSULE ORAL at 21:19

## 2023-04-21 RX ADMIN — CEFDINIR 300 MG: 300 CAPSULE ORAL at 07:27

## 2023-04-21 RX ADMIN — Medication 1000 UNITS: at 07:27

## 2023-04-21 RX ADMIN — ACETAMINOPHEN 650 MG: 325 TABLET, FILM COATED ORAL at 07:30

## 2023-04-21 RX ADMIN — ENOXAPARIN SODIUM 40 MG: 100 INJECTION SUBCUTANEOUS at 12:48

## 2023-04-21 RX ADMIN — OXYCODONE HYDROCHLORIDE AND ACETAMINOPHEN 250 MG: 500 TABLET ORAL at 07:27

## 2023-04-21 RX ADMIN — LISINOPRIL 5 MG: 5 TABLET ORAL at 07:27

## 2023-04-21 RX ADMIN — ACETAMINOPHEN 650 MG: 325 TABLET, FILM COATED ORAL at 21:19

## 2023-04-21 RX ADMIN — ASPIRIN 81 MG: 81 TABLET, CHEWABLE ORAL at 07:27

## 2023-04-21 RX ADMIN — ROSUVASTATIN CALCIUM 20 MG: 20 TABLET, FILM COATED ORAL at 21:19

## 2023-04-21 RX ADMIN — AMLODIPINE BESYLATE 10 MG: 10 TABLET ORAL at 07:28

## 2023-04-21 NOTE — THERAPY TREATMENT NOTE
Inpatient Rehabilitation - Speech Language Pathology Treatment Note    [facility]     Patient Name: Melissa Gomez  : 1940  MRN: [id number]    Today's Date: 2023                   Admit Date: 2023       Visit Dx:      ICD-10-CM ICD-9-CM   1. Impaired functional mobility, balance, gait, and endurance  Z74.09 V49.89   2. Follow-up exam  Z09 V67.9       Patient Active Problem List   Diagnosis   • Infarction of left basal ganglia       Past Medical History:   Diagnosis Date   • GERD (gastroesophageal reflux disease)    • Hyperlipidemia    • Hypertension    • Stroke        Past Surgical History:   Procedure Laterality Date   • COLONOSCOPY     • HYSTERECTOMY     • TONSILLECTOMY         SLP Recommendation and Plan                                                            SLP EVALUATION (last 72 hours)     SLP SLC Evaluation     Row Name 23 1330 23 0930 23 1330 23 1100 23 1330       Communication Assessment/Intervention    Document Type therapy note (daily note)  -AL therapy note (daily note)  -AL therapy note (daily note)  -AL therapy note (daily note)  -AL therapy note (daily note)  -AL    Patient/Family/Caregiver Comments/Observations Pt participated well.  -AL Pt participated well. She reported she continues to have occasional strong coughing episode/sneezing.  -AL Pt participated well. She reported feeling tired today due to UTI.  -AL Pt participated well.  -AL Pt participated well.  -AL       Pain Scale: Numbers Pre/Post-Treatment    Pretreatment Pain Rating 0/10 - no pain  -AL 0/10 - no pain  -AL 0/10 - no pain  -AL 0/10 - no pain  -AL 0/10 - no pain  -AL    Row Name 23 1000                   Communication Assessment/Intervention    Document Type therapy note (daily note)  -AL        Patient/Family/Caregiver Comments/Observations Pt participated well.  -AL           Pain Scale: Numbers Pre/Post-Treatment    Pretreatment Pain Rating 0/10 - no pain  -AL               User Key  (r) = Recorded By, (t) = Taken By, (c) = Cosigned By    Initials Name Effective Dates    Janell Erickson, MS CCC-SLP 06/16/21 -                    EDUCATION    The patient has been educated in the following areas:       Cognitive Impairment Communication Impairment Dysphagia (Swallowing Impairment).             SLP GOALS     Row Name 04/21/23 1330 04/21/23 0930 04/20/23 1330       (STG) Pharyngeal Strengthening Exercise Goal 1 (SLP)    Comment (Pharyngeal Strengthening Goal 1, SLP) -- No overt s/s of aspiration or penetration observed in 10/13 trials of water by cup; delayed cough x1, throat clear x2. Pt completed 30 repetitions of effortful swallow with MIN cues. Discussed monitoring saliva with volitional swallow to avoid episodes of coughing on saliva during conversations.  -AL --       Attention Goal 1 (SLP)    Progress/Outcomes (Attention Goal 1, SLP) -- -- good progress toward goal  -AL    Comment (Attention Goal 1, SLP) -- -- Initiated moderately complex written directions task: required MIN cues for 2 trials. Plan to complete in next session.  -AL       Memory Skills Goal 1 (SLP)    Improve Memory Skills Through Goal 1 (SLP) recalling related word lists with an imposed delay;listen to a paragraph and answer questions;recall details of the day;80%;independently (over 90% accuracy)  -AL -- --    Time Frame (Memory Skills Goal 1, SLP) 1 week  -AL -- --    Progress/Outcomes (Memory Skills Goal 1, SLP) good progress toward goal  -AL -- --    Comment (Memory Skills Goal 1, SLP) Immediate memory for voicemails: 100% with NO cues  -AL -- --       Organizational Skills Goal 1 (SLP)    Improve Thought Organization Through Goal 1 (SLP) completing a divergent naming task;80%;with minimal cues (75-90%)  -AL -- --    Progress/Outcomes (Thought Organization Skills Goal 1, SLP) good progress toward goal  -AL -- --    Comment (Thought Organization Skills Goal 1, SLP) Smell: 4 with NO cues, 10 with  MIN-MOD cues.  -AL -- --       Reasoning Goal 1 (SLP)    Improve Reasoning Through Goal 1 (SLP) complete deductive reasoning task;80%;independently (over 90% accuracy)  -AL -- --    Time Frame (Reasoning Goal 1, SLP) 1 week  -AL -- --    Progress/Outcomes (Reasoning Goal 1, SLP) good progress toward goal  -AL -- --    Comment (Reasoning Goal 1, SLP) Moderate level logic puzzle: 100% with NO cues  -AL -- --       Executive Functional Skills Goal 1 (SLP)    Improve Executive Function Skills Goal 1 (SLP) -- -- home management activity;80%;with minimal cues (75-90%)  -AL    Time Frame (Executive Function Skills Goal 1, SLP) -- -- 1 week  -AL    Progress/Outcomes (Executive Function Skills Goal 1, SLP) -- -- good progress toward goal  -AL    Comment (Executive Function Skills Goal 1, SLP) -- -- Medicine management with pill box and pt's own medicine list: 100% with NO cues.  -AL    Row Name 04/20/23 1100 04/19/23 1330 04/19/23 1000       (STG) Labial Strengthening Goal 1 (SLP)    Comment (Labial Strengthening Goal 1, SLP) -- -- Minimal right facial weakness noted upon smiling.  -AL       (STG) Pharyngeal Strengthening Exercise Goal 1 (SLP)    Progress/Outcomes (Pharyngeal Strengthening Goal 1, SLP) good progress toward goal  -AL -- good progress toward goal  -AL    Comment (Pharyngeal Strengthening Goal 1, SLP) No overt s/s of aspiration or penetration observed in 8/11 trials of water by cup; delayed cough x1, delayed throat clear x2. Overall, improved swallow timing noted today. Pt completed 30 repetitions of effortful swallow with MIN cues. Pt completed 5 sets of 5 reps of EMST75 at 1.25 turns past 5 cmH20 with MIN-MOD cues. Improved endurance noted.  -AL Pt with strong coughing x1 during session; suspect possible coughing on saliva. Encouraged pt to consciously swallow saliva when having longer conversation. Vocal quality remained clear.  -AL No overt s/s of aspiration or penetration observed in 10/10 trials of NTL by  "cup. Effortful swallow completed x20 with mild-moderately swallow delay noted on \"dry swallows.\" Pt completed 5 sets of 5 reps of EMST75 at 1.25 turns past 5 cmH20 with MIN-MOD cues.  -AL       Articulation Goal 1 (SLP)    Progress/Outcomes (Articulation Goal 1, SLP) -- good progress toward goal  -AL --    Comment (Articulation Goal 1, SLP) -- Pt read aloud a multiple paragraph story with initial MIN cues for slow rate and over-articulation. Pt was 100% intelligible to a familiar listener.  -AL Pt 100% intelligible in conversation today. Noted to have 4 moments of word finding delays during conversation.  -AL       Memory Skills Goal 1 (SLP)    Progress/Outcomes (Memory Skills Goal 1, SLP) -- good progress toward goal  -AL --    Comment (Memory Skills Goal 1, SLP) -- Immediate memory for voicemails: 90% with NO cues, 100% with MIN cues  -AL --          User Key  (r) = Recorded By, (t) = Taken By, (c) = Cosigned By    Initials Name Provider Type    Janell Erickson MS CCC-SLP Speech and Language Pathologist                            Time Calculation:        Time Calculation- SLP     Row Name 04/21/23 1358 04/21/23 1047          Time Calculation- SLP    SLP Start Time 1330  -AL 0930  -AL     SLP Stop Time 1400  -AL 1000  -AL     SLP Time Calculation (min) 30 min  -AL 30 min  -AL           User Key  (r) = Recorded By, (t) = Taken By, (c) = Cosigned By    Initials Name Provider Type    Janell Erickson MS CCC-SLP Speech and Language Pathologist                  Therapy Charges for Today     Code Description Service Date Service Provider Modifiers Qty    [id number] HC ST DEV OF COGN SKILLS INITIAL 15 MIN 4/20/2023 Janell Gallego MS CCC-SLP  1    [id number] HC ST DEV OF COGN SKILLS EACH ADDT'L 15 MIN 4/20/2023 Janell Gallego MS CCC-SLP  1    [id number] HC ST TREATMENT SWALLOW 2 4/20/2023 Janell Gallego MS CCC-SLP GN 1    [id number] HC ST DEV OF COGN SKILLS INITIAL 15 MIN 4/21/2023 Janell Gallego" Lilly, MS CCC-SLP  1    [id number] [facility] DEV OF COGN SKILLS EACH ADDT'L 15 MIN 4/21/2023 Janell Gallego, MS CCC-SLP  1    [id number] [facility] TREATMENT SWALLOW 2 4/21/2023 Janell Gallego, MS CCC-SLP GN 1                           Janell Gallego MS KALI-SLP  4/21/2023

## 2023-04-21 NOTE — PLAN OF CARE
Goal Outcome Evaluation:              Outcome Evaluation: Stroke. NIH=2. A&OX4. R. facial droop. Slurred speech. Diet: Mech. soft, no mixed consistencies, no straws, NTL. Upright and one-on-one supervision for all meals. Tylenol for HA. Incontinent and continent B&B. Last BM 4/21. Meds crushed in AS. Full code. 2L O2 at nite. Carolann area excoriated. Barrier cream applied. Wears a brief. Participated fully in therapy. Calm, cooperative, and pleasant. [id number] wheelchair. Blurred vision in R. eye. Ate better a tmeals. Full code. On omnicef for UTI.

## 2023-04-21 NOTE — PROGRESS NOTES
Inpatient Rehabilitation Plan of Care Note    Plan of Care  Updated Problems/Interventions    Orientation: WNL  Attention: WNL  Executive Functioning: Mildly Impaired  Abstract Reasoning: WNL  Arithmetic: Mildly Impaired  Visuospatial Perception: WNL  Visuospatial Praxis: Moderately to Severely Impaired  Verbal Memory: Immediate - Moderately Impaired; Delayed - WNL for Stories,  Severely Impaired for Lists  Visual Memory: Mildly Impaired  Emotional: Mild anxiety and depression related to worries about her  quality-of-life following discharge from the hospital.    Summary and Recommendations:  Motor and speech deficits consistent with stroke,  but also seeing moderate to severe rote verbal memory deficits, as well as  weaknesses in aspects of visual memory and executive functioning.  No history of  psychological problems, but currently reporting mild anxiety and depression; the  patient is quite worried about how she will continue to lead an active lifestyle  following this stroke.  In addition to ongoing outpatient therapies at  discharge, consider brief psychotherapy and/or medication utilization for mood  symptoms if they persist.    Signed by: Fany Funes,

## 2023-04-21 NOTE — PLAN OF CARE
Goal Outcome Evaluation:      Pt is A/Ox4, calm/cooperative, OOB x 1 stand/pivot to w/c . Meds taken crushed in puree. Pt has been incontinent of urine overnight. Pt c/o headache and RUE pain ; prn Tylenol given x1. O2 at 2L via nasal cannula worn overnight.

## 2023-04-21 NOTE — PROGRESS NOTES
" LOS: 16 days   Patient Care Team:  Jayro Fountain MD as PCP - General (Internal Medicine)      Patient Name: PIERRE FREEMAN  Patient : 1940    ADMITTING DIAGNOSIS:  Diagnoses    1. Follow-up examination  2. IMPAIRED FUNCTIONAL MOBILITY, BALANCE, GAIT, AND ENDURANCE       Left basal ganglia stroke  Right hemiparesis with impaired motor control    SUBJECTIVE:  Patient seen and examined in the recreational therapy room eating breakfast. She is doing well today. She states that \"Yesterday was the best therapy day ever. I went outside and it was a beautiful day.\" Feels less weak this morning.     OBJECTIVE:    Vitals:    23 0500   BP: 102/67   Pulse: 79   Resp: 18   Temp: 97.8 °F (36.6 °C)   SpO2: 98%       PHYSICAL EXAM:   General: pleasant, in no acute distress  HEENT: NCAT, sclera anicteric, conjunctiva pink, mucoa moist  Cardiovascular: RRR, +S1+S2, no obvious m/g/r  Lungs: CTAB, no rhonchi or wheezing  Abdomen: normoactive bowel sounds, soft, non tender to palpation  Extremities: no peripheral edema  Skin: exposed surfaces of skin warm, intact, without erythema  Neuro: awake alert and oriented to person, place, time, and situation,  Right facial droop   dysarthria.  MSK: Right shoulder flexion 4/5, elbow flexion 4+/5, finger flexion 4+/5, knee extension 4+/5, ankle dorsiflexion 4/5,  weak on the RUE, LUE 5/5 BLE 5/5      MEDICATIONS  Scheduled Meds:  amLODIPine, 10 mg, Oral, Q24H  vitamin C, 250 mg, Oral, Daily  aspirin, 81 mg, Oral, Daily  cefdinir, 300 mg, Oral, Q12H  cholecalciferol, 1,000 Units, Oral, Daily  enoxaparin, 40 mg, Subcutaneous, Q24H  lisinopril, 5 mg, Oral, Q24H  rosuvastatin, 20 mg, Oral, Nightly  senna-docusate sodium, 2 tablet, Oral, Nightly        Continuous Infusions:       PRN Meds:  •  acetaminophen **OR** [DISCONTINUED] acetaminophen  •  [DISCONTINUED] senna-docusate sodium **AND** polyethylene glycol **AND** bisacodyl **AND** bisacodyl      RESULTS:  Glucose "   Date/Time Value Ref Range Status   04/18/2023 1608 115 70 - 130 mg/dL Final     Comment:     Meter: [id number] : [id number] Julio COLIN     Results from last 7 days   Lab Units 04/21/23  0912 04/17/23  0723   WBC 10*3/mm3 6.29 6.71   HEMOGLOBIN g/dL 13.0 13.2   HEMATOCRIT % 40.9 38.4   PLATELETS 10*3/mm3 222 198     Results from last 7 days   Lab Units 04/17/23  0723   SODIUM mmol/L 141   POTASSIUM mmol/L 4.4   CHLORIDE mmol/L 107   CO2 mmol/L 25.2   BUN mg/dL 25*   CREATININE mg/dL 0.83   CALCIUM mg/dL 9.3   GLUCOSE mg/dL 102*     Urine Culture   Date Value Ref Range Status   04/19/2023 Growth present, too young to evaluate  Preliminary           ASSESSMENT and PLAN:    Infarction of left basal ganglia    Left basal ganglia stroke  - Stroke prophylaxis: ASA and Plavix x 21 days from March 30, 2023, then ASA 81 mg daily. Crestor 20 mg.  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  We will continue to monitor.    Dysphagia   April 14 - upgrade to soft (chopped meats)/no mixed consistencies with NTL. Recommend sit upright in wheelchair with supervision. Check intermittently for pocketing. Meds one at a time with applesauce or pudding    Dysarthria  -April 21 - Noted to be 100% intelligible in conversation with SLP with some moments of word finding delays.      Hypertension - amlodipine/lisinopril     Urinary retention. Treated for UTI as Reggie Duran. Check bladder scan PVR and cath if >300 cc until three consecutive < 250 cc  April 7-requires intermittent straight cath 400-113-300 cc.  No spontaneous void.  Follow pattern.  April 10-requirement straight catheter 300 cc about every 6 or 8 hours.  - will extend interval to every 12 hours to see if she voids with a larger volume.  4/12- has not required intermittent  catheterization since last night.  Noted to have 4 voids with PVR less than 200.  May be recovering bladder function.  Will monitor.  4/13-voiding on her own with postvoid residuals 190 range  4/19-complaints of dysuria-urinalysis ordered    Urinary Tract Infection  -complaint of dysuria on 4/19 with positive UA. Culture pending.   -cefdinir 300mg BID for 5 days     Right upper extremity discomfort  -April 17-Has some diffuse pain in the right upper extremity.  Distal right upper extremity slightly warmer than the left. No allodynia.  - Reviewed possible post stroke pain/central pain syndrome versus sympathetic mediated pain versus shoulder subluxation.  Will monitor.     DVT prophylaxis- on dual antiplatelet therapies.  SCDs.  Lovenox     Team Conference 4/11/2023  Nursing: continent/incontinent with bladder, requiring cathing each bladder scan, no safety issues  PT: bed mobiliy min assist, contact guard with transfers, ambulating 80 feet, fatigues quickly with low endurance, cues for sequencing, drags left foot, anticipated contact guard with walker  OT: mod assist for bathing, max/dependent with lower body dressing, toileting max assist due to balance  SLP: mildly impaired on CLQT WNL memory/visuospatial, and mild for attention/executive function, puree and nectar thick diet, mild to moderate dysarthria  Discharge Date: 1.5 weeks     TEAM CONF - April 18 - PROGRESS SLOWER THAN INITIAL FIRST DAYS SO ANTICIPATE EXTENDING LENGTH OF STAY - STRONGER BUT STILL IMPAIRED MOTOR CONTROL. FATIGUE. BED MOD. TRANSFERS MIN CTG. GAIT 80 FEET CTG RW , LESS DRAG RIGHT FEET. 4 STAIRS CTG. BATH MOD. LBD MAX. UBD MIN SBA. COGNITION - CUES FOR ATTENTION TO DETAIL. SWALLOW - COUGH WITH WATER BY CUP. MECH SOFT, NTL. VOICING/ARTICULATION- MILD DYSARTHRIA AND WILL SELF CORRECT.  BNE YESTERDAY. SEE REPORT IN [facility]. Attention: WNL  Executive Functioning: Mildly Impaired  Abstract Reasoning: WNL  Arithmetic: Mildly  Impaired  Visuospatial Perception: WNL  Visuospatial Praxis: Moderately to Severely Impaired  Verbal Memory: Immediate - Moderately Impaired; Delayed - WNL for Stories, Severely Impaired for Lists  Visual Memory: Mildly Impaired  Emotional: Mild anxiety and depression related to worries about her quality-of-life following discharge from the hospital.  O2 AT NIGHT. STILL INCONTINENT- TIMED VOIDS. ALLERGIES - SNEEZES.   ELOS - 1.5 WEEKS    CODE STATUS:  Code Status (Patient has no pulse and is not breathing): CPR (Attempt to Resuscitate)  Medical Interventions (Patient has pulse or is breathing): Full Support  Release to patient: Routine Release      Admission Status: Continues to meet requirements for inpatient admission.     Shannon Martinez, DO        During rounds, used appropriate personal protective equipment including mask and gloves.  Additional gown if indicated.  Mask used was standard procedure mask. Appropriate PPE was worn during the entire visit.  Hand hygiene was completed before and after.

## 2023-04-21 NOTE — THERAPY TREATMENT NOTE
Inpatient Rehabilitation - Occupational Therapy Treatment Note    [facility]     Patient Name: Melissa Gomez  : 1940  MRN: [id number]    Today's Date: 2023                 Admit Date: 2023         ICD-10-CM ICD-9-CM   1. Impaired functional mobility, balance, gait, and endurance  Z74.09 V49.89   2. Follow-up exam  Z09 V67.9       Patient Active Problem List   Diagnosis   • Infarction of left basal ganglia       Past Medical History:   Diagnosis Date   • GERD (gastroesophageal reflux disease)    • Hyperlipidemia    • Hypertension    • Stroke        Past Surgical History:   Procedure Laterality Date   • COLONOSCOPY     • HYSTERECTOMY     • TONSILLECTOMY               IRF OT ASSESSMENT FLOWSHEET (last 12 hours)     IRF OT Evaluation and Treatment     Row Name 23 1438 23 1201       OT Time and Intention    Document Type progress note  -DN progress note  -DN    Mode of Treatment occupational therapy  -DN occupational therapy  -DN    Patient Effort good  -DN good  -DN    Symptoms Noted During/After Treatment -- none  -DN    Row Name 23 1201          Pain Assessment    Pretreatment Pain Rating 2/10  -DN     Posttreatment Pain Rating 3/10  -DN     Pain Location - Side/Orientation Right  -DN     Pain Location - extremity;shoulder  -DN     Pre/Posttreatment Pain Comment will apply moist heat later tx  -DN     Row Name 23 1201          Cognition/Psychosocial    Affect/Mental Status (Cognition) WFL  -DN     Orientation Status (Cognition) oriented x 4  -DN     Follows Commands (Cognition) follows one-step commands;over 90% accuracy  -DN     Personal Safety Interventions fall prevention program maintained;gait belt  -DN     Cognitive Function attention deficit  -DN     Attention Deficit (Cognition) minimal deficit  -DN     Safety Deficit (Cognition) awareness of need for assistance;insight into deficits/self-awareness  -DN     Row Name 23 1201          Bathing    Stout  Level (Bathing) minimum assist (75% patient effort);verbal cues;nonverbal cues (demo/gesture)  -DN     Assistive Device (Bathing) tub bench;hand held shower spray hose;grab bar/tub rail;long-handled sponge  -DN     Position (Bathing) supported sitting;supported standing  -DN     Set-up Assistance (Bathing) obtain supplies  -DN     Row Name 04/21/23 1201          Upper Body Dressing    [place] Level (Upper Body Dressing) upper body dressing skills;doff;don;pull over garment;minimum assist (75% or more patient effort);nonverbal cues (demo/gesture);verbal cues  -DN     Position (Upper Body Dressing) supported sitting  -DN     Set-up Assistance (Upper Body Dressing) obtain clothing  -DN     Comment (Upper Body Dressing) vc for ashley technique  -DN     Row Name 04/21/23 1201          Lower Body Dressing    [place] Level (Lower Body Dressing) doff;don;pants/bottoms;shoes/slippers;socks;underwear;maximum assist (25% patient effort)  -DN     Assistive Device Use (Lower Body Dressing) reacher  -DN     Position (Lower Body Dressing) supported sitting  -DN     Set-up Assistance (Lower Body Dressing) obtain clothing  -DN     Row Name 04/21/23 1201          Grooming    [place] Level (Grooming) grooming skills;deodorant application;oral care regimen;supervision;verbal cues;nonverbal cues (demo/gesture)  -DN     Position (Grooming) supported sitting  -DN     Set-up Assistance (Grooming) open containers  -DN     Row Name 04/21/23 1201          Toileting    [place] Level (Toileting) toileting skills;adjust/manage clothing;perform perineal hygiene;maximum assist (25% patient effort);nonverbal cues (demo/gesture)  -DN     Assistive Device Use (Toileting) grab bar/safety frame  -DN     Position (Toileting) supported sitting  -DN     Set-up Assistance (Toileting) change pad/brief  -DN     Row Name 04/21/23 1438          Shoulder (Therapeutic Exercise)    Shoulder (Therapeutic Exercise) AAROM (active assistive range of  motion)  -DN     Shoulder AROM (Therapeutic Exercise) 5 repetitions  -DN     Shoulder AAROM (Therapeutic Exercise) --  hot pack applied followed by AAROM pain discomfort at 3/10 level  -DN     Row Name 04/21/23 1438          Elbow/Forearm (Therapeutic Exercise)    Elbow/Forearm (Therapeutic Exercise) strengthening exercise  -DN     Elbow/Forearm AROM (Therapeutic Exercise) bilateral;10 repetitions;3 sets  -DN     Elbow/Forearm Strengthening (Therapeutic Exercise) 1 lb free weight  -DN     Row Name 04/21/23 1438          Wrist (Therapeutic Exercise)    Wrist (Therapeutic Exercise) strengthening exercise  -DN     Wrist AROM (Therapeutic Exercise) bilateral;10 repetitions;3 sets  -DN     Wrist Strengthening (Therapeutic Exercise) 1 lb free weight  -DN     Row Name 04/21/23 1438          Hand (Therapeutic Exercise)    Hand (Therapeutic Exercise) strengthening exercise  -DN     Hand AROM/AAROM (Therapeutic Exercise) bilateral;20 repititions  -DN     Hand Strengthening (Therapeutic Exercise) hand gripper  -DN     Row Name 04/21/23 1438 04/21/23 1201       Positioning and Restraints    Pre-Treatment Position sitting in chair/recliner  -DN sitting in chair/recliner  -DN    Post Treatment Position wheelchair  -DN wheelchair  -DN    In Bed call light within reach;encouraged to call for assist;exit alarm on  -DN sitting;call light within reach;encouraged to call for assist;exit alarm on  -DN          User Key  (r) = Recorded By, (t) = Taken By, (c) = Cosigned By    Initials Name Effective Dates    DN Ismael Lanier OT 06/16/21 -                  Occupational Therapy Education     Title: PT OT SLP Therapies (Done)     Topic: Occupational Therapy (Done)     Point: ADL training (Done)     Description:   Instruct learner(s) on proper safety adaptation and remediation techniques during self care or transfers.   Instruct in proper use of assistive devices.              Learning Progress Summary           Patient Acceptance, E, VU  by  at 4/8/2023 1204    Acceptance, E,TB,D, VU,DU,NR by  at 4/6/2023 1230    Comment: ed pt on role of OT. benefit of therapy, POC w.  OT ed on safety w ADL and tsf. pt ed on UBD technique.                   Point: Home exercise program (Done)     Description:   Instruct learner(s) on appropriate technique for monitoring, assisting and/or progressing therapeutic exercises/activities.              Learning Progress Summary           Patient Acceptance, E, VU by  at 4/8/2023 1204    Acceptance, E,TB,D, VU,DU,NR by  at 4/6/2023 1230    Comment: ed pt on role of OT. benefit of therapy, POC w.  OT ed on safety w ADL and tsf. pt ed on UBD technique.                   Point: Precautions (Done)     Description:   Instruct learner(s) on prescribed precautions during self-care and functional transfers.              Learning Progress Summary           Patient Acceptance, E, VU by  at 4/8/2023 1204    Acceptance, E,TB,D, VU,DU,NR by  at 4/6/2023 1230    Comment: ed pt on role of OT. benefit of therapy, POC w.  OT ed on safety w ADL and tsf. pt ed on UBD technique.                   Point: Body mechanics (Done)     Description:   Instruct learner(s) on proper positioning and spine alignment during self-care, functional mobility activities and/or exercises.              Learning Progress Summary           Patient Acceptance, E, VU by  at 4/8/2023 1204    Acceptance, E,TB,D, VU,DU,NR by  at 4/6/2023 1230    Comment: ed pt on role of OT. benefit of therapy, POC w.  OT ed on safety w ADL and tsf. pt ed on UBD technique.                               User Key     Initials Effective Dates Name Provider Type Discipline     06/16/21 -  Patsy Blank OTR Occupational Therapist OT     06/16/21 -  Yamila Wheat MS CCC-SLP Speech and Language Pathologist SLP                    OT Recommendation and Plan                         Time Calculation:      Time Calculation- OT     Row Name 04/21/23 1400 04/21/23  0800          Time Calculation- OT    OT Start Time 1400  -DN 0800  -DN     OT Stop Time 1430  -DN 0900  -DN     OT Time Calculation (min) 30 min  -DN 60 min  -DN           User Key  (r) = Recorded By, (t) = Taken By, (c) = Cosigned By    Initials Name Provider Type    Ismael Quintanilla OT Occupational Therapist              Therapy Charges for Today     Code Description Service Date Service Provider Modifiers Qty    25005860186 HC OT SELF CARE/MGMT/TRAIN EA 15 MIN 4/20/2023 Ismael Lanier OT GO 2    25691866509 HC OT THER PROC EA 15 MIN 4/20/2023 Ismael Lanier OT GO 2    77485823423 HC OT SELF CARE/MGMT/TRAIN EA 15 MIN 4/21/2023 Ismael Lanier OT GO 4    85215063421 HC OT THER PROC EA 15 MIN 4/21/2023 Ismael Lanier OT GO 2                   Ismael Lanier OT  4/21/2023

## 2023-04-21 NOTE — THERAPY TREATMENT NOTE
Inpatient Rehabilitation - Occupational Therapy Treatment Note    [facility]     Patient Name: Melissa Gomez  : 1940  MRN: [id number]    Today's Date: 2023                 Admit Date: 2023         ICD-10-CM ICD-9-CM   1. Impaired functional mobility, balance, gait, and endurance  Z74.09 V49.89   2. Follow-up exam  Z09 V67.9       Patient Active Problem List   Diagnosis   • Infarction of left basal ganglia       Past Medical History:   Diagnosis Date   • GERD (gastroesophageal reflux disease)    • Hyperlipidemia    • Hypertension    • Stroke        Past Surgical History:   Procedure Laterality Date   • COLONOSCOPY     • HYSTERECTOMY     • TONSILLECTOMY               IRF OT ASSESSMENT FLOWSHEET (last 12 hours)     IRF OT Evaluation and Treatment     Row Name 23 1201          OT Time and Intention    Document Type progress note  -DN     Mode of Treatment occupational therapy  -DN     Patient Effort good  -DN     Symptoms Noted During/After Treatment none  -DN     Row Name 23 1201          Pain Assessment    Pretreatment Pain Rating 2/10  -DN     Posttreatment Pain Rating 3/10  -DN     Pain Location - Side/Orientation Right  -DN     Pain Location - extremity;shoulder  -DN     Pre/Posttreatment Pain Comment will apply moist heat later tx  -DN     Row Name 23 1201          Cognition/Psychosocial    Affect/Mental Status (Cognition) WFL  -DN     Orientation Status (Cognition) oriented x 4  -DN     Follows Commands (Cognition) follows one-step commands;over 90% accuracy  -DN     Personal Safety Interventions fall prevention program maintained;gait belt  -DN     Cognitive Function attention deficit  -DN     Attention Deficit (Cognition) minimal deficit  -DN     Safety Deficit (Cognition) awareness of need for assistance;insight into deficits/self-awareness  -DN     Row Name 23 1201          Bathing    [place] Level (Bathing) minimum assist (75% patient effort);verbal  cues;nonverbal cues (demo/gesture)  -DN     Assistive Device (Bathing) tub bench;hand held shower spray hose;grab bar/tub rail;long-handled sponge  -DN     Position (Bathing) supported sitting;supported standing  -DN     Set-up Assistance (Bathing) obtain supplies  -DN     Row Name 04/21/23 1201          Upper Body Dressing    [place] Level (Upper Body Dressing) upper body dressing skills;doff;don;pull over garment;minimum assist (75% or more patient effort);nonverbal cues (demo/gesture);verbal cues  -DN     Position (Upper Body Dressing) supported sitting  -DN     Set-up Assistance (Upper Body Dressing) obtain clothing  -DN     Comment (Upper Body Dressing) vc for ashley technique  -DN     Row Name 04/21/23 1201          Lower Body Dressing    [place] Level (Lower Body Dressing) doff;don;pants/bottoms;shoes/slippers;socks;underwear;maximum assist (25% patient effort)  -DN     Assistive Device Use (Lower Body Dressing) reacher  -DN     Position (Lower Body Dressing) supported sitting  -DN     Set-up Assistance (Lower Body Dressing) obtain clothing  -DN     Row Name 04/21/23 1201          Grooming    [place] Level (Grooming) grooming skills;deodorant application;oral care regimen;supervision;verbal cues;nonverbal cues (demo/gesture)  -DN     Position (Grooming) supported sitting  -DN     Set-up Assistance (Grooming) open containers  -DN     Row Name 04/21/23 1201          Toileting    [place] Level (Toileting) toileting skills;adjust/manage clothing;perform perineal hygiene;maximum assist (25% patient effort);nonverbal cues (demo/gesture)  -DN     Assistive Device Use (Toileting) grab bar/safety frame  -DN     Position (Toileting) supported sitting  -DN     Set-up Assistance (Toileting) change pad/brief  -DN     Row Name 04/21/23 1201          Positioning and Restraints    Pre-Treatment Position sitting in chair/recliner  -DN     Post Treatment Position wheelchair  -DN     In Bed sitting;call light  within reach;encouraged to call for assist;exit alarm on  -DN           User Key  (r) = Recorded By, (t) = Taken By, (c) = Cosigned By    Initials Name Effective Dates    Ismael Quintanilla OT 06/16/21 -                  Occupational Therapy Education     Title: PT OT SLP Therapies (Done)     Topic: Occupational Therapy (Done)     Point: ADL training (Done)     Description:   Instruct learner(s) on proper safety adaptation and remediation techniques during self care or transfers.   Instruct in proper use of assistive devices.              Learning Progress Summary           Patient Acceptance, E, VU by  at 4/8/2023 1204    Acceptance, E,TB,D, VU,DU,NR by  at 4/6/2023 1230    Comment: ed pt on role of OT. benefit of therapy, POC w.  OT ed on safety w ADL and tsf. pt ed on UBD technique.                   Point: Home exercise program (Done)     Description:   Instruct learner(s) on appropriate technique for monitoring, assisting and/or progressing therapeutic exercises/activities.              Learning Progress Summary           Patient Acceptance, E, VU by  at 4/8/2023 1204    Acceptance, E,TB,D, VU,DU,NR by  at 4/6/2023 1230    Comment: ed pt on role of OT. benefit of therapy, POC w.  OT ed on safety w ADL and tsf. pt ed on UBD technique.                   Point: Precautions (Done)     Description:   Instruct learner(s) on prescribed precautions during self-care and functional transfers.              Learning Progress Summary           Patient Acceptance, E, VU by  at 4/8/2023 1204    Acceptance, E,TB,D, VU,DU,NR by  at 4/6/2023 1230    Comment: ed pt on role of OT. benefit of therapy, POC w.  OT ed on safety w ADL and tsf. pt ed on UBD technique.                   Point: Body mechanics (Done)     Description:   Instruct learner(s) on proper positioning and spine alignment during self-care, functional mobility activities and/or exercises.              Learning Progress Summary           Patient  Acceptance, E, VU by  at 4/8/2023 1204    Acceptance, E,TB,D, VU,DU,NR by  at 4/6/2023 1230    Comment: ed pt on role of OT. benefit of therapy, POC w.  OT ed on safety w ADL and tsf. pt ed on UBD technique.                               User Key     Initials Effective Dates Name Provider Type Discipline     06/16/21 -  Patsy Blank OTR Occupational Therapist OT     06/16/21 -  Yamila Wheat MS CCC-SLP Speech and Language Pathologist SLP                    OT Recommendation and Plan                         Time Calculation:      Time Calculation- OT     Row Name 04/21/23 0800             Time Calculation- OT    OT Start Time 0800  -DN      OT Stop Time 0900  -DN      OT Time Calculation (min) 60 min  -DN            User Key  (r) = Recorded By, (t) = Taken By, (c) = Cosigned By    Initials Name Provider Type    DN Ismael Lanier, OT Occupational Therapist              Therapy Charges for Today     Code Description Service Date Service Provider Modifiers Qty    95260407469 HC OT SELF CARE/MGMT/TRAIN EA 15 MIN 4/20/2023 Ismael Lanier, OT GO 2    53707714806 HC OT THER PROC EA 15 MIN 4/20/2023 Ismael Lanier OT GO 2    00383911591 HC OT SELF CARE/MGMT/TRAIN EA 15 MIN 4/21/2023 Ismael Lanier OT GO 4                   Ismael Lanier OT  4/21/2023

## 2023-04-21 NOTE — PROGRESS NOTES
Inpatient Rehabilitation Plan of Care Note    Plan of Care  Care Plan Reviewed - Updates as Follows    Psychosocial    [RN] Coping/Adjustment(Active)  Current Status(04/20/2023): Pt is A/Ox4, calm/cooperative. Pt is at risk for  reduced coping r/t stroke and new deficits.  Weekly Goal(04/25/2023): provide education material regarding stroke  Discharge Goal: Knowledgeable of care needs and stroke recovery    Performed Intervention(s)  Verbalizes needs and concerns  Therapeutic environmental set up      Sphincter Control    Performed Intervention(s)  Bladder scan or I/O cath per order  Encourage fluids  Bowel/bladder training      Safety    Performed Intervention(s)  Safety Rounds  Bed alarm and/or chair alarm    Signed by: Alycia Kelley RN

## 2023-04-21 NOTE — THERAPY TREATMENT NOTE
Inpatient Rehabilitation - Physical Therapy Treatment Note       [facility]     Patient Name: Melissa Gomez  : 1940  MRN: [id number]    Today's Date: 2023                    Admit Date: 2023      Visit Dx:     ICD-10-CM ICD-9-CM   1. Impaired functional mobility, balance, gait, and endurance  Z74.09 V49.89   2. Follow-up exam  Z09 V67.9       Patient Active Problem List   Diagnosis   • Infarction of left basal ganglia       Past Medical History:   Diagnosis Date   • GERD (gastroesophageal reflux disease)    • Hyperlipidemia    • Hypertension    • Stroke        Past Surgical History:   Procedure Laterality Date   • COLONOSCOPY     • HYSTERECTOMY     • TONSILLECTOMY         PT ASSESSMENT (last 12 hours)     IRF PT Evaluation and Treatment     Row Name 23 1045          PT Time and Intention    Document Type daily treatment  -EE     Mode of Treatment physical therapy;individual therapy  -EE     Patient/Family/Caregiver Comments/Observations Pt sitting up in WC, agreeable to PT. Daughter present during AM session to observe.  -EE     Row Name 23 1045          General Information    Existing Precautions/Restrictions fall;swallow precautions  -EE     Row Name 23 1045          Pain Assessment    Pretreatment Pain Rating 2/10  -EE     Posttreatment Pain Rating 2/10  -EE     Pain Location - Side/Orientation Right  -EE     Pain Location upper  -EE     Pain Location - extremity;shoulder  -EE     Pre/Posttreatment Pain Comment provided rest breaks as needed, pt able to tolerate using R UE during functional transfers and ambulation with rwx  -EE     Row Name 23 1045          Cognition/Psychosocial    Affect/Mental Status (Cognition) WFL  -EE     Orientation Status (Cognition) oriented x 4  -EE     Follows Commands (Cognition) follows one-step commands;verbal cues/prompting required  -EE     Personal Safety Interventions fall prevention program maintained;gait belt;muscle strengthening  facilitated;nonskid shoes/slippers when out of bed;supervised activity  -EE     Cognitive Function attention deficit  -EE     Attention Deficit (Cognition) minimal deficit  -EE     Safety Deficit (Cognition) insight into deficits/self-awareness;safety precautions follow-through/compliance  -EE     Row Name 04/21/23 1045          Sit-Stand Transfer    Sit-Stand [place] (Transfers) contact guard;minimum assist (75% patient effort);verbal cues  -EE     Assistive Device (Sit-Stand Transfers) walker, front-wheeled  -EE     Comment, (Sit-Stand Transfer) cues for hand placement and sequencing  -EE     Row Name 04/21/23 1045          Stand-Sit Transfer    Stand-Sit [place] (Transfers) contact guard;minimum assist (75% patient effort);verbal cues  -EE     Assistive Device (Stand-Sit Transfers) walker, front-wheeled  -EE     Comment, (Stand-Sit Transfer) cues for hand placement  -EE     Row Name 04/21/23 1045          Toilet Transfer    Type (Toilet Transfer) stand pivot/stand step  -EE     [place] Level (Toilet Transfer) contact guard;minimum assist (75% patient effort);verbal cues  -EE     Assistive Device (Toilet Transfer) walker, front-wheeled  -EE     Row Name 04/21/23 1045          Car Transfer    Type (Car Transfer) stand pivot/stand step  -EE     [place] Level (Car Transfer) contact guard;minimum assist (75% patient effort);verbal cues  -EE     Assistive Device (Car Transfer) walker, front-wheeled  -EE     Comment, (Car Transfer) cues for hand placement  -EE     Row Name 04/21/23 1045          Gait/Stairs (Locomotion)    [place] Level (Gait) contact guard;verbal cues  -EE     Assistive Device (Gait) walker, front-wheeled  -EE     Distance in Feet (Gait) 80' x 2  -EE     Pattern (Gait) step-through  -EE     Deviations/Abnormal Patterns (Gait) lenard decreased;festinating/shuffling;stride length decreased  -EE     Bilateral Gait Deviations forward flexed posture;heel strike decreased  -EE      Gait Assessment/Intervention cues for upright posture  -EE     [place] Level (Stairs) contact guard;verbal cues  -EE     Handrail Location (Stairs) both sides  -EE     Number of Steps (Stairs) 4  -EE     Ascending Technique (Stairs) step-to-step  -EE     Descending Technique (Stairs) step-to-step  -EE     Stairs, Safety Issues weight-shifting ability decreased  -EE     Stairs, Impairments strength decreased;impaired balance  -EE     Row Name 04/21/23 1045          Safety Issues, Functional Mobility    Impairments Affecting Function (Mobility) balance;cognition;endurance/activity tolerance;strength  -EE     Row Name 04/21/23 1045          Hip (Therapeutic Exercise)    Hip Strengthening (Therapeutic Exercise) bilateral;marching while seated;10 repetitions;2 sets  -EE     Row Name 04/21/23 1045          Knee (Therapeutic Exercise)    Knee Strengthening (Therapeutic Exercise) bilateral;LAQ (long arc quad);10 repetitions;2 sets  -EE     Row Name 04/21/23 1045          Positioning and Restraints    Pre-Treatment Position sitting in chair/recliner  -EE     Post Treatment Position wheelchair  -EE     In Wheelchair sitting;call light within reach;encouraged to call for assist;exit alarm on  -EE           User Key  (r) = Recorded By, (t) = Taken By, (c) = Cosigned By    Initials Name Provider Type    EE Diann Gonzáles PT Physical Therapist                 Physical Therapy Education     Title: PT OT SLP Therapies (Done)     Topic: Physical Therapy (Done)     Point: Mobility training (Done)     Learning Progress Summary           Patient Acceptance, E,TB, VU,NR by EE at 4/21/2023 1507    Acceptance, E,TB,D, VU,NR by EE at 4/20/2023 1110    Acceptance, E,TB, VU,NR by EE at 4/20/2023 1043    Acceptance, E,TB,D, VU,NR by EE at 4/19/2023 1121    Acceptance, E,TB, VU,NR by EE at 4/18/2023 1102    Acceptance, E,D,TB, VU,NR by EE at 4/17/2023 1129    Acceptance, TB,E, VU,NR by EE at 4/13/2023 1047    Acceptance, E,D, VU,DU by DP  at 4/11/2023 1038    Acceptance, E, VU,NR by EE at 4/10/2023 1043    Acceptance, E, VU by LS at 4/8/2023 1204    Acceptance, E,TB, VU,NR by EE at 4/7/2023 1054    Acceptance, E,TB, VU by KP at 4/6/2023 1527                   Point: Home exercise program (Done)     Learning Progress Summary           Patient Acceptance, E,TB, VU,NR by EE at 4/21/2023 1507    Acceptance, E,TB,D, VU,NR by EE at 4/20/2023 1110    Acceptance, E,TB, VU,NR by EE at 4/20/2023 1043    Acceptance, E,TB,D, VU,NR by EE at 4/19/2023 1121    Acceptance, E,TB, VU,NR by EE at 4/18/2023 1102    Acceptance, E,D,TB, VU,NR by EE at 4/17/2023 1129    Acceptance, TB,E, VU,NR by EE at 4/13/2023 1047    Acceptance, E,D, VU,DU by DP at 4/11/2023 1038    Acceptance, E, VU,NR by EE at 4/10/2023 1043    Acceptance, E, VU by LS at 4/8/2023 1204    Acceptance, E,TB, VU,NR by EE at 4/7/2023 1054    Acceptance, E,TB, VU by KP at 4/6/2023 1527                               User Key     Initials Effective Dates Name Provider Type Discipline     06/16/21 -  Yamila Wheat MS CCC-SLP Speech and Language Pathologist SLP    EE 06/16/21 -  Diann Gonzáles, PT Physical Therapist PT    KP 06/16/21 -  Chen Centeno PT Physical Therapist PT    DP 08/24/21 -  Gen Gray, PT Physical Therapist PT                PT Recommendation and Plan                          Time Calculation:      PT Charges     Row Name 04/21/23 1508 04/21/23 1507          Time Calculation    Start Time 1230  -EE 1030  -EE     Stop Time 1300  -EE 1100  -EE     Time Calculation (min) 30 min  -EE 30 min  -EE     PT Received On 04/21/23  -EE 04/21/23  -EE     PT - Next Appointment 04/22/23  -EE 04/21/23  -EE        Time Calculation- PT    Total Timed Code Minutes- PT 30 minute(s)  -EE 30 minute(s)  -EE           User Key  (r) = Recorded By, (t) = Taken By, (c) = Cosigned By    Initials Name Provider Type    Diann Batista, PT Physical Therapist                Therapy Charges for Today     Code  Description Service Date Service Provider Modifiers Qty    80548304207  PT THERAPEUTIC ACT EA 15 MIN 4/20/2023 Diann Gonzáles, PT GP 2    [id number]  PT THER PROC EA 15 MIN 4/20/2023 Diann Gonzáles, PT GP 2    [id number]  PT THERAPEUTIC ACT EA 15 MIN 4/21/2023 Diann Gonzáles, PT GP 3    [id number]  PT THER PROC EA 15 MIN 4/21/2023 Diann Gonzáles, PT GP 1                   Diann Gonzáles, PT  4/21/2023

## 2023-04-22 LAB
BACTERIA SPEC AEROBE CULT: ABNORMAL
BACTERIA SPEC AEROBE CULT: ABNORMAL

## 2023-04-22 PROCEDURE — 25010000002 ENOXAPARIN PER 10 MG: Performed by: PHYSICAL MEDICINE & REHABILITATION

## 2023-04-22 PROCEDURE — 97110 THERAPEUTIC EXERCISES: CPT

## 2023-04-22 RX ADMIN — ASPIRIN 81 MG: 81 TABLET, CHEWABLE ORAL at 07:14

## 2023-04-22 RX ADMIN — ROSUVASTATIN CALCIUM 20 MG: 20 TABLET, FILM COATED ORAL at 20:34

## 2023-04-22 RX ADMIN — OXYCODONE HYDROCHLORIDE AND ACETAMINOPHEN 250 MG: 500 TABLET ORAL at 07:15

## 2023-04-22 RX ADMIN — LISINOPRIL 5 MG: 5 TABLET ORAL at 07:15

## 2023-04-22 RX ADMIN — AMLODIPINE BESYLATE 10 MG: 10 TABLET ORAL at 07:15

## 2023-04-22 RX ADMIN — Medication 1000 UNITS: at 07:14

## 2023-04-22 RX ADMIN — ACETAMINOPHEN 650 MG: 325 TABLET, FILM COATED ORAL at 20:40

## 2023-04-22 RX ADMIN — CEFDINIR 300 MG: 300 CAPSULE ORAL at 07:15

## 2023-04-22 RX ADMIN — DOCUSATE SODIUM 50MG AND SENNOSIDES 8.6MG 2 TABLET: 8.6; 5 TABLET, FILM COATED ORAL at 20:34

## 2023-04-22 RX ADMIN — CEFDINIR 300 MG: 300 CAPSULE ORAL at 20:34

## 2023-04-22 RX ADMIN — ENOXAPARIN SODIUM 40 MG: 100 INJECTION SUBCUTANEOUS at 11:20

## 2023-04-22 NOTE — PROGRESS NOTES
Inpatient Rehabilitation Plan of Care Note    Plan of Care  Care Plan Reviewed - No updates at this time.    Psychosocial    [RN] Coping/Adjustment(Active)  Current Status(04/22/2023): Pt is A/Ox4, calm/cooperative. Pt is at risk for  reduced coping r/t stroke and new deficits.  Weekly Goal(04/26/2023): provide education material regarding stroke  Discharge Goal: Knowledgeable of care needs and stroke recovery    Performed Intervention(s)  Verbalizes needs and concerns  Therapeutic environmental set up      Sphincter Control    [RN] Bladder Management(Active)  Current Status(04/22/2023): Incontinent of bladder, bladder scans no longer  ordered due to several < 200 scans.  Weekly Goal(04/26/2023): The patient is unable to void independently  Discharge Goal: The patient empties bladder completely    [RN] Bowel Management(Active)  Current Status(04/22/2023): Incontinent of stool  Weekly Goal(04/27/2023): Continent 50%  Discharge Goal: Continent 100%    Performed Intervention(s)  Bladder scan or I/O cath per order  Encourage fluids  Bowel/bladder training      Safety    [RN] Potential for Injury(Active)  Current Status(04/22/2023): Risk for falls  Weekly Goal(04/27/2023): Instruct family/caregivers regarding safety precautions  and need for close supervision  Discharge Goal: Family/caregiver will be knowledgeable of need for cueing and  supervision to avoid falls    Performed Intervention(s)  Safety Rounds  Bed alarm and/or chair alarm    Signed by: Emperatriz Mccoy RN

## 2023-04-22 NOTE — PLAN OF CARE
Goal Outcome Evaluation:  Plan of Care Reviewed With: patient        Progress: improving  Outcome Evaluation: Pt is A&OX4, calm and cooperative. Assist X1 to wc. Dysarthria, R facial droop. Generalized weakness. Meds crushed in AC. NTL. Up in dining room for meals. Incon of B&B. Scattered bruising noted. No unsafe behaviors noted. Pt uses call light for assistance. 2 L of O2 at HS.

## 2023-04-22 NOTE — PLAN OF CARE
Goal Outcome Evaluation:  Plan of Care Reviewed With: patient             Problem: Rehabilitation (IRF) Plan of Care  Goal: Plan of Care Review  Outcome: Ongoing, Progressing  Flowsheets (Taken 4/22/2023 9538)  Plan of Care Reviewed With: patient  Outcome Evaluation:  Brigid is alert and oriented x 4. incontinent of bowel and bladder. wears brief; checked and changed q2 and as needed. Abx r/t UTI x 7 days. Wearing 2 L nc at HS. Has rested well with eyes closed. No unsafe behaviors. Call light within reach.

## 2023-04-22 NOTE — PROGRESS NOTES
Physical Medicine and Rehabilitation Progress Note    Assessment     Infarction of left basal ganglia [I63.9]    Subjective     Melissa Gomez is a 82 y.o. 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 Chart, overnight events reviewed w/nursing. No complaint this morning. Denies sob, no cp, no fevers and or chills.     The patient's medical records have been reviewed.    Result Review:  I have personally reviewed the results from the time of this admission to 9/18/2022 13:11 EDT and agree with these findings:  [x]  Laboratory list / accordion  [x]  Microbiology  [x]  Radiology  []  EKG/Telemetry   []  Cardiology/Vascular   []  Pathology  []  Old records  []  Other:    Objective     Wt Readings from Last 3 Encounters:   04/08/23 86.9 kg (191 lb 9.6 oz)     Temp Readings from Last 3 Encounters:   04/22/23 97.6 °F (36.4 °C) (Oral)     BP Readings from Last 3 Encounters:   04/22/23 104/67     Pulse Readings from Last 3 Encounters:   04/22/23 78       Physical Exam:   Gen: Pleasant, full sentences, NAD.   Pulm: CTAB; no r/r/w; non-labored   Heart; RRR; no m/r/g appreciated   Abd: soft; NT/ND; +BS   Neuro: Alert; verbal; appropriate   Skin: no rashes on exposed skin   : no Indwelling catheter     CT Outside Films  This procedure was auto-finalized with no dictation required.  MRI Outside Films  This procedure was auto-finalized with no dictation required.  CT Outside Films  This procedure was auto-finalized with no dictation required.      Labs  BMP:   Results from last 7 days   Lab Units 04/21/23  0912   SODIUM mmol/L 144   POTASSIUM mmol/L 4.3   CHLORIDE mmol/L 109*   CO2 mmol/L 23.8   BUN mg/dL 31*   CREATININE mg/dL 0.97   CALCIUM mg/dL 9.7   GLUCOSE mg/dL 150*     CBC:  Results from last 7 days   Lab Units 04/21/23  0912   WBC 10*3/mm3 6.29   HEMOGLOBIN g/dL 13.0   HEMATOCRIT % 40.9   PLATELETS 10*3/mm3 222     UA:   Urine  Culture   Date Value Ref Range Status   04/19/2023 >100,000 CFU/mL Morganella morganii ssp morganii (A)  Preliminary   04/19/2023 >100,000 CFU/mL Gram Negative Bacilli (A)  Preliminary     Comment:     Organism under investigation.         PTT:    Coagulation:               Scheduled Meds:amLODIPine, 10 mg, Oral, Q24H  vitamin C, 250 mg, Oral, Daily  aspirin, 81 mg, Oral, Daily  cefdinir, 300 mg, Oral, Q12H  cholecalciferol, 1,000 Units, Oral, Daily  enoxaparin, 40 mg, Subcutaneous, Q24H  lisinopril, 5 mg, Oral, Q24H  rosuvastatin, 20 mg, Oral, Nightly  senna-docusate sodium, 2 tablet, Oral, Nightly      Continuous Infusions:   PRN Meds:.•  acetaminophen **OR** [DISCONTINUED] acetaminophen  •  [DISCONTINUED] senna-docusate sodium **AND** polyethylene glycol **AND** bisacodyl **AND** bisacodyl    Assessment and Plan:  Active Hospital Problems    Diagnosis    • **Infarction of left basal ganglia        Plan:  Spent 15 min coordinating care.   Remains stable.   Continues to meet criteria for IRF.   Chart reviewed.  Continue therapies  Medical management and education. Reviewed Ucx. Continue cefdinir.   Ongoing active work on discharge planning

## 2023-04-22 NOTE — THERAPY TREATMENT NOTE
Inpatient Rehabilitation - Physical Therapy Treatment Note       [facility]     Patient Name: Melissa Gomez  : 1940  MRN: [id number]    Today's Date: 2023                    Admit Date: 2023      Visit Dx:     ICD-10-CM ICD-9-CM   1. Impaired functional mobility, balance, gait, and endurance  Z74.09 V49.89   2. Follow-up exam  Z09 V67.9       Patient Active Problem List   Diagnosis   • Infarction of left basal ganglia       Past Medical History:   Diagnosis Date   • GERD (gastroesophageal reflux disease)    • Hyperlipidemia    • Hypertension    • Stroke        Past Surgical History:   Procedure Laterality Date   • COLONOSCOPY     • HYSTERECTOMY     • TONSILLECTOMY         PT ASSESSMENT (last 12 hours)     IRF PT Evaluation and Treatment     Row Name 23 1310          PT Time and Intention    Document Type daily treatment  -ERUM     Mode of Treatment physical therapy  -ERUM     Patient/Family/Caregiver Comments/Observations Pt up in w/c on hallway,  -ERUM     Row Name 23 1310          General Information    Patient Profile Reviewed yes  -ERUM     Existing Precautions/Restrictions fall;swallow precautions  -ERUM     Row Name 23 1310          Pain Assessment    Pretreatment Pain Rating 0/10 - no pain  -ERUM     Posttreatment Pain Rating 2/10  -ERUM     Pain Location - Side/Orientation Right  -ERUM     Pain Location --  great  -ERUM     Pain Location - toe  -ERUM     Row Name 23 1310          Cognition/Psychosocial    Affect/Mental Status (Cognition) WFL  -ERUM     Orientation Status (Cognition) oriented x 3  -ERUM     Follows Commands (Cognition) follows one-step commands;over 90% accuracy  -ERUM     Personal Safety Interventions fall prevention program maintained;gait belt;muscle strengthening facilitated  -ERUM     Cognitive Function attention deficit  -ERUM     Attention Deficit (Cognition) minimal deficit  -ERUM     Safety Deficit (Cognition) awareness of need for assistance;insight into  deficits/self-awareness  -ERUM     Row Name 04/22/23 1310          Chair-Bed Transfer    Chair-Bed [place] (Transfers) contact guard;minimum assist (75% patient effort);verbal cues  -ERUM     Assistive Device (Chair-Bed Transfers) walker, front-wheeled  -ERUM     Row Name 04/22/23 1310          Sit-Stand Transfer    Sit-Stand [place] (Transfers) contact guard;minimum assist (75% patient effort);verbal cues  -ERUM     Assistive Device (Sit-Stand Transfers) walker, front-wheeled  -ERUM     Comment, (Sit-Stand Transfer) cues for hand placement  -     Row Name 04/22/23 1310          Stand-Sit Transfer    Stand-Sit [place] (Transfers) contact guard;minimum assist (75% patient effort);verbal cues  -ERUM     Assistive Device (Stand-Sit Transfers) walker, front-wheeled  -ERUM     Comment, (Stand-Sit Transfer) cues to reach back  -     Row Name 04/22/23 1310          Gait/Stairs (Locomotion)    [place] Level (Gait) contact guard;verbal cues  -ERUM     Assistive Device (Gait) walker, front-wheeled  -ERUM     Distance in Feet (Gait) 80' x 2; 60' negotiating around pylons  -     Pattern (Gait) step-through  -     Deviations/Abnormal Patterns (Gait) lenard decreased;festinating/shuffling;stride length decreased  -ERUM     Bilateral Gait Deviations forward flexed posture;heel strike decreased  -ERUM     Right Sided Gait Deviations heel strike decreased  -ERUM     Gait Assessment/Intervention gait slows with fatigue; some cues/assist to guide walker around obstacles.  -ERUM     Row Name 04/22/23 1310          Hip (Therapeutic Exercise)    Hip Strengthening (Therapeutic Exercise) bilateral;marching while seated;15 repititions  -     Row Name 04/22/23 1310          Knee (Therapeutic Exercise)    Knee Strengthening (Therapeutic Exercise) bilateral;LAQ (long arc quad);sitting;15 repititions  -     Row Name 04/22/23 1310          Ankle (Therapeutic Exercise)    Ankle Strengthening (Therapeutic Exercise)  bilateral;dorsiflexion;plantarflexion;sitting;15 repititions  -ERUM     Row Name 04/22/23 1310          Positioning and Restraints    Pre-Treatment Position sitting in chair/recliner  -ERUM     Post Treatment Position bed  -ERUM     In Bed call light within reach;encouraged to call for assist;exit alarm on  -ERUM           User Key  (r) = Recorded By, (t) = Taken By, (c) = Cosigned By    Initials Name Provider Type    Tiera Tao, PT Physical Therapist                 Physical Therapy Education     Title: PT OT SLP Therapies (Done)     Topic: Physical Therapy (Done)     Point: Mobility training (Done)     Learning Progress Summary           Patient Acceptance, E,TB, VU,NR by EE at 4/21/2023 1507    Acceptance, E,TB,D, VU,NR by EE at 4/20/2023 1110    Acceptance, E,TB, VU,NR by EE at 4/20/2023 1043    Acceptance, E,TB,D, VU,NR by EE at 4/19/2023 1121    Acceptance, E,TB, VU,NR by EE at 4/18/2023 1102    Acceptance, E,D,TB, VU,NR by EE at 4/17/2023 1129    Acceptance, TB,E, VU,NR by EE at 4/13/2023 1047    Acceptance, E,D, VU,DU by DP at 4/11/2023 1038    Acceptance, E, VU,NR by EE at 4/10/2023 1043    Acceptance, E, VU by LS at 4/8/2023 1204    Acceptance, E,TB, VU,NR by EE at 4/7/2023 1054    Acceptance, E,TB, VU by KP at 4/6/2023 1527                   Point: Home exercise program (Done)     Learning Progress Summary           Patient Acceptance, E,TB, VU,NR by ERUM at 4/22/2023 1353    Acceptance, E,TB, VU,NR by EE at 4/21/2023 1507    Acceptance, E,TB,D, VU,NR by EE at 4/20/2023 1110    Acceptance, E,TB, VU,NR by EE at 4/20/2023 1043    Acceptance, E,TB,D, VU,NR by EE at 4/19/2023 1121    Acceptance, E,TB, VU,NR by EE at 4/18/2023 1102    Acceptance, E,D,TB, VU,NR by EE at 4/17/2023 1129    Acceptance, TB,E, VU,NR by EE at 4/13/2023 1047    Acceptance, E,D, VU,DU by DP at 4/11/2023 1038    Acceptance, E, VU,NR by EE at 4/10/2023 1043    Acceptance, E, VU by LS at 4/8/2023 1204    Acceptance, E,TB, VU,NR by EE at  4/7/2023 1054    Acceptance, E,TB, VU by  at 4/6/2023 1527                               User Key     Initials Effective Dates Name Provider Type Discipline     06/16/21 -  Tiera Alanis, PT Physical Therapist PT    LS 06/16/21 -  Yamila Wheat, MS CCC-SLP Speech and Language Pathologist SLP    EE 06/16/21 -  Diann Gonzáles, PT Physical Therapist PT    KP 06/16/21 -  Chen Centeno, PT Physical Therapist PT    DP 08/24/21 -  Gen Gray, PT Physical Therapist PT                PT Recommendation and Plan                          Time Calculation:      PT Charges     Row Name 04/22/23 1353             Time Calculation    Start Time 1310  -ERUM      Stop Time 1340  -ERUM      Time Calculation (min) 30 min  -ERUM         Time Calculation- PT    Total Timed Code Minutes- PT 30 minute(s)  -ERUM            User Key  (r) = Recorded By, (t) = Taken By, (c) = Cosigned By    Initials Name Provider Type     Tiera Alanis, PT Physical Therapist                Therapy Charges for Today     Code Description Service Date Service Provider Modifiers Qty    [id number] HC PT THER PROC EA 15 MIN 4/22/2023 Tiera Alanis, PT GP 2                   Tiera Alanis, PT  4/22/2023

## 2023-04-23 VITALS
TEMPERATURE: 97.4 F | OXYGEN SATURATION: 95 % | HEIGHT: 64 IN | DIASTOLIC BLOOD PRESSURE: 73 MMHG | HEART RATE: 94 BPM | BODY MASS INDEX: 32.71 KG/M2 | WEIGHT: 191.6 LBS | SYSTOLIC BLOOD PRESSURE: 131 MMHG | RESPIRATION RATE: 18 BRPM

## 2023-04-23 PROCEDURE — 25010000002 ENOXAPARIN PER 10 MG: Performed by: PHYSICAL MEDICINE & REHABILITATION

## 2023-04-23 RX ADMIN — ASPIRIN 81 MG: 81 TABLET, CHEWABLE ORAL at 07:43

## 2023-04-23 RX ADMIN — LISINOPRIL 5 MG: 5 TABLET ORAL at 07:44

## 2023-04-23 RX ADMIN — ENOXAPARIN SODIUM 40 MG: 100 INJECTION SUBCUTANEOUS at 11:08

## 2023-04-23 RX ADMIN — CEFDINIR 300 MG: 300 CAPSULE ORAL at 20:38

## 2023-04-23 RX ADMIN — Medication 1000 UNITS: at 07:43

## 2023-04-23 RX ADMIN — AMLODIPINE BESYLATE 10 MG: 10 TABLET ORAL at 07:43

## 2023-04-23 RX ADMIN — DOCUSATE SODIUM 50MG AND SENNOSIDES 8.6MG 2 TABLET: 8.6; 5 TABLET, FILM COATED ORAL at 20:39

## 2023-04-23 RX ADMIN — CEFDINIR 300 MG: 300 CAPSULE ORAL at 07:43

## 2023-04-23 RX ADMIN — ACETAMINOPHEN 650 MG: 325 TABLET, FILM COATED ORAL at 20:39

## 2023-04-23 RX ADMIN — ROSUVASTATIN CALCIUM 20 MG: 20 TABLET, FILM COATED ORAL at 20:39

## 2023-04-23 RX ADMIN — OXYCODONE HYDROCHLORIDE AND ACETAMINOPHEN 250 MG: 500 TABLET ORAL at 07:43

## 2023-04-23 NOTE — PLAN OF CARE
Goal Outcome Evaluation:  Plan of Care Reviewed With: patient        Progress: improving  Outcome Evaluation: Brigid rested well this shift.  Dysarthria & r facial droop.  All meds crushed in applesauce.  NTL.  Oral care performed.  Uses call light for assistance.  2L O2 via NC.

## 2023-04-23 NOTE — PLAN OF CARE
Goal Outcome Evaluation:  Plan of Care Reviewed With: patient        Progress: improving  Outcome Evaluation: Pt is A&OX4, calm and cooperative. Assist X1 to wc. Dysarthria, R facial droop. Generalized weakness. Meds crushed in AC. NTL. Up in dining room for meals. Incon of B&B. Scattered bruising noted. No unsafe behaviors noted. Pt uses call light for assistance. 2 L of O2 at HS. Family visited this afternoon. No c/o of pain thi shift/

## 2023-04-23 NOTE — PROGRESS NOTES
Physical Medicine and Rehabilitation Progress Note    Assessment     Infarction of left basal ganglia [I63.9]    Subjective     Melissa Gomez is a 82 y.o. 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 Chart, overnight events reviewed w/nursing. No complaint this morning. Denies sob, no cp, no fevers and or chills.     The patient's medical records have been reviewed.    Result Review:  I have personally reviewed the results from the time of this admission to 9/18/2022 13:11 EDT and agree with these findings:  [x]  Laboratory list / accordion  [x]  Microbiology  [x]  Radiology  []  EKG/Telemetry   []  Cardiology/Vascular   []  Pathology  []  Old records  []  Other:    Objective     Wt Readings from Last 3 Encounters:   04/08/23 86.9 kg (191 lb 9.6 oz)     Temp Readings from Last 3 Encounters:   04/23/23 97.6 °F (36.4 °C) (Oral)     BP Readings from Last 3 Encounters:   04/23/23 123/78     Pulse Readings from Last 3 Encounters:   04/23/23 102       Physical Exam:   Gen: Pleasant,  NAD.   Pulm: CTAB; no r/r/w; non-labored   Heart; RRR; no m/r/g appreciated   Abd: soft; NT/ND; +BS   Neuro: Alert; verbal; appropriate   Skin: no rashes on exposed skin   : no Indwelling catheter     CT Outside Films  This procedure was auto-finalized with no dictation required.  MRI Outside Films  This procedure was auto-finalized with no dictation required.  CT Outside Films  This procedure was auto-finalized with no dictation required.      Labs  BMP:   Results from last 7 days   Lab Units 04/21/23  0912   SODIUM mmol/L 144   POTASSIUM mmol/L 4.3   CHLORIDE mmol/L 109*   CO2 mmol/L 23.8   BUN mg/dL 31*   CREATININE mg/dL 0.97   CALCIUM mg/dL 9.7   GLUCOSE mg/dL 150*     CBC:  Results from last 7 days   Lab Units 04/21/23  0912   WBC 10*3/mm3 6.29   HEMOGLOBIN g/dL 13.0   HEMATOCRIT % 40.9   PLATELETS 10*3/mm3 222     UA:   Urine Culture   Date  Value Ref Range Status   04/19/2023 >100,000 CFU/mL Morganella morganii ssp morganii (A)  Preliminary   04/19/2023 >100,000 CFU/mL Gram Negative Bacilli (A)  Preliminary     Comment:     Organism under investigation.         PTT:    Coagulation:               Scheduled Meds:amLODIPine, 10 mg, Oral, Q24H  vitamin C, 250 mg, Oral, Daily  aspirin, 81 mg, Oral, Daily  cefdinir, 300 mg, Oral, Q12H  cholecalciferol, 1,000 Units, Oral, Daily  enoxaparin, 40 mg, Subcutaneous, Q24H  lisinopril, 5 mg, Oral, Q24H  rosuvastatin, 20 mg, Oral, Nightly  senna-docusate sodium, 2 tablet, Oral, Nightly      Continuous Infusions:   PRN Meds:.•  acetaminophen **OR** [DISCONTINUED] acetaminophen  •  [DISCONTINUED] senna-docusate sodium **AND** polyethylene glycol **AND** bisacodyl **AND** bisacodyl    Assessment and Plan:  Active Hospital Problems    Diagnosis    • **Infarction of left basal ganglia        Plan:  Spent 15 min coordinating care.   Remains stable.   Continues to meet criteria for IRF.   Chart reviewed.  Continue therapies  Medical management and education. Continue cefdinir.   Ongoing active work on discharge planning

## 2023-04-23 NOTE — PROGRESS NOTES
Inpatient Rehabilitation Plan of Care Note    Plan of Care  Care Plan Reviewed - No updates at this time.    Psychosocial    Performed Intervention(s)  Verbalizes needs and concerns  Therapeutic environmental set up      Sphincter Control    Performed Intervention(s)  Bladder scan or I/O cath per order  Encourage fluids  Bowel/bladder training      Safety    Performed Intervention(s)  Safety Rounds  Bed alarm and/or chair alarm    Signed by: Saloni Britt RN

## 2023-04-24 LAB
ANION GAP SERPL CALCULATED.3IONS-SCNC: 9.1 MMOL/L (ref 5–15)
BASOPHILS # BLD AUTO: 0.02 10*3/MM3 (ref 0–0.2)
BASOPHILS NFR BLD AUTO: 0.3 % (ref 0–1.5)
BUN SERPL-MCNC: 29 MG/DL (ref 8–23)
BUN/CREAT SERPL: 38.7 (ref 7–25)
CALCIUM SPEC-SCNC: 9.4 MG/DL (ref 8.6–10.5)
CHLORIDE SERPL-SCNC: 109 MMOL/L (ref 98–107)
CO2 SERPL-SCNC: 25.9 MMOL/L (ref 22–29)
CREAT SERPL-MCNC: 0.75 MG/DL (ref 0.57–1)
DEPRECATED RDW RBC AUTO: 40.5 FL (ref 37–54)
EGFRCR SERPLBLD CKD-EPI 2021: 78.6 ML/MIN/1.73
EOSINOPHIL # BLD AUTO: 0.33 10*3/MM3 (ref 0–0.4)
EOSINOPHIL NFR BLD AUTO: 5.6 % (ref 0.3–6.2)
ERYTHROCYTE [DISTWIDTH] IN BLOOD BY AUTOMATED COUNT: 12.5 % (ref 12.3–15.4)
GLUCOSE SERPL-MCNC: 108 MG/DL (ref 65–99)
HCT VFR BLD AUTO: 38.6 % (ref 34–46.6)
HGB BLD-MCNC: 12.6 G/DL (ref 12–15.9)
IMM GRANULOCYTES # BLD AUTO: 0.02 10*3/MM3 (ref 0–0.05)
IMM GRANULOCYTES NFR BLD AUTO: 0.3 % (ref 0–0.5)
LYMPHOCYTES # BLD AUTO: 1.39 10*3/MM3 (ref 0.7–3.1)
LYMPHOCYTES NFR BLD AUTO: 23.5 % (ref 19.6–45.3)
MCH RBC QN AUTO: 29.5 PG (ref 26.6–33)
MCHC RBC AUTO-ENTMCNC: 32.6 G/DL (ref 31.5–35.7)
MCV RBC AUTO: 90.4 FL (ref 79–97)
MONOCYTES # BLD AUTO: 0.64 10*3/MM3 (ref 0.1–0.9)
MONOCYTES NFR BLD AUTO: 10.8 % (ref 5–12)
NEUTROPHILS NFR BLD AUTO: 3.52 10*3/MM3 (ref 1.7–7)
NEUTROPHILS NFR BLD AUTO: 59.5 % (ref 42.7–76)
NRBC BLD AUTO-RTO: 0 /100 WBC (ref 0–0.2)
PLATELET # BLD AUTO: 216 10*3/MM3 (ref 140–450)
PMV BLD AUTO: 10 FL (ref 6–12)
POTASSIUM SERPL-SCNC: 4.3 MMOL/L (ref 3.5–5.2)
RBC # BLD AUTO: 4.27 10*6/MM3 (ref 3.77–5.28)
SODIUM SERPL-SCNC: 144 MMOL/L (ref 136–145)
WBC NRBC COR # BLD: 5.92 10*3/MM3 (ref 3.4–10.8)

## 2023-04-24 PROCEDURE — 97110 THERAPEUTIC EXERCISES: CPT

## 2023-04-24 PROCEDURE — 97129 THER IVNTJ 1ST 15 MIN: CPT

## 2023-04-24 PROCEDURE — 97130 THER IVNTJ EA ADDL 15 MIN: CPT

## 2023-04-24 PROCEDURE — 85025 COMPLETE CBC W/AUTO DIFF WBC: CPT | Performed by: PHYSICAL MEDICINE & REHABILITATION

## 2023-04-24 PROCEDURE — 97535 SELF CARE MNGMENT TRAINING: CPT

## 2023-04-24 PROCEDURE — 92526 ORAL FUNCTION THERAPY: CPT

## 2023-04-24 PROCEDURE — 25010000002 ENOXAPARIN PER 10 MG: Performed by: PHYSICAL MEDICINE & REHABILITATION

## 2023-04-24 PROCEDURE — 97530 THERAPEUTIC ACTIVITIES: CPT

## 2023-04-24 PROCEDURE — 80048 BASIC METABOLIC PNL TOTAL CA: CPT | Performed by: PHYSICAL MEDICINE & REHABILITATION

## 2023-04-24 RX ORDER — UREA 10 %
2 LOTION (ML) TOPICAL NIGHTLY PRN
Status: DISCONTINUED | OUTPATIENT
Start: 2023-04-24 | End: 2023-04-29 | Stop reason: HOSPADM

## 2023-04-24 RX ORDER — BUTALBITAL, ACETAMINOPHEN AND CAFFEINE 50; 325; 40 MG/1; MG/1; MG/1
1 TABLET ORAL EVERY 4 HOURS PRN
Status: DISCONTINUED | OUTPATIENT
Start: 2023-04-24 | End: 2023-04-29 | Stop reason: HOSPADM

## 2023-04-24 RX ADMIN — ROSUVASTATIN CALCIUM 20 MG: 20 TABLET, FILM COATED ORAL at 21:18

## 2023-04-24 RX ADMIN — ASPIRIN 81 MG: 81 TABLET, CHEWABLE ORAL at 08:04

## 2023-04-24 RX ADMIN — DOCUSATE SODIUM 50MG AND SENNOSIDES 8.6MG 2 TABLET: 8.6; 5 TABLET, FILM COATED ORAL at 21:18

## 2023-04-24 RX ADMIN — BUTALBITAL, ACETAMINOPHEN, AND CAFFEINE 1 TABLET: 50; 325; 40 TABLET ORAL at 21:17

## 2023-04-24 RX ADMIN — Medication 1000 UNITS: at 08:04

## 2023-04-24 RX ADMIN — AMLODIPINE BESYLATE 10 MG: 10 TABLET ORAL at 08:04

## 2023-04-24 RX ADMIN — LISINOPRIL 5 MG: 5 TABLET ORAL at 08:04

## 2023-04-24 RX ADMIN — ENOXAPARIN SODIUM 40 MG: 100 INJECTION SUBCUTANEOUS at 12:24

## 2023-04-24 RX ADMIN — CEFDINIR 300 MG: 300 CAPSULE ORAL at 08:04

## 2023-04-24 RX ADMIN — ACETAMINOPHEN 650 MG: 325 TABLET, FILM COATED ORAL at 16:35

## 2023-04-24 RX ADMIN — CEFDINIR 300 MG: 300 CAPSULE ORAL at 21:18

## 2023-04-24 RX ADMIN — OXYCODONE HYDROCHLORIDE AND ACETAMINOPHEN 250 MG: 500 TABLET ORAL at 08:04

## 2023-04-24 NOTE — THERAPY PROGRESS REPORT/RE-CERT
Inpatient Rehabilitation - Physical Therapy Progress Note and Treatment Note       [facility]     Patient Name: Melissa Gomez  : 1940  MRN: [id number]    Today's Date: 2023                    Admit Date: 2023      Visit Dx:     ICD-10-CM ICD-9-CM   1. Impaired functional mobility, balance, gait, and endurance  Z74.09 V49.89   2. Follow-up exam  Z09 V67.9       Patient Active Problem List   Diagnosis   • Infarction of left basal ganglia       Past Medical History:   Diagnosis Date   • GERD (gastroesophageal reflux disease)    • Hyperlipidemia    • Hypertension    • Stroke        Past Surgical History:   Procedure Laterality Date   • COLONOSCOPY     • HYSTERECTOMY     • TONSILLECTOMY         PT ASSESSMENT (last 12 hours)     IRF PT Evaluation and Treatment     Row Name 23          PT Time and Intention    Document Type daily treatment  -DP     Mode of Treatment individual therapy;physical therapy  -DP     Patient/Family/Caregiver Comments/Observations Pt did state she was SOA after ambulating today in the AM but SpO2% read 97%  -DP     Row Name 23 08          General Information    Patient Profile Reviewed yes  -DP     Existing Precautions/Restrictions fall;swallow precautions  -DP     Row Name 23 08          Pain Assessment    Pretreatment Pain Rating 0/10 - no pain  -DP     Posttreatment Pain Rating 0/10 - no pain  -DP     Row Name 23 0812          Cognition/Psychosocial    Affect/Mental Status (Cognition) WFL  -DP     Orientation Status (Cognition) oriented x 3  -DP     Follows Commands (Cognition) follows one-step commands;over 90% accuracy  -DP     Personal Safety Interventions safety round/check completed;elopement precautions initiated;fall prevention program maintained;gait belt;muscle strengthening facilitated;nonskid shoes/slippers when out of bed;supervised activity  -DP     Row Name 23 08          Sit-Stand Transfer    Sit-Stand [place]  (Transfers) contact guard;minimum assist (75% patient effort);verbal cues  -DP     Assistive Device (Sit-Stand Transfers) walker, front-wheeled  -DP     Comment, (Sit-Stand Transfer) only required Lila on one of the several STS. Performed 3STS for 2 sets at 21 inch surface. VC for hand placement at times  -DP     Row Name 04/24/23 0812          Stand-Sit Transfer    Stand-Sit [place] (Transfers) contact guard;verbal cues  -DP     Assistive Device (Stand-Sit Transfers) walker, front-wheeled  -DP     Row Name 04/24/23 0812          Toilet Transfer    Type (Toilet Transfer) stand pivot/stand step  -DP     [place] Level (Toilet Transfer) contact guard  -DP     Assistive Device (Toilet Transfer) walker, front-wheeled  -DP     Row Name 04/24/23 0812          Gait/Stairs (Locomotion)    [place] Level (Gait) contact guard;verbal cues  -DP     Assistive Device (Gait) walker, front-wheeled  -DP     Distance in Feet (Gait) 80'x1  -DP     Pattern (Gait) step-through  -DP     Deviations/Abnormal Patterns (Gait) lenard decreased;festinating/shuffling;stride length decreased  -DP     Bilateral Gait Deviations forward flexed posture;heel strike decreased  -DP     Right Sided Gait Deviations heel strike decreased  -DP     Row Name 04/24/23 0812          Safety Issues, Functional Mobility    Impairments Affecting Function (Mobility) balance;cognition;endurance/activity tolerance;strength  -DP     Row Name 04/24/23 0812          Balance    Comment, Balance Pt able to stand with CGA in static/dynamic manner during donning of brief and pants for 2 mins  -DP     Row Name 04/24/23 0812          Hip (Therapeutic Exercise)    Hip Strengthening (Therapeutic Exercise) bilateral;aBduction;marching while seated;red;resistance band;10 repetitions  -DP     Row Name 04/24/23 0812          Knee (Therapeutic Exercise)    Knee Strengthening (Therapeutic Exercise) bilateral;hamstring curls;resistance band;red  -DP     Row Name 04/24/23  0812          Ankle (Therapeutic Exercise)    Ankle Strengthening (Therapeutic Exercise) bilateral;dorsiflexion;sitting;red;resistance band;15 repititions  -DP     Row Name 04/24/23 0812          Positioning and Restraints    Pre-Treatment Position sitting in chair/recliner  -DP     Post Treatment Position wheelchair  -DP     In Wheelchair sitting;exit alarm on;with OT  -DP     Row Name 04/24/23 0812          Weekly Progress Summary (PT)    Weekly Progress Summary (PT) Pt has demonstrated increased strength and improvement with funcitonal mobility seen with meeting ambulation and stair navigation goals. With transfers the pt still has difficulty with correct hand place but has made great improvement with scooting to the edge of seat before tryting to stand. Pt is still working toward meeting bed mobility and transfer goals and is expected to meet these goals with continued to skilled PT services.  -DP     Row Name 04/24/23 0812          Bed Mobility Goal 1 (PT-IRF)    Activity/Assistive Device (Bed Mobility Goal 1, PT-IRF) bed mobility activities, all  -DP     [place] Level (Bed Mobility Goal 1, PT-IRF) contact guard required  -DP     Time Frame (Bed Mobility Goal 1, PT-IRF) long-term goal (LTG);2 weeks  -DP     Progress/Outcomes (Bed Mobility Goal 1, PT-IRF) goal ongoing;continuing progress toward goal  -DP     Row Name 04/24/23 0812          Transfer Goal 1 (PT-IRF)    Activity/Assistive Device (Transfer Goal 1, PT-IRF) all transfers  -DP     [place] Level (Transfer Goal 1, PT-IRF) contact guard required  -DP     Time Frame (Transfer Goal 1, PT-IRF) long-term goal (LTG);2 weeks  -DP     Progress/Outcomes (Transfer Goal 1, PT-IRF) goal ongoing;continuing progress toward goal  -DP     Row Name 04/24/23 0812          Gait/Walking Locomotion Goal 1 (PT-IRF)    Activity/Assistive Device (Gait/Walking Locomotion Goal 1, PT-IRF) gait (walking locomotion);walker, rolling  -DP     Gait/Walking Locomotion Distance  Goal 1 (PT-IRF) 100  -DP     [place] Level (Gait/Walking Locomotion Goal 1, PT-IRF) contact guard required  -DP     Time Frame (Gait/Walking Locomotion Goal 1, PT-IRF) long-term goal (LTG);2 weeks  -DP     Progress/Outcomes (Gait/Walking Locomotion Goal 1, PT-IRF) goal met  -DP     Row Name 04/24/23 0812          Stairs Goal 1 (PT-IRF)    Activity/Assistive Device (Stairs Goal 1, PT-IRF) stairs, all skills;using handrail, left;using handrail, right  -DP     Number of Stairs (Stairs Goal 1, PT-IRF) 4  -DP     [place] Level (Stairs Goal 1, PT-IRF) contact guard required  -DP     Progress/Outcomes (Stairs Goal 1, PT-IRF) goal met  -DP           User Key  (r) = Recorded By, (t) = Taken By, (c) = Cosigned By    Initials Name Provider Type    Gen Thomas, GIRISH Physical Therapist                 Physical Therapy Education     Title: PT OT SLP Therapies (Done)     Topic: Physical Therapy (Done)     Point: Mobility training (Done)     Learning Progress Summary           Patient Acceptance, E,D, VU,DU by DP at 4/24/2023 1636    Acceptance, E,TB, VU,NR by EE at 4/21/2023 1507    Acceptance, E,TB,D, VU,NR by EE at 4/20/2023 1110    Acceptance, E,TB, VU,NR by EE at 4/20/2023 1043    Acceptance, E,TB,D, VU,NR by EE at 4/19/2023 1121    Acceptance, E,TB, VU,NR by EE at 4/18/2023 1102    Acceptance, E,D,TB, VU,NR by EE at 4/17/2023 1129    Acceptance, TB,E, VU,NR by EE at 4/13/2023 1047    Acceptance, E,D, VU,DU by DP at 4/11/2023 1038    Acceptance, E, VU,NR by EE at 4/10/2023 1043    Acceptance, E, VU by LS at 4/8/2023 1204    Acceptance, E,TB, VU,NR by EE at 4/7/2023 1054    Acceptance, E,TB, VU by KP at 4/6/2023 1527                   Point: Home exercise program (Done)     Learning Progress Summary           Patient Acceptance, E,D, VU,DU by DP at 4/24/2023 1636    Acceptance, E,TB, VU,NR by ERUM at 4/22/2023 1353    Acceptance, E,TB, VU,NR by EE at 4/21/2023 1507    Acceptance, E,TB,D, VU,NR by EE at 4/20/2023  1110    Acceptance, E,TB, VU,NR by EE at 4/20/2023 1043    Acceptance, E,TB,D, VU,NR by EE at 4/19/2023 1121    Acceptance, E,TB, VU,NR by EE at 4/18/2023 1102    Acceptance, E,D,TB, VU,NR by EE at 4/17/2023 1129    Acceptance, TB,E, VU,NR by EE at 4/13/2023 1047    Acceptance, E,D, VU,DU by DP at 4/11/2023 1038    Acceptance, E, VU,NR by EE at 4/10/2023 1043    Acceptance, E, VU by LS at 4/8/2023 1204    Acceptance, E,TB, VU,NR by  at 4/7/2023 1054    Acceptance, E,TB, VU by  at 4/6/2023 1527                               User Key     Initials Effective Dates Name Provider Type Discipline     06/16/21 -  Tiera Alanis, PT Physical Therapist PT     06/16/21 -  Yamila Wheat MS CCC-SLP Speech and Language Pathologist SLP     06/16/21 -  Diann Gonzáles, PT Physical Therapist PT     06/16/21 -  Chen Centeno PT Physical Therapist PT    DP 08/24/21 -  Gen Gray, PT Physical Therapist PT                PT Recommendation and Plan                          Time Calculation:      PT Charges     Row Name 04/24/23 1637 04/24/23 1636          Time Calculation    Start Time 1400  -DP 1000  -DP     Stop Time 1430  -DP 1030  -DP     Time Calculation (min) 30 min  -DP 30 min  -DP     PT Received On -- 04/24/23  -DP     PT - Next Appointment -- 04/25/23  -DP     PT Goal Re-Cert Due Date -- 05/01/23  -DP        Time Calculation- PT    Total Timed Code Minutes- PT 30 minute(s)  -DP 30 minute(s)  -DP           User Key  (r) = Recorded By, (t) = Taken By, (c) = Cosigned By    Initials Name Provider Type    DP Gen Gray, PT Physical Therapist                Therapy Charges for Today     Code Description Service Date Service Provider Modifiers Qty    02998018933 HC PT THERAPEUTIC ACT EA 15 MIN 4/24/2023 Gen Gray, PT GP 2    [id number]  PT THER PROC EA 15 MIN 4/24/2023 Gen Gray, PT GP 2                   Gen Gray, PT  4/24/2023

## 2023-04-24 NOTE — THERAPY TREATMENT NOTE
Inpatient Rehabilitation - Occupational Therapy Treatment Note    [facility]     Patient Name: Melissa Gomez  : 1940  MRN: [id number]    Today's Date: 2023                 Admit Date: 2023         ICD-10-CM ICD-9-CM   1. Impaired functional mobility, balance, gait, and endurance  Z74.09 V49.89   2. Follow-up exam  Z09 V67.9       Patient Active Problem List   Diagnosis   • Infarction of left basal ganglia       Past Medical History:   Diagnosis Date   • GERD (gastroesophageal reflux disease)    • Hyperlipidemia    • Hypertension    • Stroke        Past Surgical History:   Procedure Laterality Date   • COLONOSCOPY     • HYSTERECTOMY     • TONSILLECTOMY               IRF OT ASSESSMENT FLOWSHEET (last 12 hours)     IRF OT Evaluation and Treatment     Row Name 23 1601 23 1154       OT Time and Intention    Document Type daily treatment  -DN daily treatment  -DN    Mode of Treatment occupational therapy  -DN occupational therapy  -DN    Patient Effort good  -DN good  -DN    Row Name 23 1601 23 1154       Pain Assessment    Pretreatment Pain Rating 3/10  -DN 3/10  -DN    Posttreatment Pain Rating 4/10  -DN 3/10  -DN    Pain Location - Side/Orientation Right  -DN Right  -DN    Pain Location proximal  -DN proximal  -DN    Pain Location - shoulder  -DN shoulder  -DN    Pre/Posttreatment Pain Comment hot pack after use in therapy with OT  -DN nsg aware  -DN    Row Name 23 1154          Cognition/Psychosocial    Affect/Mental Status (Cognition) WFL  -DN     Orientation Status (Cognition) oriented x 3  -DN     Follows Commands (Cognition) follows one-step commands;over 90% accuracy;verbal cues/prompting required  -DN     Personal Safety Interventions fall prevention program maintained;gait belt;supervised activity  -DN     Cognitive Function attention deficit  -DN     Attention Deficit (Cognition) minimal deficit  -DN     Row Name 23 1154          Bathing    [place]  Level (Bathing) bathing skills;minimum assist (75% patient effort);verbal cues;nonverbal cues (demo/gesture)  -DN     Assistive Device (Bathing) long-handled sponge  -DN     Position (Bathing) supported sitting;supported standing;sink side  -DN     Set-up Assistance (Bathing) obtain supplies  -DN     Row Name 04/24/23 1152          Upper Body Dressing    [place] Level (Upper Body Dressing) upper body dressing skills;doff;don;pull over garment;set up assistance  -DN     Position (Upper Body Dressing) supported sitting  -DN     Set-up Assistance (Upper Body Dressing) obtain clothing  -DN     Row Name 04/24/23 1156          Lower Body Dressing    [place] Level (Lower Body Dressing) don;doff;pants/bottoms;socks;maximum assist (25% patient effort);set up  -DN     Assistive Device Use (Lower Body Dressing) reacher  -DN     Position (Lower Body Dressing) supported standing  -DN     Set-up Assistance (Lower Body Dressing) obtain clothing  -DN     Row Name 04/24/23 1157          Grooming    [place] Level (Grooming) grooming skills;deodorant application;oral care regimen;supervision  -DN     Position (Grooming) supported sitting  -DN     Row Name 04/24/23 1157          Toileting    [place] Level (Toileting) toileting skills;adjust/manage clothing;perform perineal hygiene;maximum assist (25% patient effort);nonverbal cues (demo/gesture)  -DN     Assistive Device Use (Toileting) grab bar/safety frame;raised toilet seat  -DN     Position (Toileting) supported sitting;supported standing  -DN     Set-up Assistance (Toileting) change pad/brief;obtain supplies  -DN     Row Name 04/24/23 1150          Self-Feeding    [place] Level (Self-Feeding) feeding skills;supervision;verbal cues;nonverbal cues (demo/gesture)  -DN     Position (Self-Feeding) supported sitting  -DN     Comment (Self-Feeding) on NTL  -DN     Row Name 04/24/23 1601          Transfer Assessment/Treatment    Comment, (Transfers) CGA w/c to mat  with RWX  -DN     Row Name 04/24/23 1154          Toilet Transfer    Type (Toilet Transfer) stand pivot/stand step  -DN     [place] Level (Toilet Transfer) contact guard;nonverbal cues (demo/gesture);verbal cues  -DN     Assistive Device (Toilet Transfer) wheelchair;raised toilet seat;grab bars/safety frame;walker, front-wheeled  -DN     Comment, (Toilet Transfer) stand pivot sit from w/c to commode with RWX vc to scoot to edge of chair and to push up from chair arms  -DN     Row Name 04/24/23 1601          Motor Skills    Functional Endurance fair+  -DN     Motor Control/Coordination Interventions therapeutic exercise/ROM;fine motor manipulation/dexterity activities;gross motor coordination activities  clothes pin and small peg placement with RUE for  gross and fine motor skill improvement with adls  -DN     Row Name 04/24/23 1601 04/24/23 1154       Positioning and Restraints    Pre-Treatment Position sitting in chair/recliner  -DN sitting in chair/recliner  -DN    Post Treatment Position wheelchair  -DN wheelchair  -DN    In Bed sitting;call light within reach;encouraged to call for assist;exit alarm on  -DN sitting;call light within reach;encouraged to call for assist;exit alarm on  -DN          User Key  (r) = Recorded By, (t) = Taken By, (c) = Cosigned By    Initials Name Effective Dates    DN Ismael Lanier OT 06/16/21 -                  Occupational Therapy Education     Title: PT OT SLP Therapies (Done)     Topic: Occupational Therapy (Done)     Point: ADL training (Done)     Description:   Instruct learner(s) on proper safety adaptation and remediation techniques during self care or transfers.   Instruct in proper use of assistive devices.              Learning Progress Summary           Patient Acceptance, E,TB, VU,NR by ERUM at 4/22/2023 1353    Acceptance, E, VU by [place] at 4/8/2023 1204    Acceptance, E,TB,D, VU,DU,NR by KP at 4/6/2023 1230    Comment: ed pt on role of OT. benefit of therapy, POC w.   OT ed on safety w ADL and tsf. pt ed on UBD technique.                   Point: Home exercise program (Done)     Description:   Instruct learner(s) on appropriate technique for monitoring, assisting and/or progressing therapeutic exercises/activities.              Learning Progress Summary           Patient Acceptance, E,TB, VU,NR by ERUM at 4/22/2023 1353    Acceptance, E, VU by  at 4/8/2023 1204    Acceptance, E,TB,D, VU,DU,NR by  at 4/6/2023 1230    Comment: ed pt on role of OT. benefit of therapy, POC w.  OT ed on safety w ADL and tsf. pt ed on UBD technique.                   Point: Precautions (Done)     Description:   Instruct learner(s) on prescribed precautions during self-care and functional transfers.              Learning Progress Summary           Patient Acceptance, E,TB, VU,NR by ERUM at 4/22/2023 1353    Acceptance, E, VU by  at 4/8/2023 1204    Acceptance, E,TB,D, VU,DU,NR by  at 4/6/2023 1230    Comment: ed pt on role of OT. benefit of therapy, POC w.  OT ed on safety w ADL and tsf. pt ed on UBD technique.                   Point: Body mechanics (Done)     Description:   Instruct learner(s) on proper positioning and spine alignment during self-care, functional mobility activities and/or exercises.              Learning Progress Summary           Patient Acceptance, E,TB, VU,NR by ERUM at 4/22/2023 1353    Acceptance, E, VU by  at 4/8/2023 1204    Acceptance, E,TB,D, VU,DU,NR by  at 4/6/2023 1230    Comment: ed pt on role of OT. benefit of therapy, POC w.  OT ed on safety w ADL and tsf. pt ed on UBD technique.                               User Key     Initials Effective Dates Name Provider Type Discipline     06/16/21 -  Patsy Blank OTR Occupational Therapist OT    ERUM 06/16/21 -  Tiera Alanis, PT Physical Therapist PT     06/16/21 -  Yamila Wheat MS CCC-SLP Speech and Language Pathologist SLP                    OT Recommendation and Plan                         Time  Calculation:      Time Calculation- OT     Row Name 04/24/23 1430 04/24/23 0830          Time Calculation- OT    OT Start Time 1430  -DN 0830  -DN     OT Stop Time 1500  -DN 0900  -DN     OT Time Calculation (min) 30 min  -DN 30 min  -DN           User Key  (r) = Recorded By, (t) = Taken By, (c) = Cosigned By    Initials Name Provider Type    Ismael Quintanilla OT Occupational Therapist              Therapy Charges for Today     Code Description Service Date Service Provider Modifiers Qty    [id number]  OT SELF CARE/MGMT/TRAIN EA 15 MIN 4/24/2023 Ismael Lanier OT GO 2    [id number]  OT THER PROC EA 15 MIN 4/24/2023 Ismael Lanier OT GO 2                   Ismael Lanier OT  4/24/2023

## 2023-04-24 NOTE — PROGRESS NOTES
LOS: 19 days   Patient Care Team:  Jayro Fountain MD as PCP - General (Internal Medicine)      Patient Name: PIERRE FREEMAN  Patient : 1940    ADMITTING DIAGNOSIS:  Diagnoses    1. Follow-up examination  2. IMPAIRED FUNCTIONAL MOBILITY, BALANCE, GAIT, AND ENDURANCE       Left basal ganglia stroke  Right hemiparesis with impaired motor control    SUBJECTIVE:  Patient seen and examined in the recreational therapy moving breakfast.  She states that her daughter from [place] is going to move down for a while to help out along with her daughter Nina.  She states that she thinks she is constipated as she had a bowel movement yesterday but did not have one for two days prior to that.     OBJECTIVE:    Vitals:    23 0500   BP: 124/72   Pulse: 78   Resp: 18   Temp: 97.4 °F (36.3 °C)   SpO2: 97%       PHYSICAL EXAM:   General: pleasant, in no acute distress  HEENT: NCAT, sclera anicteric, conjunctiva pink, mucoa moist  Cardiovascular: RRR, +S1+S2, no obvious m/g/r  Lungs: CTAB, no rhonchi or wheezing  Abdomen: normoactive bowel sounds, soft, non tender to palpation  Extremities: no peripheral edema  Skin: exposed surfaces of skin warm, intact, without erythema  Neuro: awake alert and oriented to person, place, time, and situation,  Right facial droop   dysarthria.  MSK: Right shoulder flexion 4/5, elbow flexion 4+/5, finger flexion 4+/5, knee extension 4+/5, ankle dorsiflexion 4/5,  weak on the RUE, LUE 5/5 BLE 5/5      MEDICATIONS  Scheduled Meds:  amLODIPine, 10 mg, Oral, Q24H  vitamin C, 250 mg, Oral, Daily  aspirin, 81 mg, Oral, Daily  cefdinir, 300 mg, Oral, Q12H  cholecalciferol, 1,000 Units, Oral, Daily  enoxaparin, 40 mg, Subcutaneous, Q24H  lisinopril, 5 mg, Oral, Q24H  rosuvastatin, 20 mg, Oral, Nightly  senna-docusate sodium, 2 tablet, Oral, Nightly        Continuous Infusions:       PRN Meds:  •  acetaminophen **OR** [DISCONTINUED] acetaminophen  •  [DISCONTINUED] senna-docusate  sodium **AND** polyethylene glycol **AND** bisacodyl **AND** bisacodyl      RESULTS:  No results found for: POCGLU  Results from last 7 days   Lab Units 04/24/23  0639 04/21/23  0912   WBC 10*3/mm3 5.92 6.29   HEMOGLOBIN g/dL 12.6 13.0   HEMATOCRIT % 38.6 40.9   PLATELETS 10*3/mm3 216 222     Results from last 7 days   Lab Units 04/24/23  0639 04/21/23  0912   SODIUM mmol/L 144 144   POTASSIUM mmol/L 4.3 4.3   CHLORIDE mmol/L 109* 109*   CO2 mmol/L 25.9 23.8   BUN mg/dL 29* 31*   CREATININE mg/dL 0.75 0.97   CALCIUM mg/dL 9.4 9.7   GLUCOSE mg/dL 108* 150*       Urine Culture   Date Value Ref Range Status   04/19/2023 >100,000 CFU/mL Morganella morganii ssp morganii (A)  Final   04/19/2023 (A)  Final    >100,000 CFU/mL Klebsiella pneumoniae ssp pneumoniae         ASSESSMENT and PLAN:    Infarction of left basal ganglia    Left basal ganglia stroke  - Stroke prophylaxis: ASA and Plavix x 21 days from March 30, 2023, then ASA 81 mg daily. Crestor 20 mg.  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  We will continue to monitor.     Dysphagia   April 14 - upgrade to soft (chopped meats)/no mixed consistencies with NTL. Recommend sit upright in wheelchair with supervision. Check intermittently for pocketing. Meds one at a time with applesauce or pudding     Dysarthria  -April 21 - Noted to be 100% intelligible in conversation with SLP with some moments of word finding delays.      Hypertension - amlodipine/lisinopril     Urinary retention. Treated for UTI as Reggie Duran. Check bladder scan PVR and cath if >300 cc until three consecutive < 250 cc  April 7-requires intermittent straight cath 400-113-300 cc.  No spontaneous void.  Follow pattern.  April 10-requirement straight catheter 300 cc about every 6 or 8 hours.  - will extend interval  to every 12 hours to see if she voids with a larger volume.  4/12- has not required intermittent catheterization since last night.  Noted to have 4 voids with PVR less than 200.  May be recovering bladder function.  Will monitor.  4/13-voiding on her own with postvoid residuals 190 range  4/19-complaints of dysuria-urinalysis ordered     Urinary Tract Infection, resolved  -complaint of dysuria on 4/19 with positive UA.   -cefdinir 300mg BID for 5 days   April 24 - no further symptoms. Culture positive for morganella morganii and klebsiella pneumoniae susceptible to cefdinir. tx complete.      Right upper extremity discomfort  -April 17-Has some diffuse pain in the right upper extremity.  Distal right upper extremity slightly warmer than the left. No allodynia.  - Reviewed possible post stroke pain/central pain syndrome versus sympathetic mediated pain versus shoulder subluxation.  Will monitor.     DVT prophylaxis- on dual antiplatelet therapies.  SCDs.  Lovenox     Team Conference 4/11/2023  Nursing: continent/incontinent with bladder, requiring cathing each bladder scan, no safety issues  PT: bed mobiliy min assist, contact guard with transfers, ambulating 80 feet, fatigues quickly with low endurance, cues for sequencing, drags left foot, anticipated contact guard with walker  OT: mod assist for bathing, max/dependent with lower body dressing, toileting max assist due to balance  SLP: mildly impaired on CLQT WNL memory/visuospatial, and mild for attention/executive function, puree and nectar thick diet, mild to moderate dysarthria  Discharge Date: 1.5 weeks      TEAM CONF - April 18 - PROGRESS SLOWER THAN INITIAL FIRST DAYS SO ANTICIPATE EXTENDING LENGTH OF STAY - STRONGER BUT STILL IMPAIRED MOTOR CONTROL. FATIGUE. BED MOD. TRANSFERS MIN CTG. GAIT 80 FEET CTG RW , LESS DRAG RIGHT FEET. 4 STAIRS CTG. BATH MOD. LBD MAX. UBD MIN SBA. COGNITION - CUES FOR ATTENTION TO DETAIL. SWALLOW - COUGH WITH WATER BY CUP. [facility]  SOFT, NTL. VOICING/ARTICULATION- MILD DYSARTHRIA AND WILL SELF CORRECT.  BNE YESTERDAY. SEE REPORT IN [facility]. Attention: WNL  Executive Functioning: Mildly Impaired  Abstract Reasoning: WNL  Arithmetic: Mildly Impaired  Visuospatial Perception: WNL  Visuospatial Praxis: Moderately to Severely Impaired  Verbal Memory: Immediate - Moderately Impaired; Delayed - WNL for Stories, Severely Impaired for Lists  Visual Memory: Mildly Impaired  Emotional: Mild anxiety and depression related to worries about her quality-of-life following discharge from the hospital.  O2 AT NIGHT. STILL INCONTINENT- TIMED VOIDS. ALLERGIES - SNEEZES.   ELOS - 1.5 WEEKS    CODE STATUS:  Code Status (Patient has no pulse and is not breathing): CPR (Attempt to Resuscitate)  Medical Interventions (Patient has pulse or is breathing): Full Support  Release to patient: Routine Release      Admission Status: Continues to meet requirements for inpatient admission.     Patient seen and evaluated with attending physician, Dr. Fajardo. Please see attestation.     Shannon Martinez, DO  Physical Medicine and Rehabilitation   PGY-2    During rounds, used appropriate personal protective equipment including mask and gloves.  Additional gown if indicated.  Mask used was standard procedure mask. Appropriate PPE was worn during the entire visit.  Hand hygiene was completed before and after.

## 2023-04-24 NOTE — THERAPY TREATMENT NOTE
Inpatient Rehabilitation - Occupational Therapy Treatment Note    [facility]     Patient Name: Melissa Gomez  : 1940  MRN: [id number]    Today's Date: 2023                 Admit Date: 2023         ICD-10-CM ICD-9-CM   1. Impaired functional mobility, balance, gait, and endurance  Z74.09 V49.89   2. Follow-up exam  Z09 V67.9       Patient Active Problem List   Diagnosis   • Infarction of left basal ganglia       Past Medical History:   Diagnosis Date   • GERD (gastroesophageal reflux disease)    • Hyperlipidemia    • Hypertension    • Stroke        Past Surgical History:   Procedure Laterality Date   • COLONOSCOPY     • HYSTERECTOMY     • TONSILLECTOMY               IRF OT ASSESSMENT FLOWSHEET (last 12 hours)     IRF OT Evaluation and Treatment     Row Name 23 1154          OT Time and Intention    Document Type daily treatment  -DN     Mode of Treatment occupational therapy  -DN     Patient Effort good  -DN     Row Name 23 1154          Pain Assessment    Pretreatment Pain Rating 3/10  -DN     Posttreatment Pain Rating 3/10  -DN     Pain Location - Side/Orientation Right  -DN     Pain Location proximal  -DN     Pain Location - shoulder  -DN     Pre/Posttreatment Pain Comment nsg aware  -DN     Row Name 23 1154          Cognition/Psychosocial    Affect/Mental Status (Cognition) WFL  -DN     Orientation Status (Cognition) oriented x 3  -DN     Follows Commands (Cognition) follows one-step commands;over 90% accuracy;verbal cues/prompting required  -DN     Personal Safety Interventions fall prevention program maintained;gait belt;supervised activity  -DN     Cognitive Function attention deficit  -DN     Attention Deficit (Cognition) minimal deficit  -DN     Row Name 23 1155          Bathing    [place] Level (Bathing) bathing skills;minimum assist (75% patient effort);verbal cues;nonverbal cues (demo/gesture)  -DN     Assistive Device (Bathing) long-handled sponge  -DN      Position (Bathing) supported sitting;supported standing;sink side  -DN     Set-up Assistance (Bathing) obtain supplies  -DN     Row Name 04/24/23 1154          Upper Body Dressing    Mobile Level (Upper Body Dressing) upper body dressing skills;doff;don;pull over garment;set up assistance  -DN     Position (Upper Body Dressing) supported sitting  -DN     Set-up Assistance (Upper Body Dressing) obtain clothing  -DN     Row Name 04/24/23 1152          Lower Body Dressing    Mobile Level (Lower Body Dressing) don;doff;pants/bottoms;socks;maximum assist (25% patient effort);set up  -DN     Assistive Device Use (Lower Body Dressing) reacher  -DN     Position (Lower Body Dressing) supported standing  -DN     Set-up Assistance (Lower Body Dressing) obtain clothing  -DN     Row Name 04/24/23 1159          Grooming    Mobile Level (Grooming) grooming skills;deodorant application;oral care regimen;supervision  -DN     Position (Grooming) supported sitting  -DN     Row Name 04/24/23 1155          Toileting    Mobile Level (Toileting) toileting skills;adjust/manage clothing;perform perineal hygiene;maximum assist (25% patient effort);nonverbal cues (demo/gesture)  -DN     Assistive Device Use (Toileting) grab bar/safety frame;raised toilet seat  -DN     Position (Toileting) supported sitting;supported standing  -DN     Set-up Assistance (Toileting) change pad/brief;obtain supplies  -DN     Row Name 04/24/23 0417          Self-Feeding    Mobile Level (Self-Feeding) feeding skills;supervision;verbal cues;nonverbal cues (demo/gesture)  -DN     Position (Self-Feeding) supported sitting  -DN     Comment (Self-Feeding) on NTL  -DN     Row Name 04/24/23 1157          Toilet Transfer    Type (Toilet Transfer) stand pivot/stand step  -DN     Mobile Level (Toilet Transfer) contact guard;nonverbal cues (demo/gesture);verbal cues  -DN     Assistive Device (Toilet Transfer) wheelchair;raised toilet  seat;grab bars/safety frame;walker, front-wheeled  -DN     Comment, (Toilet Transfer) stand pivot sit from w/c to commode with RWX vc to scoot to edge of chair and to push up from chair arms  -DN     Row Name 04/24/23 1154          Positioning and Restraints    Pre-Treatment Position sitting in chair/recliner  -DN     Post Treatment Position wheelchair  -DN     In Bed sitting;call light within reach;encouraged to call for assist;exit alarm on  -DN           User Key  (r) = Recorded By, (t) = Taken By, (c) = Cosigned By    Initials Name Effective Dates    Ismael Quintanilla OT 06/16/21 -                  Occupational Therapy Education     Title: PT OT SLP Therapies (Done)     Topic: Occupational Therapy (Done)     Point: ADL training (Done)     Description:   Instruct learner(s) on proper safety adaptation and remediation techniques during self care or transfers.   Instruct in proper use of assistive devices.              Learning Progress Summary           Patient Acceptance, E,TB, VU,NR by ERUM at 4/22/2023 1353    Acceptance, E, VU by ORLANDO at 4/8/2023 1204    Acceptance, E,TB,D, VU,DU,NR by  at 4/6/2023 1230    Comment: ed pt on role of OT. benefit of therapy, POC w.  OT ed on safety w ADL and tsf. pt ed on UBD technique.                   Point: Home exercise program (Done)     Description:   Instruct learner(s) on appropriate technique for monitoring, assisting and/or progressing therapeutic exercises/activities.              Learning Progress Summary           Patient Acceptance, E,TB, VU,NR by ERUM at 4/22/2023 1353    Acceptance, E, VU by ORLANDO at 4/8/2023 1204    Acceptance, E,TB,D, VU,DU,NR by  at 4/6/2023 1230    Comment: ed pt on role of OT. benefit of therapy, POC w.  OT ed on safety w ADL and tsf. pt ed on UBD technique.                   Point: Precautions (Done)     Description:   Instruct learner(s) on prescribed precautions during self-care and functional transfers.              Learning Progress Summary            Patient Acceptance, E,TB, VU,NR by ERUM at 4/22/2023 1353    Acceptance, E, VU by  at 4/8/2023 1204    Acceptance, E,TB,D, VU,DU,NR by  at 4/6/2023 1230    Comment: ed pt on role of OT. benefit of therapy, POC w.  OT ed on safety w ADL and tsf. pt ed on UBD technique.                   Point: Body mechanics (Done)     Description:   Instruct learner(s) on proper positioning and spine alignment during self-care, functional mobility activities and/or exercises.              Learning Progress Summary           Patient Acceptance, E,TB, VU,NR by ERUM at 4/22/2023 1353    Acceptance, E, VU by  at 4/8/2023 1204    Acceptance, E,TB,D, VU,DU,NR by  at 4/6/2023 1230    Comment: ed pt on role of OT. benefit of therapy, POC w.  OT ed on safety w ADL and tsf. pt ed on UBD technique.                               User Key     Initials Effective Dates Name Provider Type Discipline     06/16/21 -  Patsy Blank, OTR Occupational Therapist OT    ERUM 06/16/21 -  iTera Alanis, PT Physical Therapist PT     06/16/21 -  Yamila Wheat MS CCC-SLP Speech and Language Pathologist SLP                    OT Recommendation and Plan                         Time Calculation:      Time Calculation- OT     Row Name 04/24/23 0830             Time Calculation- OT    OT Start Time 0830  -DN      OT Stop Time 0900  -DN      OT Time Calculation (min) 30 min  -DN            User Key  (r) = Recorded By, (t) = Taken By, (c) = Cosigned By    Initials Name Provider Type    Ismael Quintanilla OT Occupational Therapist              Therapy Charges for Today     Code Description Service Date Service Provider Modifiers Qty    43067366403 HC OT SELF CARE/MGMT/TRAIN EA 15 MIN 4/24/2023 Ismael Lanier OT GO 2                   Ismael Lanier OT  4/24/2023

## 2023-04-24 NOTE — PROGRESS NOTES
Inpatient Rehabilitation Plan of Care Note    Plan of Care  Updated Problems/Interventions  Mobility    [PT] Walk(Active)  Current Status(04/24/2023): 80'-160 CGA RWX  Weekly Goal(05/02/2023): CGA to BR with RWX  Discharge Goal: 100' CGA RWX    [PT] Bed/Chair/Wheelchair(Active)  Current Status(04/24/2023): Min/CGA RWX  Weekly Goal(05/02/2023): CGA RWX  Discharge Goal: CGA RWX    [PT] Bed Mobility(Active)  Current Status(04/24/2023): Kristine  Weekly Goal(05/02/2023): CGA  Discharge Goal: SBA    [PT] Stairs(Active)  Current Status(04/24/2023): 4, B malathi, CGA  Weekly Goal(05/02/2023): PT only  Discharge Goal: 4, B rabrian, CGA    Signed by: Gen Gray, PT

## 2023-04-24 NOTE — THERAPY TREATMENT NOTE
Inpatient Rehabilitation - Speech Language Pathology Treatment Note    [facility]     Patient Name: Melissa Gomez  : 1940  MRN: [id number]    Today's Date: 2023                   Admit Date: 2023       Visit Dx:      ICD-10-CM ICD-9-CM   1. Impaired functional mobility, balance, gait, and endurance  Z74.09 V49.89   2. Follow-up exam  Z09 V67.9       Patient Active Problem List   Diagnosis   • Infarction of left basal ganglia       Past Medical History:   Diagnosis Date   • GERD (gastroesophageal reflux disease)    • Hyperlipidemia    • Hypertension    • Stroke        Past Surgical History:   Procedure Laterality Date   • COLONOSCOPY     • HYSTERECTOMY     • TONSILLECTOMY         SLP Recommendation and Plan                                                            SLP EVALUATION (last 72 hours)     SLP SLC Evaluation     Row Name 23 13323 1100                Communication Assessment/Intervention    Document Type therapy note (daily note)  -AL therapy note (daily note)  -AL       Patient/Family/Caregiver Comments/Observations Pt participated well.  -AL Pt participated well. Daughter, Renu, present at beginning of session for education.  -AL          Pain Scale: Numbers Pre/Post-Treatment    Pretreatment Pain Rating 0/10 - no pain  -AL 0/10 - no pain  -AL             User Key  (r) = Recorded By, (t) = Taken By, (c) = Cosigned By    Initials Name Effective Dates    Janell Erickson, MS CCC-SLP 21 -                    EDUCATION    The patient has been educated in the following areas:       Cognitive Impairment Communication Impairment Dysphagia (Swallowing Impairment).             SLP GOALS     Row Name 23 13323 1100          ([facility]) Pharyngeal Strengthening Exercise Goal 1 (SLP)    Progress/Outcomes (Pharyngeal Strengthening Goal 1, SLP) -- good progress toward goal  -AL     Comment (Pharyngeal Strengthening Goal 1, SLP) -- No overt s/s of aspiration or  penetration observed in 12/14 trials of water by cup; delayed cough x1, throat clear x1. Pt completed 30 repetitions of effortful swallow with MIN-MOD cues. Pt completed 3 sets of 5 reps of EMST 75 at 1.25 turns past 5 cmH20 with MOD cues. Plan VFSS later this week.  -AL        Attention Goal 1 (SLP)    Improve Attention by Goal 1 (SLP) -- complete selective attention task;complete sustained attention task;80%;independently (over 90% accuracy)  -AL     Progress/Outcomes (Attention Goal 1, SLP) -- good progress toward goal  -AL        Memory Skills Goal 1 (SLP)    Improve Memory Skills Through Goal 1 (SLP) -- recalling related word lists with an imposed delay;listen to a paragraph and answer questions;recall details of the day;80%;independently (over 90% accuracy)  -AL     Progress/Outcomes (Memory Skills Goal 1, SLP) -- good progress toward goal  -AL        Organizational Skills Goal 1 (SLP)    Improve Thought Organization Through Goal 1 (SLP) -- completing a divergent naming task;80%;with minimal cues (75-90%)  -AL     Progress/Outcomes (Thought Organization Skills Goal 1, SLP) -- good progress toward goal  -AL        Reasoning Goal 1 (SLP)    Improve Reasoning Through Goal 1 (SLP) complete deductive reasoning task;80%;independently (over 90% accuracy)  -AL complete deductive reasoning task;80%;independently (over 90% accuracy)  -AL     Time Frame (Reasoning Goal 1, SLP) 1 week  -AL --     Progress/Outcomes (Reasoning Goal 1, SLP) goal ongoing  -AL good progress toward goal  -AL     Comment (Reasoning Goal 1, SLP) Moderate level logic puzzle: 50% with NO cues, 100% with MIN cues. Pt with reduced organizational skills during this task today.  -AL --        Executive Functional Skills Goal 1 (SLP)    Improve Executive Function Skills Goal 1 (SLP) -- home management activity;80%;with minimal cues (75-90%)  -AL     Progress/Outcomes (Executive Function Skills Goal 1, SLP) -- goal met  -AL           User Key  (r) =  Recorded By, (t) = Taken By, (c) = Cosigned By    Initials Name Provider Type    Janell Erickson MS CCC-SLP Speech and Language Pathologist                            Time Calculation:        Time Calculation- SLP     Row Name 04/24/23 1534 04/24/23 1204          Time Calculation- SLP    SLP Start Time 1330  -AL 1100  -AL     SLP Stop Time 1400  -AL 1130  -AL     SLP Time Calculation (min) 30 min  -AL 30 min  -AL           User Key  (r) = Recorded By, (t) = Taken By, (c) = Cosigned By    Initials Name Provider Type    Janell Erickson MS CCC-SLP Speech and Language Pathologist                  Therapy Charges for Today     Code Description Service Date Service Provider Modifiers Qty    29626264373 HC ST TREATMENT SWALLOW 2 4/24/2023 Janell Gallego MS CCC-SLP GN 1    27118919238 HC ST DEV OF COGN SKILLS INITIAL 15 MIN 4/24/2023 Janell Gallego MS CCC-SLP  1    58364540336 HC ST DEV OF COGN SKILLS EACH ADDT'L 15 MIN 4/24/2023 Janell Gallego MS CCC-SLP  1                           Janell Gallego MS CCC-SLP  4/24/2023

## 2023-04-24 NOTE — PLAN OF CARE
Goal Outcome Evaluation:  Plan of Care Reviewed With: patient             Problem: Rehabilitation (IRF) Plan of Care  Goal: Plan of Care Review  Outcome: Ongoing, Progressing  Flowsheets (Taken 4/24/2023 0320)  Plan of Care Reviewed With: patient  Outcome Evaluation:  Brigid is alert and oriented x 4. Dyarthria noted. Right side hemiparesis with droop. Takes all meds crushed with applesauce. encouraged NTL throughout shift. Incontinent of bowel and bladder. Wears brief; checked and changed q2 and as needed. CGA with RW during ambulation and transfers. PRN Tylenol administered at 2039 r/t HA. Pt to be up in w/c for all meals. Continues on abx q12 x 7days r/t UTI. Wearing 1.5L nc at HS. No unsafe behaviors. Call light within reach.

## 2023-04-24 NOTE — PLAN OF CARE
Goal Outcome Evaluation:              Outcome Evaluation: Stroke. NIH=2. A&OX4. R. facial droop. Slurred speech. Diet: Mech. soft, no mixed consistencies, no straws, NTL. Upright and one-on-one supervision for all meals. Tylenol for HA. Incontinent and continent B&B. Last BM 4/24. Meds one at a time in AS. Full code. 2L O2 at nite. Carolann area skin improved. Wears a brief. Participated fully in therapy. Calm, cooperative, and pleasant. [id number] wheelchair. Blurred vision in R. eye. Ate better at meals. Full code. On omnicef for UTI.

## 2023-04-24 NOTE — PROGRESS NOTES
Inpatient Rehabilitation Functional Measures Assessment and Plan of Care    Plan of Care  Updated Problems/Interventions  Swallow Function    [ST] Swallowing(Active)  Current Status(04/24/2023): Now tolerating soft (chopped meats)/no mixed  consistencies with NTL. No overt s/s of aspiration or penetration observed with  water by cup 80% of the time; intermittent delayed cough and throat clearing  observed. Plan VFSS for this week.  Weekly Goal(04/28/2023): Will tolerate trials of water by cup with no overt s/s  of aspiration or penetration with MIN cues for small sips.  Discharge Goal: Will tolerate the least restrictive diet with 1:1 supervision  for meals.        Cognition    [ST] Attention(Active)  Current Status(04/24/2023): Now completes attention to detail tasks with  occasional MIN cues. Completed medicine management with pill box with NO cues.  Weekly Goal(04/28/2023): Will complete attention to detail tasks with NO cues.  Discharge Goal: Improved attention skills for home environment.        Communication    [ST] Expression(Active)  Current Status(04/24/2023): Now presents with overall mild mixed dysarthria.  Weekly Goal(04/28/2023): Will use speech intelligibility strategies in  conversation with NO cues.  Discharge Goal: Will be able to participate in a complex conversation with NO  cues for use of speech intelligibility strategies.    Signed by: Janell Gallego, SLP

## 2023-04-24 NOTE — THERAPY TREATMENT NOTE
Inpatient Rehabilitation - Speech Language Pathology Treatment Note    [facility]     Patient Name: Melissa Gomez  : 1940  MRN: [id number]    Today's Date: 2023                   Admit Date: 2023       Visit Dx:      ICD-10-CM ICD-9-CM   1. Impaired functional mobility, balance, gait, and endurance  Z74.09 V49.89   2. Follow-up exam  Z09 V67.9       Patient Active Problem List   Diagnosis   • Infarction of left basal ganglia       Past Medical History:   Diagnosis Date   • GERD (gastroesophageal reflux disease)    • Hyperlipidemia    • Hypertension    • Stroke        Past Surgical History:   Procedure Laterality Date   • COLONOSCOPY     • HYSTERECTOMY     • TONSILLECTOMY         SLP Recommendation and Plan                                                            SLP EVALUATION (last 72 hours)     SLP SLC Evaluation     Row Name 23 1100 23 1332                Communication Assessment/Intervention    Document Type therapy note (daily note)  -AL therapy note (daily note)  -AL       Patient/Family/Caregiver Comments/Observations Pt participated well. Daughter, Renu, present at beginning of session for education.  -AL Pt participated well.  -AL          Pain Scale: Numbers Pre/Post-Treatment    Pretreatment Pain Rating 0/10 - no pain  -AL 0/10 - no pain  -AL             User Key  (r) = Recorded By, (t) = Taken By, (c) = Cosigned By    Initials Name Effective Dates    Janell Erickson, MS CCC-SLP 21 -                    EDUCATION    The patient has been educated in the following areas:       Cognitive Impairment Communication Impairment Dysphagia (Swallowing Impairment).             SLP GOALS     Row Name 23 1100 23 3363          ([facility]) Pharyngeal Strengthening Exercise Goal 1 (SLP)    Progress/Outcomes (Pharyngeal Strengthening Goal 1, SLP) good progress toward goal  -AL --     Comment (Pharyngeal Strengthening Goal 1, SLP) No overt s/s of aspiration or  penetration observed in 12/14 trials of water by cup; delayed cough x1, throat clear x1. Pt completed 30 repetitions of effortful swallow with MIN-MOD cues. Pt completed 3 sets of 5 reps of EMST 75 at 1.25 turns past 5 cmH20 with MOD cues. Plan VFSS later this week.  -AL --        Attention Goal 1 (SLP)    Improve Attention by Goal 1 (SLP) complete selective attention task;complete sustained attention task;80%;independently (over 90% accuracy)  -AL --     Progress/Outcomes (Attention Goal 1, SLP) good progress toward goal  -AL --        Memory Skills Goal 1 (SLP)    Improve Memory Skills Through Goal 1 (SLP) recalling related word lists with an imposed delay;listen to a paragraph and answer questions;recall details of the day;80%;independently (over 90% accuracy)  -AL recalling related word lists with an imposed delay;listen to a paragraph and answer questions;recall details of the day;80%;independently (over 90% accuracy)  -AL     Time Frame (Memory Skills Goal 1, SLP) -- 1 week  -AL     Progress/Outcomes (Memory Skills Goal 1, SLP) good progress toward goal  -AL good progress toward goal  -AL     Comment (Memory Skills Goal 1, SLP) -- Immediate memory for voicemails: 100% with NO cues  -AL        Organizational Skills Goal 1 (SLP)    Improve Thought Organization Through Goal 1 (SLP) completing a divergent naming task;80%;with minimal cues (75-90%)  -AL completing a divergent naming task;80%;with minimal cues (75-90%)  -AL     Progress/Outcomes (Thought Organization Skills Goal 1, SLP) good progress toward goal  -AL good progress toward goal  -AL     Comment (Thought Organization Skills Goal 1, SLP) -- Smell: 4 with NO cues, 10 with MIN-MOD cues.  -AL        Reasoning Goal 1 (SLP)    Improve Reasoning Through Goal 1 (SLP) complete deductive reasoning task;80%;independently (over 90% accuracy)  -AL complete deductive reasoning task;80%;independently (over 90% accuracy)  -AL     Time Frame (Reasoning Goal 1, SLP) --  1 week  -AL     Progress/Outcomes (Reasoning Goal 1, SLP) good progress toward goal  -AL good progress toward goal  -AL     Comment (Reasoning Goal 1, SLP) -- Moderate level logic puzzle: 100% with NO cues  -AL        Executive Functional Skills Goal 1 (SLP)    Improve Executive Function Skills Goal 1 (SLP) home management activity;80%;with minimal cues (75-90%)  -AL --     Progress/Outcomes (Executive Function Skills Goal 1, SLP) goal met  -AL --           User Key  (r) = Recorded By, (t) = Taken By, (c) = Cosigned By    Initials Name Provider Type    Janell Erickson MS CCC-SLP Speech and Language Pathologist                            Time Calculation:        Time Calculation- SLP     Row Name 04/24/23 1204             Time Calculation- SLP    SLP Start Time 1100  -AL      SLP Stop Time 1130  -AL      SLP Time Calculation (min) 30 min  -AL            User Key  (r) = Recorded By, (t) = Taken By, (c) = Cosigned By    Initials Name Provider Type    Janell Erickson MS CCC-SLP Speech and Language Pathologist                  Therapy Charges for Today     Code Description Service Date Service Provider Modifiers Qty    71638999966  ST TREATMENT SWALLOW 2 4/24/2023 Janell Gallego MS CCC-SLP GN 1                           MS FE Cam  4/24/2023

## 2023-04-24 NOTE — PROGRESS NOTES
Inpatient Rehabilitation Plan of Care Note    Plan of Care  Care Plan Reviewed - No updates at this time.    Psychosocial    [RN] Coping/Adjustment(Active)  Current Status(04/24/2023): Pt is A/Ox4, calm/cooperative. Pt is at risk for  reduced coping r/t stroke and new deficits.  Weekly Goal(04/26/2023): provide education material regarding stroke  Discharge Goal: Knowledgeable of care needs and stroke recovery    Performed Intervention(s)  Verbalizes needs and concerns  Therapeutic environmental set up      Sphincter Control    [RN] Bladder Management(Active)  Current Status(04/24/2023): Incontinent of bladder, bladder scans no longer  ordered due to several < 200 scans.  Weekly Goal(04/26/2023): The patient is unable to void independently  Discharge Goal: The patient empties bladder completely    [RN] Bowel Management(Active)  Current Status(04/24/2023): Incontinent of stool  Weekly Goal(04/27/2023): Continent 50%  Discharge Goal: Continent 100%    Performed Intervention(s)  Bladder scan or I/O cath per order  Encourage fluids  Bowel/bladder training      Safety    [RN] Potential for Injury(Active)  Current Status(04/24/2023): Risk for falls  Weekly Goal(04/27/2023): Instruct family/caregivers regarding safety precautions  and need for close supervision  Discharge Goal: Family/caregiver will be knowledgeable of need for cueing and  supervision to avoid falls    Performed Intervention(s)  Safety Rounds  Bed alarm and/or chair alarm    Signed by: Emperatriz Mccoy RN

## 2023-04-25 PROCEDURE — 97116 GAIT TRAINING THERAPY: CPT

## 2023-04-25 PROCEDURE — 97130 THER IVNTJ EA ADDL 15 MIN: CPT

## 2023-04-25 PROCEDURE — 97116 GAIT TRAINING THERAPY: CPT | Performed by: PHYSICAL THERAPIST

## 2023-04-25 PROCEDURE — 97110 THERAPEUTIC EXERCISES: CPT

## 2023-04-25 PROCEDURE — 25010000002 ENOXAPARIN PER 10 MG: Performed by: PHYSICAL MEDICINE & REHABILITATION

## 2023-04-25 PROCEDURE — 97110 THERAPEUTIC EXERCISES: CPT | Performed by: PHYSICAL THERAPIST

## 2023-04-25 PROCEDURE — 97530 THERAPEUTIC ACTIVITIES: CPT

## 2023-04-25 PROCEDURE — 97535 SELF CARE MNGMENT TRAINING: CPT

## 2023-04-25 PROCEDURE — 97129 THER IVNTJ 1ST 15 MIN: CPT

## 2023-04-25 PROCEDURE — 92526 ORAL FUNCTION THERAPY: CPT

## 2023-04-25 RX ADMIN — ASPIRIN 81 MG: 81 TABLET, CHEWABLE ORAL at 09:43

## 2023-04-25 RX ADMIN — CEFDINIR 300 MG: 300 CAPSULE ORAL at 09:43

## 2023-04-25 RX ADMIN — ENOXAPARIN SODIUM 40 MG: 100 INJECTION SUBCUTANEOUS at 13:22

## 2023-04-25 RX ADMIN — Medication 1000 UNITS: at 09:43

## 2023-04-25 RX ADMIN — CEFDINIR 300 MG: 300 CAPSULE ORAL at 20:20

## 2023-04-25 RX ADMIN — OXYCODONE HYDROCHLORIDE AND ACETAMINOPHEN 250 MG: 500 TABLET ORAL at 09:43

## 2023-04-25 RX ADMIN — ACETAMINOPHEN 650 MG: 325 TABLET, FILM COATED ORAL at 20:20

## 2023-04-25 RX ADMIN — Medication 2 MG: at 20:20

## 2023-04-25 RX ADMIN — ROSUVASTATIN CALCIUM 20 MG: 20 TABLET, FILM COATED ORAL at 20:20

## 2023-04-25 RX ADMIN — LISINOPRIL 5 MG: 5 TABLET ORAL at 09:43

## 2023-04-25 RX ADMIN — AMLODIPINE BESYLATE 10 MG: 10 TABLET ORAL at 09:43

## 2023-04-25 RX ADMIN — ACETAMINOPHEN 650 MG: 325 TABLET, FILM COATED ORAL at 14:12

## 2023-04-25 RX ADMIN — Medication 2 MG: at 00:06

## 2023-04-25 NOTE — THERAPY TREATMENT NOTE
Inpatient Rehabilitation - Occupational Therapy Treatment Note    Psychiatric     Patient Name: Melissa Gomez  : 1940  MRN: [id number]    Today's Date: 2023                 Admit Date: 2023         ICD-10-CM ICD-9-CM   1. Impaired functional mobility, balance, gait, and endurance  Z74.09 V49.89   2. Follow-up exam  Z09 V67.9       Patient Active Problem List   Diagnosis   • Infarction of left basal ganglia       Past Medical History:   Diagnosis Date   • GERD (gastroesophageal reflux disease)    • Hyperlipidemia    • Hypertension    • Stroke        Past Surgical History:   Procedure Laterality Date   • COLONOSCOPY     • HYSTERECTOMY     • TONSILLECTOMY               IRF OT ASSESSMENT FLOWSHEET (last 12 hours)     IRF OT Evaluation and Treatment     Row Name 23 1221          OT Time and Intention    Document Type daily treatment  -DN     Mode of Treatment occupational therapy  -DN     Patient Effort good  -DN     Row Name 23 1221          Pain Assessment    Pretreatment Pain Rating 0/10 - no pain  -DN     Posttreatment Pain Rating 0/10 - no pain  -DN     Row Name 23 1221          Cognition/Psychosocial    Affect/Mental Status (Cognition) WFL  -DN     Personal Safety Interventions fall prevention program maintained;gait belt  -DN     Row Name 23 1221          Bathing    [place] Level (Bathing) bathing skills;minimum assist (75% patient effort);verbal cues;nonverbal cues (demo/gesture)  -DN     Position (Bathing) sink side;supported sitting;supported standing  -DN     Set-up Assistance (Bathing) obtain supplies  -DN     Row Name 23 1221          Upper Body Dressing    [place] Level (Upper Body Dressing) upper body dressing skills;doff;don;pull over garment;supervision;verbal cues  -DN     Position (Upper Body Dressing) supported sitting  -DN     Set-up Assistance (Upper Body Dressing) obtain clothing  -DN     Row Name 23 1221          Lower Body  Dressing    [place] Level (Lower Body Dressing) doff;pants/bottoms;don;socks;maximum assist (25% patient effort);verbal cues;nonverbal cues (demo/gesture)  -DN     Position (Lower Body Dressing) supported sitting;supported standing  -DN     Set-up Assistance (Lower Body Dressing) obtain clothing  -DN     Row Name 04/25/23 1221          Grooming    [place] Level (Grooming) grooming skills;supervision  -DN     Position (Grooming) supported sitting  -DN     Row Name 04/25/23 1221          Positioning and Restraints    Pre-Treatment Position sitting in chair/recliner  -DN     Post Treatment Position wheelchair  -DN     In Bed sitting;call light within reach;encouraged to call for assist;exit alarm on  -DN           User Key  (r) = Recorded By, (t) = Taken By, (c) = Cosigned By    Initials Name Effective Dates    Ismael Quintanilla OT 06/16/21 -                  Occupational Therapy Education     Title: PT OT SLP Therapies (Done)     Topic: Occupational Therapy (Done)     Point: ADL training (Done)     Description:   Instruct learner(s) on proper safety adaptation and remediation techniques during self care or transfers.   Instruct in proper use of assistive devices.              Learning Progress Summary           Patient Acceptance, E,TB, VU,NR by ERUM at 4/22/2023 1353    Acceptance, E, VU by [place]O at 4/8/2023 1204    Acceptance, E,TB,D, VU,DU,NR by  at 4/6/2023 1230    Comment: ed pt on role of OT. benefit of therapy, POC w.  OT ed on safety w ADL and tsf. pt ed on UBD technique.                   Point: Home exercise program (Done)     Description:   Instruct learner(s) on appropriate technique for monitoring, assisting and/or progressing therapeutic exercises/activities.              Learning Progress Summary           Patient Acceptance, E,TB, VU,NR by ERUM at 4/22/2023 1353    Acceptance, E, VU by [place] at 4/8/2023 1204    Acceptance, E,TB,D, VU,DU,NR by  at 4/6/2023 1230    Comment: ed pt on role of OT.  benefit of therapy, POC w.  OT ed on safety w ADL and tsf. pt ed on UBD technique.                   Point: Precautions (Done)     Description:   Instruct learner(s) on prescribed precautions during self-care and functional transfers.              Learning Progress Summary           Patient Acceptance, E,TB, VU,NR by  at 4/22/2023 1353    Acceptance, E, VU by  at 4/8/2023 1204    Acceptance, E,TB,D, VU,DU,NR by  at 4/6/2023 1230    Comment: ed pt on role of OT. benefit of therapy, POC w.  OT ed on safety w ADL and tsf. pt ed on UBD technique.                   Point: Body mechanics (Done)     Description:   Instruct learner(s) on proper positioning and spine alignment during self-care, functional mobility activities and/or exercises.              Learning Progress Summary           Patient Acceptance, E,TB, VU,NR by  at 4/22/2023 1353    Acceptance, E, VU by  at 4/8/2023 1204    Acceptance, E,TB,D, VU,DU,NR by  at 4/6/2023 1230    Comment: ed pt on role of OT. benefit of therapy, POC w.  OT ed on safety w ADL and tsf. pt ed on UBD technique.                               User Key     Initials Effective Dates Name Provider Type Discipline     06/16/21 -  Patsy Blank OTR Occupational Therapist OT     06/16/21 -  Tiera Alanis, PT Physical Therapist PT     06/16/21 -  Yamila Wheat MS CCC-SLP Speech and Language Pathologist SLP                    OT Recommendation and Plan                         Time Calculation:      Time Calculation- OT     Row Name 04/25/23 0830             Time Calculation- OT    OT Start Time 0830  -DN      OT Stop Time 0900  -DN      OT Time Calculation (min) 30 min  -DN            User Key  (r) = Recorded By, (t) = Taken By, (c) = Cosigned By    Initials Name Provider Type    Ismael Quintanilla OT Occupational Therapist              Therapy Charges for Today     Code Description Service Date Service Provider Modifiers Qty    67605320915 HC OT SELF  CARE/MGMT/TRAIN EA 15 MIN 4/24/2023 Ismael Lanier OT GO 2    71500003342 HC OT THER PROC EA 15 MIN 4/24/2023 Ismael Lanier OT GO 2    02349308087 HC OT SELF CARE/MGMT/TRAIN EA 15 MIN 4/25/2023 Ismael Lanier OT GO 2                   Ismael Lanire OT  4/25/2023

## 2023-04-25 NOTE — PROGRESS NOTES
"Nutrition Services    Patient Name:  Melissa Gomez  YOB: 1940  MRN: [id number]  Admit Date:  4/5/2023      Assessment Date:  04/25/23    FOLLOW UP NOTE - CLINICAL NUTRITION    Comments:  Note from visit with pt 4/24. Pt reported not receiving thickened carnation breakfast supplement on trays. RD advised pt doesn't require supplement as long as intakes remain good. Recent PO intakes %, per EMR review and confirmed by pt. Pt does enjoy the flavor of the supplement and stated she will bring the packets + thickener (in bag RD left in pt's room, with instructions to mix) to common space during lunch, for nursing to mix to nectar-thick compliance for her.     Thickening instructions as follows, for reference:   Mix carnation breakfast essentials powder as directed on package (with milk, preferably). Per every 8 oz, add 2 pumps of thickener (or 2 small packets of nectar-thick thickener). Stir for 1 minute.   OR  Mix carnation breakfast essentials powder WITH nectar-thickened liquid (nectar-thick milk, preferred). Stir for 1 minute.       RD will continue to follow-up, per protocol.    Encounter Information        Reason for Encounter Follow-up    Current Issues Infarction of L basal ganglia     Current Nutrition Orders & Evaluation of Intake       Oral Nutrition     Current PO Diet Diet: Regular/House Diet; No Mixed Consistencies; Texture: Soft to Chew (NDD 3); Soft to Chew: Chopped Meat; Fluid Consistency: Nectar Thick   Supplement Mighty Shake BID   PO Evaluation    % PO Intake/# of days %    Factors Affecting Intake  dislikes modified diet     Anthropometrics         Height   Weight Height: 162.6 cm (64\")  Weight: 86.9 kg (191 lb 9.6 oz) (04/08/23 1312)   BMI kg/m2 Body mass index is 32.89 kg/m².   Weight trend No new weight available     Physical Findings          Physical Appearance alert, oriented   Oral/Mouth Cavity teeth missing   Edema  1+ (trace), 2+ (mild)   Gastrointestinal " normoactive, last bowel movement:4/24   Skin  skin intact   Tubes/Drains none     Labs       Pertinent Labs Reviewed, listed below     Results from last 7 days   Lab Units 04/24/23  0639 04/21/23  0912   SODIUM mmol/L 144 144   POTASSIUM mmol/L 4.3 4.3   CHLORIDE mmol/L 109* 109*   CO2 mmol/L 25.9 23.8   BUN mg/dL 29* 31*   CREATININE mg/dL 0.75 0.97   CALCIUM mg/dL 9.4 9.7   GLUCOSE mg/dL 108* 150*     Results from last 7 days   Lab Units 04/24/23  0639   HEMOGLOBIN g/dL 12.6   HEMATOCRIT % 38.6   WBC 10*3/mm3 5.92     Results from last 7 days   Lab Units 04/24/23  0639 04/21/23  0912   PLATELETS 10*3/mm3 216 222     No results found for: COVID19  Lab Results   Component Value Date    HGBA1C 5.3 03/30/2023          Medications           Scheduled Medications amLODIPine, 10 mg, Oral, Q24H  vitamin C, 250 mg, Oral, Daily  aspirin, 81 mg, Oral, Daily  cefdinir, 300 mg, Oral, Q12H  cholecalciferol, 1,000 Units, Oral, Daily  enoxaparin, 40 mg, Subcutaneous, Q24H  lisinopril, 5 mg, Oral, Q24H  rosuvastatin, 20 mg, Oral, Nightly  senna-docusate sodium, 2 tablet, Oral, Nightly       Infusions     PRN Medications •  acetaminophen **OR** [DISCONTINUED] acetaminophen  •  [DISCONTINUED] senna-docusate sodium **AND** polyethylene glycol **AND** bisacodyl **AND** bisacodyl  •  butalbital-acetaminophen-caffeine  •  melatonin     PLAN OF CARE  Intervention Goal        Intervention Goal(s) Maintain nutrition status, Maintain intake, Continue positive trend, Advance diet, Maintain weight and No significant weight loss     Nutrition Intervention        RD Action Adjusted supplement, Encourage intake, Follow Tx Progress and Care plan reviewed     Prescription        Diet Prescription    Supplement Prescription    EN/PN Prescription    Prescription Ordered Continue same per protocol   --  Monitor/Evaluation        Monitor Per protocol, PO intake, Supplement intake, Weight, Skin status, GI status, Symptoms, Swallow function   Discharge  Plan Pending clinical course   Education Will educate as needed       Electronically signed by:  Yue Ayala RD  04/25/23 15:10 EDT

## 2023-04-25 NOTE — PROGRESS NOTES
Inpatient Rehabilitation Plan of Care Note    Plan of Care  Care Plan Reviewed - No updates at this time.    Psychosocial    [RN] Coping/Adjustment(Active)  Current Status(04/25/2023): Pt is A/Ox4, calm/cooperative. Pt is at risk for  reduced coping r/t stroke and new deficits.  Weekly Goal(05/02/2023): provide education material regarding stroke  Discharge Goal: Knowledgeable of care needs and stroke recovery    Performed Intervention(s)  Verbalizes needs and concerns  Therapeutic environmental set up      Sphincter Control    [RN] Bladder Management(Active)  Current Status(04/24/2023): Incontinent of bladder, bladder scans no longer  ordered due to several < 200 scans.  Weekly Goal(05/02/2023): The patient is unable to void independently  Discharge Goal: The patient empties bladder completely    [RN] Bowel Management(Active)  Current Status(04/25/2023): Incontinent of stool  Weekly Goal(05/02/2023): Continent 50%  Discharge Goal: Continent 100%    Performed Intervention(s)  Bladder scan or I/O cath per order  Encourage fluids  Bowel/bladder training      Safety    [RN] Potential for Injury(Active)  Current Status(04/24/2023): Risk for falls  Weekly Goal(05/02/2023): Instruct family/caregivers regarding safety precautions  and need for close supervision  Discharge Goal: Family/caregiver will be knowledgeable of need for cueing and  supervision to avoid falls    Performed Intervention(s)  Safety Rounds  Bed alarm and/or chair alarm    Signed by: Janis Magana RN

## 2023-04-25 NOTE — THERAPY TREATMENT NOTE
Inpatient Rehabilitation - Physical Therapy Treatment Note       [facility]     Patient Name: Melissa Gomez  : 1940  MRN: [id number]    Today's Date: 2023                    Admit Date: 2023      Visit Dx:     ICD-10-CM ICD-9-CM   1. Impaired functional mobility, balance, gait, and endurance  Z74.09 V49.89   2. Follow-up exam  Z09 V67.9       Patient Active Problem List   Diagnosis   • Infarction of left basal ganglia       Past Medical History:   Diagnosis Date   • GERD (gastroesophageal reflux disease)    • Hyperlipidemia    • Hypertension    • Stroke        Past Surgical History:   Procedure Laterality Date   • COLONOSCOPY     • HYSTERECTOMY     • TONSILLECTOMY         PT ASSESSMENT (last 12 hours)     IRF PT Evaluation and Treatment     Row Name 23 1400 23 1000       PT Time and Intention    Document Type daily treatment  -MG daily treatment  -JK    Mode of Treatment individual therapy;physical therapy  -MG physical therapy  -JK    Patient/Family/Caregiver Comments/Observations Pt sitting [facility] in the gym, pleasant and ready for PT. States her headache is returning and progressively worsening.  -MG pt seated in WC; reports having a bad headache  -JK    Total Minutes, Physical Therapy 30  -MG --    Row Name 23 1400 23 1000       General Information    Patient Profile Reviewed yes  -MG yes  -JK    Existing Precautions/Restrictions fall;swallow precautions  -MG fall;swallow precautions  -JK    Row Name 23 1400 23 1000       Pain Assessment    Pretreatment Pain Rating 8/10  -MG 8/10  -JK    Posttreatment Pain Rating 8/10  -MG 8/10  -JK    Pain Location - Side/Orientation -- Bilateral  -JK    Pain Location -- generalized  -JK    Pain Location - head  -MG head  -JK    Row Name 23 1400 23 1000       Cognition/Psychosocial    Affect/Mental Status (Cognition) WFL  -MG WFL  -JK    Orientation Status (Cognition) oriented x 3  -MG oriented x 3  -JK     Follows Commands (Cognition) follows one-step commands;over 90% accuracy;verbal cues/prompting required  -MG --    Personal Safety Interventions fall prevention program maintained;gait belt;muscle strengthening facilitated;nonskid shoes/slippers when out of bed;supervised activity  -MG --    Row Name 04/25/23 1400          Transfer Assessment/Treatment    Comment, (Transfers) STS x3, w/ RW CG progressing to very close SBA at belt. Focus on anterior weight shift. Pt did not require any cueing the last stand. Couple min rest break between each stand.  -MG     Row Name 04/25/23 1400 04/25/23 1000       Sit-Stand Transfer    Sit-Stand [place] (Transfers) contact guard;minimum assist (75% patient effort);verbal cues  -MG minimum assist (75% patient effort);verbal cues  -JK    Assistive Device (Sit-Stand Transfers) walker, front-wheeled;wheelchair  -MG walker, front-wheeled  -JK    Comment, (Sit-Stand Transfer) Pt taking increased time to scoot to edge of seat. Initial posterior lean w/ pt on heels requiring Kristine to maintain balance and assist w/ anterior weight shift.  -MG --    Row Name 04/25/23 1400 04/25/23 1000       Stand-Sit Transfer    Stand-Sit [place] (Transfers) contact guard;verbal cues  -MG contact guard;verbal cues  -JK    Assistive Device (Stand-Sit Transfers) wheelchair;walker, front-wheeled  -MG walker, front-wheeled  -JK    Row Name 04/25/23 1400 04/25/23 1000       Gait/Stairs (Locomotion)    [place] Level (Gait) contact guard;verbal cues  -MG contact guard;verbal cues  -JK    Assistive Device (Gait) walker, front-wheeled  -MG walker, front-wheeled  -JK    Distance in Feet (Gait) 80' x2  -MG 80'  -JK    Pattern (Gait) step-through  -MG step-through  -JK    Deviations/Abnormal Patterns (Gait) lenard decreased;festinating/shuffling;stride length decreased  -MG lenard decreased;festinating/shuffling;stride length decreased  -JK    Bilateral Gait Deviations forward flexed posture;heel  strike decreased  -MG forward flexed posture;heel strike decreased  -JK    Right Sided Gait Deviations heel strike decreased  -MG --    Gait Assessment/Intervention Began to fatigue around 65', primarily BLEs. Intermittent stopping for 1-3s for posture correction as pt flexed fwd over RW.  -MG --    Row Name 04/25/23 1400          Safety Issues, Functional Mobility    Impairments Affecting Function (Mobility) balance;cognition;endurance/activity tolerance;strength  -MG     Row Name 04/25/23 1000          Hip (Therapeutic Exercise)    Hip Strengthening (Therapeutic Exercise) bilateral;sitting;marching while seated;10 repetitions;2 sets  -JK     Row Name 04/25/23 1000          Knee (Therapeutic Exercise)    Knee Strengthening (Therapeutic Exercise) bilateral;sitting;LAQ (long arc quad);10 repetitions  -JK     Row Name 04/25/23 1000          Ankle (Therapeutic Exercise)    Ankle Strengthening (Therapeutic Exercise) bilateral;dorsiflexion;plantarflexion;sitting;10 repetitions;2 sets  -JK     Row Name 04/25/23 1400 04/25/23 1000       Positioning and Restraints    Pre-Treatment Position sitting in chair/recliner  -MG sitting in chair/recliner  -JK    Post Treatment Position wheelchair  -MG wheelchair  -JK    In Wheelchair sitting;call light within reach;encouraged to call for assist;exit alarm on;with OT  -MG sitting;call light within reach;encouraged to call for assist;exit alarm on;heels elevated  -JK    Row Name 04/25/23 1000          Vital Signs    Intra Systolic BP Rehab 110  -JK     Intra Treatment Diastolic BP 70  -JK           User Key  (r) = Recorded By, (t) = Taken By, (c) = Cosigned By    Initials Name Provider Type    Stephani Jones, PT Physical Therapist    MG Shama Wild, PT Physical Therapist                 Physical Therapy Education     Title: PT OT SLP Therapies (Done)     Topic: Physical Therapy (Done)     Point: Mobility training (Done)     Learning Progress Summary           Patient  Acceptance, E, DU,VU,NR by MG at 4/25/2023 1219    Acceptance, E,D, VU,DU,NR by JK at 4/25/2023 1044    Acceptance, E,D, VU,DU by DP at 4/24/2023 1636    Acceptance, E,TB, VU,NR by EE at 4/21/2023 1507    Acceptance, E,TB,D, VU,NR by EE at 4/20/2023 1110    Acceptance, E,TB, VU,NR by EE at 4/20/2023 1043    Acceptance, E,TB,D, VU,NR by EE at 4/19/2023 1121    Acceptance, E,TB, VU,NR by EE at 4/18/2023 1102    Acceptance, E,D,TB, VU,NR by EE at 4/17/2023 1129    Acceptance, TB,E, VU,NR by EE at 4/13/2023 1047    Acceptance, E,D, VU,DU by DP at 4/11/2023 1038    Acceptance, E, VU,NR by EE at 4/10/2023 1043    Acceptance, E, VU by LS at 4/8/2023 1204    Acceptance, E,TB, VU,NR by EE at 4/7/2023 1054    Acceptance, E,TB, VU by KP at 4/6/2023 1527                   Point: Home exercise program (Done)     Learning Progress Summary           Patient Acceptance, E,D, VU,DU,NR by JK at 4/25/2023 1044    Acceptance, E,D, VU,DU by DP at 4/24/2023 1636    Acceptance, E,TB, VU,NR by ERUM at 4/22/2023 1353    Acceptance, E,TB, VU,NR by EE at 4/21/2023 1507    Acceptance, E,TB,D, VU,NR by EE at 4/20/2023 1110    Acceptance, E,TB, VU,NR by EE at 4/20/2023 1043    Acceptance, E,TB,D, VU,NR by EE at 4/19/2023 1121    Acceptance, E,TB, VU,NR by EE at 4/18/2023 1102    Acceptance, E,D,TB, VU,NR by EE at 4/17/2023 1129    Acceptance, TB,E, VU,NR by EE at 4/13/2023 1047    Acceptance, E,D, VU,DU by DP at 4/11/2023 1038    Acceptance, E, VU,NR by EE at 4/10/2023 1043    Acceptance, E, VU by LS at 4/8/2023 1204    Acceptance, E,TB, VU,NR by EE at 4/7/2023 1054    Acceptance, E,TB, VU by KP at 4/6/2023 1527                   Point: Body mechanics (Done)     Learning Progress Summary           Patient Acceptance, E, DU,VU,NR by MG at 4/25/2023 1219                   Point: Precautions (Done)     Learning Progress Summary           Patient Acceptance, E, DU,VU,NR by MG at 4/25/2023 1219                               User Key     Initials  Effective Dates Name Provider Type Discipline    ERUM 06/16/21 -  Tiera Alanis, PT Physical Therapist PT    LS 06/16/21 -  Yamila Wheat MS CCC-SLP Speech and Language Pathologist SLP    EE 06/16/21 -  Diann Gonzáles, PT Physical Therapist PT    JK 06/16/21 -  Stephani Elder, PT Physical Therapist PT    KP 06/16/21 -  Chen Centeno, PT Physical Therapist PT    MG 05/24/22 -  Shama Wild, PT Physical Therapist PT    DP 08/24/21 -  Gen Gray, PT Physical Therapist PT                PT Recommendation and Plan                          Time Calculation:      PT Charges     Row Name 04/25/23 1511 04/25/23 1424 04/25/23 1043       Time Calculation    Start Time --  -JK 1400  -MG 1000  -JK    Stop Time --  -JK 1430  -MG 1030  -JK    Time Calculation (min) --  -JK 30 min  -MG 30 min  -JK    PT Received On -- 04/25/23  -MG 04/25/23  -JK    PT - Next Appointment -- 04/26/23  -MG --          User Key  (r) = Recorded By, (t) = Taken By, (c) = Cosigned By    Initials Name Provider Type    JK Stephani Elder, PT Physical Therapist    MG Shama Wild, PT Physical Therapist                Therapy Charges for Today     Code Description Service Date Service Provider Modifiers Qty    26223093844 HC GAIT TRAINING EA 15 MIN 4/25/2023 Shama Wild, PT GP 1    52292731526 HC PT THERAPEUTIC ACT EA 15 MIN 4/25/2023 Shama Wild, PT GP 1                   Shama Wild, PT  4/25/2023

## 2023-04-25 NOTE — THERAPY TREATMENT NOTE
Inpatient Rehabilitation - Occupational Therapy Treatment Note    [facility]     Patient Name: Melissa Gomez  : 1940  MRN: [id number]    Today's Date: 2023                 Admit Date: 2023         ICD-10-CM ICD-9-CM   1. Impaired functional mobility, balance, gait, and endurance  Z74.09 V49.89   2. Follow-up exam  Z09 V67.9       Patient Active Problem List   Diagnosis   • Infarction of left basal ganglia       Past Medical History:   Diagnosis Date   • GERD (gastroesophageal reflux disease)    • Hyperlipidemia    • Hypertension    • Stroke        Past Surgical History:   Procedure Laterality Date   • COLONOSCOPY     • HYSTERECTOMY     • TONSILLECTOMY               IRF OT ASSESSMENT FLOWSHEET (last 12 hours)     IRF OT Evaluation and Treatment     Row Name 23 1508 23 1221       OT Time and Intention    Document Type daily treatment  -DN daily treatment  -DN    Mode of Treatment occupational therapy  -DN occupational therapy  -DN    Patient Effort good  -DN good  -DN    Row Name 23 1221          Pain Assessment    Pretreatment Pain Rating 0/10 - no pain  -DN     Posttreatment Pain Rating 0/10 - no pain  -DN     Row Name 23 1221          Cognition/Psychosocial    Affect/Mental Status (Cognition) WFL  -DN     Personal Safety Interventions fall prevention program maintained;gait belt  -DN     Row Name 23 1221          Bathing    [place] Level (Bathing) bathing skills;minimum assist (75% patient effort);verbal cues;nonverbal cues (demo/gesture)  -DN     Position (Bathing) sink side;supported sitting;supported standing  -DN     Set-up Assistance (Bathing) obtain supplies  -DN     Row Name 23 1221          Upper Body Dressing    [place] Level (Upper Body Dressing) upper body dressing skills;doff;don;pull over garment;supervision;verbal cues  -DN     Position (Upper Body Dressing) supported sitting  -DN     Set-up Assistance (Upper Body Dressing) obtain  clothing  -DN     Row Name 04/25/23 1221          Lower Body Dressing    [place] Level (Lower Body Dressing) doff;pants/bottoms;don;socks;maximum assist (25% patient effort);verbal cues;nonverbal cues (demo/gesture)  -DN     Position (Lower Body Dressing) supported sitting;supported standing  -DN     Set-up Assistance (Lower Body Dressing) obtain clothing  -DN     Row Name 04/25/23 1221          Grooming    [place] Level (Grooming) grooming skills;supervision  -DN     Position (Grooming) supported sitting  -DN     Row Name 04/25/23 1508          Sit-Stand Transfer    Sit-Stand [place] (Transfers) minimum assist (75% patient effort);verbal cues;nonverbal cues (demo/gesture)  -DN     Assistive Device (Sit-Stand Transfers) wheelchair;walker, front-wheeled  -DN     Comment, (Sit-Stand Transfer) vc with sit to stand and needed min assist to become fully upright, once up pt was CGA  -DN     Row Name 04/25/23 1508          Elbow/Forearm (Therapeutic Exercise)    Elbow/Forearm (Therapeutic Exercise) strengthening exercise  -DN     Elbow/Forearm AROM (Therapeutic Exercise) bilateral;10 repetitions;3 sets  -DN     Elbow/Forearm Strengthening (Therapeutic Exercise) 1 lb free weight  -DN     Row Name 04/25/23 1508          Wrist (Therapeutic Exercise)    Wrist (Therapeutic Exercise) strengthening exercise  -DN     Wrist AROM (Therapeutic Exercise) bilateral;10 repetitions;3 sets  -DN     Wrist Strengthening (Therapeutic Exercise) 1 lb free weight  -DN     Row Name 04/25/23 1508          Hand (Therapeutic Exercise)    Hand (Therapeutic Exercise) strengthening exercise  -DN     Hand AROM/AAROM (Therapeutic Exercise) bilateral;15 repititions  -DN     Hand Strengthening (Therapeutic Exercise) hand gripper  -DN     Row Name 04/25/23 1508          Balance    Static Standing Balance contact guard;verbal cues;non-verbal cues (demo/gesture)  pt standing at countertop for 2 sets one for 5 minutes and another for 2 minutes  during RUE activity.  Pt needs to increase standing tolerance for increased adls  -DN     Row Name 04/25/23 1508 04/25/23 1221       Positioning and Restraints    Pre-Treatment Position sitting in chair/recliner  -DN sitting in chair/recliner  -DN    Post Treatment Position wheelchair  -DN wheelchair  -DN    In Bed sitting;call light within reach;encouraged to call for assist;exit alarm on  -DN sitting;call light within reach;encouraged to call for assist;exit alarm on  -DN          User Key  (r) = Recorded By, (t) = Taken By, (c) = Cosigned By    Initials Name Effective Dates    Ismael Quintanilla OT 06/16/21 -                  Occupational Therapy Education     Title: PT OT SLP Therapies (Done)     Topic: Occupational Therapy (Done)     Point: ADL training (Done)     Description:   Instruct learner(s) on proper safety adaptation and remediation techniques during self care or transfers.   Instruct in proper use of assistive devices.              Learning Progress Summary           Patient Acceptance, E,TB, VU,NR by ERUM at 4/22/2023 1353    Acceptance, E, VU by [place] at 4/8/2023 1204    Acceptance, E,TB,D, VU,DU,NR by  at 4/6/2023 1230    Comment: ed pt on role of OT. benefit of therapy, POC w.  OT ed on safety w ADL and tsf. pt ed on UBD technique.                   Point: Home exercise program (Done)     Description:   Instruct learner(s) on appropriate technique for monitoring, assisting and/or progressing therapeutic exercises/activities.              Learning Progress Summary           Patient Acceptance, E,TB, VU,NR by ERUM at 4/22/2023 1353    Acceptance, E, VU by  at 4/8/2023 1204    Acceptance, E,TB,D, VU,DU,NR by  at 4/6/2023 1230    Comment: ed pt on role of OT. benefit of therapy, POC w.  OT ed on safety w ADL and tsf. pt ed on UBD technique.                   Point: Precautions (Done)     Description:   Instruct learner(s) on prescribed precautions during self-care and functional transfers.               Learning Progress Summary           Patient Acceptance, E,TB, VU,NR by ERUM at 4/22/2023 1353    Acceptance, E, VU by  at 4/8/2023 1204    Acceptance, E,TB,D, VU,DU,NR by  at 4/6/2023 1230    Comment: ed pt on role of OT. benefit of therapy, POC w.  OT ed on safety w ADL and tsf. pt ed on UBD technique.                   Point: Body mechanics (Done)     Description:   Instruct learner(s) on proper positioning and spine alignment during self-care, functional mobility activities and/or exercises.              Learning Progress Summary           Patient Acceptance, E,TB, VU,NR by ERUM at 4/22/2023 1353    Acceptance, E, VU by  at 4/8/2023 1204    Acceptance, E,TB,D, VU,DU,NR by  at 4/6/2023 1230    Comment: ed pt on role of OT. benefit of therapy, POC w.  OT ed on safety w ADL and tsf. pt ed on UBD technique.                               User Key     Initials Effective Dates Name Provider Type Discipline     06/16/21 -  Patsy Blank OTR Occupational Therapist OT    ERUM 06/16/21 -  Tiera Alanis, PT Physical Therapist PT     06/16/21 -  Yamila Wheat MS CCC-SLP Speech and Language Pathologist SLP                    OT Recommendation and Plan                         Time Calculation:      Time Calculation- OT     Row Name 04/25/23 1430 04/25/23 0830          Time Calculation- OT    OT Start Time 1430  -DN 0830  -DN     OT Stop Time 1500  -DN 0900  -DN     OT Time Calculation (min) 30 min  -DN 30 min  -DN           User Key  (r) = Recorded By, (t) = Taken By, (c) = Cosigned By    Initials Name Provider Type    DN Ismael Lanier OT Occupational Therapist              Therapy Charges for Today     Code Description Service Date Service Provider Modifiers Qty    95647143728 HC OT SELF CARE/MGMT/TRAIN EA 15 MIN 4/24/2023 Ismael Lanier OT GO 2    12569935102 HC OT THER PROC EA 15 MIN 4/24/2023 Ismael Lanier OT GO 2    61648156091 HC OT SELF CARE/MGMT/TRAIN EA 15 MIN 4/25/2023 Ismael Lanier OT GO  2    [id number]  OT THER PROC EA 15 MIN 4/25/2023 Ismael Lanier OT GO 2                   Ismael Lanier OT  4/25/2023

## 2023-04-25 NOTE — PLAN OF CARE
Goal Outcome Evaluation:  Plan of Care Reviewed With: patient        Progress: improving  Outcome Evaluation: Pt is A&OX4, calm and cooperative. Assist X1 to wc. Meds crushed in AC. NTL. Up in dining room for meals. Incon of B&B, Last BM 4/20, Scattered bruising noted. No unsafe behaviors noted. Pt uses call light for assistance. Pt participated with all therapies today

## 2023-04-25 NOTE — PROGRESS NOTES
Art Therapy Session  Madonna Crow (Art Therapist Student)    Had a brief check-in with patient from [phone]. Had art materials available, but did not use as patient requested to just talk today.     Pt presented with calm affect and stable mood. Pt reported that the pt's daughter was staying local and providing support to the pt, but stated being interested in art therapy sessions for some additional support and company. Pt explained that they were worried about the pt's upcoming discharge date and hoped to be allowed to stay at the hospital longer. The pt verbalized not feeling ready to leave and feeling concerned about not having made enough progress. Session was cut short by the pt's rehab therapy appointments, but this therapist intends to return for another session at a future unspecified time.     Patient strengths and resources: receptive to communication with therapist, able to be vulnerable and express emotions, able to express wants/needs, family support.

## 2023-04-25 NOTE — PROGRESS NOTES
TEAM CONF -APRIL 25 -  BED MED. TRANSFER MIN CTG GAIT 80 - 160 FEET CTG RW. TRANSFERS MIN CTG.   TOILET AND SHOWER TRANSFERS CTG RW. BAT MIN MOD WITH ADAPTIVE EQUIPMENT. LVD MOD. UBD SBA. COGNITION - MILD DIFFICULTY WITH DIVERGENT REASONING. DECREASED RECALL/CARRYOVER. MILD DYSARTHRIA. SWALLOW CHOPPED MEAT / NTL. REPEAT VFSS  ELOS - FAMILY UNABLE TO MANAGE PATIENT AT HOME IN SHORT TERM. LOOKING AT [facility].

## 2023-04-25 NOTE — PROGRESS NOTES
LOS: 20 days   Patient Care Team:  Jayro Fountain MD as PCP - General (Internal Medicine)      Patient Name: PIERRE FREEMAN  Patient : 1940    ADMITTING DIAGNOSIS:  Diagnoses    1. Follow-up examination  2. IMPAIRED FUNCTIONAL MOBILITY, BALANCE, GAIT, AND ENDURANCE       Left basal ganglia stroke  Right hemiparesis with impaired motor control    SUBJECTIVE:  Strength on right side better  Tolerates therapies  Fioricet helped headache  Slept better with melatonin    OBJECTIVE:    Vitals:    23 1153   BP: 122/80   Pulse: 94   Resp: 18   Temp: 97.6 °F (36.4 °C)   SpO2: 97%       PHYSICAL EXAM:   General: pleasant, in no acute distress  HEENT: NCAT, sclera anicteric, conjunctiva pink, mucoa moist  Cardiovascular: RRR, +S1+S2, no obvious m/g/r  Lungs: CTAB, no rhonchi or wheezing  Abdomen: normoactive bowel sounds, soft, non tender to palpation  Extremities: no peripheral edema  Skin: exposed surfaces of skin warm, intact, without erythema  Neuro: awake alert and oriented to person, place, time, and situation,  Right facial droop   dysarthria.  MSK: Right shoulder flexion 4/5, elbow flexion 4+/5, finger flexion 4+/5, knee extension 4+/5, ankle dorsiflexion 4/5,  weak on the RUE, LUE 5/5 BLE 5/5      MEDICATIONS  Scheduled Meds:  amLODIPine, 10 mg, Oral, Q24H  vitamin C, 250 mg, Oral, Daily  aspirin, 81 mg, Oral, Daily  cefdinir, 300 mg, Oral, Q12H  cholecalciferol, 1,000 Units, Oral, Daily  enoxaparin, 40 mg, Subcutaneous, Q24H  lisinopril, 5 mg, Oral, Q24H  rosuvastatin, 20 mg, Oral, Nightly  senna-docusate sodium, 2 tablet, Oral, Nightly        Continuous Infusions:       PRN Meds:  •  acetaminophen **OR** [DISCONTINUED] acetaminophen  •  [DISCONTINUED] senna-docusate sodium **AND** polyethylene glycol **AND** bisacodyl **AND** bisacodyl  •  butalbital-acetaminophen-caffeine  •  melatonin      RESULTS:  No results found for: POCGLU  Results from last 7 days   Lab Units 23  0639  04/21/23  0912   WBC 10*3/mm3 5.92 6.29   HEMOGLOBIN g/dL 12.6 13.0   HEMATOCRIT % 38.6 40.9   PLATELETS 10*3/mm3 216 222     Results from last 7 days   Lab Units 04/24/23  0639 04/21/23  0912   SODIUM mmol/L 144 144   POTASSIUM mmol/L 4.3 4.3   CHLORIDE mmol/L 109* 109*   CO2 mmol/L 25.9 23.8   BUN mg/dL 29* 31*   CREATININE mg/dL 0.75 0.97   CALCIUM mg/dL 9.4 9.7   GLUCOSE mg/dL 108* 150*       Urine Culture   Date Value Ref Range Status   04/19/2023 >100,000 CFU/mL Morganella morganii ssp morganii (A)  Final   04/19/2023 (A)  Final    >100,000 CFU/mL Klebsiella pneumoniae ssp pneumoniae         ASSESSMENT and PLAN:    Infarction of left basal ganglia    Left basal ganglia stroke  - Stroke prophylaxis: ASA and Plavix x 21 days from March 30, 2023, then ASA 81 mg daily. Crestor 20 mg.  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  We will continue to monitor.     Dysphagia   April 14 - upgrade to soft (chopped meats)/no mixed consistencies with NTL. Recommend sit upright in wheelchair with supervision. Check intermittently for pocketing. Meds one at a time with applesauce or pudding     Dysarthria  -April 21 - Noted to be 100% intelligible in conversation with SLP with some moments of word finding delays.      Hypertension - amlodipine/lisinopril     Urinary retention. Treated for UTI as Reggie Duran. Check bladder scan PVR and cath if >300 cc until three consecutive < 250 cc  April 7-requires intermittent straight cath 400-113-300 cc.  No spontaneous void.  Follow pattern.  April 10-requirement straight catheter 300 cc about every 6 or 8 hours.  - will extend interval to every 12 hours to see if she voids with a larger volume.  4/12- has not required intermittent catheterization since last night.  Noted to have 4 voids with PVR less  than 200.  May be recovering bladder function.  Will monitor.  4/13-voiding on her own with postvoid residuals 190 range  4/19-complaints of dysuria-urinalysis ordered     Urinary Tract Infection, resolved  -complaint of dysuria on 4/19 with positive UA.   -cefdinir 300mg BID for 5 days   April 24 - no further symptoms. Culture positive for morganella morganii and klebsiella pneumoniae susceptible to cefdinir. tx complete.      Right upper extremity discomfort  -April 17-Has some diffuse pain in the right upper extremity.  Distal right upper extremity slightly warmer than the left. No allodynia.  - Reviewed possible post stroke pain/central pain syndrome versus sympathetic mediated pain versus shoulder subluxation.  Will monitor.     DVT prophylaxis- on dual antiplatelet therapies.  SCDs.  Lovenox     Team Conference 4/11/2023  Nursing: continent/incontinent with bladder, requiring cathing each bladder scan, no safety issues  PT: bed mobiliy min assist, contact guard with transfers, ambulating 80 feet, fatigues quickly with low endurance, cues for sequencing, drags left foot, anticipated contact guard with walker  OT: mod assist for bathing, max/dependent with lower body dressing, toileting max assist due to balance  SLP: mildly impaired on CLQT WNL memory/visuospatial, and mild for attention/executive function, puree and nectar thick diet, mild to moderate dysarthria  Discharge Date: 1.5 weeks      TEAM CONF - April 18 - PROGRESS SLOWER THAN INITIAL FIRST DAYS SO ANTICIPATE EXTENDING LENGTH OF STAY - STRONGER BUT STILL IMPAIRED MOTOR CONTROL. FATIGUE. BED MOD. TRANSFERS MIN CTG. GAIT 80 FEET CTG RW , LESS DRAG RIGHT FEET. 4 STAIRS CTG. BATH MOD. LBD MAX. UBD MIN SBA. COGNITION - CUES FOR ATTENTION TO DETAIL. SWALLOW - COUGH WITH WATER BY CUP. MECH SOFT, NTL. VOICING/ARTICULATION- MILD DYSARTHRIA AND WILL SELF CORRECT.  BNE YESTERDAY. SEE REPORT IN [facility]. Attention: WNL  Executive Functioning: Mildly  Impaired  Abstract Reasoning: WNL  Arithmetic: Mildly Impaired  Visuospatial Perception: WNL  Visuospatial Praxis: Moderately to Severely Impaired  Verbal Memory: Immediate - Moderately Impaired; Delayed - WNL for Stories, Severely Impaired for Lists  Visual Memory: Mildly Impaired  Emotional: Mild anxiety and depression related to worries about her quality-of-life following discharge from the hospital.  O2 AT NIGHT. STILL INCONTINENT- TIMED VOIDS. ALLERGIES - SNEEZES.   ELOS - 1.5 WEEKS    TEAM CONF -APRIL 25 -  BED MED. TRANSFER MIN CTG GAIT 80 - 160 FEET CTG RW. TRANSFERS MIN CTG.   TOILET AND SHOWER TRANSFERS CTG RW. BAT MIN MOD WITH ADAPTIVE EQUIPMENT. LVD MOD. UBD SBA. COGNITION - MILD DIFFICULTY WITH DIVERGENT REASONING. DECREASED RECALL/CARRYOVER. MILD DYSARTHRIA. SWALLOW CHOPPED MEAT / NTL. REPEAT VFSS  ELOS - FAMILY UNABLE TO MANAGE PATIENT AT HOME IN SHORT TERM. LOOKING AT [facility].     CODE STATUS:  Level Of Support Discussed With: Patient  Code Status (Patient has no pulse and is not breathing): No CPR (Do Not Attempt to Resuscitate)  Medical Interventions (Patient has pulse or is breathing): Full Support  Release to patient: Routine Release      Admission Status: Continues to meet requirements for inpatient admission.       Anton Fajardo MD      During rounds, used appropriate personal protective equipment including mask and gloves.  Additional gown if indicated.  Mask used was standard procedure mask. Appropriate PPE was worn during the entire visit.  Hand hygiene was completed before and after.

## 2023-04-25 NOTE — PLAN OF CARE
Problem: Rehabilitation ([facility]) Plan of Care  Goal: Plan of Care Review  Outcome: Ongoing, Progressing  Flowsheets (Taken 4/25/2023 0138)  Progress: improving  Plan of Care Reviewed With: patient  Outcome Evaluation: Pt is A&Ox4, pleasant and cooperative, pt with R facial droop, slurred speech, weakness to R arm/leg however they have improved, she takes meds whole one at a time in applesauce, NTL, encouraged fluid intake, pt did c/o headache, day shift RN had already given pt Tylenol, I did speak to MD who ordered Fioricet, gave pt one dose at bedtime, she did state headache was improved although there was still some pain lingering, but was better than before, 2L O2 at bedtime, incontinent of bladder tonight, pt with brief on, BM 4/24, gave melatonin around midnight, she thinks possibly the caffeine in Fioricet may have kept her awake, resting well currently will continue to monitor.   Goal Outcome Evaluation:  Plan of Care Reviewed With: patient        Progress: improving  Outcome Evaluation: Pt is A&Ox4, pleasant and cooperative, pt with R facial droop, slurred speech, weakness to R arm/leg however they have improved, she takes meds whole one at a time in applesauce, NTL, encouraged fluid intake, pt did c/o headache, day shift RN had already given pt Tylenol, I did speak to MD who ordered Fioricet, gave pt one dose at bedtime, she did state headache was improved although there was still some pain lingering, but was better than before, 2L O2 at bedtime, incontinent of bladder tonight, pt with brief on, BM 4/24, gave melatonin around midnight, she thinks possibly the caffeine in Fioricet may have kept her awake, resting well currently will continue to monitor.

## 2023-04-25 NOTE — PROGRESS NOTES
Case Management  Inpatient Rehabilitation Team Conference    Conference Date/Time: 4/25/2023 7:53:34 AM    Team Conference Attendees:  Dr. Anton Ramos, Erwin Carranza, Pharmacist  Yue Robert, SW  Tiffanie Antunez, PT  Ismael Lanier, OT  Janell Gallego, SLP  Nancy Gimenez, CTRS  Chelsea Ortega, RN   Marlen Lewis RN    Demographics            Age: 82Y            Gender: Female    Admission Date: 4/5/2023 7:52:00 PM  Rehabilitation Diagnosis:  CVA of Left Basal Ganglia  Past Medical History: Past Medical History:  DiagnosisDate  ?GERD (gastroesophageal reflux disease)?  ?Hyperlipidemia?  ?Hypertension?  ?Stroke?    ?  ?  ?  PAST SURGICAL HISTORY:  Surgical History  Past Surgical History:  ProcedureLateralityDate  ?COLONOSCOPY??  ?HYSTERECTOMY??  ?TONSILLECTOMY      Plan of Care  Anticipated Discharge Date/Estimated Length of Stay: ELOS: 1 1/2 weeks  Anticipated Discharge Destination: Community discharge with assistance  Discharge Plan : Patient plans to d/c home with home health and possible private  caregivers. Daughter can assist intermittently.  Medical Necessity Expected Level Rationale: Goals are to achieve a level of SBA  / CGA with  mobility and self-care and improved ADLs and swallowing.  Rehabilitation prognosis good.  Medical prognosis good.  Intensity and Duration: an average of 3 hours/5 days per week  Medical Supervision and 24 Hour Rehab Nursing: x  Physical Therapy: x  PT Intensity/Duration: 1 hour / day, 5 days / week, for approximately 2 weeks.  Occupational Therapy: x  OT Intensity/Duration: 1 hour / day, 5 days / week, for approximately 2 weeks.  Speech and Language Therapy: x  SLP Intensity/Duration: 1 hour / day, 5 days / week, for approximately 2 weeks.  Social Work: x  Therapeutic Recreation: x  Psychology: x  Registered Dietician: x  Updated (if changes indicated)    Anticipated Discharge Date/Estimated Length of Stay:   ELOS: 4/27    Based on the patient's medical and  functional status, their prognosis and  expected level of functional improvement is: Goals are to achieve a level of SBA  / CGA with  mobility and self-care and improved ADLs and swallowing.  Rehabilitation prognosis good.  Medical prognosis good.      Interdisciplinary Problem/Goals/Status    All Rehab Problems:  Mobility    [OT] Toilet Transfers(Active)  Current Status(04/24/2023): CGA RWX vc  Weekly Goal(04/28/2023): CGA  Discharge Goal: CGA    [OT] Tub/Shower Transfers(Active)  Current Status(04/24/2023): CGA RWX vc  Weekly Goal(04/28/2023): CGA  Discharge Goal: CGA    [PT] Walk(Active)  Current Status(04/24/2023): 80'-160 CGA RWX  Weekly Goal(05/02/2023): CGA to BR with RWX  Discharge Goal: 100' CGA RWX    [PT] Bed/Chair/Wheelchair(Active)  Current Status(04/24/2023): Min/CGA RWX  Weekly Goal(05/02/2023): CGA RWX  Discharge Goal: CGA RWX    [PT] Bed Mobility(Active)  Current Status(04/24/2023): Kristine  Weekly Goal(05/02/2023): CGA  Discharge Goal: SBA    [PT] Stairs(Active)  Current Status(04/24/2023): 4, B rails, CGA  Weekly Goal(05/02/2023): PT only  Discharge Goal: 4, B rails, CGA        Self Care    [OT] Bathing(Active)  Current Status(04/24/2023): mod/Min with AE  Weekly Goal(04/28/2023): min  Discharge Goal: CGA    [OT] Dressing (Lower)(Active)  Current Status(04/24/2023): Mod  Weekly Goal(04/28/2023): Min  Discharge Goal: min    [OT] Dressing (Upper)(Active)  Current Status(04/24/2023): SBA with VC  Weekly Goal(04/28/2023): SBA  Discharge Goal: SBA    [OT] Grooming(Active)  Current Status(04/24/2023): SBA  Weekly Goal(04/28/2023): set up  Discharge Goal: supervision    [OT] Toileting(Active)  Current Status(04/24/2023): Max  Weekly Goal(04/28/2023): mod  Discharge Goal: CGA        Psychosocial    [RN] Coping/Adjustment(Active)  Current Status(04/25/2023): Pt is A/Ox4, calm/cooperative. Pt is at risk for  reduced coping r/t stroke and new deficits.  Weekly Goal(05/02/2023): provide education material  regarding stroke  Discharge Goal: Knowledgeable of care needs and stroke recovery        Sphincter Control    [RN] Bladder Management(Active)  Current Status(04/24/2023): Incontinent of bladder, bladder scans no longer  ordered due to several < 200 scans.  Weekly Goal(05/02/2023): The patient is unable to void independently  Discharge Goal: The patient empties bladder completely    [RN] Bowel Management(Active)  Current Status(04/25/2023): Incontinent of stool  Weekly Goal(05/02/2023): Continent 50%  Discharge Goal: Continent 100%        Safety    [RN] Potential for Injury(Active)  Current Status(04/24/2023): Risk for falls  Weekly Goal(05/02/2023): Instruct family/caregivers regarding safety precautions  and need for close supervision  Discharge Goal: Family/caregiver will be knowledgeable of need for cueing and  supervision to avoid falls        Swallow Function    [ST] Swallowing(Active)  Current Status(04/24/2023): Now tolerating soft (chopped meats)/no mixed  consistencies with NTL. No overt s/s of aspiration or penetration observed with  water by cup 80% of the time; intermittent delayed cough and throat clearing  observed. Plan VFSS for this week.  Weekly Goal(04/28/2023): Will tolerate trials of water by cup with no overt s/s  of aspiration or penetration with MIN cues for small sips.  Discharge Goal: Will tolerate the least restrictive diet with 1:1 supervision  for meals.        Cognition    [ST] Attention(Active)  Current Status(04/24/2023): Now completes attention to detail tasks with  occasional MIN cues. Completed medicine management with pill box with NO cues.  Weekly Goal(04/28/2023): Will complete attention to detail tasks with NO cues.  Discharge Goal: Improved attention skills for home environment.        Communication    [ST] Expression(Active)  Current Status(04/24/2023): Now presents with overall mild mixed dysarthria.  Weekly Goal(04/28/2023): Will use speech intelligibility strategies  in  conversation with NO cues.  Discharge Goal: Will be able to participate in a complex conversation with NO  cues for use of speech intelligibility strategies.        Comments: 4/11 Bed mob Mod I, transfers Min A rwx, amb 80' Min / CGA rwx, Min A  toilet transfer, est Min A for shower transfer.  Dep w/ toileting.  Mod A w/  bathing.  Mildly impaired attention skills on CLQT.  Mildly impaired language  skills, occasional word finding delays in conversation.  Tolerating puree diet  NTL.  Intermittent coughing.  Urinary retention, ISC as needed.  Incontinent of  bowel.  Tylenol for headaches.    4/18 Bed mob Mod A, transfers Min / CGA rwx, completing 4 steps w/ bilat rails  CGA.  Amb 80' CGA w/ rwx.  Min A for toilet and shower transfer.  Toileting dep  / max.  Intermittent min cues for attention to detail tasks.  Mild mixed  dysarthria.  Plan video swallow next week.    4/25 Min A bed mob, Min / CGA rwx for transfers.  Can do 4 steps w/ bilateral  rails CGA.  Amb 80' - 160' CGA rwx.  CGA rwx w/ vc's for toilet and shower  transfer.  Max A toileting.  Mod A LBD.  Completes attention to detail tasks now  with Min cues.  Completed medicine management w/ pill box w/ no cues.  Mild  mixed dysarthria.  Tolerating soft chopped meats, no mixed consistencies, NTL.  Plan video before discharge.  Incont bowel and bladder.  2L oxygen at night.    Signed by: CHAS Parikh    Physician CoSigned By: Anton Fajardo 04/25/2023 08:40:25

## 2023-04-25 NOTE — THERAPY TREATMENT NOTE
Inpatient Rehabilitation - Physical Therapy Treatment Note       [facility]     Patient Name: Melissa Gomez  : 1940  MRN: [id number]    Today's Date: 2023                    Admit Date: 2023      Visit Dx:     ICD-10-CM ICD-9-CM   1. Impaired functional mobility, balance, gait, and endurance  Z74.09 V49.89   2. Follow-up exam  Z09 V67.9       Patient Active Problem List   Diagnosis   • Infarction of left basal ganglia       Past Medical History:   Diagnosis Date   • GERD (gastroesophageal reflux disease)    • Hyperlipidemia    • Hypertension    • Stroke        Past Surgical History:   Procedure Laterality Date   • COLONOSCOPY     • HYSTERECTOMY     • TONSILLECTOMY         PT ASSESSMENT (last 12 hours)     IRF PT Evaluation and Treatment     Row Name 23 1400 23 1000       PT Time and Intention    Document Type daily treatment  -MG daily treatment  -JK    Mode of Treatment individual therapy;physical therapy  -MG physical therapy  -JK    Patient/Family/Caregiver Comments/Observations Pt sitting [facility] in the gym, pleasant and ready for PT. States her headache is returning and progressively worsening.  -MG pt seated in WC; reports having a bad headache  -JK    Total Minutes, Physical Therapy 30  -MG --    Row Name 23 1400 23 1000       General Information    Patient Profile Reviewed yes  -MG yes  -JK    Existing Precautions/Restrictions fall;swallow precautions  -MG fall;swallow precautions  -JK    Row Name 23 1400 23 1000       Pain Assessment    Pretreatment Pain Rating 8/10  -MG 8/10  -JK    Posttreatment Pain Rating 8/10  -MG 8/10  -JK    Pain Location - Side/Orientation -- Bilateral  -JK    Pain Location -- generalized  -JK    Pain Location - head  -MG head  -JK    Row Name 23 1400 23 1000       Cognition/Psychosocial    Affect/Mental Status (Cognition) WFL  -MG WFL  -JK    Orientation Status (Cognition) oriented x 3  -MG oriented x 3  -JK     Follows Commands (Cognition) follows one-step commands;over 90% accuracy;verbal cues/prompting required  -MG --    Personal Safety Interventions fall prevention program maintained;gait belt;muscle strengthening facilitated;nonskid shoes/slippers when out of bed;supervised activity  -MG --    Row Name 04/25/23 1400          Transfer Assessment/Treatment    Comment, (Transfers) STS x3, w/ RW CG progressing to very close SBA at belt. Focus on anterior weight shift. Pt did not require any cueing the last stand.  -MG     Row Name 04/25/23 1400 04/25/23 1000       Sit-Stand Transfer    Sit-Stand Rich (Transfers) contact guard;minimum assist (75% patient effort);verbal cues  -MG minimum assist (75% patient effort);verbal cues  -JK    Assistive Device (Sit-Stand Transfers) walker, front-wheeled;wheelchair  -MG walker, front-wheeled  -JK    Comment, (Sit-Stand Transfer) Pt taking increased time to scoot to edge of seat. Initial posterior lean w/ pt on heels requiring Kristine to maintain balance and assist w/ anterior weight shift.  -MG --    Row Name 04/25/23 1400 04/25/23 1000       Stand-Sit Transfer    Stand-Sit Rich (Transfers) contact guard;verbal cues  -MG contact guard;verbal cues  -JK    Assistive Device (Stand-Sit Transfers) wheelchair;walker, front-wheeled  -MG walker, front-wheeled  -JK    Row Name 04/25/23 1400 04/25/23 1000       Gait/Stairs (Locomotion)    [place] Level (Gait) contact guard;verbal cues  -MG contact guard;verbal cues  -JK    Assistive Device (Gait) walker, front-wheeled  -MG walker, front-wheeled  -JK    Distance in Feet (Gait) 80'  -MG 80'  -JK    Pattern (Gait) step-through  -MG step-through  -JK    Deviations/Abnormal Patterns (Gait) lenard decreased;festinating/shuffling;stride length decreased  -MG lenard decreased;festinating/shuffling;stride length decreased  -JK    Bilateral Gait Deviations forward flexed posture;heel strike decreased  -MG forward flexed posture;heel  strike decreased  -JK    Right Sided Gait Deviations heel strike decreased  -MG --    Gait Assessment/Intervention Began to fatigue around 65', primarily BLEs. Intermittent stopping for 1-3s for posture correction as pt flexed fwd over RW.  -MG --    Row Name 04/25/23 1400          Safety Issues, Functional Mobility    Impairments Affecting Function (Mobility) balance;cognition;endurance/activity tolerance;strength  -MG     Row Name 04/25/23 1000          Hip (Therapeutic Exercise)    Hip Strengthening (Therapeutic Exercise) bilateral;sitting;marching while seated;10 repetitions;2 sets  -JK     Row Name 04/25/23 1000          Knee (Therapeutic Exercise)    Knee Strengthening (Therapeutic Exercise) bilateral;sitting;LAQ (long arc quad);10 repetitions  -JK     Row Name 04/25/23 1000          Ankle (Therapeutic Exercise)    Ankle Strengthening (Therapeutic Exercise) bilateral;dorsiflexion;plantarflexion;sitting;10 repetitions;2 sets  -JK     Row Name 04/25/23 1400 04/25/23 1000       Positioning and Restraints    Pre-Treatment Position sitting in chair/recliner  -MG sitting in chair/recliner  -JK    Post Treatment Position wheelchair  -MG wheelchair  -JK    In Wheelchair sitting;call light within reach;encouraged to call for assist;exit alarm on;with OT  -MG sitting;call light within reach;encouraged to call for assist;exit alarm on;heels elevated  -JK    Row Name 04/25/23 1000          Vital Signs    Intra Systolic BP Rehab 110  -JK     Intra Treatment Diastolic BP 70  -JK           User Key  (r) = Recorded By, (t) = Taken By, (c) = Cosigned By    Initials Name Provider Type    Stephani Jones, PT Physical Therapist    MG Shama Wild, GIRISH Physical Therapist                 Physical Therapy Education     Title: PT OT SLP Therapies (Done)     Topic: Physical Therapy (Done)     Point: Mobility training (Done)     Learning Progress Summary           Patient Acceptance, E, DU,VU,NR by  at 4/25/2023 7856     Acceptance, E,D, VU,DU,NR by JK at 4/25/2023 1044    Acceptance, E,D, VU,DU by DP at 4/24/2023 1636    Acceptance, E,TB, VU,NR by EE at 4/21/2023 1507    Acceptance, E,TB,D, VU,NR by EE at 4/20/2023 1110    Acceptance, E,TB, VU,NR by EE at 4/20/2023 1043    Acceptance, E,TB,D, VU,NR by EE at 4/19/2023 1121    Acceptance, E,TB, VU,NR by EE at 4/18/2023 1102    Acceptance, E,D,TB, VU,NR by EE at 4/17/2023 1129    Acceptance, TB,E, VU,NR by EE at 4/13/2023 1047    Acceptance, E,D, VU,DU by DP at 4/11/2023 1038    Acceptance, E, VU,NR by EE at 4/10/2023 1043    Acceptance, E, VU by LS at 4/8/2023 1204    Acceptance, E,TB, VU,NR by EE at 4/7/2023 1054    Acceptance, E,TB, VU by KP at 4/6/2023 1527                   Point: Home exercise program (Done)     Learning Progress Summary           Patient Acceptance, E,D, VU,DU,NR by JK at 4/25/2023 1044    Acceptance, E,D, VU,DU by DP at 4/24/2023 1636    Acceptance, E,TB, VU,NR by ERUM at 4/22/2023 1353    Acceptance, E,TB, VU,NR by EE at 4/21/2023 1507    Acceptance, E,TB,D, VU,NR by EE at 4/20/2023 1110    Acceptance, E,TB, VU,NR by EE at 4/20/2023 1043    Acceptance, E,TB,D, VU,NR by EE at 4/19/2023 1121    Acceptance, E,TB, VU,NR by EE at 4/18/2023 1102    Acceptance, E,D,TB, VU,NR by EE at 4/17/2023 1129    Acceptance, TB,E, VU,NR by EE at 4/13/2023 1047    Acceptance, E,D, VU,DU by DP at 4/11/2023 1038    Acceptance, E, VU,NR by EE at 4/10/2023 1043    Acceptance, E, VU by LS at 4/8/2023 1204    Acceptance, E,TB, VU,NR by EE at 4/7/2023 1054    Acceptance, E,TB, VU by KP at 4/6/2023 1527                   Point: Body mechanics (Done)     Learning Progress Summary           Patient Acceptance, E, DU,VU,NR by MG at 4/25/2023 1219                   Point: Precautions (Done)     Learning Progress Summary           Patient Acceptance, E, DU,VU,NR by MG at 4/25/2023 1219                               User Key     Initials Effective Dates Name Provider Type Discipline    ERUM  06/16/21 -  Tiera Alanis, PT Physical Therapist PT    LS 06/16/21 -  Yamila Wheat MS CCC-SLP Speech and Language Pathologist SLP    EE 06/16/21 -  Diann Gonzáles, PT Physical Therapist PT    JK 06/16/21 -  Stephani Elder, PT Physical Therapist PT    KP 06/16/21 -  Chen Centeno, PT Physical Therapist PT    MG 05/24/22 -  Shama Wild, PT Physical Therapist PT    DP 08/24/21 -  Gen Gray, PT Physical Therapist PT                PT Recommendation and Plan                          Time Calculation:      PT Charges     Row Name 04/25/23 1511 04/25/23 1424 04/25/23 1043       Time Calculation    Start Time --  -JK 1400  -MG 1000  -JK    Stop Time --  -JK 1430  -MG 1030  -JK    Time Calculation (min) --  -JK 30 min  -MG 30 min  -JK    PT Received On -- 04/25/23  -MG 04/25/23  -JK    PT - Next Appointment -- 04/26/23  -MG --          User Key  (r) = Recorded By, (t) = Taken By, (c) = Cosigned By    Initials Name Provider Type    Stephani Jones, PT Physical Therapist    MG Shama Wild, PT Physical Therapist                Therapy Charges for Today     Code Description Service Date Service Provider Modifiers Qty    38512300157 HC PT THER PROC EA 15 MIN 4/25/2023 Stephani Elder, PT GP 1    97158339272 HC GAIT TRAINING EA 15 MIN 4/25/2023 Stephani Elder, PT GP 1                   Stephani Elder, PT  4/25/2023

## 2023-04-25 NOTE — PROGRESS NOTES
Recreational Therapy Note    Patient Name: Melissa Gomez   MRN: [id number]    Therapeutic Recreation Eval and Treat (last 12 hours)     Therapeutic Recreation Eval & Treat     Row Name 04/25/23 1300       Therapeutic Recreation Participation    Recreation Therapy Participation games  -TW    Games card games  Phase 10  -TW    Objectives of Recreation Participation maintain;positive attitudes leading to a healthy leisure lifestyle;sense of autonomy by choosing level of participation  -TW    Comment, Recreation Participation BUE use to hold cards; scorekeeping WFL; occ assist with directions  -TW    Recreation Therapy Summary of Participation active participation  -TW          User Key  (r) = Recorded By, (t) = Taken By, (c) = Cosigned By    Initials Name Provider Type    Nancy Hess CTRS Recreational Therapist                  HUGH Espinoza  4/25/2023

## 2023-04-25 NOTE — PROGRESS NOTES
Inpatient Rehabilitation Functional Measures Assessment and Plan of Care    Plan of Care  Updated Problems/Interventions  Mobility    [OT] Toilet Transfers(Active)  Current Status(04/24/2023): CGA RWX vc  Weekly Goal(04/28/2023): CGA  Discharge Goal: CGA    [OT] Tub/Shower Transfers(Active)  Current Status(04/24/2023): CGA RWX vc  Weekly Goal(04/28/2023): CGA  Discharge Goal: CGA        Self Care    [OT] Bathing(Active)  Current Status(04/24/2023): mod/Min with AE  Weekly Goal(04/28/2023): min  Discharge Goal: CGA    [OT] Dressing (Lower)(Active)  Current Status(04/24/2023): Mod  Weekly Goal(04/28/2023): Min  Discharge Goal: min    [OT] Dressing (Upper)(Active)  Current Status(04/24/2023): SBA with VC  Weekly Goal(04/28/2023): SBA  Discharge Goal: SBA    [OT] Grooming(Active)  Current Status(04/24/2023): SBA  Weekly Goal(04/28/2023): set up  Discharge Goal: supervision    [OT] Toileting(Active)  Current Status(04/24/2023): Max  Weekly Goal(04/28/2023): mod  Discharge Goal: CGA    Functional Measures  MESHA Eating:  Branch  MESHA Grooming: Branch  MESHA Bathing:  Branch  MESHA Upper Body Dressing:  Branch  MESHA Lower Body Dressing:  Branch  MESHA Toileting:  Branch    MESHA Bladder Management  Level of Assistance:  Branch  Frequency/Number of Accidents this Shift:  Branch    MESHA Bowel Management  Level of Assistance: Branch  Frequency/Number of Accidents this Shift: Branch    MESHA Bed/Chair/Wheelchair Transfer:  Branch  MESHA Toilet Transfer:  Branch  MESHA Tub/Shower Transfer:  Branch    Previously Documented Mode of Locomotion at Discharge: Field  MESHA Expected Mode of Locomotion at Discharge: Branch  MESHA Walk/Wheelchair:  Branch  MESHA Stairs:  Branch    MESHA Comprehension:  Branch  MESHA Expression:  Branch  MESHA Social Interaction:  Branch  MESHA Problem Solving:  Branch  MESHA Memory:  Branch    Therapy Mode Minutes  Occupational Therapy: Branch  Physical Therapy: Branch  Speech Language Pathology:  Branch    Signed by: Ismael Lanier  OTR/L

## 2023-04-26 ENCOUNTER — APPOINTMENT (OUTPATIENT)
Dept: GENERAL RADIOLOGY | Facility: HOSPITAL | Age: 83
DRG: 57 | End: 2023-04-26
Payer: MEDICARE

## 2023-04-26 PROCEDURE — 97530 THERAPEUTIC ACTIVITIES: CPT

## 2023-04-26 PROCEDURE — 92611 MOTION FLUOROSCOPY/SWALLOW: CPT

## 2023-04-26 PROCEDURE — 25010000002 ENOXAPARIN PER 10 MG: Performed by: PHYSICAL MEDICINE & REHABILITATION

## 2023-04-26 PROCEDURE — 97110 THERAPEUTIC EXERCISES: CPT

## 2023-04-26 PROCEDURE — 97535 SELF CARE MNGMENT TRAINING: CPT

## 2023-04-26 PROCEDURE — 74230 X-RAY XM SWLNG FUNCJ C+: CPT

## 2023-04-26 RX ORDER — LISINOPRIL 5 MG/1
5 TABLET ORAL
Qty: 30 TABLET | Refills: 0 | Status: SHIPPED | OUTPATIENT
Start: 2023-04-26 | End: 2023-04-27 | Stop reason: SDUPTHER

## 2023-04-26 RX ORDER — ROSUVASTATIN CALCIUM 20 MG/1
20 TABLET, COATED ORAL DAILY
Qty: 45 TABLET | Refills: 0 | Status: SHIPPED | OUTPATIENT
Start: 2023-04-26 | End: 2023-04-27 | Stop reason: SDUPTHER

## 2023-04-26 RX ORDER — ASPIRIN 81 MG/1
81 TABLET, CHEWABLE ORAL DAILY
Qty: 30 TABLET | Refills: 0 | Status: SHIPPED | OUTPATIENT
Start: 2023-04-26 | End: 2023-04-27 | Stop reason: SDUPTHER

## 2023-04-26 RX ORDER — AMLODIPINE BESYLATE 10 MG/1
10 TABLET ORAL
Qty: 30 TABLET | Refills: 0 | Status: SHIPPED | OUTPATIENT
Start: 2023-04-26 | End: 2023-04-27 | Stop reason: SDUPTHER

## 2023-04-26 RX ORDER — CALCIUM CARBONATE 200(500)MG
2 TABLET,CHEWABLE ORAL 3 TIMES DAILY PRN
Status: DISCONTINUED | OUTPATIENT
Start: 2023-04-26 | End: 2023-04-29 | Stop reason: HOSPADM

## 2023-04-26 RX ORDER — BENZONATATE 100 MG/1
100 CAPSULE ORAL 3 TIMES DAILY PRN
Status: DISCONTINUED | OUTPATIENT
Start: 2023-04-26 | End: 2023-04-29 | Stop reason: HOSPADM

## 2023-04-26 RX ADMIN — BENZONATATE 100 MG: 100 CAPSULE ORAL at 22:48

## 2023-04-26 RX ADMIN — ENOXAPARIN SODIUM 40 MG: 100 INJECTION SUBCUTANEOUS at 11:52

## 2023-04-26 RX ADMIN — BARIUM SULFATE 55 ML: 0.81 POWDER, FOR SUSPENSION ORAL at 11:22

## 2023-04-26 RX ADMIN — BUTALBITAL, ACETAMINOPHEN, AND CAFFEINE 1 TABLET: 50; 325; 40 TABLET ORAL at 06:16

## 2023-04-26 RX ADMIN — ANTACID TABLETS 2 TABLET: 500 TABLET, CHEWABLE ORAL at 22:48

## 2023-04-26 RX ADMIN — ROSUVASTATIN CALCIUM 20 MG: 20 TABLET, FILM COATED ORAL at 22:48

## 2023-04-26 RX ADMIN — CEFDINIR 300 MG: 300 CAPSULE ORAL at 08:29

## 2023-04-26 RX ADMIN — BUTALBITAL, ACETAMINOPHEN, AND CAFFEINE 1 TABLET: 50; 325; 40 TABLET ORAL at 22:47

## 2023-04-26 RX ADMIN — OXYCODONE HYDROCHLORIDE AND ACETAMINOPHEN 250 MG: 500 TABLET ORAL at 08:29

## 2023-04-26 RX ADMIN — AMLODIPINE BESYLATE 10 MG: 10 TABLET ORAL at 08:29

## 2023-04-26 RX ADMIN — BARIUM SULFATE 4 ML: 980 POWDER, FOR SUSPENSION ORAL at 11:22

## 2023-04-26 RX ADMIN — BARIUM SULFATE 50 ML: 400 SUSPENSION ORAL at 11:22

## 2023-04-26 RX ADMIN — BUTALBITAL, ACETAMINOPHEN, AND CAFFEINE 1 TABLET: 50; 325; 40 TABLET ORAL at 14:49

## 2023-04-26 RX ADMIN — ANTACID TABLETS 2 TABLET: 500 TABLET, CHEWABLE ORAL at 15:42

## 2023-04-26 RX ADMIN — Medication 2 MG: at 21:47

## 2023-04-26 RX ADMIN — LISINOPRIL 5 MG: 5 TABLET ORAL at 08:29

## 2023-04-26 RX ADMIN — ASPIRIN 81 MG: 81 TABLET, CHEWABLE ORAL at 08:29

## 2023-04-26 RX ADMIN — Medication 1000 UNITS: at 08:29

## 2023-04-26 NOTE — PROGRESS NOTES
Reviewed goals and progress with patient and daughter after team conference. Patient is scheduled to d/c on 4/27. Patient will be going to [facility] for further therapy. Patient will do a video swallow study before d/c. SW called  to complete living will paperwork with patient. Patient is thinking she wants to be transported by wheelchair van because she doesn't like to get into her daughter's car. Therapy did practice car transfers with patient and she did well and would be able to get into daughter's car. SW will follow up with patient to double check her preference with transportation. No family conference needed. SW will continue to assess for d/c needs.

## 2023-04-26 NOTE — THERAPY DISCHARGE NOTE
Inpatient Rehabilitation - Physical Therapy Treatment Note/Discharge  [facility]     Patient Name: Melissa Gomez  : 1940  MRN: [id number]  Today's Date: 2023                Admit Date: 2023    Visit Dx:    ICD-10-CM ICD-9-CM   1. Impaired functional mobility, balance, gait, and endurance  Z74.09 V49.89   2. Follow-up exam  Z09 V67.9     Patient Active Problem List   Diagnosis   • Infarction of left basal ganglia     Past Medical History:   Diagnosis Date   • GERD (gastroesophageal reflux disease)    • Hyperlipidemia    • Hypertension    • Stroke      Past Surgical History:   Procedure Laterality Date   • COLONOSCOPY     • HYSTERECTOMY     • TONSILLECTOMY         PT ASSESSMENT (last 12 hours)     IRF PT Evaluation and Treatment     Row Name 23 1045          PT Time and Intention    Document Type discharge evaluation  -EE     Mode of Treatment physical therapy;individual therapy  -EE     Patient/Family/Caregiver Comments/Observations Pt sitting up in WC, agreeable to PT.  -EE     Row Name 23 1045          General Information    Existing Precautions/Restrictions fall;swallow precautions  -EE     Row Name 23 1045          Pain Assessment    Pretreatment Pain Rating 4/10  -EE     Posttreatment Pain Rating 4/10  -EE     Pain Location - Side/Orientation Right  -EE     Pain Location upper  -EE     Pain Location - extremity  -EE     Pre/Posttreatment Pain Comment Also reported mild posterior headache pain. Pt provided rest breaks as needed throughout session.  -EE     Row Name 23 1045          Cognition/Psychosocial    Affect/Mental Status (Cognition) WFL  -EE     Orientation Status (Cognition) oriented x 3  -EE     Follows Commands (Cognition) follows one-step commands;over 90% accuracy;verbal cues/prompting required  -EE     Personal Safety Interventions fall prevention program maintained;gait belt;muscle strengthening facilitated;nonskid shoes/slippers when out of  bed;supervised activity  -EE     Cognitive Function attention deficit  -EE     Attention Deficit (Cognition) minimal deficit  -EE     Safety Deficit (Cognition) safety precautions follow-through/compliance  -EE     Row Name 04/26/23 1045          Bed Mobility    Rolling Left [place] (Bed Mobility) contact guard;verbal cues  -EE     Rolling Right [place] (Bed Mobility) contact guard;verbal cues  -EE     Supine-Sit [place] (Bed Mobility) minimum assist (75% patient effort);verbal cues  -EE     Sit-Supine [place] (Bed Mobility) contact guard;verbal cues  -EE     Bed Mobility, Safety Issues decreased use of arms for pushing/pulling  -EE     Comment, (Bed Mobility) mat mobility; no bedrails  -EE     Row Name 04/26/23 1045          Bed-Chair Transfer    Bed-Chair [place] (Transfers) contact guard;minimum assist (75% patient effort);verbal cues  -EE     Assistive Device (Bed-Chair Transfers) walker, front-wheeled;wheelchair  -EE     Row Name 04/26/23 1045          Chair-Bed Transfer    Chair-Bed [place] (Transfers) contact guard;minimum assist (75% patient effort);verbal cues  -EE     Assistive Device (Chair-Bed Transfers) walker, front-wheeled;wheelchair  -EE     Row Name 04/26/23 1045          Sit-Stand Transfer    Sit-Stand [place] (Transfers) contact guard;minimum assist (75% patient effort);verbal cues  -EE     Assistive Device (Sit-Stand Transfers) walker, front-wheeled  -EE     Comment, (Sit-Stand Transfer) cues for sequencing  -EE     Row Name 04/26/23 1045          Stand-Sit Transfer    Stand-Sit [place] (Transfers) contact guard;verbal cues  -EE     Assistive Device (Stand-Sit Transfers) walker, front-wheeled;wheelchair  -EE     Row Name 04/26/23 1045          Car Transfer    Type (Car Transfer) stand pivot/stand step  -EE     [place] Level (Car Transfer) contact guard;minimum assist (75% patient effort);verbal cues  -EE     Assistive Device (Car Transfer) walker,  front-wheeled  -EE     Comment, (Car Transfer) cues for hand placement  -EE     Row Name 04/26/23 1045          Gait/Stairs (Locomotion)    [place] Level (Gait) contact guard;verbal cues  -EE     Assistive Device (Gait) walker, front-wheeled  -EE     Distance in Feet (Gait) 160' x 1  -EE     Pattern (Gait) step-through  -EE     Deviations/Abnormal Patterns (Gait) lenard decreased;stride length decreased  -EE     Bilateral Gait Deviations forward flexed posture;heel strike decreased  -EE     Gait Assessment/Intervention cues for upright posture and increased step length  -EE     [place] Level (Stairs) contact guard;verbal cues  -EE     Handrail Location (Stairs) both sides  -EE     Number of Steps (Stairs) 4  -EE     Ascending Technique (Stairs) step-to-step  -EE     Descending Technique (Stairs) step-to-step  -EE     Stairs, Safety Issues weight-shifting ability decreased;balance decreased during turns  -EE     Stairs, Impairments strength decreased;impaired balance  -EE     Row Name 04/26/23 1045          Safety Issues, Functional Mobility    Impairments Affecting Function (Mobility) balance;cognition;endurance/activity tolerance;strength  -EE     Row Name 04/26/23 1045          Core Strength (Therapeutic Exercise)    Core Strength (Therapeutic Exercise) bridging, bilateral lower extremities;10 repetitions  -EE     Row Name 04/26/23 1045          Positioning and Restraints    Pre-Treatment Position sitting in chair/recliner  -EE     Post Treatment Position wheelchair  -EE     In Wheelchair sitting;call light within reach;encouraged to call for assist;exit alarm on;with SLP  -EE     Row Name 04/26/23 1045          Bed Mobility Goal 1 (PT-IRF)    Activity/Assistive Device (Bed Mobility Goal 1, PT-IRF) bed mobility activities, all  -EE     [place] Level (Bed Mobility Goal 1, PT-IRF) contact guard required  -EE     Progress/Outcomes (Bed Mobility Goal 1, PT-IRF) goal partially met  still requires min A  with supine > sit  -EE     Row Name 04/26/23 1045          Transfer Goal 1 (PT-IRF)    Activity/Assistive Device (Transfer Goal 1, PT-IRF) all transfers  -EE     [place] Level (Transfer Goal 1, PT-IRF) contact guard required  -EE     Progress/Outcomes (Transfer Goal 1, PT-IRF) goal partially met  requires min A at times due to fatigue/weakness  -EE     Row Name 04/26/23 1045          Gait/Walking Locomotion Goal 1 (PT-IRF)    Activity/Assistive Device (Gait/Walking Locomotion Goal 1, PT-IRF) gait (walking locomotion);walker, rolling  -EE     Gait/Walking Locomotion Distance Goal 1 (PT-IRF) 100  -EE     [place] Level (Gait/Walking Locomotion Goal 1, PT-IRF) contact guard required  -EE     Progress/Outcomes (Gait/Walking Locomotion Goal 1, PT-IRF) goal met  -EE     Row Name 04/26/23 1045          Stairs Goal 1 (PT-IRF)    Activity/Assistive Device (Stairs Goal 1, PT-IRF) stairs, all skills;using handrail, left;using handrail, right  -EE     Number of Stairs (Stairs Goal 1, PT-IRF) 4  -EE     [place] Level (Stairs Goal 1, PT-IRF) contact guard required  -EE     Progress/Outcomes (Stairs Goal 1, PT-IRF) goal met  -EE           User Key  (r) = Recorded By, (t) = Taken By, (c) = Cosigned By    Initials Name Provider Type    EE Diann Gonázles PT Physical Therapist                Physical Therapy Education     Title: PT OT SLP Therapies (Done)     Topic: Physical Therapy (Done)     Point: Mobility training (Done)     Learning Progress Summary           Patient Acceptance, E, DU,VU,NR by MG at 4/25/2023 1219    Acceptance, E,D, VU,DU,NR by JK at 4/25/2023 1044    Acceptance, E,D, VU,DU by DP at 4/24/2023 1636    Acceptance, E,TB, VU,NR by EE at 4/21/2023 1507    Acceptance, E,TB,D, VU,NR by EE at 4/20/2023 1110    Acceptance, E,TB, VU,NR by EE at 4/20/2023 1043    Acceptance, E,TB,D, VU,NR by EE at 4/19/2023 1121    Acceptance, E,TB, VU,NR by EE at 4/18/2023 1102    Acceptance, E,D,TB, VU,NR by EE at 4/17/2023  1129    Acceptance, TB,E, VU,NR by EE at 4/13/2023 1047    Acceptance, E,D, VU,DU by DP at 4/11/2023 1038    Acceptance, E, VU,NR by EE at 4/10/2023 1043    Acceptance, E, VU by LS at 4/8/2023 1204    Acceptance, E,TB, VU,NR by EE at 4/7/2023 1054    Acceptance, E,TB, VU by KP at 4/6/2023 1527                   Point: Home exercise program (Done)     Learning Progress Summary           Patient Acceptance, E,D, VU,DU,NR by EDGAR at 4/25/2023 1044    Acceptance, E,D, VU,DU by EDSON at 4/24/2023 1636    Acceptance, E,TB, VU,NR by ERUM at 4/22/2023 1353    Acceptance, E,TB, VU,NR by EE at 4/21/2023 1507    Acceptance, E,TB,D, VU,NR by EE at 4/20/2023 1110    Acceptance, E,TB, VU,NR by EE at 4/20/2023 1043    Acceptance, E,TB,D, VU,NR by EE at 4/19/2023 1121    Acceptance, E,TB, VU,NR by EE at 4/18/2023 1102    Acceptance, E,D,TB, VU,NR by EE at 4/17/2023 1129    Acceptance, TB,E, VU,NR by EE at 4/13/2023 1047    Acceptance, E,D, VU,DU by DP at 4/11/2023 1038    Acceptance, E, VU,NR by EE at 4/10/2023 1043    Acceptance, E, VU by LS at 4/8/2023 1204    Acceptance, E,TB, VU,NR by EE at 4/7/2023 1054    Acceptance, E,TB, VU by KRISH at 4/6/2023 1527                   Point: Body mechanics (Done)     Learning Progress Summary           Patient Acceptance, E, DU,VU,NR by MG at 4/25/2023 1219                   Point: Precautions (Done)     Learning Progress Summary           Patient Acceptance, E, DU,VU,NR by MG at 4/25/2023 1219                               User Key     Initials Effective Dates Name Provider Type Discipline    ERUM 06/16/21 -  Tiera Alanis, PT Physical Therapist PT    LS 06/16/21 -  Yamila Wheat MS CCC-SLP Speech and Language Pathologist SLP    EE 06/16/21 -  Dinan Gonzáles, PT Physical Therapist PT    JK 06/16/21 -  Stephani Elder, PT Physical Therapist PT    KP 06/16/21 -  Chen Centeno, PT Physical Therapist PT    MG 05/24/22 -  Shama Wild, PT Physical Therapist PT    DP 08/24/21 -  Gen Gray, PT  Physical Therapist PT                PT Recommendation and Plan                  Time Calculation:    PT Charges     Row Name 04/26/23 1108             Time Calculation    Start Time 1000  -EE      Stop Time 1100  -EE      Time Calculation (min) 60 min  -EE      PT Received On 04/26/23  -EE         Time Calculation- PT    Total Timed Code Minutes- PT 60 minute(s)  -EE            User Key  (r) = Recorded By, (t) = Taken By, (c) = Cosigned By    Initials Name Provider Type    EE Diann Gonzáles, PT Physical Therapist                Therapy Charges for Today     Code Description Service Date Service Provider Modifiers Qty    05636378046  PT THERAPEUTIC ACT EA 15 MIN 4/26/2023 Diann Gonzáles, PT GP 3    [id number]  PT THER PROC EA 15 MIN 4/26/2023 Diann Gonzáles, PT GP 1                    Diann Gonzáles PT  4/26/2023

## 2023-04-26 NOTE — CONSULTS
"Visit with patient for completion of Advance directive. Paperwork completed, notarized and faxed to [facility].  Patient is anxious about leaving rehab. \"I am not ready to go.\"  Daughters caring for patient and helpful with tasks that need to be completed.  "

## 2023-04-26 NOTE — PROGRESS NOTES
Recreational Therapy Note    Patient Name: Melissa Gomez   MRN: [id number]    Therapeutic Recreation Eval and Treat (last 12 hours)     Therapeutic Recreation Eval & Treat     Row Name 04/26/23 1400       Therapeutic Recreation Participation    Recreation Therapy Participation games  -TW    Games board games  [employer]  -TW    Objectives of Recreation Participation maintain;positive attitudes leading to a healthy leisure lifestyle;sense of autonomy by choosing level of participation  -TW    Comment, Recreation Participation BUE to roll dice; follow directions and scorekeeping WFL  -TW    Recreation Therapy Summary of Participation active participation  -TW          User Key  (r) = Recorded By, (t) = Taken By, (c) = Cosigned By    Initials Name Provider Type    TW Nancy Gimenez CTRS Recreational Therapist                  HUGH Espinoza  4/26/2023

## 2023-04-26 NOTE — PLAN OF CARE
Problem: Rehabilitation ([facility]) Plan of Care  Goal: Plan of Care Review  Outcome: Ongoing, Progressing  Flowsheets (Taken 4/25/2023 5435)  Progress: improving  Plan of Care Reviewed With: patient  Outcome Evaluation: Pt is A&Ox4, pleasant, c/o headache, gave tylenol, pt also requested melatonin at bedtime, meds w/ applesauce, pt incontinent of B/B at night, 2L O2 at bedtime, BM 4/24, pt is resting well tonight, will continue to monitor.   Goal Outcome Evaluation:  Plan of Care Reviewed With: patient        Progress: improving  Outcome Evaluation: Pt is A&Ox4, pleasant, c/o headache, gave tylenol, pt also requested melatonin at bedtime, meds w/ applesauce, pt incontinent of B/B at night, 2L O2 at bedtime, BM 4/24, pt is resting well tonight, will continue to monitor.

## 2023-04-26 NOTE — MBS/VFSS/FEES
Inpatient Rehabilitation - Speech Language Pathology   Swallow MBS/VFSS [facility]     Patient Name: Melissa Gomez  : 1940  MRN: [id number]  Today's Date: 2023               Admit Date: 2023    Visit Dx:     ICD-10-CM ICD-9-CM   1. Impaired functional mobility, balance, gait, and endurance  Z74.09 V49.89   2. Follow-up exam  Z09 V67.9     Patient Active Problem List   Diagnosis   • Infarction of left basal ganglia     Past Medical History:   Diagnosis Date   • GERD (gastroesophageal reflux disease)    • Hyperlipidemia    • Hypertension    • Stroke      Past Surgical History:   Procedure Laterality Date   • COLONOSCOPY     • HYSTERECTOMY     • TONSILLECTOMY         SLP Recommendation and Plan  SLP Swallowing Diagnosis: mild-moderate, oral dysphagia, pharyngeal dysphagia (23)  SLP Diet Recommendation: soft to chew textures, no mixed consistencies, nectar thick liquids, water between meals after oral care, with supervision (Trials of soft (chopped meats) with thins with SLP only using small sips. Consider training breath hold technique.) (23)  Recommended Precautions and Strategies: upright posture during/after eating, small bites of food and sips of liquid, 1:1 supervision, assist with feeding (23)  SLP Rec. for Method of Medication Administration: meds whole, with puree, with thick liquids, as tolerated (23)     Monitor for Signs of Aspiration: yes, notify SLP if any concerns (23)  Recommended Diagnostics: reassess via clinical swallow evaluation (23)  Swallow Criteria for Skilled Therapeutic Interventions Met: demonstrates skilled criteria (23)  Anticipated Discharge Disposition (SLP): skilled nursing facility (23)  Rehab Potential/Prognosis, Swallowing: good, to achieve stated therapy goals (23)  Therapy Frequency (Swallow): 5 days per week (23)  Predicted Duration Therapy Intervention  "(Days): until discharge (04/26/23 1100)                                        Plan of Care Reviewed With: patient, daughter      SWALLOW EVALUATION (last 72 hours)     SLP Adult Swallow Evaluation     Row Name 04/26/23 1100                   Rehab Evaluation    Document Type evaluation  MBS/VFSS  -AL        Subjective Information no complaints  -AL        Patient Observations alert;cooperative  -AL        Patient Effort excellent  -AL        Symptoms Noted During/After Treatment none  -AL           General Information    Patient Profile Reviewed yes  -AL        Pertinent History Of Current Problem Pt is an 82-year-old female admitted to inpatient rehab under diagnosis L Basal Ganglia Infarct/Right Sided Weakness with onset date 3/29/23. VFSS completed at [facility] 3/30/23 showed \"mild-moderate oral dysphagia and moderate pharyngeal dysphagia. Pt greatest deficit is delayed swallow initiation resulting in nikolas aspiration of thin liquids and penetration of NTL and puree solids.\" Recommended puree with NTL. Pt was upgraded to soft (chopped meats)/no mixed consistencies with NTL during inpatient stay.  -AL        Current Method of Nutrition soft to chew textures;no mixed consistencies;nectar/syrup-thick liquids  -AL        Precautions/Limitations, Vision WFL;for purposes of eval  -AL        Precautions/Limitations, Hearing WFL  -AL        Prior Level of Function-Communication WFL  -AL        Prior Level of Function-Swallowing no diet consistency restrictions  -AL        Plans/Goals Discussed with patient  -AL        Barriers to Rehab none identified  -AL        Patient's Goals for Discharge eat/drink without coughing/choking;return to regular diet  -AL           Pain Scale: Numbers Pre/Post-Treatment    Pretreatment Pain Rating 0/10 - no pain  -AL           Oral Motor Structure and Function    Dentition Assessment natural, present and adequate  -AL        Volitional Swallow delayed  -AL           Oral " Musculature and Cranial Nerve Assessment    Oral Motor, Comment Mild right facial weakness. Pt presents with mild dysarthria.  -AL           SLP Communication to Radiology    Severity Level of Dysphagia mild-moderate dysphagia  -AL        Summary Statement VFSS completed with Dr. Erickson. Pt presented with mild-moderate oropharyngeal dysphagia. Pt exhibited aspiration of thins by tsp with cough response x1. Pt exhibited silent penetration to the vocal folds with additional trial of thins by tsp. Pt exhibited silent trace penetration above the vocal folds with thin component of mixed. Pt exhibited shallow, transient penetration with thins by cup, thins by straw, NTL by cup and NTL by straw. No penetration or aspiration observed with NTL by tsp, puree, and regular.  Esophageal scan with puree and thins appeared WNL for pt’s age.  -AL        Summary Statement Continued VFSS completed with Dr. Erickson. Pt presented with mild-moderate oropharyngeal dysphagia. In the oral phase, pt exhibited mildly prolonged mastication of small bite of regular consistency with minimal oral residue in right buccal cavity. In pharyngeal phase, delayed swallow initiation and discoordination of airway closure led to penetration of thins by tsp before the swallow and aspiration during the swallow with strong cough response on initial trial. On 2nd trial of thins by tsp, pt exhibited trace penetration during the swallow to the level of the vocal folds with no cough response. SLP cued pt to cough. With thins by cup, pt exhibited transient shallow penetration above the vocal folds in 4/4 trials. Light throat clearing noted between trials. With thins by straw, pt exhibited transient shallow penetration. Pt with cough following this trial; no aspiration was visualized under fluoro. Pt exhibited trace, transient penetration above the vocal folds with NTL by cup and straw. Pt noted to initiate airway closure with thins by cup/straw and NTL by  cup/straw prior to initiating the swallow; this appears to be a “breath hold” compensation to assist with airway protection. Pt exhibited silent trace deep penetration above the vocal folds with thin component of mixed, suspect due to spillage from pyriforms. No penetration or aspiration observed with NTL by tsp, puree and regular. No significant pharyngeal residue observed with any consistency. Screened esophageal motility with puree and NTL, which appeared WNL for pt’s age.  -AL           SLP Evaluation Clinical Impression    SLP Swallowing Diagnosis mild-moderate;oral dysphagia;pharyngeal dysphagia  -AL        Functional Impact risk of aspiration/pneumonia  -AL        Rehab Potential/Prognosis, Swallowing good, to achieve stated therapy goals  -AL        Swallow Criteria for Skilled Therapeutic Interventions Met demonstrates skilled criteria  -AL           Recommendations    Therapy Frequency (Swallow) 5 days per week  -AL        Predicted Duration Therapy Intervention (Days) until discharge  -AL        SLP Diet Recommendation soft to chew textures;no mixed consistencies;nectar thick liquids;water between meals after oral care, with supervision  Trials of soft (chopped meats) with thins with SLP only using small sips. Consider training breath hold technique.  -AL        Recommended Diagnostics reassess via clinical swallow evaluation  -AL        Recommended Precautions and Strategies upright posture during/after eating;small bites of food and sips of liquid;1:1 supervision;assist with feeding  -AL        Oral Care Recommendations Oral Care before breakfast, after meals and PRN;Before ice/water  -AL        SLP Rec. for Method of Medication Administration meds whole;with puree;with thick liquids;as tolerated  -AL        Monitor for Signs of Aspiration yes;notify SLP if any concerns  -AL        Anticipated Discharge Disposition (SLP) skilled nursing facility  -AL           (LTG) Patient will demonstrate functional  swallow for    Diet Texture (Demonstrate functional swallow) soft to chew (chopped) textures  -AL        Liquid viscosity (Demonstrate functional swallow) thin liquids  -AL           (STG) Patient will tolerate trials of    Consistencies Trialed (Tolerate trials) thin liquids  -AL        Desired Outcome (Tolerate trials) without signs/symptoms of aspiration  -AL        [place] (Tolerate trials) with 1:1 assist/ supervision  -AL           (STG) Patient will tolerate therapeutic trials of    Consistencies Trialed (Tolerate therapeutic trials) regular textures;thin liquids  -AL        Desired Outcome (Tolerate therapeutic trials) without signs/symptoms of aspiration  -AL           (STG) Pharyngeal Strengthening Exercise Goal 1 (SLP)    Activity (Pharyngeal Strengthening Goal 1, SLP) increase timing;increase superior movement of the hyolaryngeal complex;increase anterior movement of the hyolaryngeal complex;increase epiglottic inversion and retroflexion;increase closure at entrance to airway/closure of airway at glottis  -AL        Increase Timing hard effortful swallow  -AL        Increase Superior Movement of the Hyolaryngeal Complex hard effortful swallow  -AL        Increase Anterior Movement of the Hyolaryngeal Complex EMST  -AL        Increase Epiglottic Inversion and Retroflexion Mendelsohn;hard effortful swallow;EMST  -AL        Increase Closure at Entrance to Airway/Closure of Airway at Glottis Mendelsohn;hard effortful swallow  -AL        [place]/Accuracy (Pharyngeal Strengthening Goal 1, SLP) with moderate cues (50-74% accuracy)  -AL        Progress/Outcomes (Pharyngeal Strengthening Goal 1, SLP) good progress toward goal  -AL              User Key  (r) = Recorded By, (t) = Taken By, (c) = Cosigned By    Initials Name Effective Dates    Janell Erickson MS CCC-SLP 06/16/21 -                 EDUCATION  The patient has been educated in the following areas:   Dysphagia (Swallowing Impairment).         SLP GOALS     Row Name 04/26/23 1100 04/25/23 1330 04/25/23 1100       (LTG) Patient will demonstrate functional swallow for    Diet Texture (Demonstrate functional swallow) soft to chew (chopped) textures  -AL -- --    Liquid viscosity (Demonstrate functional swallow) thin liquids  -AL -- --       (STG) Patient will tolerate trials of    Consistencies Trialed (Tolerate trials) thin liquids  -AL -- --    Desired Outcome (Tolerate trials) without signs/symptoms of aspiration  -AL -- --    [place] (Tolerate trials) with 1:1 assist/ supervision  -AL -- --       (STG) Patient will tolerate therapeutic trials of    Consistencies Trialed (Tolerate therapeutic trials) regular textures;thin liquids  -AL -- --    Desired Outcome (Tolerate therapeutic trials) without signs/symptoms of aspiration  -AL -- --       (STG) Pharyngeal Strengthening Exercise Goal 1 (SLP)    Activity (Pharyngeal Strengthening Goal 1, SLP) increase timing;increase superior movement of the hyolaryngeal complex;increase anterior movement of the hyolaryngeal complex;increase epiglottic inversion and retroflexion;increase closure at entrance to airway/closure of airway at glottis  -AL -- --    Increase Timing hard effortful swallow  -AL -- --    Increase Superior Movement of the Hyolaryngeal Complex hard effortful swallow  -AL -- --    Increase Anterior Movement of the Hyolaryngeal Complex EMST  -AL -- --    Increase Epiglottic Inversion and Retroflexion Mendelsohn;hard effortful swallow;EMST  -AL -- --    Increase Closure at Entrance to Airway/Closure of Airway at Glottis Mendelsohn;hard effortful swallow  -AL -- --    [place]/Accuracy (Pharyngeal Strengthening Goal 1, SLP) with moderate cues (50-74% accuracy)  -AL -- --    Progress/Outcomes (Pharyngeal Strengthening Goal 1, SLP) good progress toward goal  -AL -- good progress toward goal  -AL    Comment (Pharyngeal Strengthening Goal 1, SLP) -- -- No overt s/s of aspiration or  penetration observed in 11/12 trials of water by cup; strong delayed cough noted x1. Plan to complete VFSS 4/26/23. Pt completed 30 repetitions of effortful swallow with MIN-MOD cues. Pt completed 5 sets of 5 reps of EMST75 at 1.25 cmH20 past 5 cmH20 with MOD cues.  -AL       Articulation Goal 1 (SLP)    Improve Articulation Goal 1 (SLP) -- by over-articulating at word level;by over-articulating at phrase level;by over-articulating in connected speech;80%;with minimal cues (75-90%)  -AL --    Progress/Outcomes (Articulation Goal 1, SLP) -- good progress toward goal  -AL --    Comment (Articulation Goal 1, SLP) -- Pt read aloud a multiple paragraph story (10 minute duration) with 100% intelligible with initial MIN cues for over-articulation and slow rate. Discussed reduced pitch inflection noted. Pt was stimulable for increasing pitch inflection for dialogue in story.  -AL --       Reasoning Goal 1 (SLP)    Improve Reasoning Through Goal 1 (SLP) -- complete deductive reasoning task;80%;independently (over 90% accuracy)  -AL --    Time Frame (Reasoning Goal 1, SLP) -- 1 week  -AL --    Progress/Outcomes (Reasoning Goal 1, SLP) -- goal ongoing  -AL --    Comment (Reasoning Goal 1, SLP) -- Moderate level logic puzzle: 80% with NO cues, 100% with MIN cues. Goal met x1.  -AL --    Row Name 04/24/23 1330 04/24/23 1100          (STG) Pharyngeal Strengthening Exercise Goal 1 (SLP)    Progress/Outcomes (Pharyngeal Strengthening Goal 1, SLP) -- good progress toward goal  -AL     Comment (Pharyngeal Strengthening Goal 1, SLP) -- No overt s/s of aspiration or penetration observed in 12/14 trials of water by cup; delayed cough x1, throat clear x1. Pt completed 30 repetitions of effortful swallow with MIN-MOD cues. Pt completed 3 sets of 5 reps of EMST 75 at 1.25 turns past 5 cmH20 with MOD cues. Plan VFSS later this week.  -AL        Attention Goal 1 (SLP)    Improve Attention by Goal 1 (SLP) -- complete selective attention  task;complete sustained attention task;80%;independently (over 90% accuracy)  -AL     Progress/Outcomes (Attention Goal 1, SLP) -- good progress toward goal  -AL        Memory Skills Goal 1 (SLP)    Improve Memory Skills Through Goal 1 (SLP) -- recalling related word lists with an imposed delay;listen to a paragraph and answer questions;recall details of the day;80%;independently (over 90% accuracy)  -AL     Progress/Outcomes (Memory Skills Goal 1, SLP) -- good progress toward goal  -AL        Organizational Skills Goal 1 (SLP)    Improve Thought Organization Through Goal 1 (SLP) -- completing a divergent naming task;80%;with minimal cues (75-90%)  -AL     Progress/Outcomes (Thought Organization Skills Goal 1, SLP) -- good progress toward goal  -AL        Reasoning Goal 1 (SLP)    Improve Reasoning Through Goal 1 (SLP) complete deductive reasoning task;80%;independently (over 90% accuracy)  -AL complete deductive reasoning task;80%;independently (over 90% accuracy)  -AL     Time Frame (Reasoning Goal 1, SLP) 1 week  -AL --     Progress/Outcomes (Reasoning Goal 1, SLP) goal ongoing  -AL good progress toward goal  -AL     Comment (Reasoning Goal 1, SLP) Moderate level logic puzzle: 50% with NO cues, 100% with MIN cues. Pt with reduced organizational skills during this task today.  -AL --        Executive Functional Skills Goal 1 (SLP)    Improve Executive Function Skills Goal 1 (SLP) -- home management activity;80%;with minimal cues (75-90%)  -AL     Progress/Outcomes (Executive Function Skills Goal 1, SLP) -- goal met  -AL           User Key  (r) = Recorded By, (t) = Taken By, (c) = Cosigned By    Initials Name Provider Type    Janell Erickson MS CCC-SLP Speech and Language Pathologist                   Time Calculation:    Time Calculation- SLP     Row Name 04/26/23 1251             Time Calculation- SLP    SLP Start Time 1100  -AL      SLP Stop Time 1200  -AL      SLP Time Calculation (min) 60 min  -AL          Untimed Charges    63790-PY Motion Fluoro Eval Swallow Minutes 60  -AL         Total Minutes    Untimed Charges Total Minutes 60  -AL       Total Minutes 60  -AL            User Key  (r) = Recorded By, (t) = Taken By, (c) = Cosigned By    Initials Name Provider Type    Janell Erickson, MS CCC-SLP Speech and Language Pathologist                Therapy Charges for Today     Code Description Service Date Service Provider Modifiers Qty    21288912876 HC ST TREATMENT SWALLOW 2 4/25/2023 Janell Gallego, MS CCC-SLP GN 1    83332090629 HC ST DEV OF COGN SKILLS INITIAL 15 MIN 4/25/2023 Janell Gallego, MS CCC-SLP  1    77415630942 HC ST DEV OF COGN SKILLS EACH ADDT'L 15 MIN 4/25/2023 Janell Gallego, MS CCC-SLP  1    96514805315 HC ST MOTION FLUORO EVAL SWALLOW 4 4/26/2023 Janell Gallego, MS CCC-SLP GN 1               Janell Gallego MS CCC-SLP  4/26/2023

## 2023-04-26 NOTE — PROGRESS NOTES
Inpatient Rehabilitation Plan of Care Note    Plan of Care  Care Plan Reviewed - Updates as Follows    Psychosocial    [RN] Coping/Adjustment(Active)  Current Status(04/25/2023): Pt is A/Ox4, calm/cooperative. Pt is at risk for  reduced coping r/t stroke and new deficits.  Weekly Goal(05/04/2023): provide education material regarding stroke  Discharge Goal: Knowledgeable of care needs and stroke recovery    Performed Intervention(s)  Verbalizes needs and concerns  Therapeutic environmental set up      Sphincter Control    [RN] Bladder Management(Active)  Current Status(04/25/2023): Incontinent of bladder  Weekly Goal(05/04/2023): continent 25%  Discharge Goal: continent 100%    Performed Intervention(s)  Bladder scan or I/O cath per order  Encourage fluids  Bowel/bladder training      Safety    [RN] Potential for Injury(Active)  Current Status(04/25/2023): Risk for falls  Weekly Goal(05/02/2023): Instruct family/caregivers regarding safety precautions  and need for close supervision  Discharge Goal: Family/caregiver will be knowledgeable of need for cueing and  supervision to avoid falls    Performed Intervention(s)  Safety Rounds  Bed alarm and/or chair alarm    Signed by: Marlen Lewis RN

## 2023-04-26 NOTE — THERAPY DISCHARGE NOTE
Inpatient Rehabilitation - IRF Occupational Therapy Treatment Note/Discharge  [facility]     Patient Name: Melissa Gomez  : 1940  MRN: [id number]  Today's Date: 2023               Admit Date: 2023       ICD-10-CM ICD-9-CM   1. Impaired functional mobility, balance, gait, and endurance  Z74.09 V49.89   2. Follow-up exam  Z09 V67.9     Patient Active Problem List   Diagnosis   • Infarction of left basal ganglia     Past Medical History:   Diagnosis Date   • GERD (gastroesophageal reflux disease)    • Hyperlipidemia    • Hypertension    • Stroke      Past Surgical History:   Procedure Laterality Date   • COLONOSCOPY     • HYSTERECTOMY     • TONSILLECTOMY         IRF OT ASSESSMENT FLOWSHEET (last 12 hours)     IRF OT Evaluation and Treatment     Row Name 23 153          OT Time and Intention    Document Type discharge evaluation  -DN     Mode of Treatment occupational therapy  -DN     Patient Effort good  -DN     Symptoms Noted During/After Treatment --  pain in R shoulder with movement  -DN     Row Name 23 1532          General Information    Existing Precautions/Restrictions fall;swallow precautions  [facility]  [facility]     Row Name 23 1532          Pain Assessment    Pretreatment Pain Rating 1/10  -DN     Posttreatment Pain Rating 4/10  -DN     Pain Location - Side/Orientation Right  -DN     Pain Location upper  -DN     Pain Location - extremity  -DN     Pre/Posttreatment Pain Comment only hurts with movement  -DN     Row Name 23 1532          Cognition/Psychosocial    Affect/Mental Status (Cognition) WFL  -DN     Orientation Status (Cognition) oriented x 3  -DN     Follows Commands (Cognition) follows one-step commands;over 90% accuracy  -DN     Personal Safety Interventions fall prevention program maintained;gait belt  -DN     Cognitive Function memory deficit  -DN     Attention Deficit (Cognition) minimal deficit  -DN     Row Name 23 1532          Range of  Motion Comprehensive    General Range of Motion upper extremity range of motion deficits identified  -DN     Comment, General Range of Motion RUE shoulder is 1/2 to 3/4 AROM, elbow through hand is wfl, LUE is WFL  -DN     Row Name 04/26/23 1532          Strength (Manual Muscle Testing)    Left Hand, Setting 1 (Dynamometer Testing) 15  -DN     Right Hand, Setting 1 (Dynamometer Testing) 16  -DN     Left Hand: Lateral (Key) Pinch Strength (Pinch Dynamometer Testing) 8  -DN     Left Hand: [place] (Jony) Pinch Strength (Pinch Dynamometer Testing) 6  -DN     Right Hand: Lateral (Key) Pinch Strength (Pinch Dynamometer Testing) 8  -DN     Right Hand: [place] (Jony) Pinch Strength (Pinch Dynamometer Testing) 6  -DN     Row Name 04/26/23 1532          Strength Comprehensive (MMT)    General Manual Muscle Testing (MMT) Assessment upper extremity strength deficits identified  Pt MMT not tested on R shoulder due to pain, elbow to hand is 4/5, LUE is 4/5 throughout  -DN     Row Name 04/26/23 1532          Vision Assessment/Intervention    Visual Impairment/Limitations WFL  -DN     Row Name 04/26/23 1532          Bathing    [place] Level (Bathing) bathing skills;minimum assist (75% patient effort);verbal cues;nonverbal cues (demo/gesture)  -DN     Assistive Device (Bathing) hand held shower spray hose;long-handled sponge;tub bench;grab bar/tub rail  -DN     Position (Bathing) supported standing;supported sitting  -DN     Set-up Assistance (Bathing) obtain supplies  -DN     Comment (Bathing) VC for balance and to hold on to grab bar due to LOB backwards with squat for mid section hygene  -DN     Row Name 04/26/23 1532          Upper Body Dressing    [place] Level (Upper Body Dressing) upper body dressing skills;doff;don;pull over garment;supervision;verbal cues  -DN     Position (Upper Body Dressing) supported sitting  -DN     Set-up Assistance (Upper Body Dressing) obtain clothing  -DN     Comment (Upper Body  Dressing) pullover shirt with no bra  -DN     Row Name 04/26/23 1532          Lower Body Dressing    [place] Level (Lower Body Dressing) doff;don;pants/bottoms;socks;maximum assist (25% patient effort);verbal cues;nonverbal cues (demo/gesture)  -DN     Assistive Device Use (Lower Body Dressing) reacher  -DN     Position (Lower Body Dressing) supported sitting;supported standing  -DN     Set-up Assistance (Lower Body Dressing) obtain clothing  -DN     Comment (Lower Body Dressing) pt needed assist withuse of reacher to thread pants on each foot and assist with pull up around waist, pt is dependent with socks  -DN     Row Name 04/26/23 1532          Grooming    [place] Level (Grooming) grooming skills;supervision  -DN     Position (Grooming) supported sitting  -DN     Row Name 04/26/23 1532          Toileting    [place] Level (Toileting) toileting skills;adjust/manage clothing;perform perineal hygiene;maximum assist (25% patient effort);nonverbal cues (demo/gesture)  -DN     Assistive Device Use (Toileting) grab bar/safety frame  -DN     Position (Toileting) supported standing;supported sitting  -DN     Set-up Assistance (Toileting) change pad/brief  -DN     Comment (Toileting) pt can complete hygene with vc, however needs assist with pants pull up and down and dep with breif  -DN     Row Name 04/26/23 1532          Self-Feeding    [place] Level (Self-Feeding) feeding skills;supervision;verbal cues;nonverbal cues (demo/gesture)  -DN     Position (Self-Feeding) supported sitting  -DN     Row Name 04/26/23 1532          Toilet Transfer    Type (Toilet Transfer) stand pivot/stand step  -DN     [place] Level (Toilet Transfer) minimum assist (75% patient effort);contact guard  -DN     Assistive Device (Toilet Transfer) wheelchair;walker, front-wheeled;grab bars/safety frame;raised toilet seat  -DN     Comment, (Toilet Transfer) pt varies daily between CGA and Min a with sit to stand usally CGA with  transfer once standing  -DN     Row Name 04/26/23 1532          Shower Transfer    Type (Shower Transfer) stand pivot/stand step  -DN     [place] Level (Shower Transfer) minimum assist (75% patient effort);nonverbal cues (demo/gesture);verbal cues  -DN     Assistive Device (Shower Transfer) wheelchair;walker, front-wheeled;tub bench;grab bar, tub/shower  -DN     Row Name 04/26/23 1532          Safety Issues, Functional Mobility    Impairments Affecting Function (Mobility) balance;endurance/activity tolerance;strength  -DN     Row Name 04/26/23 1532          Motor Skills    Results, 9 Hole Peg Test of Fine Motor Coordination RUE 57 and LUE 41, Box and Blocks RUE 26 and LUE 30  -DN     Functional Endurance fair +  -DN     Row Name 04/26/23 1532          Positioning and Restraints    Pre-Treatment Position sitting in chair/recliner  -DN     Post Treatment Position wheelchair  -DN     In Bed sitting;call light within reach;encouraged to call for assist;exit alarm on  -DN     Row Name 04/26/23 1532          Transfer Goal 1 (OT-IRF)    Activity/Assistive Device (Transfer Goal 1, OT-IRF) toilet;sit-to-stand/stand-to-sit;shower chair;walk-in shower;walker, rolling  -DN     [place] Level (Transfer Goal 1, OT-IRF) contact guard required  -DN     Time Frame (Transfer Goal 1, OT-IRF) short-term goal (STG)  -DN     Strategies/Barriers (Transfer Goal 1, OT-IRF) Pt vairies daily  -DN     Progress/Outcomes (Transfer Goal 1, OT-IRF) goal partially met  -DN     Row Name 04/26/23 1532          Transfer Goal 2 (OT-IRF)    Activity/Assistive Device (Transfer Goal 2, OT-IRF) sit-to-stand/stand-to-sit;toilet;walk-in shower;shower chair;walker, rolling  -DN     [place] Level (Transfer Goal 2, OT-IRF) set-up required;contact guard required  -DN     Time Frame (Transfer Goal 2, OT-IRF) long-term goal (LTG);by discharge  -DN     Progress/Outcomes (Transfer Goal 2, OT-IRF) goal not met  -DN     Row Name 04/26/23 1532           Bathing Goal 1 (OT-IRF)    Activity/Device (Bathing Goal 1, OT-IRF) bathing skills, all;hand-held shower spray hose;grab bar, tub/shower;shower chair  -DN     [place] Level (Bathing Goal 1, OT-IRF) contact guard required  -DN     Time Frame (Bathing Goal 1, OT-IRF) short-term goal (STG)  -DN     Progress/Outcomes (Bathing Goal 1, OT-IRF) goal met  -DN     Row Name 04/26/23 1532          Bathing Goal 2 (OT-IRF)    Activity/Device (Bathing Goal 2, OT-IRF) grab bar, tub/shower;hand-held shower spray hose;shower chair;bathing skills, all  -DN     [place] Level (Bathing Goal 2, OT-IRF) set-up required;contact guard required  -DN     Time Frame (Bathing Goal 2, OT-IRF) long-term goal (LTG);by discharge  -DN     Progress/Outcomes (Bathing Goal 2, OT-IRF) goal met  -DN     Row Name 04/26/23 1532          UB Dressing Goal 1 (OT-IRF)    Activity/Device (UB Dressing Goal 1, OT-IRF) upper body dressing  -DN     [place] (UB Dress Goal 1, OT-IRF) supervision required  -DN     Time Frame (UB Dressing Goal 1, OT-IRF) short-term goal (STG)  -DN     Progress/Outcomes (UB Dressing Goal 1, OT-IRF) goal met  -DN     Row Name 04/26/23 1532          UB Dressing Goal 2 (OT-IRF)    Activity/Device (UB Dressing Goal 2, OT-IRF) upper body dressing  -DN     [place] (UB Dress Goal 2, OT-IRF) set-up required  -DN     Time Frame (UB Dressing Goal 2, OT-IRF) long-term goal (LTG)  -DN     Progress/Outcomes (UB Dressing Goal 2, OT-IRF) goal met  -DN     Row Name 04/26/23 1532          LB Dressing Goal 1 (OT-IRF)    Activity/Device (LB Dressing Goal 1, OT-IRF) lower body dressing  -DN     [place] (LB Dressing Goal 1, OT-IRF) moderate assist (50-74% patient effort)  -DN     Time Frame (LB Dressing Goal 1, OT-IRF) short-term goal (STG);1 week  -DN     Progress/Outcomes (LB Dressing Goal 1, OT-IRF) goal not met  -DN     Row Name 04/26/23 1532          LB Dressing Goal 2 (OT-IRF)    Activity/Device (LB Dressing Goal 2, OT-IRF)  lower body dressing;reacher;sock aid;elastic laces  -DN     [place] (LB Dressing Goal 2, OT-IRF) moderate assist (50-74% patient effort)  -DN     Time Frame (LB Dressing Goal 2, OT-IRF) long-term goal (LTG)  -DN     Progress/Outcomes (LB Dressing Goal 2, OT-IRF) goal not met  -DN     Row Name 04/26/23 1532          Grooming Goal 1 (OT-IRF)    Activity/Device (Grooming Goal 1, OT-IRF) grooming skills, all  -DN     [place] (Grooming Goal 1, OT-IRF) set-up required  -DN     Time Frame (Grooming Goal 1, OT-IRF) short-term goal (STG)  -DN     Progress/Outcomes (Grooming Goal 1, OT-IRF) goal met  -DN     Row Name 04/26/23 1532          Grooming Goal 2 (OT-IRF)    Activity/Device (Grooming Goal 2, OT-IRF) grooming skills, all  -DN     [place] (Grooming Goal 2, OT-IRF) set-up required  -DN     Time Frame (Grooming Goal 2, OT-IRF) long-term goal (LTG);by discharge  -DN     Progress/Outcomes (Grooming Goal 2, OT-IRF) goal met  -DN     Row Name 04/26/23 1532          Toileting Goal 1 (OT-IRF)    Activity/Device (Toileting Goal 1, OT-IRF) toileting skills, all;grab bar/safety frame  -DN     [place] Level (Toileting Goal 1, OT-IRF) moderate assist (50-74% patient effort)  -DN     Progress/Outcomes (Toileting Goal 1, OT-IRF) goal not met  -DN     Time Frame (Toileting Goal 1, OT-IRF) short-term goal (STG)  -DN     Row Name 04/26/23 1532          Toileting Goal 2 (OT-IRF)    Activity/Device (Toileting Goal 2, OT-IRF) toileting skills, all;grab bar/safety frame  -DN     [place] Level (Toileting Goal 2, OT-IRF) set-up required;moderate assist (50-74% patient effort)  -DN     Progress/Outcomes (Toileting Goal 2, OT-IRF) goal not met;goal revised this date  -DN     Time Frame (Toileting Goal 2, OT-IRF) long-term goal (LTG)  -DN     Row Name 04/26/23 1532          Strength Goal 1 (OT-IRF)    Strength Goal 1 (OT-IRF) incr R hand  to 20 and R UE to 4-/5  -DN     Time Frame (Strength Goal 1, OT-IRF) long-term  goal (LTG);by discharge  -DN     Progress/Outcomes (Strength Goal 1, OT-IRF) goal not met  -DN     Row Name 04/26/23 1532          Balance Goal 1 (OT)    Activity/Assistive Device (Balance Goal 1, OT) standing, dynamic;walker, rolling  -DN     [place] Level/Cues Needed (Balance Goal 1, OT) contact guard assist  -DN     Time Frame (Balance Goal 1, OT) long term goal (LTG);by discharge  -DN     Progress/Outcomes (Balance Goal 1, OT) goal met  -DN     Row Name 04/26/23 1532          Functional Mobility Goal 1 (OT)    Activity/Assistive Device (Functional Mobility Goal 1, OT) walker, rolling  -DN     [place] Level/Cues Needed (Functional Mobility Goal 1, OT) contact guard assist  -DN     Distance Goal 1 (Functional Mobility, OT) commode and shower transfers  -DN     Time Frame (Functional Mobility Goal 1, OT) long term goal (LTG)  -DN     Progress/Outcome (Functional Mobility Goal 1, OT) goal not met  -DN     Row Name 04/26/23 1532          Coordination Goal 1 (OT)    Activity/Assistive Device (Coordination Goal 1, OT) FM task;GM task;FM written ex program  -DN     [place] Level/Cues Needed (Coordination Goal 1, OT) set-up required  -DN     Time Frame (Coordination Goal 1, OT) long term goal (LTG);by discharge  -DN     Progress/Outcomes (Coordination Goal 1, OT) goal met  -DN     Row Name 04/26/23 1532          Caregiver Training Goal 1 (OT-IRF)    Caregiver Training Goal 1 (OT-IRF) pt family ed on safety w tsf w pt and ADLs. pt ed on HEP for [facility] and [facility]  -DN     Time Frame (Caregiver Training Goal 1, OT-IRF) long-term goal (LTG);by discharge  -DN     Progress/Outcomes (Caregiver Training Goal 1, OT-IRF) goal not met  pt family did not go through teaching due to going to [facility]  -DN           User Key  (r) = Recorded By, (t) = Taken By, (c) = Cosigned By    Initials Name Effective Dates    Ismael Quintanilla OT 06/16/21 -                    Occupational Therapy Education     Title: PT OT SLP Therapies (Done)      Topic: Occupational Therapy (Done)     Point: ADL training (Done)     Description:   Instruct learner(s) on proper safety adaptation and remediation techniques during self care or transfers.   Instruct in proper use of assistive devices.              Learning Progress Summary           Patient Acceptance, E,TB, VU,NR by ERUM at 4/22/2023 1353    Acceptance, E, VU by LS at 4/8/2023 1204    Acceptance, E,TB,D, VU,DU,NR by  at 4/6/2023 1230    Comment: ed pt on role of OT. benefit of therapy, POC w.  OT ed on safety w ADL and tsf. pt ed on UBD technique.                   Point: Home exercise program (Done)     Description:   Instruct learner(s) on appropriate technique for monitoring, assisting and/or progressing therapeutic exercises/activities.              Learning Progress Summary           Patient Acceptance, E,TB, VU,NR by ERUM at 4/22/2023 1353    Acceptance, E, VU by [place] at 4/8/2023 1204    Acceptance, E,TB,D, VU,DU,NR by  at 4/6/2023 1230    Comment: ed pt on role of OT. benefit of therapy, POC w.  OT ed on safety w ADL and tsf. pt ed on UBD technique.                   Point: Precautions (Done)     Description:   Instruct learner(s) on prescribed precautions during self-care and functional transfers.              Learning Progress Summary           Patient Acceptance, E,TB, VU,NR by ERUM at 4/22/2023 1353    Acceptance, E, VU by LS at 4/8/2023 1204    Acceptance, E,TB,D, VU,DU,NR by  at 4/6/2023 1230    Comment: ed pt on role of OT. benefit of therapy, POC w.  OT ed on safety w ADL and tsf. pt ed on UBD technique.                   Point: Body mechanics (Done)     Description:   Instruct learner(s) on proper positioning and spine alignment during self-care, functional mobility activities and/or exercises.              Learning Progress Summary           Patient Acceptance, E,TB, VU,NR by ERUM at 4/22/2023 1353    Acceptance, E, VU by ROLANDO at 4/8/2023 1204    Acceptance, E,TB,D, VU,DU,NR by  at 4/6/2023 1230     Comment: ed pt on role of OT. benefit of therapy, POC w.  OT ed on safety w ADL and tsf. pt ed on UBD technique.                               User Key     Initials Effective Dates Name Provider Type Discipline     06/16/21 -  Patsy Blank, OTR Occupational Therapist OT    ERUM 06/16/21 -  Tiera Alanis, PT Physical Therapist PT    LS 06/16/21 -  Yamila Wheat MS CCC-SLP Speech and Language Pathologist SLP                OT Recommendation and Plan  Anticipated Discharge Disposition (OT): skilled nursing facility  Planned Therapy Interventions (OT): activity tolerance training, adaptive equipment training, BADL retraining, functional balance retraining, neuromuscular control/coordination retraining, occupation/activity based interventions, patient/caregiver education/training, ROM/therapeutic exercise, strengthening exercise, transfer/mobility retraining           OT IRF GOALS     Row Name 04/26/23 1532 04/21/23 1209 04/14/23 1403       Transfer Goal 1 (OT-IRF)    Activity/Assistive Device (Transfer Goal 1, OT-IRF) toilet;sit-to-stand/stand-to-sit;shower chair;walk-in shower;walker, rolling  -DN toilet;sit-to-stand/stand-to-sit;shower chair;walk-in shower;walker, rolling  -DN toilet;sit-to-stand/stand-to-sit;shower chair;walk-in shower;walker, rolling  -KP    [place] Level (Transfer Goal 1, OT-IRF) contact guard required  -DN contact guard required  -DN set-up required;minimum assist (75% or more patient effort);moderate assist (50-74% patient effort)  -KP    Time Frame (Transfer Goal 1, OT-IRF) short-term goal (STG)  -DN short-term goal (STG)  -DN short-term goal (STG);1 week  -KP    Strategies/Barriers (Transfer Goal 1, OT-IRF) Pt vairies daily  -DN -- --    Progress/Outcomes (Transfer Goal 1, OT-IRF) goal partially met  -DN goal met;goal revised this date  -DN goal met  -KP       Transfer Goal 2 (OT-IRF)    Activity/Assistive Device (Transfer Goal 2, OT-IRF)  sit-to-stand/stand-to-sit;toilet;walk-in shower;shower chair;walker, rolling  -DN sit-to-stand/stand-to-sit;toilet;walk-in shower;shower chair;walker, rolling  -DN sit-to-stand/stand-to-sit;toilet;walk-in shower;shower chair;walker, rolling  -KP    [place] Level (Transfer Goal 2, OT-IRF) set-up required;contact guard required  -DN set-up required;contact guard required  -DN set-up required;contact guard required  -KP    Time Frame (Transfer Goal 2, OT-IRF) long-term goal (LTG);by discharge  -DN long-term goal (LTG);by discharge  -DN long-term goal (LTG);by discharge  -KP    Progress/Outcomes (Transfer Goal 2, OT-IRF) goal not met  -DN good progress toward goal  -DN good progress toward goal  -KP       Bathing Goal 1 (OT-IRF)    Activity/Device (Bathing Goal 1, OT-IRF) bathing skills, all;hand-held shower spray hose;grab bar, tub/shower;shower chair  -DN bathing skills, all;hand-held shower spray hose;grab bar, tub/shower;shower chair  -DN bathing skills, all;hand-held shower spray hose;grab bar, tub/shower;shower chair  -KP    [place] Level (Bathing Goal 1, OT-IRF) contact guard required  -DN contact guard required  -DN minimum assist (75% or more patient effort)  -KP    Time Frame (Bathing Goal 1, OT-IRF) short-term goal (STG)  -DN short-term goal (STG)  -DN short-term goal (STG);1 week  -KP    Progress/Outcomes (Bathing Goal 1, OT-IRF) goal met  -DN goal met;goal revised this date  -DN good progress toward goal  -KP       Bathing Goal 2 (OT-IRF)    Activity/Device (Bathing Goal 2, OT-IRF) grab bar, tub/shower;hand-held shower spray hose;shower chair;bathing skills, all  -DN grab bar, tub/shower;hand-held shower spray hose;shower chair;bathing skills, all  -DN grab bar, tub/shower;hand-held shower spray hose;shower chair;bathing skills, all  -KP    [place] Level (Bathing Goal 2, OT-IRF) set-up required;contact guard required  -DN set-up required;contact guard required  -DN set-up required;contact  guard required  -KP    Time Frame (Bathing Goal 2, OT-IRF) long-term goal (LTG);by discharge  -DN long-term goal (LTG);by discharge  -DN long-term goal (LTG);by discharge  -KP    Progress/Outcomes (Bathing Goal 2, OT-IRF) goal met  -DN goal met;goal revised this date  -DN continuing progress toward goal  -KP       UB Dressing Goal 1 (OT-IRF)    Activity/Device (UB Dressing Goal 1, OT-IRF) upper body dressing  -DN upper body dressing  -DN upper body dressing  -KP    [place] (UB Dress Goal 1, OT-IRF) supervision required  -DN set-up required;standby assist  -DN set-up required;standby assist  -KP    Time Frame (UB Dressing Goal 1, OT-IRF) short-term goal (STG)  -DN short-term goal (STG);1 week  -DN short-term goal (STG);1 week  -KP    Progress/Outcomes (UB Dressing Goal 1, OT-IRF) goal met  -DN goal partially met  pt varies with type of shirt, and shoulder pain  -DN good progress toward goal  -KP       UB Dressing Goal 2 (OT-IRF)    Activity/Device (UB Dressing Goal 2, OT-IRF) upper body dressing  -DN upper body dressing  -DN upper body dressing  -KP    [place] (UB Dress Goal 2, OT-IRF) set-up required  -DN set-up required  -DN set-up required  -KP    Time Frame (UB Dressing Goal 2, OT-IRF) long-term goal (LTG)  -DN long-term goal (LTG);by discharge  -DN long-term goal (LTG);by discharge  -KP    Progress/Outcomes (UB Dressing Goal 2, OT-IRF) goal met  -DN goal ongoing  -DN good progress toward goal  -KP       LB Dressing Goal 1 (OT-IRF)    Activity/Device (LB Dressing Goal 1, OT-IRF) lower body dressing  -DN lower body dressing;elastic laces;sock aid;reacher  -DN lower body dressing;elastic laces;sock aid;reacher  -KP    [place] (LB Dressing Goal 1, OT-IRF) moderate assist (50-74% patient effort)  -DN moderate assist (50-74% patient effort)  -DN moderate assist (50-74% patient effort)  -KP    Time Frame (LB Dressing Goal 1, OT-IRF) short-term goal (STG);1 week  -DN short-term goal (STG);1 week  -DN  short-term goal (STG);1 week  -KP    Progress/Outcomes (LB Dressing Goal 1, OT-IRF) goal not met  -DN goal not met;goal ongoing  -DN continuing progress toward goal  -KP       LB Dressing Goal 2 (OT-IRF)    Activity/Device (LB Dressing Goal 2, OT-IRF) lower body dressing;reacher;sock aid;elastic laces  -DN lower body dressing;reacher;sock aid;elastic laces  -DN lower body dressing;reacher;sock aid;elastic laces  -KP    [place] (LB Dressing Goal 2, OT-IRF) moderate assist (50-74% patient effort)  -DN moderate assist (50-74% patient effort)  -DN minimum assist (75% or more patient effort)  -KP    Time Frame (LB Dressing Goal 2, OT-IRF) long-term goal (LTG)  -DN long-term goal (LTG)  -DN long-term goal (LTG);by discharge  -KP    Progress/Outcomes (LB Dressing Goal 2, OT-IRF) goal not met  -DN goal revised this date  -DN continuing progress toward goal  -KP       Grooming Goal 1 (OT-IRF)    Activity/Device (Grooming Goal 1, OT-IRF) grooming skills, all  -DN grooming skills, all  -DN grooming skills, all  -KP    [place] (Grooming Goal 1, OT-IRF) set-up required  -DN set-up required;supervision required  -DN set-up required;supervision required  -KP    Time Frame (Grooming Goal 1, OT-IRF) short-term goal (STG)  -DN short-term goal (STG);1 week  -DN short-term goal (STG);1 week  -KP    Progress/Outcomes (Grooming Goal 1, OT-IRF) goal met  -DN goal partially met  varies  -DN good progress toward goal  -KP       Grooming Goal 2 (OT-IRF)    Activity/Device (Grooming Goal 2, OT-IRF) grooming skills, all  -DN grooming skills, all  -DN grooming skills, all  -KP    [place] (Grooming Goal 2, OT-IRF) set-up required  -DN set-up required  -DN set-up required  -KP    Time Frame (Grooming Goal 2, OT-IRF) long-term goal (LTG);by discharge  -DN long-term goal (LTG);by discharge  -DN long-term goal (LTG);by discharge  -KP    Progress/Outcomes (Grooming Goal 2, OT-IRF) goal met  -DN goal ongoing  -DN continuing progress  toward goal  -KP       Toileting Goal 1 (OT-IRF)    Activity/Device (Toileting Goal 1, OT-IRF) toileting skills, all;grab bar/safety frame  -DN toileting skills, all;grab bar/safety frame  -DN toileting skills, all;grab bar/safety frame  -KP    [place] Level (Toileting Goal 1, OT-IRF) moderate assist (50-74% patient effort)  -DN moderate assist (50-74% patient effort)  -DN moderate assist (50-74% patient effort)  -KP    Progress/Outcomes (Toileting Goal 1, OT-IRF) goal not met  -DN goal not met  -DN continuing progress toward goal  -KP    Time Frame (Toileting Goal 1, OT-IRF) short-term goal (STG)  -DN short-term goal (STG)  -DN short-term goal (STG);1 week  -KP       Toileting Goal 2 (OT-IRF)    Activity/Device (Toileting Goal 2, OT-IRF) toileting skills, all;grab bar/safety frame  -DN toileting skills, all;grab bar/safety frame  -DN toileting skills, all;grab bar/safety frame  -KP    [place] Level (Toileting Goal 2, OT-IRF) set-up required;moderate assist (50-74% patient effort)  -DN set-up required;moderate assist (50-74% patient effort)  -DN set-up required;contact guard required;minimum assist (75% or more patient effort)  -KP    Progress/Outcomes (Toileting Goal 2, OT-IRF) goal not met;goal revised this date  -DN goal not met;goal revised this date  -DN continuing progress toward goal  -KP    Time Frame (Toileting Goal 2, OT-IRF) long-term goal (LTG)  -DN long-term goal (LTG)  -DN long-term goal (LTG);by discharge  -KP       Strength Goal 1 (OT-IRF)    Strength Goal 1 (OT-IRF) incr R hand  to 20 and R UE to 4-/5  -DN incr R hand  to 20 and R UE to 4-/5  -DN incr R hand  to 20 and R UE to 4-/5  -KP    Time Frame (Strength Goal 1, OT-IRF) long-term goal (LTG);by discharge  -DN long-term goal (LTG);by discharge  -DN long-term goal (LTG);by discharge  -KP    Progress/Outcomes (Strength Goal 1, OT-IRF) goal not met  -DN goal not met;goal ongoing  -DN continuing progress toward goal  -KP        Balance Goal 1 (OT)    Activity/Assistive Device (Balance Goal 1, OT) standing, dynamic;walker, rolling  -DN standing, dynamic;walker, rolling  -DN standing, dynamic;walker, rolling  -KP    [place] Level/Cues Needed (Balance Goal 1, OT) contact guard assist  -DN contact guard assist  -DN contact guard assist  -KP    Time Frame (Balance Goal 1, OT) long term goal (LTG);by discharge  -DN long term goal (LTG);by discharge  -DN long term goal (LTG);by discharge  -KP    Progress/Outcomes (Balance Goal 1, OT) goal met  -DN goal ongoing  -DN good progress toward goal  -KP       Caregiver Training Goal 1 (OT-IRF)    Caregiver Training Goal 1 (OT-IRF) pt family ed on safety w tsf w pt and ADLs. pt ed on HEP for [facility] and [facility]  -DN pt family ed on safety w tsf w pt and ADLs. pt ed on HEP for [facility] and [facility]  -DN pt family ed on safety w tsf w pt and ADLs. pt ed on HEP for [facility] and [facility]  -KP    Time Frame (Caregiver Training Goal 1, OT-IRF) long-term goal (LTG);by discharge  -DN long-term goal (LTG);by discharge  -DN long-term goal (LTG);by discharge  -KP    Progress/Outcomes (Caregiver Training Goal 1, OT-IRF) goal not met  pt family did not go through teaching due to going to SNU  -DN goal ongoing  -DN continuing progress toward goal  -KP          User Key  (r) = Recorded By, (t) = Taken By, (c) = Cosigned By    Initials Name Provider Type    Ismael Quintanilla OT Occupational Therapist    Patsy Rosen OTR Occupational Therapist                    Time Calculation:    Time Calculation- OT     Row Name 04/26/23 1300 04/26/23 0900          Time Calculation- OT    OT Start Time 1300  -DN 0900  -DN     OT Stop Time 1330  -DN 0930  -DN     OT Time Calculation (min) 30 min  -DN 30 min  -DN           User Key  (r) = Recorded By, (t) = Taken By, (c) = Cosigned By    Initials Name Provider Type    Ismael Quintanilla OT Occupational Therapist                Therapy Charges for Today     Code Description Service Date Service  Provider Modifiers Qty    84579300044 HC OT SELF CARE/MGMT/TRAIN EA 15 MIN 4/25/2023 Ismael Lanier OT GO 2    41819710494 HC OT THER PROC EA 15 MIN 4/25/2023 Ismael Lanier OT GO 2    63495425037 HC OT SELF CARE/MGMT/TRAIN EA 15 MIN 4/26/2023 Ismael Lanier OT GO 2    66757145494 HC OT THER PROC EA 15 MIN 4/26/2023 Ismael Lanier OT GO 2               OT Discharge Summary  Anticipated Discharge Disposition (OT): skilled nursing facility  Reason for Discharge: Discharge from facility  Outcomes Achieved: Patient able to partially acheive established goals  Discharge Destination: SNF    Ismael Lanier OT  4/26/2023

## 2023-04-26 NOTE — PROGRESS NOTES
LOS: 21 days   Patient Care Team:  Jayro Fountain MD as PCP - General (Internal Medicine)      Patient Name: PIERRE FREEMAN  Patient : 1940    ADMITTING DIAGNOSIS:  Diagnoses    1. Follow-up examination  2. IMPAIRED FUNCTIONAL MOBILITY, BALANCE, GAIT, AND ENDURANCE       Left basal ganglia stroke  Right hemiparesis with impaired motor control    SUBJECTIVE:  Patient seen and examined just after breakfast. She is doing well today. Sleeping well and having regular bowel movements. No acute complaints or concerns.     OBJECTIVE:    Vitals:    23 0528   BP: 106/72   Pulse: 81   Resp: 18   Temp: 97 °F (36.1 °C)   SpO2: 96%       PHYSICAL EXAM:   General: pleasant, in no acute distress  HEENT: NCAT, sclera anicteric, conjunctiva pink, mucoa moist  Cardiovascular: RRR, +S1+S2, no obvious m/g/r  Lungs: CTAB, no rhonchi or wheezing  Abdomen: normoactive bowel sounds, soft, non tender to palpation  Extremities: no peripheral edema  Skin: exposed surfaces of skin warm, intact, without erythema  Neuro: awake alert and oriented to person, place, time, and situation,  Right facial droop   dysarthria.  MSK: Right shoulder flexion 4/5, elbow flexion 4+/5, finger flexion 4+/5, knee extension 4+/5, ankle dorsiflexion 4/5,  weak on the RUE, LUE 5/5 BLE 5/5      MEDICATIONS  Scheduled Meds:  amLODIPine, 10 mg, Oral, Q24H  vitamin C, 250 mg, Oral, Daily  aspirin, 81 mg, Oral, Daily  cholecalciferol, 1,000 Units, Oral, Daily  enoxaparin, 40 mg, Subcutaneous, Q24H  lisinopril, 5 mg, Oral, Q24H  rosuvastatin, 20 mg, Oral, Nightly  senna-docusate sodium, 2 tablet, Oral, Nightly        Continuous Infusions:       PRN Meds:  •  acetaminophen **OR** [DISCONTINUED] acetaminophen  •  [DISCONTINUED] senna-docusate sodium **AND** polyethylene glycol **AND** bisacodyl **AND** bisacodyl  •  butalbital-acetaminophen-caffeine  •  melatonin      RESULTS:  No results found for: POCGLU  Results from last 7 days   Lab Units  04/24/23  0639 04/21/23  0912   WBC 10*3/mm3 5.92 6.29   HEMOGLOBIN g/dL 12.6 13.0   HEMATOCRIT % 38.6 40.9   PLATELETS 10*3/mm3 216 222     Results from last 7 days   Lab Units 04/24/23  0639 04/21/23  0912   SODIUM mmol/L 144 144   POTASSIUM mmol/L 4.3 4.3   CHLORIDE mmol/L 109* 109*   CO2 mmol/L 25.9 23.8   BUN mg/dL 29* 31*   CREATININE mg/dL 0.75 0.97   CALCIUM mg/dL 9.4 9.7   GLUCOSE mg/dL 108* 150*       Urine Culture   Date Value Ref Range Status   04/19/2023 >100,000 CFU/mL Morganella morganii ssp morganii (A)  Final   04/19/2023 (A)  Final    >100,000 CFU/mL Klebsiella pneumoniae ssp pneumoniae         ASSESSMENT and PLAN:    Infarction of left basal ganglia    Left basal ganglia stroke  Right hemiparesis with impaired motor control  - Stroke prophylaxis: ASA and Plavix x 21 days from March 30, 2023, then ASA 81 mg daily. Crestor 20 mg.  - TSH, Vit B12 and Vit D levels within normal limits   April 26 - stable for dc tomorrow     Dysphagia   April 14 - upgrade to soft (chopped meats)/no mixed consistencies with NTL. Recommend sit upright in wheelchair with supervision. Check intermittently for pocketing. Meds one at a time with applesauce or pudding.  April 26 - VSS today     Dysarthria  -April 21 - Noted to be 100% intelligible in conversation with SLP with some moments of word finding delays.      Hypertension - amlodipine/lisinopril     Urinary retention. Treated for UTI as Reggie Duran. Check bladder scan PVR and cath if >300 cc until three consecutive < 250 cc  April 7-requires intermittent straight cath 400-113-300 cc.  No spontaneous void.  Follow pattern.  April 10-requirement straight catheter 300 cc about every 6 or 8 hours.  - will extend interval to every 12 hours to see if she voids with a larger volume.  4/12- has not required intermittent catheterization since last night.  Noted to have 4 voids with PVR less than 200.  May be recovering bladder function.  Will monitor.  4/13-voiding on  her own with postvoid residuals 190 range  4/19-complaints of dysuria-urinalysis ordered     Urinary Tract Infection, resolved  -complaint of dysuria on 4/19 with positive UA.   -Culture positive for morganella morganii and klebsiella pneumoniae susceptible to cefdinir  -Completed cefdinir 300mg BID for 5 days     Right upper extremity discomfort  -April 17-Has some diffuse pain in the right upper extremity.  Distal right upper extremity slightly warmer than the left. No allodynia.  - Reviewed possible post stroke pain/central pain syndrome versus sympathetic mediated pain versus shoulder subluxation.  Will monitor.  April 26 - persistent pain controlled by Tylenol      DVT prophylaxis- on dual antiplatelet therapies.  SCDs.  Lovenox     Team Conference 4/11/2023  Nursing: continent/incontinent with bladder, requiring cathing each bladder scan, no safety issues  PT: bed mobiliy min assist, contact guard with transfers, ambulating 80 feet, fatigues quickly with low endurance, cues for sequencing, drags left foot, anticipated contact guard with walker  OT: mod assist for bathing, max/dependent with lower body dressing, toileting max assist due to balance  SLP: mildly impaired on CLQT WNL memory/visuospatial, and mild for attention/executive function, puree and nectar thick diet, mild to moderate dysarthria  Discharge Date: 1.5 weeks      TEAM CONF - April 18 - PROGRESS SLOWER THAN INITIAL FIRST DAYS SO ANTICIPATE EXTENDING LENGTH OF STAY - STRONGER BUT STILL IMPAIRED MOTOR CONTROL. FATIGUE. BED MOD. TRANSFERS MIN CTG. GAIT 80 FEET CTG RW , LESS DRAG RIGHT FEET. 4 STAIRS CTG. BATH MOD. LBD MAX. UBD MIN SBA. COGNITION - CUES FOR ATTENTION TO DETAIL. SWALLOW - COUGH WITH WATER BY CUP. MECH SOFT, NTL. VOICING/ARTICULATION- MILD DYSARTHRIA AND WILL SELF CORRECT.  BNE YESTERDAY. SEE REPORT IN [facility]. Attention: WNL  Executive Functioning: Mildly Impaired  Abstract Reasoning: WNL  Arithmetic: Mildly Impaired  Visuospatial  Perception: WNL  Visuospatial Praxis: Moderately to Severely Impaired  Verbal Memory: Immediate - Moderately Impaired; Delayed - WNL for Stories, Severely Impaired for Lists  Visual Memory: Mildly Impaired  Emotional: Mild anxiety and depression related to worries about her quality-of-life following discharge from the hospital.  O2 AT NIGHT. STILL INCONTINENT- TIMED VOIDS. ALLERGIES - SNEEZES.   ELOS - 1.5 WEEKS    TEAM CONF -APRIL 25 -  BED MED. TRANSFER MIN CTG GAIT 80 - 160 FEET CTG RW. TRANSFERS MIN CTG.   TOILET AND SHOWER TRANSFERS CTG RW. BAT MIN MOD WITH ADAPTIVE EQUIPMENT. LVD MOD. UBD SBA. COGNITION - MILD DIFFICULTY WITH DIVERGENT REASONING. DECREASED RECALL/CARRYOVER. MILD DYSARTHRIA. SWALLOW CHOPPED MEAT / NTL. REPEAT VFSS  ELOS - FAMILY UNABLE TO MANAGE PATIENT AT HOME IN SHORT TERM. LOOKING AT [facility].     CODE STATUS:  Level Of Support Discussed With: Patient  Code Status (Patient has no pulse and is not breathing): No CPR (Do Not Attempt to Resuscitate)  Medical Interventions (Patient has pulse or is breathing): Full Support  Release to patient: Routine Release      Admission Status: Continues to meet requirements for inpatient admission.       Shannon Martinez DO      During rounds, used appropriate personal protective equipment including mask and gloves.  Additional gown if indicated.  Mask used was standard procedure mask. Appropriate PPE was worn during the entire visit.  Hand hygiene was completed before and after.

## 2023-04-26 NOTE — PROGRESS NOTES
"Section C. BIMS  Brief Interview for Mental Status (BIMS) was conducted.  Repetition of Three Words: Three words  Able to report correct year: Correct  Able to report correct month: Accurate within 5 days  Able to report correct day of the week: Correct  Able to recall \"sock\": Yes, no cue required  Able to recall \"blue\": Yes, no cue required  Able to recall \"bed\": Yes, no cue required    BIMS SUMMARY SCORE: 15 Cognitively intact    Section C. Signs and Symptoms of Delirium (from CAM)  Acute Change in Mental Status:   No  Inattention:   Behavior not present  Disorganized Thinking:   Behavior not present  Altered Level of Consciousness:   Behavior not present    Signed by: Janell Gallego, SLP    "

## 2023-04-26 NOTE — PROGRESS NOTES
SECTION GG      Mobility Performance Discharge:     Roll Left and Right: [place] provides verbal cues and/or touching/steadying  and/or contact guard assistance as patient completes activity. Assistance may be  provided throughout the activity or intermittently.   Sit to Lying: [place] provides verbal cues and/or touching/steadying and/or  contact guard assistance as patient completes activity. Assistance may be  provided throughout the activity or intermittently.   Lying to Sitting on Side of Bed: [place] does less than half the effort. [place]  lifts, holds or supports trunk or limbs but provides less than half the effort.   Sit to Stand: [place] does less than half the effort. [place] lifts, holds or  supports trunk or limbs but provides less than half the effort.   Chair/Bed to Chair Transfer: [place] does less than half the effort. [place]  lifts, holds or supports trunk or limbs but provides less than half the effort.   Car Transfer: [place] does less than half the effort. [place] lifts, holds or  supports trunk or limbs but provides less than half the effort.   Walk 10 Feet:   [place] provides verbal cues and/or touching/steadying and/or  contact guard assistance as patient completes activity. Assistance may be  provided throughout the activity or intermittently.  Walk 50 Feet with 2 Turns:   [place] provides verbal cues and/or  touching/steadying and/or contact guard assistance as patient completes  activity. Assistance may be provided throughout the activity or intermittently.  Walk 150 Feet:   [place] provides verbal cues and/or touching/steadying and/or  contact guard assistance as patient completes activity. Assistance may be  provided throughout the activity or intermittently.  Walking 10 Feet on Uneven Surfaces:   Not attempted due to medical or safety  concerns.  1 Step Over Curb or Up/Down Stair:   [place] provides verbal cues and/or  touching/steadying and/or contact guard assistance as patient  completes  activity. Assistance may be provided throughout the activity or intermittently.  4 Steps Up and Down, With/Without Rail:   [place] provides verbal cues and/or  touching/steadying and/or contact guard assistance as patient completes  activity. Assistance may be provided throughout the activity or intermittently.  12 Steps Up and Down, With/Without Rail:   Not attempted due to medical or  safety concerns.  Picking up an Object:   Not attempted due to medical or safety concerns. Uses  Wheelchair and/or Scooter: No    Section J. Health Conditions (Pain):  Pain Interference with Therapy Activities:   Rarely or not at all  Pain Interference with Day-to-Day Activities:   Rarely or not at all    Signed by: Diann Gonzáles DPT

## 2023-04-26 NOTE — PLAN OF CARE
Goal Outcome Evaluation:  Plan of Care Reviewed With: patient        Progress: improving  Outcome Evaluation: Pt is A&Ox4, pleasant, c/o headache, gave tylenol, meds w/ applesauce, pt incontinent of B/B, 2L O2 at bedtime, BM 4/25, will continue to monitor.

## 2023-04-26 NOTE — PLAN OF CARE
Goal Outcome Evaluation:  Plan of Care Reviewed With: patient     VFSS completed.. Pt presented with mild-moderate oropharyngeal dysphagia. In the oral phase, pt exhibited mildly prolonged mastication of small bite of regular consistency with minimal oral residue in right buccal cavity. In pharyngeal phase, delayed swallow initiation and discoordination of airway closure which led to penetration of thins by tsp before the swallow and aspiration during the swallow with strong cough response on initial trial. On 2nd trial of thins by tsp, pt exhibited trace penetration during the swallow to the level of the vocal folds with no cough response. SLP cued pt to cough. With thins by cup, pt exhibited transient shallow penetration above the vocal folds in 4/4 trials. With thins by straw, pt exhibited transient shallow penetration. Pt with cough following this trial; no aspiration was visualized under fluoro. Pt exhibited trace, transient penetration above the vocal folds with NTL by cup and straw.Pt exhibited silent trace deep penetration above the vocal folds with thin component of mixed, suspect due to spillage from pyriforms. No penetration or aspiration observed with NTL by tsp, puree and regular.     Recommend continue soft (chopped meats)/no mixed consistencies with NTL. Hydration protocol by cup 30 minutes after meals with adequate oral care. Meds one at a time with applesauce. Sit upright for all meals. Small bites/sips, slow rate. Trials of soft (chopped meats) with thins with SLP only.

## 2023-04-26 NOTE — PROGRESS NOTES
Inpatient Rehabilitation Plan of Care Note    Plan of Care  Care Plan Reviewed - No updates at this time.    Psychosocial    Performed Intervention(s)  Verbalizes needs and concerns  Therapeutic environmental set up      Sphincter Control    Performed Intervention(s)  Bladder scan or I/O cath per order  Encourage fluids  Bowel/bladder training      Safety    Performed Intervention(s)  Safety Rounds  Bed alarm and/or chair alarm    Signed by: Janis Magana RN

## 2023-04-26 NOTE — PROGRESS NOTES
Inpatient Rehabilitation Plan of Care Note    Plan of Care  Care Plan Reviewed - Updates as Follows    Psychosocial    [RN] Coping/Adjustment(Active)  Current Status(04/25/2023): Pt is A/Ox4, calm/cooperative. Pt is at risk for  reduced coping r/t stroke and new deficits.  Weekly Goal(05/02/2023): provide education material regarding stroke  Discharge Goal: Knowledgeable of care needs and stroke recovery    Performed Intervention(s)  Verbalizes needs and concerns  Therapeutic environmental set up      Sphincter Control    [RN] Bladder Management(Active)  Current Status(04/25/2023): Incontinent of bladder  Weekly Goal(05/02/2023): continent 25%  Discharge Goal: continent 100%    [RN] Bowel Management(Active)  Current Status(04/25/2023): Incontinent of stool  Weekly Goal(05/02/2023): Continent 50%  Discharge Goal: Continent 100%    Performed Intervention(s)  Bladder scan or I/O cath per order  Encourage fluids  Bowel/bladder training      Safety    [RN] Potential for Injury(Active)  Current Status(04/25/2023): Risk for falls  Weekly Goal(05/02/2023): Instruct family/caregivers regarding safety precautions  and need for close supervision  Discharge Goal: Family/caregiver will be knowledgeable of need for cueing and  supervision to avoid falls    Performed Intervention(s)  Safety Rounds  Bed alarm and/or chair alarm    Signed by: Emperatriz Mccoy RN

## 2023-04-27 PROCEDURE — 97110 THERAPEUTIC EXERCISES: CPT | Performed by: PHYSICAL THERAPIST

## 2023-04-27 PROCEDURE — 97110 THERAPEUTIC EXERCISES: CPT | Performed by: OCCUPATIONAL THERAPIST

## 2023-04-27 PROCEDURE — 97112 NEUROMUSCULAR REEDUCATION: CPT | Performed by: OCCUPATIONAL THERAPIST

## 2023-04-27 PROCEDURE — 25010000002 ENOXAPARIN PER 10 MG: Performed by: PHYSICAL MEDICINE & REHABILITATION

## 2023-04-27 PROCEDURE — 97130 THER IVNTJ EA ADDL 15 MIN: CPT

## 2023-04-27 PROCEDURE — 97129 THER IVNTJ 1ST 15 MIN: CPT

## 2023-04-27 PROCEDURE — 97116 GAIT TRAINING THERAPY: CPT | Performed by: PHYSICAL THERAPIST

## 2023-04-27 PROCEDURE — 92526 ORAL FUNCTION THERAPY: CPT

## 2023-04-27 RX ORDER — CALCIUM CARBONATE 200(500)MG
2 TABLET,CHEWABLE ORAL 3 TIMES DAILY PRN
Qty: 30 TABLET | Refills: 0 | Status: SHIPPED | OUTPATIENT
Start: 2023-04-27

## 2023-04-27 RX ORDER — LISINOPRIL 5 MG/1
5 TABLET ORAL
Qty: 30 TABLET | Refills: 0 | Status: SHIPPED | OUTPATIENT
Start: 2023-04-27

## 2023-04-27 RX ORDER — IBUPROFEN 200 MG
200 TABLET ORAL EVERY 6 HOURS PRN
Qty: 30 TABLET | Refills: 0 | Status: SHIPPED | OUTPATIENT
Start: 2023-04-27

## 2023-04-27 RX ORDER — ASPIRIN 81 MG/1
81 TABLET, CHEWABLE ORAL DAILY
Qty: 30 TABLET | Refills: 0 | Status: SHIPPED | OUTPATIENT
Start: 2023-04-27

## 2023-04-27 RX ORDER — ROSUVASTATIN CALCIUM 20 MG/1
20 TABLET, COATED ORAL DAILY
Qty: 45 TABLET | Refills: 0 | Status: SHIPPED | OUTPATIENT
Start: 2023-04-27

## 2023-04-27 RX ORDER — AMLODIPINE BESYLATE 10 MG/1
10 TABLET ORAL
Qty: 30 TABLET | Refills: 0 | Status: SHIPPED | OUTPATIENT
Start: 2023-04-27

## 2023-04-27 RX ORDER — MULTIVIT WITH MINERALS/LUTEIN
250 TABLET ORAL DAILY
Qty: 30 TABLET | Refills: 0 | Status: SHIPPED | OUTPATIENT
Start: 2023-04-27

## 2023-04-27 RX ORDER — MELATONIN
1000 DAILY
Qty: 30 TABLET | Refills: 0 | Status: SHIPPED | OUTPATIENT
Start: 2023-04-27

## 2023-04-27 RX ADMIN — OXYCODONE HYDROCHLORIDE AND ACETAMINOPHEN 250 MG: 500 TABLET ORAL at 09:12

## 2023-04-27 RX ADMIN — BUTALBITAL, ACETAMINOPHEN, AND CAFFEINE 1 TABLET: 50; 325; 40 TABLET ORAL at 19:45

## 2023-04-27 RX ADMIN — ASPIRIN 81 MG: 81 TABLET, CHEWABLE ORAL at 09:12

## 2023-04-27 RX ADMIN — ENOXAPARIN SODIUM 40 MG: 100 INJECTION SUBCUTANEOUS at 12:42

## 2023-04-27 RX ADMIN — ROSUVASTATIN CALCIUM 20 MG: 20 TABLET, FILM COATED ORAL at 19:45

## 2023-04-27 RX ADMIN — Medication 2 MG: at 19:45

## 2023-04-27 RX ADMIN — LISINOPRIL 5 MG: 5 TABLET ORAL at 09:12

## 2023-04-27 RX ADMIN — Medication 1000 UNITS: at 09:12

## 2023-04-27 RX ADMIN — AMLODIPINE BESYLATE 10 MG: 10 TABLET ORAL at 09:12

## 2023-04-27 NOTE — THERAPY TREATMENT NOTE
Inpatient Rehabilitation - Occupational Therapy Treatment Note    [facility]     Patient Name: Melissa Gomez  : 1940  MRN: [id number]    Today's Date: 2023                 Admit Date: 2023         ICD-10-CM ICD-9-CM   1. Impaired functional mobility, balance, gait, and endurance  Z74.09 V49.89   2. Follow-up exam  Z09 V67.9       Patient Active Problem List   Diagnosis   • Infarction of left basal ganglia       Past Medical History:   Diagnosis Date   • GERD (gastroesophageal reflux disease)    • Hyperlipidemia    • Hypertension    • Stroke        Past Surgical History:   Procedure Laterality Date   • COLONOSCOPY     • HYSTERECTOMY     • TONSILLECTOMY               IRF OT ASSESSMENT FLOWSHEET (last 12 hours)     IRF OT Evaluation and Treatment     Row Name 23 1506          OT Time and Intention    Document Type daily treatment  addendum to dc pt ins denied dc to rehab today. MD doing peer to peer.  -     Mode of Treatment individual therapy;occupational therapy  -KP     Patient Effort good  -KP     Symptoms Noted During/After Treatment fatigue  -     Comment, Evaluation/Treatment Not Performed pt had to complete all 3 hours in pm all in a row due to not being dc to nursing rehab due to ins today  -     Row Name 23 1506          General Information    Patient/Family/Caregiver Comments/Observations pt seated in wc  -     Existing Precautions/Restrictions fall;swallow precautions  NLT  -     Row Name 23 1506          Pain Assessment    Pretreatment Pain Rating 7/10  -KP     Posttreatment Pain Rating 7/10  -KP     Pain Location - Side/Orientation Right  -     Pain Location upper  -     Pain Location - shoulder  -KP     Pre/Posttreatment Pain Comment moist heat applied  -     Row Name 23 1506          Cognition/Psychosocial    Affect/Mental Status (Cognition) WFL  -     Orientation Status (Cognition) oriented x 3  -KP     Follows Commands (Cognition) follows  one-step commands;over 90% accuracy  \[facility]\""     Personal Safety Interventions fall prevention program maintained;gait belt;muscle strengthening facilitated;nonskid shoes/slippers when out of bed  -     Cognitive Function memory deficit  -     Attention Deficit (Cognition) minimal deficit  -KP     Row Name 04/27/23 1506          Bathing    Comment (Bathing) NT  -KP     Row Name 04/27/23 1506          Upper Body Dressing    Comment (Upper Body Dressing) [facility] Name 04/27/23 1506          Lower Body Dressing    Comment (Lower Body Dressing) NT  -KP     Row Name 04/27/23 1506          Grooming    Comment (Grooming) [facility] Name 04/27/23 1506          Toileting    Comment (Toileting) [facility] Name 04/27/23 1506          Bed Mobility    Comment, (Bed Mobility) in wc  -KP     Row Name 04/27/23 1506          Sit-Stand Transfer    Comment, (Sit-Stand Transfer) NT too fatigued this pm. had PT for 1 hr prior  -[facility] Name 04/27/23 1506          Motor Skills    Results, 9 Hole Peg Test of Fine Motor Coordination pt completed small color peg design w R and L hand incr [facility]. pt reaches for cones w L UE and insert onto dowel john  -KP     Row Name 04/27/23 1506          Elbow/Forearm (Therapeutic Exercise)    Elbow/Forearm Strengthening (Therapeutic Exercise) left;right;flexion;extension;supination;pronation;sitting;1 lb free weight;10 repetitions;3 sets  -KP     Row Name 04/27/23 1506          Wrist (Therapeutic Exercise)    Wrist Strengthening (Therapeutic Exercise) left;right;flexion;extension;1 lb free weight;10 repetitions;3 sets  -KP     Row Name 04/27/23 1506          Hand (Therapeutic Exercise)    Hand Strengthening (Therapeutic Exercise) right;left; strengthening;hand gripper;10 repetitions;3 sets  -KP     Row Name 04/27/23 1506          Balance    Dynamic Sitting Balance standby assist  -     Comment, Balance completed reaching task w L UE due to pain in R UE to incr balance, did not  complete any standing balance due to fatigue this pm and pt wanting to remain in the chair.  -     Row Name 04/27/23 1506          Hot Pack Treatment (Superficial Heat)    Location 1 (Hot Pack) upper extremity treatment area  -KP     Indications (Hot Pack) pain modulation  -KP     Treatment Details (Hot Packs) moist heat  -KP     Response (Hot Pack) pain decreased  -KP     Row Name 04/27/23 1506          Positioning and Restraints    Pre-Treatment Position sitting in chair/recliner  -KP     Post Treatment Position wheelchair  -KP     In Wheelchair sitting;exit alarm on;with SLP  -KP     Row Name 04/27/23 1506          Daily Progress Summary (OT)    Overall Progress Toward Functional Goals (OT) progressing toward functional goals as expected  -     Row Name 04/27/23 1506          Transfer Goal 1 (OT-IRF)    Activity/Assistive Device (Transfer Goal 1, OT-IRF) toilet;sit-to-stand/stand-to-sit;shower chair;walk-in shower;walker, rolling  -KP     [place] Level (Transfer Goal 1, OT-IRF) contact guard required  -KP     Time Frame (Transfer Goal 1, OT-IRF) short-term goal (STG)  -KP     Progress/Outcomes (Transfer Goal 1, OT-IRF) goal ongoing  -     Row Name 04/27/23 1506          Transfer Goal 2 (OT-IRF)    Activity/Assistive Device (Transfer Goal 2, OT-IRF) sit-to-stand/stand-to-sit;toilet;walk-in shower;shower chair;walker, rolling  -KP     [place] Level (Transfer Goal 2, OT-IRF) set-up required;contact guard required  -KP     Time Frame (Transfer Goal 2, OT-IRF) long-term goal (LTG);by discharge  -KP     Progress/Outcomes (Transfer Goal 2, OT-IRF) goal ongoing  -     Row Name 04/27/23 1506          Bathing Goal 1 (OT-IRF)    Activity/Device (Bathing Goal 1, OT-IRF) bathing skills, all;hand-held shower spray hose;grab bar, tub/shower;shower chair  -     [place] Level (Bathing Goal 1, OT-IRF) contact guard required  -KP     Time Frame (Bathing Goal 1, OT-IRF) short-term goal (STG)  -KP      Progress/Outcomes (Bathing Goal 1, OT-IRF) goal ongoing  -KP     Row Name 04/27/23 1506          LB Dressing Goal 1 (OT-IRF)    Activity/Device (LB Dressing Goal 1, OT-IRF) lower body dressing  -KP     [place] (LB Dressing Goal 1, OT-IRF) moderate assist (50-74% patient effort)  -KP     Time Frame (LB Dressing Goal 1, OT-IRF) short-term goal (STG);1 week  -KP     Progress/Outcomes (LB Dressing Goal 1, OT-IRF) goal ongoing  -KP     Row Name 04/27/23 1506          LB Dressing Goal 2 (OT-IRF)    Activity/Device (LB Dressing Goal 2, OT-IRF) lower body dressing;reacher;sock aid;elastic laces  -KP     [place] (LB Dressing Goal 2, OT-IRF) moderate assist (50-74% patient effort)  -KP     Time Frame (LB Dressing Goal 2, OT-IRF) long-term goal (LTG)  -KP     Progress/Outcomes (LB Dressing Goal 2, OT-IRF) goal ongoing  -KP     Row Name 04/27/23 1506          Toileting Goal 1 (OT-IRF)    Activity/Device (Toileting Goal 1, OT-IRF) toileting skills, all;grab bar/safety frame  -KP     [place] Level (Toileting Goal 1, OT-IRF) moderate assist (50-74% patient effort)  -KP     Progress/Outcomes (Toileting Goal 1, OT-IRF) goal ongoing  -KP     Time Frame (Toileting Goal 1, OT-IRF) short-term goal (STG)  -KP     Row Name 04/27/23 1506          Toileting Goal 2 (OT-IRF)    Activity/Device (Toileting Goal 2, OT-IRF) toileting skills, all;grab bar/safety frame  -KP     [place] Level (Toileting Goal 2, OT-IRF) set-up required;moderate assist (50-74% patient effort)  -KP     Progress/Outcomes (Toileting Goal 2, OT-IRF) goal ongoing  -KP     Time Frame (Toileting Goal 2, OT-IRF) long-term goal (LTG)  -KP     Row Name 04/27/23 1506          Strength Goal 1 (OT-IRF)    Strength Goal 1 (OT-IRF) incr R hand  to 20 and R UE to 4-/5  -KP     Time Frame (Strength Goal 1, OT-IRF) long-term goal (LTG);by discharge  -     Progress/Outcomes (Strength Goal 1, OT-IRF) goal ongoing  -KP     Row Name 04/27/23 1508           Functional Mobility Goal 1 (OT)    Activity/Assistive Device (Functional Mobility Goal 1, OT) walker, rolling  -KP     [place] Level/Cues Needed (Functional Mobility Goal 1, OT) contact guard assist  -KP     Distance Goal 1 (Functional Mobility, OT) commode and shower transfers  -KP     Time Frame (Functional Mobility Goal 1, OT) long term goal (LTG)  -KP     Progress/Outcome (Functional Mobility Goal 1, OT) goal ongoing  -     Row Name 04/27/23 1506          Caregiver Training Goal 1 (OT-IRF)    Caregiver Training Goal 1 (OT-IRF) pt family ed on safety w tsf w pt and ADLs. pt ed on HEP for [facility] and [facility]  re instating goal in case pt dc home from rehab  -KP     Time Frame (Caregiver Training Goal 1, OT-IRF) long-term goal (LTG);by discharge  -     Progress/Outcomes (Caregiver Training Goal 1, OT-IRF) goal ongoing  -           User Key  (r) = Recorded By, (t) = Taken By, (c) = Cosigned By    Initials Name Effective Dates     Patsy Blank, OTR 06/16/21 -                  Occupational Therapy Education     Title: PT OT SLP Therapies (Done)     Topic: Occupational Therapy (Done)     Point: ADL training (Done)     Description:   Instruct learner(s) on proper safety adaptation and remediation techniques during self care or transfers.   Instruct in proper use of assistive devices.              Learning Progress Summary           Patient Acceptance, E,TB, VU,NR by ERUM at 4/22/2023 1353    Acceptance, E, VU by  at 4/8/2023 1204    Acceptance, E,TB,D, VU,DU,NR by  at 4/6/2023 1230    Comment: ed pt on role of OT. benefit of therapy, POC w.  OT ed on safety w ADL and tsf. pt ed on UBD technique.                   Point: Home exercise program (Done)     Description:   Instruct learner(s) on appropriate technique for monitoring, assisting and/or progressing therapeutic exercises/activities.              Learning Progress Summary           Patient Acceptance, E,TB, VU,NR by  at 4/22/2023 1353     Acceptance, E, VU by  at 4/8/2023 1204    Acceptance, E,TB,D, VU,DU,NR by  at 4/6/2023 1230    Comment: ed pt on role of OT. benefit of therapy, POC w.  OT ed on safety w ADL and tsf. pt ed on UBD technique.                   Point: Precautions (Done)     Description:   Instruct learner(s) on prescribed precautions during self-care and functional transfers.              Learning Progress Summary           Patient Acceptance, E,TB, VU,NR by  at 4/22/2023 1353    Acceptance, E, VU by  at 4/8/2023 1204    Acceptance, E,TB,D, VU,DU,NR by  at 4/6/2023 1230    Comment: ed pt on role of OT. benefit of therapy, POC w.  OT ed on safety w ADL and tsf. pt ed on UBD technique.                   Point: Body mechanics (Done)     Description:   Instruct learner(s) on proper positioning and spine alignment during self-care, functional mobility activities and/or exercises.              Learning Progress Summary           Patient Acceptance, E,TB, VU,NR by  at 4/22/2023 1353    Acceptance, E, VU by  at 4/8/2023 1204    Acceptance, E,TB,D, VU,DU,NR by  at 4/6/2023 1230    Comment: ed pt on role of OT. benefit of therapy, POC w.  OT ed on safety w ADL and tsf. pt ed on UBD technique.                               User Key     Initials Effective Dates Name Provider Type Discipline     06/16/21 -  Patsy Blank OTR Occupational Therapist OT     06/16/21 -  Tiera Alanis, PT Physical Therapist PT     06/16/21 -  Yamila Wheat MS CCC-SLP Speech and Language Pathologist SLP                    OT Recommendation and Plan    Planned Therapy Interventions (OT): activity tolerance training, adaptive equipment training, BADL retraining, functional balance retraining, neuromuscular control/coordination retraining, occupation/activity based interventions, patient/caregiver education/training, ROM/therapeutic exercise, strengthening exercise, transfer/mobility retraining       Daily Progress Summary  (OT)  Overall Progress Toward Functional Goals (OT): progressing toward functional goals as expected            Time Calculation:      Time Calculation- OT     Row Name 04/27/23 1514             Time Calculation- OT    OT Start Time 1330  -      OT Stop Time 1430  -      OT Time Calculation (min) 60 min  -            User Key  (r) = Recorded By, (t) = Taken By, (c) = Cosigned By    Initials Name Provider Type     Patsy Blank OTR Occupational Therapist              Therapy Charges for Today     Code Description Service Date Service Provider Modifiers Qty    66189693009 HC OT THER PROC EA 15 MIN 4/27/2023 Patsy Blank OTR GO 2    04486138408 HC OT NEUROMUSC RE EDUCATION EA 15 MIN 4/27/2023 Patsy Blank OTR GO 2                   RIAN Barton  4/27/2023

## 2023-04-27 NOTE — PROGRESS NOTES
"Spoke with Yesica at [facility] this morning. She reported that patient's insurance was \"pending denial\" so she will not be able to come to [facility] at 12:30 today as planned. Yesica will keep SW updated on the insurance status. RENETTA let patient and daughter know the update and she understood and is fine with staying on the acute rehab unit at this time.  "

## 2023-04-27 NOTE — THERAPY TREATMENT NOTE
Inpatient Rehabilitation - Speech Language Pathology Treatment Note    [facility]     Patient Name: Melissa Gomez  : 1940  MRN: [id number]    Today's Date: 2023                   Admit Date: 2023       Visit Dx:      ICD-10-CM ICD-9-CM   1. Impaired functional mobility, balance, gait, and endurance  Z74.09 V49.89   2. Follow-up exam  Z09 V67.9       Patient Active Problem List   Diagnosis   • Infarction of left basal ganglia       Past Medical History:   Diagnosis Date   • GERD (gastroesophageal reflux disease)    • Hyperlipidemia    • Hypertension    • Stroke        Past Surgical History:   Procedure Laterality Date   • COLONOSCOPY     • HYSTERECTOMY     • TONSILLECTOMY         SLP Recommendation and Plan                                                            SLP EVALUATION (last 72 hours)     SLP SLC Evaluation     Row Name 23 1430 23 1330 23 1100             Communication Assessment/Intervention    Document Type therapy note (daily note)  -AL therapy note (daily note)  -AL therapy note (daily note)  -AL      Subjective Information no complaints  -AL -- --      Patient/Family/Caregiver Comments/Observations Pt participated well.  -AL Pt participated well.  -AL Pt participated well.  -AL         Pain Scale: Numbers Pre/Post-Treatment    Pretreatment Pain Rating 0/10 - no pain  -AL 0/10 - no pain  -AL --      Pre/Posttreatment Pain Comment -- -- headache; pt did not rate pain when asked, but stated it is beter under control after medicine prior to session.  -AL            User Key  (r) = Recorded By, (t) = Taken By, (c) = Cosigned By    Initials Name Effective Dates    Janell Erickson MS CCC-SLP 21 -                    EDUCATION    The patient has been educated in the following areas:       Cognitive Impairment Communication Impairment Dysphagia (Swallowing Impairment).             SLP GOALS     Row Name 23 1506 23 1430 23 1100       (LTG)  Patient will demonstrate functional swallow for    Diet Texture (Demonstrate functional swallow) -- -- soft to chew (chopped) textures  -AL    Liquid viscosity (Demonstrate functional swallow) -- -- thin liquids  -AL       (STG) Patient will tolerate trials of    Consistencies Trialed (Tolerate trials) -- -- thin liquids  -AL    Desired Outcome (Tolerate trials) -- -- without signs/symptoms of aspiration  -AL    [place] (Tolerate trials) -- -- with 1:1 assist/ supervision  -AL       (STG) Patient will tolerate therapeutic trials of    Consistencies Trialed (Tolerate therapeutic trials) -- -- regular textures;thin liquids  -AL    Desired Outcome (Tolerate therapeutic trials) -- -- without signs/symptoms of aspiration  -AL       ([facility]) Pharyngeal Strengthening Exercise Goal 1 (SLP)    Activity (Pharyngeal Strengthening Goal 1, SLP) -- -- increase timing;increase superior movement of the hyolaryngeal complex;increase anterior movement of the hyolaryngeal complex;increase epiglottic inversion and retroflexion;increase closure at entrance to airway/closure of airway at glottis  -AL    Increase Timing -- -- hard effortful swallow  -AL    Increase Superior Movement of the Hyolaryngeal Complex -- -- hard effortful swallow  -AL    Increase Anterior Movement of the Hyolaryngeal Complex -- -- EMST  -AL    Increase Epiglottic Inversion and Retroflexion -- -- [place]elsohn;hard effortful swallow;EMST  -AL    Increase Closure at Entrance to Airway/Closure of Airway at Glottis -- -- [place]elsohn;hard effortful swallow  -AL    [place]/Accuracy (Pharyngeal Strengthening Goal 1, SLP) -- -- with moderate cues (50-74% accuracy)  -AL    Progress/Outcomes (Pharyngeal Strengthening Goal 1, SLP) -- -- good progress toward goal  -AL    Comment (Pharyngeal Strengthening Goal 1, SLP) SLP reviewed images from VFSS completed 4/26. Discussed aspiration of thins by tsp and penetration of thins by cup. Discussed use of small sips and breath  "hold to reduce aspiration. Pt reported swallowing with this technique felt like water went down middle of throat rather than \"side of throat.\" No overt s/s of aspiration or penetration observed in 5/5 trials. Intermittent throat clear and cough observed during conversation today. Discussed intentionally swallowing saliva, rather than waiting for swallow reflex.  -AL No overt s/s of aspiration or penetration observed in 5/5 trials of water by cup. SLP showed pt VFSS images from 4/26. Discussed aspiration of thins by tsp and penetration of thins by cup. Discussed use of small sips and breath hold to reduce aspiration. Pt reported swallowing with this technique felt like water went down middle of throat rather than \"side of throat.\"  -AL --       Articulation Goal 1 (SLP)    Improve Articulation Goal 1 (SLP) -- by over-articulating at word level;by over-articulating at phrase level;by over-articulating in connected speech;80%;with minimal cues (75-90%)  -AL --    Time Frame (Articulation Goal 1, SLP) -- 1 week  -AL --    Progress/Outcomes (Articulation Goal 1, SLP) -- good progress toward goal  -AL --    Comment (Articulation Goal 1, SLP) -- Pt was 100% intelligible in structured conversation with initial MIN cues. Pt with intermittent throat clearing on saliva.  -AL --       Organizational Skills Goal 1 (SLP)    Progress/Outcomes (Thought Organization Skills Goal 1, SLP) good progress toward goal  -AL -- --    Comment (Thought Organization Skills Goal 1, SLP) Hot items: 7 with NO cues, 10 with MIN cues. Cold items: 7 with NO cues, 10 with MIN-MOD cues.  -AL -- --       Reasoning Goal 1 (SLP)    Progress/Outcomes (Reasoning Goal 1, SLP) goal ongoing  -AL -- --    Comment (Reasoning Goal 1, SLP) Moderate level logic puzzle: 70% with NO cues, 100% with MIN-MOD cues.  -AL -- --    Row Name 04/25/23 1330 04/25/23 1100          (STG) Pharyngeal Strengthening Exercise Goal 1 (SLP)    Progress/Outcomes (Pharyngeal " Strengthening Goal 1, SLP) -- good progress toward goal  -AL     Comment (Pharyngeal Strengthening Goal 1, SLP) -- No overt s/s of aspiration or penetration observed in 11/12 trials of water by cup; strong delayed cough noted x1. Plan to complete VFSS 4/26/23. Pt completed 30 repetitions of effortful swallow with MIN-MOD cues. Pt completed 5 sets of 5 reps of EMST75 at 1.25 cmH20 past 5 cmH20 with MOD cues.  -AL        Articulation Goal 1 (SLP)    Improve Articulation Goal 1 (SLP) by over-articulating at word level;by over-articulating at phrase level;by over-articulating in connected speech;80%;with minimal cues (75-90%)  -AL --     Progress/Outcomes (Articulation Goal 1, SLP) good progress toward goal  -AL --     Comment (Articulation Goal 1, SLP) Pt read aloud a multiple paragraph story (10 minute duration) with 100% intelligible with initial MIN cues for over-articulation and slow rate. Discussed reduced pitch inflection noted. Pt was stimulable for increasing pitch inflection for dialogue in story.  -AL --        Reasoning Goal 1 (SLP)    Improve Reasoning Through Goal 1 (SLP) complete deductive reasoning task;80%;independently (over 90% accuracy)  -AL --     Time Frame (Reasoning Goal 1, SLP) 1 week  -AL --     Progress/Outcomes (Reasoning Goal 1, SLP) goal ongoing  -AL --     Comment (Reasoning Goal 1, SLP) Moderate level logic puzzle: 80% with NO cues, 100% with MIN cues. Goal met x1.  -AL --           User Key  (r) = Recorded By, (t) = Taken By, (c) = Cosigned By    Initials Name Provider Type    Janell Erickson MS CCC-SLP Speech and Language Pathologist                            Time Calculation:        Time Calculation- SLP     Row Name 04/27/23 1547             Time Calculation- SLP    SLP Start Time 1430  -AL      SLP Stop Time 1530  -AL      SLP Time Calculation (min) 60 min  -AL            User Key  (r) = Recorded By, (t) = Taken By, (c) = Cosigned By    Initials Name Provider Type    AL  Janell Gallego, MS CCC-SLP Speech and Language Pathologist                  Therapy Charges for Today     Code Description Service Date Service Provider Modifiers Qty    83699594326 HC ST MOTION FLUORO EVAL SWALLOW 4 4/26/2023 Janell Gallego, MS CCC-SLP GN 1    66733531272 HC ST DEV OF COGN SKILLS INITIAL 15 MIN 4/27/2023 Janell Gallego, MS CCC-SLP  1    28485183030 HC ST DEV OF COGN SKILLS EACH ADDT'L 15 MIN 4/27/2023 Janell Gallego, MS CCC-SLP  1    33145648071 HC ST TREATMENT SWALLOW 2 4/27/2023 Janell Gallego, MS CCC-SLP GN 1                           Janell Gallego MS CCC-SLP  4/27/2023

## 2023-04-27 NOTE — THERAPY TREATMENT NOTE
Inpatient Rehabilitation - Physical Therapy Treatment Note       [facility]     Patient Name: Mleissa Gomez  : 1940  MRN: [id number]    Today's Date: 2023                    Admit Date: 2023      Visit Dx:     ICD-10-CM ICD-9-CM   1. Impaired functional mobility, balance, gait, and endurance  Z74.09 V49.89   2. Follow-up exam  Z09 V67.9       Patient Active Problem List   Diagnosis   • Infarction of left basal ganglia       Past Medical History:   Diagnosis Date   • GERD (gastroesophageal reflux disease)    • Hyperlipidemia    • Hypertension    • Stroke        Past Surgical History:   Procedure Laterality Date   • COLONOSCOPY     • HYSTERECTOMY     • TONSILLECTOMY         PT ASSESSMENT (last 12 hours)     IRF PT Evaluation and Treatment     Row Name 23 1230          PT Time and Intention    Document Type daily treatment  -JK     Mode of Treatment physical therapy  -JK     Patient/Family/Caregiver Comments/Observations pt seated in WC  -     Row Name 23 1230          General Information    Patient Profile Reviewed yes  -JK     Existing Precautions/Restrictions fall;swallow precautions  -     Row Name 23 1230          Pain Assessment    Pretreatment Pain Rating 5/10  -JK     Posttreatment Pain Rating 5/10  -JK     Pain Location - Side/Orientation Bilateral  -JK     Pain Location posterior  -JK     Pain Location - head  -JK     Pre/Posttreatment Pain Comment R shoulder/upper arm too  -     Row Name 23 1230          Cognition/Psychosocial    Affect/Mental Status (Cognition) WFL  -JK     Orientation Status (Cognition) oriented x 3  -     Row Name 23 1230          Sit-Stand Transfer    Sit-Stand Stevens (Transfers) contact guard;minimum assist (75% patient effort);verbal cues  -JK     Assistive Device (Sit-Stand Transfers) walker, front-wheeled  -     Row Name 23 1230          Stand-Sit Transfer    Stand-Sit Stevens (Transfers) contact  guard;verbal cues  -JK     Assistive Device (Stand-Sit Transfers) walker, front-wheeled;wheelchair  -[employer]     Row Name 04/27/23 1230          Gait/Stairs (Locomotion)    R[place] Level (Gait) contact guard;verbal cues  -JK     Assistive Device (Gait) walker, front-wheeled  -JK     Distance in Feet (Gait) 80'  -JK     Pattern (Gait) step-through  -JK     Deviations/Abnormal Patterns (Gait) gait speed decreased;lenard decreased;stride length decreased;weight shifting decreased  -     Bilateral Gait Deviations forward flexed posture;heel strike decreased  -Actionality     Row Name 04/27/23 1230          Safety Issues, Functional Mobility    Impairments Affecting Function (Mobility) balance;endurance/activity tolerance;strength  -Actionality     Row Name 04/27/23 1230          Hip (Therapeutic Exercise)    Hip Strengthening (Therapeutic Exercise) bilateral;sitting;marching while seated;aDduction;15 repititions  -Actionality     Row Name 04/27/23 1230          Knee (Therapeutic Exercise)    Knee Strengthening (Therapeutic Exercise) bilateral;sitting;LAQ (long arc quad);hamstring curls;resistance band;red;15 repititions  -Actionality     Row Name 04/27/23 1230          Ankle (Therapeutic Exercise)    Ankle Strengthening (Therapeutic Exercise) bilateral;sitting;dorsiflexion;plantarflexion;10 repetitions;2 sets  -Actionality     Row Name 04/27/23 1230          Positioning and Restraints    Pre-Treatment Position sitting in chair/recliner  -JK     Post Treatment Position wheelchair  -JK     In Wheelchair sitting;exit alarm on;encouraged to call for assist;with OT;heels elevated  -           User Key  (r) = Recorded By, (t) = Taken By, (c) = Cosigned By    Initials Name Provider Type    Stephani Jones, PT Physical Therapist                 Physical Therapy Education     Title: PT OT SLP Therapies (Done)     Topic: Physical Therapy (Done)     Point: Mobility training (Done)     Learning Progress Summary           Patient Acceptance, E,D, VU,DU by EDGAR at  4/27/2023 1316    Acceptance, E, DU,VU,NR by MG at 4/25/2023 1219    Acceptance, E,D, VU,DU,NR by JK at 4/25/2023 1044    Acceptance, E,D, VU,DU by DP at 4/24/2023 1636    Acceptance, E,TB, VU,NR by EE at 4/21/2023 1507    Acceptance, E,TB,D, VU,NR by EE at 4/20/2023 1110    Acceptance, E,TB, VU,NR by EE at 4/20/2023 1043    Acceptance, E,TB,D, VU,NR by EE at 4/19/2023 1121    Acceptance, E,TB, VU,NR by EE at 4/18/2023 1102    Acceptance, E,D,TB, VU,NR by EE at 4/17/2023 1129    Acceptance, TB,E, VU,NR by EE at 4/13/2023 1047    Acceptance, E,D, VU,DU by DP at 4/11/2023 1038    Acceptance, E, VU,NR by EE at 4/10/2023 1043    Acceptance, E, VU by LS at 4/8/2023 1204    Acceptance, E,TB, VU,NR by EE at 4/7/2023 1054    Acceptance, E,TB, VU by KP at 4/6/2023 1527                   Point: Home exercise program (Done)     Learning Progress Summary           Patient Acceptance, E,D, VU,DU by JK at 4/27/2023 1316    Acceptance, E,D, VU,DU,NR by JK at 4/25/2023 1044    Acceptance, E,D, VU,DU by DP at 4/24/2023 1636    Acceptance, E,TB, VU,NR by ERUM at 4/22/2023 1353    Acceptance, E,TB, VU,NR by EE at 4/21/2023 1507    Acceptance, E,TB,D, VU,NR by EE at 4/20/2023 1110    Acceptance, E,TB, VU,NR by EE at 4/20/2023 1043    Acceptance, E,TB,D, VU,NR by EE at 4/19/2023 1121    Acceptance, E,TB, VU,NR by EE at 4/18/2023 1102    Acceptance, E,D,TB, VU,NR by EE at 4/17/2023 1129    Acceptance, TB,E, VU,NR by EE at 4/13/2023 1047    Acceptance, E,D, VU,DU by DP at 4/11/2023 1038    Acceptance, E, VU,NR by EE at 4/10/2023 1043    Acceptance, E, VU by LS at 4/8/2023 1204    Acceptance, E,TB, VU,NR by EE at 4/7/2023 1054    Acceptance, E,TB, VU by KP at 4/6/2023 1527                   Point: Body mechanics (Done)     Learning Progress Summary           Patient Acceptance, E, DU,VU,NR by MG at 4/25/2023 1219                   Point: Precautions (Done)     Learning Progress Summary           Patient Acceptance, E, DU,VU,NR by MG at  4/25/2023 1219                               User Key     Initials Effective Dates Name Provider Type Discipline    ERUM 06/16/21 -  Tiera Alanis, PT Physical Therapist PT    LS 06/16/21 -  Yamila Wheat MS CCC-SLP Speech and Language Pathologist SLP    EE 06/16/21 -  Diann Gonzáles, PT Physical Therapist PT    JK 06/16/21 -  Stephani Elder, PT Physical Therapist PT    KP 06/16/21 -  Chen Centeno, PT Physical Therapist PT    MG 05/24/22 -  Shama Wild, PT Physical Therapist PT    DP 08/24/21 -  Gen Gray, PT Physical Therapist PT                PT Recommendation and Plan                          Time Calculation:      PT Charges     Row Name 04/27/23 1441             Time Calculation    Start Time 1230  -JK      Stop Time 1330  -JK      Time Calculation (min) 60 min  -JK            User Key  (r) = Recorded By, (t) = Taken By, (c) = Cosigned By    Initials Name Provider Type     Stephani Elder, PT Physical Therapist                Therapy Charges for Today     Code Description Service Date Service Provider Modifiers Qty    40857187841 HC PT THER PROC EA 15 MIN 4/27/2023 Stephani Elder, PT GP 2    62788181681 HC GAIT TRAINING EA 15 MIN 4/27/2023 Stephani Elder, PT GP 2                   Stephani Elder, PT  4/27/2023

## 2023-04-27 NOTE — PROGRESS NOTES
LOS: 22 days   Patient Care Team:  Jayro Fountain MD as PCP - General (Internal Medicine)      Patient Name: PIERRE FREEMAN  Patient : 1940    ADMITTING DIAGNOSIS:  Diagnoses    1. Follow-up examination  2. IMPAIRED FUNCTIONAL MOBILITY, BALANCE, GAIT, AND ENDURANCE       Left basal ganglia stroke  Right hemiparesis with impaired motor control    SUBJECTIVE:  Patient seen and examined just after breakfast. She is doing well today without acute complaint.  She was supposed to be discharged today but unfortunately her insurance has denied her at the facility she was to be discharged to. Approved for IRF through the 2nd of May.     OBJECTIVE:    Vitals:    23 0500   BP: 110/62   Pulse: 75   Resp: 18   Temp: 97.7 °F (36.5 °C)   SpO2: 97%       PHYSICAL EXAM:   General: pleasant, in no acute distress  HEENT: NCAT, sclera anicteric, conjunctiva pink, mucoa moist  Cardiovascular: RRR, +S1+S2, no obvious m/g/r  Lungs: CTAB, no rhonchi or wheezing  Abdomen: normoactive bowel sounds, soft, non tender to palpation  Extremities: no peripheral edema  Skin: exposed surfaces of skin warm, intact, without erythema  Neuro: awake alert and oriented to person, place, time, and situation,  Right facial droop   Mild dysarthria  MSK: Right shoulder flexion 4/5, elbow flexion 4+/5, finger flexion 4+/5, knee extension 4+/5, ankle dorsiflexion 4/5,  weak on the RUE, LUE 5/5 BLE 5/5      MEDICATIONS  Scheduled Meds:  amLODIPine, 10 mg, Oral, Q24H  vitamin C, 250 mg, Oral, Daily  aspirin, 81 mg, Oral, Daily  cholecalciferol, 1,000 Units, Oral, Daily  enoxaparin, 40 mg, Subcutaneous, Q24H  lisinopril, 5 mg, Oral, Q24H  rosuvastatin, 20 mg, Oral, Nightly  senna-docusate sodium, 2 tablet, Oral, Nightly        Continuous Infusions:       PRN Meds:  •  acetaminophen **OR** [DISCONTINUED] acetaminophen  •  benzonatate  •  [DISCONTINUED] senna-docusate sodium **AND** polyethylene glycol **AND** bisacodyl **AND**  bisacodyl  •  butalbital-acetaminophen-caffeine  •  calcium carbonate  •  melatonin  •  phenol      RESULTS:  No results found for: POCGLU  Results from last 7 days   Lab Units 04/24/23  0639 04/21/23  0912   WBC 10*3/mm3 5.92 6.29   HEMOGLOBIN g/dL 12.6 13.0   HEMATOCRIT % 38.6 40.9   PLATELETS 10*3/mm3 216 222     Results from last 7 days   Lab Units 04/24/23  0639 04/21/23  0912   SODIUM mmol/L 144 144   POTASSIUM mmol/L 4.3 4.3   CHLORIDE mmol/L 109* 109*   CO2 mmol/L 25.9 23.8   BUN mg/dL 29* 31*   CREATININE mg/dL 0.75 0.97   CALCIUM mg/dL 9.4 9.7   GLUCOSE mg/dL 108* 150*       Urine Culture   Date Value Ref Range Status   04/19/2023 >100,000 CFU/mL Morganella morganii ssp morganii (A)  Final   04/19/2023 (A)  Final    >100,000 CFU/mL Klebsiella pneumoniae ssp pneumoniae         ASSESSMENT and PLAN:    Infarction of left basal ganglia    Left basal ganglia stroke  Right hemiparesis with impaired motor control  - Stroke prophylaxis: ASA and Plavix x 21 days from March 30, 2023, then ASA 81 mg daily. Crestor 20 mg.  - TSH, Vit B12 and Vit D levels within normal limits   April 26 - stable for dc tomorrow     Dysphagia   April 14 - upgrade to soft (chopped meats)/no mixed consistencies with NTL. Recommend sit upright in wheelchair with supervision. Check intermittently for pocketing. Meds one at a time with applesauce or pudding.  April 26 - VSS today     Dysarthria  -April 21 - Noted to be 100% intelligible in conversation with SLP with some moments of word finding delays.      Hypertension - amlodipine/lisinopril     Urinary retention. Treated for UTI as Reggie Duran. Check bladder scan PVR and cath if >300 cc until three consecutive < 250 cc  April 7-requires intermittent straight cath 400-113-300 cc.  No spontaneous void.  Follow pattern.  April 10-requirement straight catheter 300 cc about every 6 or 8 hours.  - will extend interval to every 12 hours to see if she voids with a larger volume.  4/12- has  not required intermittent catheterization since last night.  Noted to have 4 voids with PVR less than 200.  May be recovering bladder function.  Will monitor.  4/13-voiding on her own with postvoid residuals 190 range  4/19-complaints of dysuria-urinalysis ordered     Urinary Tract Infection, resolved  -complaint of dysuria on 4/19 with positive UA.   -Culture positive for morganella morganii and klebsiella pneumoniae susceptible to cefdinir  -Completed cefdinir 300mg BID for 5 days     Right upper extremity discomfort  -April 17-Has some diffuse pain in the right upper extremity.  Distal right upper extremity slightly warmer than the left. No allodynia.  - Reviewed possible post stroke pain/central pain syndrome versus sympathetic mediated pain versus shoulder subluxation.  Will monitor.  April 26 - persistent pain controlled by Tylenol      DVT prophylaxis- on dual antiplatelet therapies.  SCDs.  Lovenox     Team Conference 4/11/2023  Nursing: continent/incontinent with bladder, requiring cathing each bladder scan, no safety issues  PT: bed mobiliy min assist, contact guard with transfers, ambulating 80 feet, fatigues quickly with low endurance, cues for sequencing, drags left foot, anticipated contact guard with walker  OT: mod assist for bathing, max/dependent with lower body dressing, toileting max assist due to balance  SLP: mildly impaired on CLQT WNL memory/visuospatial, and mild for attention/executive function, puree and nectar thick diet, mild to moderate dysarthria  Discharge Date: 1.5 weeks      TEAM CONF - April 18 - PROGRESS SLOWER THAN INITIAL FIRST DAYS SO ANTICIPATE EXTENDING LENGTH OF STAY - STRONGER BUT STILL IMPAIRED MOTOR CONTROL. FATIGUE. BED MOD. TRANSFERS MIN CTG. GAIT 80 FEET CTG RW , LESS DRAG RIGHT FEET. 4 STAIRS CTG. BATH MOD. LBD MAX. UBD MIN SBA. COGNITION - CUES FOR ATTENTION TO DETAIL. SWALLOW - COUGH WITH WATER BY CUP. MECH SOFT, NTL. VOICING/ARTICULATION- MILD DYSARTHRIA AND WILL  SELF CORRECT.  BNE YESTERDAY. SEE REPORT IN [facility]. Attention: WNL  Executive Functioning: Mildly Impaired  Abstract Reasoning: WNL  Arithmetic: Mildly Impaired  Visuospatial Perception: WNL  Visuospatial Praxis: Moderately to Severely Impaired  Verbal Memory: Immediate - Moderately Impaired; Delayed - WNL for Stories, Severely Impaired for Lists  Visual Memory: Mildly Impaired  Emotional: Mild anxiety and depression related to worries about her quality-of-life following discharge from the hospital.  O2 AT NIGHT. STILL INCONTINENT- TIMED VOIDS. ALLERGIES - SNEEZES.   ELOS - 1.5 WEEKS    TEAM CONF -APRIL 25 -  BED MED. TRANSFER MIN CTG GAIT 80 - 160 FEET CTG RW. TRANSFERS MIN CTG.   TOILET AND SHOWER TRANSFERS CTG RW. BAT MIN MOD WITH ADAPTIVE EQUIPMENT. LVD MOD. UBD SBA. COGNITION - MILD DIFFICULTY WITH DIVERGENT REASONING. DECREASED RECALL/CARRYOVER. MILD DYSARTHRIA. SWALLOW CHOPPED MEAT / NTL. REPEAT VFSS  ELOS - FAMILY UNABLE TO MANAGE PATIENT AT HOME IN SHORT TERM. LOOKING AT [facility].     CODE STATUS:  Level Of Support Discussed With: Patient  Code Status (Patient has no pulse and is not breathing): No CPR (Do Not Attempt to Resuscitate)  Medical Interventions (Patient has pulse or is breathing): Full Support  Release to patient: Routine Release      Admission Status: Continues to meet requirements for inpatient admission.       Shannon Martinez DO      During rounds, used appropriate personal protective equipment including mask and gloves.  Additional gown if indicated.  Mask used was standard procedure mask. Appropriate PPE was worn during the entire visit.  Hand hygiene was completed before and after.

## 2023-04-27 NOTE — THERAPY DISCHARGE NOTE
Inpatient Rehabilitation - Speech Language Pathology /Discharge  [facility]     Patient Name: Melissa Gomez  : 1940  MRN: [id number]  Today's Date: 2023               Admit Date: 2023     Visit Dx:    ICD-10-CM ICD-9-CM   1. Impaired functional mobility, balance, gait, and endurance  Z74.09 V49.89   2. Follow-up exam  Z09 V67.9     Patient Active Problem List   Diagnosis   • Infarction of left basal ganglia     Past Medical History:   Diagnosis Date   • GERD (gastroesophageal reflux disease)    • Hyperlipidemia    • Hypertension    • Stroke      Past Surgical History:   Procedure Laterality Date   • COLONOSCOPY     • HYSTERECTOMY     • TONSILLECTOMY         SLP Recommendation and Plan    Discharge Summary:  Pt made good progress during inpatient stay toward cognitive, communication and swallow goals. At discharge, pt presented with a mild cognitive-communication deficit, mild dysarthria and mild-moderate oropharyngeal dysphagia. Pt met goal for recall of 3 errands after 20 minutes and immediate recall of a complex paragraph. She required MIN-MOD cues to complete abstract divergent thinking tasks. She required intermittent MIN cues to complete a moderate level logic puzzle. She was able to complete functional attention to detail tasks (calendar) with 90% accuracy with NO cues, MIN cues to increase. She completed medicine management with pill box with NO cues. She presented with mild mixed dysarthria characterized imprecise consonants, occasional rushes of speech and reduced vocal intensity; however, she often self-corrected and repeated utterances with improved intelligibility.     Swallow Status: Pt was tolerating soft (chopped meats)/no mixed consistencies with NTL. VFSS was completed 23 with the following results: Pt presented with mild-moderate oropharyngeal dysphagia. In the oral phase, pt exhibited mildly prolonged mastication of small bite of regular consistency with minimal oral  residue in right buccal cavity. In pharyngeal phase, delayed swallow initiation and discoordination of airway closure which led to penetration of thins by tsp before the swallow and aspiration during the swallow with strong cough response on initial trial. On 2nd trial of thins by tsp, pt exhibited trace penetration during the swallow to the level of the vocal folds with no cough response. SLP cued pt to cough. With thins by cup, pt exhibited transient shallow penetration above the vocal folds in 4/4 trials. With thins by straw, pt exhibited transient shallow penetration. Pt with cough following this trial; no aspiration was visualized under fluoro. Pt exhibited trace, transient penetration above the vocal folds with NTL by cup and straw.Pt exhibited silent trace deep penetration above the vocal folds with thin component of mixed, suspect due to spillage from pyriforms. No penetration or aspiration observed with NTL by tsp, puree and regular. Recommend continue soft (chopped meats)/no mixed consistencies with NTL. Hydration protocol by cup 30 minutes after meals with adequate oral care. Meds one at a time with applesauce. Sit upright for all meals. Small bites/sips, slow rate. Trials of soft (chopped meats) with thins with SLP only. Recommend continue EMST exercise, effortful swallows and introduce Mendelsohn exercise. Also, recommend trialing breath hold technique with thins by cup.     Recommend continued speech therapy at next level of care (SNF) to improve cognition, communication and swallowing.           Swallow Criteria for Skilled Therapeutic Interventions Met: demonstrates skilled criteria (04/26/23 1100)  Anticipated Discharge Disposition (SLP): skilled nursing facility (04/26/23 1100)  Therapy Frequency (Swallow): 5 days per week (04/26/23 1100)  Predicted Duration Therapy Intervention (Days): until discharge (04/26/23 1100)                                  Plan of Care Reviewed With: patient (04/26/23  1254)    SLP EVALUATION (last 72 hours)     SLP SLC Evaluation     Row Name 04/25/23 1330 04/25/23 1100 04/24/23 1330 04/24/23 1100          Communication Assessment/Intervention    Document Type therapy note (daily note)  -AL therapy note (daily note)  -AL therapy note (daily note)  -AL therapy note (daily note)  -AL     Patient/Family/Caregiver Comments/Observations Pt participated well.  -AL Pt participated well.  -AL Pt participated well.  -AL Pt participated well. Daughter, Renu, present at beginning of session for education.  -AL        Pain Scale: Numbers Pre/Post-Treatment    Pretreatment Pain Rating 0/10 - no pain  -AL -- 0/10 - no pain  -AL 0/10 - no pain  -AL     Pre/Posttreatment Pain Comment -- headache; pt did not rate pain when asked, but stated it is beter under control after medicine prior to session.  -AL -- --           User Key  (r) = Recorded By, (t) = Taken By, (c) = Cosigned By    Initials Name Effective Dates    Janell Erickson, MS CCC-SLP 06/16/21 -                    EDUCATION  The patient has been educated in the following areas:   Cognitive Impairment Communication Impairment Dysphagia (Swallowing Impairment).           SLP GOALS     Row Name 04/26/23 1100 04/25/23 1330 04/25/23 1100       (LTG) Patient will demonstrate functional swallow for    Diet Texture (Demonstrate functional swallow) soft to chew (chopped) textures  -AL -- --    Liquid viscosity (Demonstrate functional swallow) thin liquids  -AL -- --       (STG) Patient will tolerate trials of    Consistencies Trialed (Tolerate trials) thin liquids  -AL -- --    Desired Outcome (Tolerate trials) without signs/symptoms of aspiration  -AL -- --    [place] (Tolerate trials) with 1:1 assist/ supervision  -AL -- --       (STG) Patient will tolerate therapeutic trials of    Consistencies Trialed (Tolerate therapeutic trials) regular textures;thin liquids  -AL -- --    Desired Outcome (Tolerate therapeutic trials) without  signs/symptoms of aspiration  -AL -- --       ([facility]) Pharyngeal Strengthening Exercise Goal 1 (SLP)    Activity (Pharyngeal Strengthening Goal 1, SLP) increase timing;increase superior movement of the hyolaryngeal complex;increase anterior movement of the hyolaryngeal complex;increase epiglottic inversion and retroflexion;increase closure at entrance to airway/closure of airway at glottis  -AL -- --    Increase Timing hard effortful swallow  -AL -- --    Increase Superior Movement of the Hyolaryngeal Complex hard effortful swallow  -AL -- --    Increase Anterior Movement of the Hyolaryngeal Complex EMST  -AL -- --    Increase Epiglottic Inversion and Retroflexion Mendelsohn;hard effortful swallow;EMST  -AL -- --    Increase Closure at Entrance to Airway/Closure of Airway at Glottis Mendelsohn;hard effortful swallow  -AL -- --    [place]/Accuracy (Pharyngeal Strengthening Goal 1, SLP) with moderate cues (50-74% accuracy)  -AL -- --    Progress/Outcomes (Pharyngeal Strengthening Goal 1, SLP) good progress toward goal  -AL -- good progress toward goal  -AL    Comment (Pharyngeal Strengthening Goal 1, SLP) -- -- No overt s/s of aspiration or penetration observed in 11/12 trials of water by cup; strong delayed cough noted x1. Plan to complete VFSS 4/26/23. Pt completed 30 repetitions of effortful swallow with MIN-MOD cues. Pt completed 5 sets of 5 reps of EMST75 at 1.25 cmH20 past 5 cmH20 with MOD cues.  -AL       Articulation Goal 1 (SLP)    Improve Articulation Goal 1 (SLP) -- by over-articulating at word level;by over-articulating at phrase level;by over-articulating in connected speech;80%;with minimal cues (75-90%)  -AL --    Progress/Outcomes (Articulation Goal 1, SLP) -- good progress toward goal  -AL --    Comment (Articulation Goal 1, SLP) -- Pt read aloud a multiple paragraph story (10 minute duration) with 100% intelligible with initial MIN cues for over-articulation and slow rate. Discussed reduced pitch  inflection noted. Pt was stimulable for increasing pitch inflection for dialogue in story.  -AL --       Reasoning Goal 1 (SLP)    Improve Reasoning Through Goal 1 (SLP) -- complete deductive reasoning task;80%;independently (over 90% accuracy)  -AL --    Time Frame (Reasoning Goal 1, SLP) -- 1 week  -AL --    Progress/Outcomes (Reasoning Goal 1, SLP) -- goal ongoing  -AL --    Comment (Reasoning Goal 1, SLP) -- Moderate level logic puzzle: 80% with NO cues, 100% with MIN cues. Goal met x1.  -AL --    Row Name 04/24/23 1330 04/24/23 1100          (STG) Pharyngeal Strengthening Exercise Goal 1 (SLP)    Progress/Outcomes (Pharyngeal Strengthening Goal 1, SLP) -- good progress toward goal  -AL     Comment (Pharyngeal Strengthening Goal 1, SLP) -- No overt s/s of aspiration or penetration observed in 12/14 trials of water by cup; delayed cough x1, throat clear x1. Pt completed 30 repetitions of effortful swallow with MIN-MOD cues. Pt completed 3 sets of 5 reps of EMST 75 at 1.25 turns past 5 cmH20 with MOD cues. Plan VFSS later this week.  -AL        Attention Goal 1 (SLP)    Improve Attention by Goal 1 (SLP) -- complete selective attention task;complete sustained attention task;80%;independently (over 90% accuracy)  -AL     Progress/Outcomes (Attention Goal 1, SLP) -- good progress toward goal  -AL        Memory Skills Goal 1 (SLP)    Improve Memory Skills Through Goal 1 (SLP) -- recalling related word lists with an imposed delay;listen to a paragraph and answer questions;recall details of the day;80%;independently (over 90% accuracy)  -AL     Progress/Outcomes (Memory Skills Goal 1, SLP) -- good progress toward goal  -AL        Organizational Skills Goal 1 (SLP)    Improve Thought Organization Through Goal 1 (SLP) -- completing a divergent naming task;80%;with minimal cues (75-90%)  -AL     Progress/Outcomes (Thought Organization Skills Goal 1, SLP) -- good progress toward goal  -AL        Reasoning Goal 1 (SLP)     Improve Reasoning Through Goal 1 (SLP) complete deductive reasoning task;80%;independently (over 90% accuracy)  -AL complete deductive reasoning task;80%;independently (over 90% accuracy)  -AL     Time Frame (Reasoning Goal 1, SLP) 1 week  -AL --     Progress/Outcomes (Reasoning Goal 1, SLP) goal ongoing  -AL good progress toward goal  -AL     Comment (Reasoning Goal 1, SLP) Moderate level logic puzzle: 50% with NO cues, 100% with MIN cues. Pt with reduced organizational skills during this task today.  -AL --        Executive Functional Skills Goal 1 (SLP)    Improve Executive Function Skills Goal 1 (SLP) -- home management activity;80%;with minimal cues (75-90%)  -AL     Progress/Outcomes (Executive Function Skills Goal 1, SLP) -- goal met  -AL           User Key  (r) = Recorded By, (t) = Taken By, (c) = Cosigned By    Initials Name Provider Type    Janell Erickson MS CCC-SLP Speech and Language Pathologist                    Time Calculation:       Therapy Charges for Today     Code Description Service Date Service Provider Modifiers Qty    89198369528 HC ST MOTION FLUORO EVAL SWALLOW 4 4/26/2023 Janell Gallego MS CCC-SLP GN 1                   SLP Discharge Summary  Anticipated Discharge Disposition (SLP): skilled nursing facility    MS JANUARY CamSLP  4/27/2023

## 2023-04-28 LAB
ANION GAP SERPL CALCULATED.3IONS-SCNC: 9.6 MMOL/L (ref 5–15)
BASOPHILS # BLD AUTO: 0.03 10*3/MM3 (ref 0–0.2)
BASOPHILS NFR BLD AUTO: 0.4 % (ref 0–1.5)
BUN SERPL-MCNC: 27 MG/DL (ref 8–23)
BUN/CREAT SERPL: 30.7 (ref 7–25)
CALCIUM SPEC-SCNC: 9.8 MG/DL (ref 8.6–10.5)
CHLORIDE SERPL-SCNC: 107 MMOL/L (ref 98–107)
CO2 SERPL-SCNC: 26.4 MMOL/L (ref 22–29)
CREAT SERPL-MCNC: 0.88 MG/DL (ref 0.57–1)
DEPRECATED RDW RBC AUTO: 40.2 FL (ref 37–54)
EGFRCR SERPLBLD CKD-EPI 2021: 64.9 ML/MIN/1.73
EOSINOPHIL # BLD AUTO: 0.34 10*3/MM3 (ref 0–0.4)
EOSINOPHIL NFR BLD AUTO: 4 % (ref 0.3–6.2)
ERYTHROCYTE [DISTWIDTH] IN BLOOD BY AUTOMATED COUNT: 12.3 % (ref 12.3–15.4)
GLUCOSE SERPL-MCNC: 109 MG/DL (ref 65–99)
HCT VFR BLD AUTO: 36.8 % (ref 34–46.6)
HGB BLD-MCNC: 12.2 G/DL (ref 12–15.9)
IMM GRANULOCYTES # BLD AUTO: 0.02 10*3/MM3 (ref 0–0.05)
IMM GRANULOCYTES NFR BLD AUTO: 0.2 % (ref 0–0.5)
LYMPHOCYTES # BLD AUTO: 1.55 10*3/MM3 (ref 0.7–3.1)
LYMPHOCYTES NFR BLD AUTO: 18.1 % (ref 19.6–45.3)
MCH RBC QN AUTO: 30.2 PG (ref 26.6–33)
MCHC RBC AUTO-ENTMCNC: 33.2 G/DL (ref 31.5–35.7)
MCV RBC AUTO: 91.1 FL (ref 79–97)
MONOCYTES # BLD AUTO: 0.68 10*3/MM3 (ref 0.1–0.9)
MONOCYTES NFR BLD AUTO: 7.9 % (ref 5–12)
NEUTROPHILS NFR BLD AUTO: 5.94 10*3/MM3 (ref 1.7–7)
NEUTROPHILS NFR BLD AUTO: 69.4 % (ref 42.7–76)
NRBC BLD AUTO-RTO: 0 /100 WBC (ref 0–0.2)
PLATELET # BLD AUTO: 208 10*3/MM3 (ref 140–450)
PMV BLD AUTO: 10.1 FL (ref 6–12)
POTASSIUM SERPL-SCNC: 4.1 MMOL/L (ref 3.5–5.2)
RBC # BLD AUTO: 4.04 10*6/MM3 (ref 3.77–5.28)
SODIUM SERPL-SCNC: 143 MMOL/L (ref 136–145)
WBC NRBC COR # BLD: 8.56 10*3/MM3 (ref 3.4–10.8)

## 2023-04-28 PROCEDURE — 97535 SELF CARE MNGMENT TRAINING: CPT | Performed by: OCCUPATIONAL THERAPIST

## 2023-04-28 PROCEDURE — 80048 BASIC METABOLIC PNL TOTAL CA: CPT | Performed by: PHYSICAL MEDICINE & REHABILITATION

## 2023-04-28 PROCEDURE — 25010000002 ENOXAPARIN PER 10 MG: Performed by: PHYSICAL MEDICINE & REHABILITATION

## 2023-04-28 PROCEDURE — 92526 ORAL FUNCTION THERAPY: CPT

## 2023-04-28 PROCEDURE — 97110 THERAPEUTIC EXERCISES: CPT | Performed by: OCCUPATIONAL THERAPIST

## 2023-04-28 PROCEDURE — 97129 THER IVNTJ 1ST 15 MIN: CPT

## 2023-04-28 PROCEDURE — 85025 COMPLETE CBC W/AUTO DIFF WBC: CPT | Performed by: PHYSICAL MEDICINE & REHABILITATION

## 2023-04-28 PROCEDURE — 97130 THER IVNTJ EA ADDL 15 MIN: CPT

## 2023-04-28 PROCEDURE — 97110 THERAPEUTIC EXERCISES: CPT

## 2023-04-28 PROCEDURE — 97530 THERAPEUTIC ACTIVITIES: CPT

## 2023-04-28 RX ADMIN — Medication 2 MG: at 19:37

## 2023-04-28 RX ADMIN — POLYETHYLENE GLYCOL 3350 17 G: 17 POWDER, FOR SOLUTION ORAL at 08:02

## 2023-04-28 RX ADMIN — OXYCODONE HYDROCHLORIDE AND ACETAMINOPHEN 250 MG: 500 TABLET ORAL at 08:01

## 2023-04-28 RX ADMIN — BUTALBITAL, ACETAMINOPHEN, AND CAFFEINE 1 TABLET: 50; 325; 40 TABLET ORAL at 09:51

## 2023-04-28 RX ADMIN — ASPIRIN 81 MG: 81 TABLET, CHEWABLE ORAL at 08:01

## 2023-04-28 RX ADMIN — Medication 1000 UNITS: at 08:01

## 2023-04-28 RX ADMIN — LISINOPRIL 5 MG: 5 TABLET ORAL at 08:02

## 2023-04-28 RX ADMIN — ACETAMINOPHEN 650 MG: 325 TABLET, FILM COATED ORAL at 19:38

## 2023-04-28 RX ADMIN — AMLODIPINE BESYLATE 10 MG: 10 TABLET ORAL at 08:01

## 2023-04-28 RX ADMIN — ROSUVASTATIN CALCIUM 20 MG: 20 TABLET, FILM COATED ORAL at 19:38

## 2023-04-28 RX ADMIN — DOCUSATE SODIUM 50MG AND SENNOSIDES 8.6MG 2 TABLET: 8.6; 5 TABLET, FILM COATED ORAL at 19:37

## 2023-04-28 RX ADMIN — ENOXAPARIN SODIUM 40 MG: 100 INJECTION SUBCUTANEOUS at 11:42

## 2023-04-28 NOTE — THERAPY TREATMENT NOTE
Inpatient Rehabilitation - Speech Language Pathology Treatment Note    [facility]     Patient Name: Melissa Gomez  : 1940  MRN: [id number]    Today's Date: 2023                   Admit Date: 2023       Visit Dx:      ICD-10-CM ICD-9-CM   1. Impaired functional mobility, balance, gait, and endurance  Z74.09 V49.89   2. Follow-up exam  Z09 V67.9       Patient Active Problem List   Diagnosis   • Infarction of left basal ganglia       Past Medical History:   Diagnosis Date   • GERD (gastroesophageal reflux disease)    • Hyperlipidemia    • Hypertension    • Stroke        Past Surgical History:   Procedure Laterality Date   • COLONOSCOPY     • HYSTERECTOMY     • TONSILLECTOMY         SLP Recommendation and Plan                                                            SLP EVALUATION (last 72 hours)     SLP SLC Evaluation     Row Name 23 1430 2330 23             Communication Assessment/Intervention    Document Type therapy note (daily note)  -AL therapy note (daily note)  -AL therapy note (daily note)  -AL      Subjective Information -- -- no complaints  -AL      Patient/Family/Caregiver Comments/Observations Pt participated well.  -AL Pt c/o headache 9/10 in front left of her head; stated this is a new location for her headache. Nurse called and gave pain med during session.  -AL Pt participated well.  -AL         Pain Scale: Numbers Pre/Post-Treatment    Pretreatment Pain Rating 0/10 - no pain  -AL 9/10  -AL 0/10 - no pain  -AL      Posttreatment Pain Rating -- 9/10  -AL --      Pain Location - -- head  -AL --            User Key  (r) = Recorded By, (t) = Taken By, (c) = Cosigned By    Initials Name Effective Dates    Janell Erickson, MS CCC-SLP 21 -                    EDUCATION    The patient has been educated in the following areas:       Cognitive Impairment Communication Impairment Dysphagia (Swallowing Impairment).             SLP GOALS     Row Name  "04/28/23 1430 04/28/23 0930 04/27/23 1506       ([facility]) Pharyngeal Strengthening Exercise Goal 1 (SLP)    Comment (Pharyngeal Strengthening Goal 1, SLP) Pt exhibited no overt s/s of aspiration or penetration observed in 9/12 trials of water by cup; throat clear/coughing x2, delayed throat clear x1. Pt with intermittent throat clearing during conversation; reported drainage in back of throat. SLP encouraged her to complete volitional swallows during conversation. Pt completed 30 repetitions of effortful swallow with MIN-MOD cues. Pt completed 3 sets of 5 reps of EMST 75 at 1.25 cmH20 with MOD cues.  -AL -- SLP showed pt VFSS images from 4/26. Discussed aspiration of thins by tsp and penetration of thins by cup. Discussed use of small sips and breath hold to reduce aspiration. Pt reported swallowing with this technique felt like water went down middle of throat rather than \"side of throat.\" No overt s/s of aspiration or penetration observed in 5/5 trials. Intermittent throat clear and cough observed during conversation today. Discussed intentionally swallowing saliva, rather than waiting for swallow reflex.  -AL       Memory Skills Goal 1 (SLP)    Improve Memory Skills Through Goal 1 (SLP) -- select a word from a list by exclusion;80%;independently (over 90% accuracy)  -AL --    Progress/Outcomes (Memory Skills Goal 1, SLP) -- goal ongoing  -AL --    Comment (Memory Skills Goal 1, SLP) -- 5 unit list retention task: 1/8 with NO cues, 6/8 with MIN cues, 8/8 with MOD cues  -AL --       Organizational Skills Goal 1 (SLP)    Progress/Outcomes (Thought Organization Skills Goal 1, SLP) -- -- good progress toward goal  -AL    Comment (Thought Organization Skills Goal 1, SLP) -- -- Hot items: 7 with NO cues, 10 with MIN cues. Cold items: 7 with NO cues, 10 with MIN-MOD cues.  -AL       Reasoning Goal 1 (SLP)    Improve Reasoning Through Goal 1 (SLP) -- complete deductive reasoning task;80%;independently (over 90% accuracy)  -AL " --    Time Frame (Reasoning Goal 1, SLP) -- 1 week  -AL --    Progress/Outcomes (Reasoning Goal 1, SLP) -- goal ongoing  -AL goal ongoing  -AL    Comment (Reasoning Goal 1, SLP) -- Moderate level logic puzzle: 70% with NO cues, 100% with MOD-MAX cues  -AL Moderate level logic puzzle: 70% with NO cues, 100% with MIN-MOD cues.  -AL    Row Name 04/27/23 1430 04/26/23 1100          (LTG) Patient will demonstrate functional swallow for    Diet Texture (Demonstrate functional swallow) -- soft to chew (chopped) textures  -AL     Liquid viscosity (Demonstrate functional swallow) -- thin liquids  -AL        (STG) Patient will tolerate trials of    Consistencies Trialed (Tolerate trials) -- thin liquids  -AL     Desired Outcome (Tolerate trials) -- without signs/symptoms of aspiration  -AL     [place] (Tolerate trials) -- with 1:1 assist/ supervision  -AL        (STG) Patient will tolerate therapeutic trials of    Consistencies Trialed (Tolerate therapeutic trials) -- regular textures;thin liquids  -AL     Desired Outcome (Tolerate therapeutic trials) -- without signs/symptoms of aspiration  -AL        (STG) Pharyngeal Strengthening Exercise Goal 1 (SLP)    Activity (Pharyngeal Strengthening Goal 1, SLP) -- increase timing;increase superior movement of the hyolaryngeal complex;increase anterior movement of the hyolaryngeal complex;increase epiglottic inversion and retroflexion;increase closure at entrance to airway/closure of airway at glottis  -AL     Increase Timing -- hard effortful swallow  -AL     Increase Superior Movement of the Hyolaryngeal Complex -- hard effortful swallow  -AL     Increase Anterior Movement of the Hyolaryngeal Complex -- EMST  -AL     Increase Epiglottic Inversion and Retroflexion -- Mendelsohn;hard effortful swallow;EMST  -AL     Increase Closure at Entrance to Airway/Closure of Airway at Glottis -- Mendelsohn;hard effortful swallow  -AL     [place]/Accuracy (Pharyngeal Strengthening Goal  "1, SLP) -- with moderate cues (50-74% accuracy)  -AL     Progress/Outcomes (Pharyngeal Strengthening Goal 1, SLP) -- good progress toward goal  -AL     Comment (Pharyngeal Strengthening Goal 1, SLP) No overt s/s of aspiration or penetration observed in 5/5 trials of water by cup. SLP showed pt VFSS images from 4/26. Discussed aspiration of thins by tsp and penetration of thins by cup. Discussed use of small sips and breath hold to reduce aspiration. Pt reported swallowing with this technique felt like water went down middle of throat rather than \"side of throat.\"  -AL --        Articulation Goal 1 (SLP)    Improve Articulation Goal 1 (SLP) by over-articulating at word level;by over-articulating at phrase level;by over-articulating in connected speech;80%;with minimal cues (75-90%)  -AL --     Time Frame (Articulation Goal 1, SLP) 1 week  -AL --     Progress/Outcomes (Articulation Goal 1, SLP) good progress toward goal  -AL --     Comment (Articulation Goal 1, SLP) Pt was 100% intelligible in structured conversation with initial MIN cues. Pt with intermittent throat clearing on saliva.  -AL --           User Key  (r) = Recorded By, (t) = Taken By, (c) = Cosigned By    Initials Name Provider Type    Janell Erickson MS CCC-SLP Speech and Language Pathologist                            Time Calculation:        Time Calculation- SLP     Row Name 04/28/23 1522 04/28/23 1010          Time Calculation- SLP    SLP Start Time 1430  -AL 0930  -AL     SLP Stop Time 1500  -AL 1000  -AL     SLP Time Calculation (min) 30 min  -AL 30 min  -AL           User Key  (r) = Recorded By, (t) = Taken By, (c) = Cosigned By    Initials Name Provider Type    Janell Erickson MS CCC-SLP Speech and Language Pathologist                  Therapy Charges for Today     Code Description Service Date Service Provider Modifiers Qty    52565446597  ST DEV OF COGN SKILLS INITIAL 15 MIN 4/27/2023 Janell Gallego MS CCC-SLP  1    " 81513266737 HC ST DEV OF COGN SKILLS EACH ADDT'L 15 MIN 4/27/2023 Janell Gallego, MS CCC-SLP  1    [id number] HC ST TREATMENT SWALLOW 2 4/27/2023 Janell Gallego, MS CCC-SLP GN 1    84872419279 HC ST DEV OF COGN SKILLS INITIAL 15 MIN 4/28/2023 Janell Gallego, MS CCC-SLP  1    [id number] HC ST DEV OF COGN SKILLS EACH ADDT'L 15 MIN 4/28/2023 Janell Gallego, MS CCC-SLP  1    [id number] HC ST TREATMENT SWALLOW 2 4/28/2023 Janell Gallego, MS CCC-SLP GN 1                           Janell Gallego, MS CCC-SLP  4/28/2023

## 2023-04-28 NOTE — PROGRESS NOTES
Patient will be d/c to [facility] on 4/29. Wheelchair van will come to transport her at 1:00pm.

## 2023-04-28 NOTE — PLAN OF CARE
Goal Outcome Evaluation:      Slept fairly well. Turns self some. Rt. Side slightly weaker. Meds with applesauce. Transfers x1 assist. Declined SCD's & sanket colace. Continent/incontinent at times. NO CPR. Discharge pending per report. Fioricet given for headache with relief. Melatonin given for sleep.

## 2023-04-28 NOTE — PROGRESS NOTES
Inpatient Rehabilitation Plan of Care Note    Plan of Care  Care Plan Reviewed - No updates at this time.    Psychosocial    Performed Intervention(s)  Verbalizes needs and concerns  Therapeutic environmental set up      Sphincter Control    Performed Intervention(s)  Bladder scan or I/O cath per order  Encourage fluids  Bowel/bladder training      Safety    Performed Intervention(s)  Safety Rounds  Bed alarm and/or chair alarm    Signed by: Macy Smith RN

## 2023-04-28 NOTE — PROGRESS NOTES
Inpatient Rehabilitation Discharge  Section A. Transportation  Issues Due to Lack of Transportation:  No    Section A. Medication List  Subsequent Provider:  03 - Skilled Nursing Facility (SNF)  Medication List to Subsequent Provider at Discharge:  Yes - Current reconciled  medication list provided to the subsequent provider  Route(s) of Medication List Transmission to Subsequent Provider:  Paper-based  Medication List to Patient:  Not applicable.  Patient discharged to a subsequent  provider as defined in 44D    Signed by: Yue Robert, Social Work

## 2023-04-28 NOTE — THERAPY DISCHARGE NOTE
Inpatient Rehabilitation - IRF Occupational Therapy Treatment Note/Discharge  [facility]     Patient Name: Melissa Gomez  : 1940  MRN: [id number]  Today's Date: 2023               Admit Date: 2023       ICD-10-CM ICD-9-CM   1. Impaired functional mobility, balance, gait, and endurance  Z74.09 V49.89   2. Follow-up exam  Z09 V67.9     Patient Active Problem List   Diagnosis   • Infarction of left basal ganglia     Past Medical History:   Diagnosis Date   • GERD (gastroesophageal reflux disease)    • Hyperlipidemia    • Hypertension    • Stroke      Past Surgical History:   Procedure Laterality Date   • COLONOSCOPY     • HYSTERECTOMY     • TONSILLECTOMY         IRF OT ASSESSMENT FLOWSHEET (last 12 hours)     IRF OT Evaluation and Treatment     Row Name 23 1446          OT Time and Intention    Document Type discharge evaluation  -     Mode of Treatment individual therapy;occupational therapy  -KP     Patient Effort good  -KP     Symptoms Noted During/After Treatment fatigue  -     Row Name 23 1446          General Information    Patient/Family/Caregiver Comments/Observations pt sitting in wc in am and pm  -     Existing Precautions/Restrictions fall;swallow precautions  -     Row Name 23 1446          Pain Assessment    Pretreatment Pain Rating 5/10  -KP     Posttreatment Pain Rating 5/10  -KP     Pain Location - Side/Orientation Right  -     Pain Location upper  -     Pain Location - shoulder  -KP     Row Name 23 1446          Cognition/Psychosocial    Affect/Mental Status (Cognition) WFL  -     Orientation Status (Cognition) oriented x 3  -KP     Follows Commands (Cognition) follows one-step commands;over 90% accuracy;verbal cues/prompting required  -     Personal Safety Interventions fall prevention program maintained;gait belt;muscle strengthening facilitated;nonskid shoes/slippers when out of bed  -     Cognitive Function memory deficit  -      Attention Deficit (Cognition) minimal deficit  -     Safety Deficit (Cognition) minimal deficit;safety precautions awareness  -[facility] Name 04/28/23 1446          Range of Motion Comprehensive    Comment, General Range of Motion R shoulder 3/4 AROM R elbow through hand is 8/8 L UE 8/8  -[facility] Name 04/28/23 1446          Vision Assessment/Intervention    Visual Impairment/Limitations WFL  -[facility] Name 04/28/23 1446          Bathing    [place] Level (Bathing) bathing skills;upper body;standby assist;set up;lower body;minimum assist (75% patient effort)  -     Assistive Device (Bathing) grab bar/tub rail  -     Position (Bathing) sink side;supported sitting;supported standing  -     Set-up Assistance (Bathing) obtain supplies  -KP     Row Name 04/28/23 1446          Upper Body Dressing    [place] Level (Upper Body Dressing) upper body dressing skills;doff;don;pull over garment;set up assistance;supervision  -     Position (Upper Body Dressing) supported sitting  -     Set-up Assistance (Upper Body Dressing) obtain clothing  -KP     Row Name 04/28/23 1446          Lower Body Dressing    [place] Level (Lower Body Dressing) don;doff;pants/bottoms;socks;maximum assist (25% patient effort)  -     Position (Lower Body Dressing) supported standing;supported sitting  -     Set-up Assistance (Lower Body Dressing) obtain clothing  -KP     Row Name 04/28/23 1446          Grooming    [place] Level (Grooming) grooming skills;supervision  -     Position (Grooming) sink side;supported sitting  -     Set-up Assistance (Grooming) obtain supplies  -KP     Row Name 04/28/23 1446          Toileting    [place] Level (Toileting) toileting skills;adjust/manage clothing;perform perineal hygiene;verbal cues;set up assistance;maximum assist (25% patient effort)  -     Assistive Device Use (Toileting) grab bar/safety frame  -     Position (Toileting) supported standing;supported  sitting  -     Set-up Assistance (Toileting) change pad/brief;obtain supplies  -     Row Name 04/28/23 1446          Bed Mobility    Comment, (Bed Mobility) in wc  -     Row Name 04/28/23 1446          Sit-Stand Transfer    Sit-Stand [place] (Transfers) set up;contact guard  [facility]     [facility]ist[facility]) [facility]          Stand-Sit Transfer    Stand-Sit [place] (Transfers) set up;contact guard  -     Assistive Device (Stand-Sit Transfers) wheelchair  -KP     Row Name 04/28/23 1446          Toilet Transfer    Comment, (Toilet Transfer) CGA to min on 4/26  [facility] Name 04/28/23 1446          Shower Transfer    Comment, (Shower Transfer) min on 4/26  [facility] Name 04/28/23 1446          Elbow/Forearm (Therapeutic Exercise)    Elbow/Forearm Strengthening (Therapeutic Exercise) left;right;flexion;extension;supination;pronation;sitting;1 lb free weight;10 repetitions;3 sets  -KP     Row Name 04/28/23 1446          Wrist (Therapeutic Exercise)    Wrist Strengthening (Therapeutic Exercise) left;right;extension;flexion;1 lb free weight;10 repetitions;3 sets  -[facility] Name 04/28/23 1446          Hand (Therapeutic Exercise)    Hand Strengthening (Therapeutic Exercise) left;right;finger flexion;finger extension;hand gripper;2 sets;20 repititions  -[facility] Name 04/28/23 1446          Balance    Static Sitting Balance standby assist  -     Dynamic Sitting Balance standby assist  -     Static Standing Balance contact guard  [facility] Name 04/28/23 1446          Positioning and Restraints    Pre-Treatment Position sitting in chair/recliner  -     Post Treatment Position wheelchair  -     In Wheelchair sitting;call light within reach;encouraged to call for assist;exit alarm on;with family/caregiver  in pm. in am in DR  [facility]     Row Name 04/28/23 1446          Transfer Goal 1 (OT-IRF)    Activity/Assistive Device (Transfer Goal 1, OT-IRF)  toilet;sit-to-stand/stand-to-sit;shower chair;walk-in shower;walker, rolling  -KP     [place] Level (Transfer Goal 1, OT-IRF) contact guard required  -KP     Time Frame (Transfer Goal 1, OT-IRF) short-term goal (STG)  -KP     Progress/Outcomes (Transfer Goal 1, OT-IRF) goal not met  -     Row Name 04/28/23 1446          Transfer Goal 2 (OT-IRF)    Activity/Assistive Device (Transfer Goal 2, OT-IRF) sit-to-stand/stand-to-sit;toilet;walk-in shower;shower chair;walker, rolling  -KP     [place] Level (Transfer Goal 2, OT-IRF) set-up required;contact guard required  -KP     Time Frame (Transfer Goal 2, OT-IRF) long-term goal (LTG);by discharge  -KP     Progress/Outcomes (Transfer Goal 2, OT-IRF) goal not met  -     Row Name 04/28/23 1446          Bathing Goal 1 (OT-IRF)    Activity/Device (Bathing Goal 1, OT-IRF) bathing skills, all;hand-held shower spray hose;grab bar, tub/shower;shower chair  -KP     [place] Level (Bathing Goal 1, OT-IRF) contact guard required  -KP     Time Frame (Bathing Goal 1, OT-IRF) short-term goal (STG)  -KP     Progress/Outcomes (Bathing Goal 1, OT-IRF) goal not met  -     Row Name 04/28/23 1446          LB Dressing Goal 1 (OT-IRF)    Activity/Device (LB Dressing Goal 1, OT-IRF) lower body dressing  -KP     [place] (LB Dressing Goal 1, OT-IRF) moderate assist (50-74% patient effort)  -KP     Time Frame (LB Dressing Goal 1, OT-IRF) short-term goal (STG);1 week  -KP     Progress/Outcomes (LB Dressing Goal 1, OT-IRF) goal not met  -KP     Row Name 04/28/23 1446          LB Dressing Goal 2 (OT-IRF)    Activity/Device (LB Dressing Goal 2, OT-IRF) lower body dressing;reacher;sock aid;elastic laces  -KP     [place] (LB Dressing Goal 2, OT-IRF) moderate assist (50-74% patient effort)  -KP     Time Frame (LB Dressing Goal 2, OT-IRF) long-term goal (LTG)  -KP     Progress/Outcomes (LB Dressing Goal 2, OT-IRF) goal not met  -KP     Row Name 04/28/23 9463          Toileting  Goal 1 (OT-IRF)    Activity/Device (Toileting Goal 1, OT-IRF) toileting skills, all;grab bar/safety frame  -KP     [place] Level (Toileting Goal 1, OT-IRF) moderate assist (50-74% patient effort)  -KP     Progress/Outcomes (Toileting Goal 1, OT-IRF) goal not met  -KP     Row Name 04/28/23 1446          Toileting Goal 2 (OT-IRF)    Activity/Device (Toileting Goal 2, OT-IRF) toileting skills, all;grab bar/safety frame  -KP     [place] Level (Toileting Goal 2, OT-IRF) set-up required;moderate assist (50-74% patient effort)  -KP     Progress/Outcomes (Toileting Goal 2, OT-IRF) goal not met  -KP     Time Frame (Toileting Goal 2, OT-IRF) long-term goal (LTG)  -KP     Row Name 04/28/23 1446          Strength Goal 1 (OT-IRF)    Strength Goal 1 (OT-IRF) incr R hand  to 20 and R UE to 4-/5  -KP     Time Frame (Strength Goal 1, OT-IRF) long-term goal (LTG);by discharge  -KP     Progress/Outcomes (Strength Goal 1, OT-IRF) goal partially met  -KP     Row Name 04/28/23 1446          Balance Goal 1 (OT)    Activity/Assistive Device (Balance Goal 1, OT) standing, dynamic;walker, rolling  -KP     [place] Level/Cues Needed (Balance Goal 1, OT) contact guard assist  -KP     Time Frame (Balance Goal 1, OT) long term goal (LTG);by discharge  -KP     Progress/Outcomes (Balance Goal 1, OT) goal met  -KP     Row Name 04/28/23 1446          Functional Mobility Goal 1 (OT)    Activity/Assistive Device (Functional Mobility Goal 1, OT) walker, rolling  -KP     [place] Level/Cues Needed (Functional Mobility Goal 1, OT) contact guard assist  -KP     Distance Goal 1 (Functional Mobility, OT) commode and shower transfers  -KP     Time Frame (Functional Mobility Goal 1, OT) long term goal (LTG)  -KP     Progress/Outcome (Functional Mobility Goal 1, OT) goal not met  -     Row Name 04/28/23 1446          Caregiver Training Goal 1 (OT-IRF)    Caregiver Training Goal 1 (OT-IRF) pt family ed on safety w tsf w pt and ADLs. pt  ed on HEP for [facility] and [facility]  -     Time Frame (Caregiver Training Goal 1, OT-IRF) long-term goal (LTG);by discharge  -     Progress/Outcomes (Caregiver Training Goal 1, OT-IRF) goal no longer appropriate  pt dc to subacute rehab  -     Row Name 04/28/23 1446          Discharge Summary (OT)    Outcomes Achieved Upon Discharge (OT) goals partially achieved prior to discharge;progress was made toward goals;patient transferred to another care setting/facility  pt tsf to sub acute rehab tomorrow  -           User Key  (r) = Recorded By, (t) = Taken By, (c) = Cosigned By    Initials Name Effective Dates     Patsy Blank, OTR 06/16/21 -                    Occupational Therapy Education     Title: PT OT SLP Therapies (Done)     Topic: Occupational Therapy (Resolved)     Point: ADL training (Resolved)     Description:   Instruct learner(s) on proper safety adaptation and remediation techniques during self care or transfers.   Instruct in proper use of assistive devices.              Learning Progress Summary           Patient Acceptance, E,TB, VU,DU by  at 4/28/2023 1455    Comment: ed pt and pt dght on progress in therapy and current tsf status for sit to stand at the sink. ed on UE ex 1# hand wt.    Acceptance, E,TB, VU,NR by ERUM at 4/22/2023 1353    Acceptance, E, VU by  at 4/8/2023 1204    Acceptance, E,TB,D, VU,DU,NR by  at 4/6/2023 1230    Comment: ed pt on role of OT. benefit of therapy, POC w.  OT ed on safety w ADL and tsf. pt ed on UBD technique.   Family Acceptance, E,TB, VU,DU by  at 4/28/2023 1455    Comment: ed pt and pt dght on progress in therapy and current tsf status for sit to stand at the sink. ed on UE ex 1# hand wt.                   Point: Home exercise program (Resolved)     Description:   Instruct learner(s) on appropriate technique for monitoring, assisting and/or progressing therapeutic exercises/activities.              Learning Progress Summary           Patient  Acceptance, E,TB, VU,DU by  at 4/28/2023 1455    Comment: ed pt and pt dght on progress in therapy and current tsf status for sit to stand at the sink. ed on UE ex 1# hand wt.    Acceptance, E,TB, VU,NR by ERUM at 4/22/2023 1353    Acceptance, E, VU by LS at 4/8/2023 1204    Acceptance, E,TB,D, VU,DU,NR by  at 4/6/2023 1230    Comment: ed pt on role of OT. benefit of therapy, POC w.  OT ed on safety w ADL and tsf. pt ed on UBD technique.   Family Acceptance, E,TB, VU,DU by  at 4/28/2023 1455    Comment: ed pt and pt dght on progress in therapy and current tsf status for sit to stand at the sink. ed on UE ex 1# hand wt.                   Point: Precautions (Resolved)     Description:   Instruct learner(s) on prescribed precautions during self-care and functional transfers.              Learning Progress Summary           Patient Acceptance, E,TB, VU,DU by  at 4/28/2023 1455    Comment: ed pt and pt dght on progress in therapy and current tsf status for sit to stand at the sink. ed on UE ex 1# hand wt.    Acceptance, E,TB, VU,NR by ERUM at 4/22/2023 1353    Acceptance, E, VU by  at 4/8/2023 1204    Acceptance, E,TB,D, VU,DU,NR by  at 4/6/2023 1230    Comment: ed pt on role of OT. benefit of therapy, POC w.  OT ed on safety w ADL and tsf. pt ed on UBD technique.   Family Acceptance, E,TB, VU,DU by  at 4/28/2023 1455    Comment: ed pt and pt dght on progress in therapy and current tsf status for sit to stand at the sink. ed on UE ex 1# hand wt.                   Point: Body mechanics (Resolved)     Description:   Instruct learner(s) on proper positioning and spine alignment during self-care, functional mobility activities and/or exercises.              Learning Progress Summary           Patient Acceptance, E,TB, VU,DU by  at 4/28/2023 1455    Comment: ed pt and pt dght on progress in therapy and current tsf status for sit to stand at the sink. ed on UE ex 1# hand wt.    Acceptance, E,TB, VU,NR by ERUM at  4/22/2023 1353    Acceptance, E, VU by  at 4/8/2023 1204    Acceptance, E,TB,D, VU,DU,NR by  at 4/6/2023 1230    Comment: ed pt on role of OT. benefit of therapy, POC w.  OT ed on safety w ADL and tsf. pt ed on UBD technique.   Family Acceptance, E,TB, VU,DU by  at 4/28/2023 1455    Comment: ed pt and pt dght on progress in therapy and current tsf status for sit to stand at the sink. ed on UE ex 1# hand wt.                               User Key     Initials Effective Dates Name Provider Type Discipline     06/16/21 -  Patsy Blank, OTR Occupational Therapist OT    ERUM 06/16/21 -  Tiera Alanis, PT Physical Therapist PT     06/16/21 -  Yamila Wheat MS CCC-SLP Speech and Language Pathologist SLP                OT Recommendation and Plan  Anticipated Discharge Disposition (OT): skilled nursing facility  Planned Therapy Interventions (OT): activity tolerance training, adaptive equipment training, BADL retraining, functional balance retraining, neuromuscular control/coordination retraining, occupation/activity based interventions, patient/caregiver education/training, ROM/therapeutic exercise, strengthening exercise, transfer/mobility retraining    Daily Progress Summary (OT)  Overall Progress Toward Functional Goals (OT): progressing toward functional goals as expected      OT IRF GOALS     Row Name 04/28/23 1446 04/27/23 1506 04/26/23 1532       Transfer Goal 1 (OT-IRF)    Activity/Assistive Device (Transfer Goal 1, OT-IRF) toilet;sit-to-stand/stand-to-sit;shower chair;walk-in shower;walker, rolling  -KP toilet;sit-to-stand/stand-to-sit;shower chair;walk-in shower;walker, rolling  -KP toilet;sit-to-stand/stand-to-sit;shower chair;walk-in shower;walker, rolling  -DN    [place] Level (Transfer Goal 1, OT-IRF) contact guard required  -KP contact guard required  -KP contact guard required  -DN    Time Frame (Transfer Goal 1, OT-IRF) short-term goal (STG)  -KP short-term goal (STG)  -KP  short-term goal (STG)  -DN    Strategies/Barriers (Transfer Goal 1, OT-IRF) -- -- Pt vairies daily  -DN    Progress/Outcomes (Transfer Goal 1, OT-IRF) goal not met  -KP goal ongoing  -KP goal partially met  -DN       Transfer Goal 2 (OT-IRF)    Activity/Assistive Device (Transfer Goal 2, OT-IRF) sit-to-stand/stand-to-sit;toilet;walk-in shower;shower chair;walker, rolling  -KP sit-to-stand/stand-to-sit;toilet;walk-in shower;shower chair;walker, rolling  -KP sit-to-stand/stand-to-sit;toilet;walk-in shower;shower chair;walker, rolling  -DN    [place] Level (Transfer Goal 2, OT-IRF) set-up required;contact guard required  -KP set-up required;contact guard required  -KP set-up required;contact guard required  -DN    Time Frame (Transfer Goal 2, OT-IRF) long-term goal (LTG);by discharge  -KP long-term goal (LTG);by discharge  -KP long-term goal (LTG);by discharge  -DN    Progress/Outcomes (Transfer Goal 2, OT-IRF) goal not met  -KP goal ongoing  -KP goal not met  -DN       Bathing Goal 1 (OT-IRF)    Activity/Device (Bathing Goal 1, OT-IRF) bathing skills, all;hand-held shower spray hose;grab bar, tub/shower;shower chair  -KP bathing skills, all;hand-held shower spray hose;grab bar, tub/shower;shower chair  -KP bathing skills, all;hand-held shower spray hose;grab bar, tub/shower;shower chair  -DN    [place] Level (Bathing Goal 1, OT-IRF) contact guard required  -KP contact guard required  -KP contact guard required  -DN    Time Frame (Bathing Goal 1, OT-IRF) short-term goal (STG)  -KP short-term goal (STG)  -KP short-term goal (STG)  -DN    Progress/Outcomes (Bathing Goal 1, OT-IRF) goal not met  -KP goal ongoing  -KP goal met  -DN       Bathing Goal 2 (OT-IRF)    Activity/Device (Bathing Goal 2, OT-IRF) -- -- grab bar, tub/shower;hand-held shower spray hose;shower chair;bathing skills, all  -DN    [place] Level (Bathing Goal 2, OT-IRF) -- -- set-up required;contact guard required  -DN    Time Frame (Bathing  Goal 2, OT-IRF) -- -- long-term goal (LTG);by discharge  -DN    Progress/Outcomes (Bathing Goal 2, OT-IRF) -- -- goal met  -DN       UB Dressing Goal 1 (OT-IRF)    Activity/Device (UB Dressing Goal 1, OT-IRF) -- -- upper body dressing  -DN    [place] (UB Dress Goal 1, OT-IRF) -- -- supervision required  -DN    Time Frame (UB Dressing Goal 1, OT-IRF) -- -- short-term goal (STG)  -DN    Progress/Outcomes (UB Dressing Goal 1, OT-IRF) -- -- goal met  -DN       UB Dressing Goal 2 (OT-IRF)    Activity/Device (UB Dressing Goal 2, OT-IRF) -- -- upper body dressing  -DN    [place] (UB Dress Goal 2, OT-IRF) -- -- set-up required  -DN    Time Frame (UB Dressing Goal 2, OT-IRF) -- -- long-term goal (LTG)  -DN    Progress/Outcomes (UB Dressing Goal 2, OT-IRF) -- -- goal met  -DN       LB Dressing Goal 1 (OT-IRF)    Activity/Device (LB Dressing Goal 1, OT-IRF) lower body dressing  -KP lower body dressing  -KP lower body dressing  -DN    [place] (LB Dressing Goal 1, OT-IRF) moderate assist (50-74% patient effort)  -KP moderate assist (50-74% patient effort)  -KP moderate assist (50-74% patient effort)  -DN    Time Frame (LB Dressing Goal 1, OT-IRF) short-term goal (STG);1 week  -KP short-term goal (STG);1 week  -KP short-term goal (STG);1 week  -DN    Progress/Outcomes (LB Dressing Goal 1, OT-IRF) goal not met  -KP goal ongoing  -KP goal not met  -DN       LB Dressing Goal 2 (OT-IRF)    Activity/Device (LB Dressing Goal 2, OT-IRF) lower body dressing;reacher;sock aid;elastic laces  -KP lower body dressing;reacher;sock aid;elastic laces  -KP lower body dressing;reacher;sock aid;elastic laces  -DN    [place] (LB Dressing Goal 2, OT-IRF) moderate assist (50-74% patient effort)  -KP moderate assist (50-74% patient effort)  -KP moderate assist (50-74% patient effort)  -DN    Time Frame (LB Dressing Goal 2, OT-IRF) long-term goal (LTG)  -KP long-term goal (LTG)  -KP long-term goal (LTG)  -DN    Progress/Outcomes (LB  Dressing Goal 2, OT-IRF) goal not met  -KP goal ongoing  -KP goal not met  -DN       Grooming Goal 1 (OT-IRF)    Activity/Device (Grooming Goal 1, OT-IRF) -- -- grooming skills, all  -DN    [place] (Grooming Goal 1, OT-IRF) -- -- set-up required  -DN    Time Frame (Grooming Goal 1, OT-IRF) -- -- short-term goal (STG)  -DN    Progress/Outcomes (Grooming Goal 1, OT-IRF) -- -- goal met  -DN       Grooming Goal 2 (OT-IRF)    Activity/Device (Grooming Goal 2, OT-IRF) -- -- grooming skills, all  -DN    [place] (Grooming Goal 2, OT-IRF) -- -- set-up required  -DN    Time Frame (Grooming Goal 2, OT-IRF) -- -- long-term goal (LTG);by discharge  -DN    Progress/Outcomes (Grooming Goal 2, OT-IRF) -- -- goal met  -DN       Toileting Goal 1 (OT-IRF)    Activity/Device (Toileting Goal 1, OT-IRF) toileting skills, all;grab bar/safety frame  -KP toileting skills, all;grab bar/safety frame  -KP toileting skills, all;grab bar/safety frame  -DN    [place] Level (Toileting Goal 1, OT-IRF) moderate assist (50-74% patient effort)  -KP moderate assist (50-74% patient effort)  -KP moderate assist (50-74% patient effort)  -DN    Progress/Outcomes (Toileting Goal 1, OT-IRF) goal not met  -KP goal ongoing  -KP goal not met  -DN    Time Frame (Toileting Goal 1, OT-IRF) -- short-term goal (STG)  -KP short-term goal (STG)  -DN       Toileting Goal 2 (OT-IRF)    Activity/Device (Toileting Goal 2, OT-IRF) toileting skills, all;grab bar/safety frame  -KP toileting skills, all;grab bar/safety frame  -KP toileting skills, all;grab bar/safety frame  -DN    [place] Level (Toileting Goal 2, OT-IRF) set-up required;moderate assist (50-74% patient effort)  -KP set-up required;moderate assist (50-74% patient effort)  -KP set-up required;moderate assist (50-74% patient effort)  -DN    Progress/Outcomes (Toileting Goal 2, OT-IRF) goal not met  -KP goal ongoing  -KP goal not met;goal revised this date  -DN    Time Frame (Toileting Goal 2,  OT-IRF) long-term goal (LTG)  -KP long-term goal (LTG)  -KP long-term goal (LTG)  -DN       Strength Goal 1 (OT-IRF)    Strength Goal 1 (OT-IRF) incr R hand  to 20 and R UE to 4-/5  -KP incr R hand  to 20 and R UE to 4-/5  -KP incr R hand  to 20 and R UE to 4-/5  -DN    Time Frame (Strength Goal 1, OT-IRF) long-term goal (LTG);by discharge  -KP long-term goal (LTG);by discharge  -KP long-term goal (LTG);by discharge  -DN    Progress/Outcomes (Strength Goal 1, OT-IRF) goal partially met  -KP goal ongoing  -KP goal not met  -DN       Balance Goal 1 (OT)    Activity/Assistive Device (Balance Goal 1, OT) standing, dynamic;walker, rolling  -KP -- standing, dynamic;walker, rolling  -DN    [place] Level/Cues Needed (Balance Goal 1, OT) contact guard assist  -KP -- contact guard assist  -DN    Time Frame (Balance Goal 1, OT) long term goal (LTG);by discharge  -KP -- long term goal (LTG);by discharge  -DN    Progress/Outcomes (Balance Goal 1, OT) goal met  -KP -- goal met  -DN       Caregiver Training Goal 1 (OT-IRF)    Caregiver Training Goal 1 (OT-IRF) pt family ed on safety w tsf w pt and ADLs. pt ed on HEP for [facility] and [facility]  -KP pt family ed on safety w tsf w pt and ADLs. pt ed on HEP for [facility] and [facility]  re instating goal in case pt dc home from rehab  -KP pt family ed on safety w tsf w pt and ADLs. pt ed on HEP for [facility] and [facility]  -DN    Time Frame (Caregiver Training Goal 1, OT-IRF) long-term goal (LTG);by discharge  -KP long-term goal (LTG);by discharge  -KP long-term goal (LTG);by discharge  -DN    Progress/Outcomes (Caregiver Training Goal 1, OT-IRF) goal no longer appropriate  pt dc to subacute rehab  -KP goal ongoing  -KP goal not met  pt family did not go through teaching due to going to [facility]    Row Name 04/21/23 1209             Transfer Goal 1 (OT-IRF)    Activity/Assistive Device (Transfer Goal 1, OT-IRF) toilet;sit-to-stand/stand-to-sit;shower chair;walk-in shower;walker, rolling  -DN       [place] Level (Transfer Goal 1, OT-IRF) contact guard required  -DN      Time Frame (Transfer Goal 1, OT-IRF) short-term goal (STG)  -DN      Progress/Outcomes (Transfer Goal 1, OT-IRF) goal met;goal revised this date  -DN         Transfer Goal 2 (OT-IRF)    Activity/Assistive Device (Transfer Goal 2, OT-IRF) sit-to-stand/stand-to-sit;toilet;walk-in shower;shower chair;walker, rolling  -DN      [place] Level (Transfer Goal 2, OT-IRF) set-up required;contact guard required  -DN      Time Frame (Transfer Goal 2, OT-IRF) long-term goal (LTG);by discharge  -DN      Progress/Outcomes (Transfer Goal 2, OT-IRF) good progress toward goal  -DN         Bathing Goal 1 (OT-IRF)    Activity/Device (Bathing Goal 1, OT-IRF) bathing skills, all;hand-held shower spray hose;grab bar, tub/shower;shower chair  -DN      [place] Level (Bathing Goal 1, OT-IRF) contact guard required  -DN      Time Frame (Bathing Goal 1, OT-IRF) short-term goal (STG)  -DN      Progress/Outcomes (Bathing Goal 1, OT-IRF) goal met;goal revised this date  -DN         Bathing Goal 2 (OT-IRF)    Activity/Device (Bathing Goal 2, OT-IRF) grab bar, tub/shower;hand-held shower spray hose;shower chair;bathing skills, all  -DN      [place] Level (Bathing Goal 2, OT-IRF) set-up required;contact guard required  -DN      Time Frame (Bathing Goal 2, OT-IRF) long-term goal (LTG);by discharge  -DN      Progress/Outcomes (Bathing Goal 2, OT-IRF) goal met;goal revised this date  -DN         UB Dressing Goal 1 (OT-IRF)    Activity/Device (UB Dressing Goal 1, OT-IRF) upper body dressing  -DN      [place] (UB Dress Goal 1, OT-IRF) set-up required;standby assist  -DN      Time Frame (UB Dressing Goal 1, OT-IRF) short-term goal (STG);1 week  -DN      Progress/Outcomes (UB Dressing Goal 1, OT-IRF) goal partially met  pt varies with type of shirt, and shoulder pain  -DN         UB Dressing Goal 2 (OT-IRF)    Activity/Device (UB Dressing Goal 2, OT-IRF) upper  body dressing  -DN      [place] (UB Dress Goal 2, OT-IRF) set-up required  -DN      Time Frame (UB Dressing Goal 2, OT-IRF) long-term goal (LTG);by discharge  -DN      Progress/Outcomes (UB Dressing Goal 2, OT-IRF) goal ongoing  -DN         LB Dressing Goal 1 (OT-IRF)    Activity/Device (LB Dressing Goal 1, OT-IRF) lower body dressing;elastic laces;sock aid;reacher  -DN      [place] (LB Dressing Goal 1, OT-IRF) moderate assist (50-74% patient effort)  -DN      Time Frame (LB Dressing Goal 1, OT-IRF) short-term goal (STG);1 week  -DN      Progress/Outcomes (LB Dressing Goal 1, OT-IRF) goal not met;goal ongoing  -DN         LB Dressing Goal 2 (OT-IRF)    Activity/Device (LB Dressing Goal 2, OT-IRF) lower body dressing;reacher;sock aid;elastic laces  -DN      [place] (LB Dressing Goal 2, OT-IRF) moderate assist (50-74% patient effort)  -DN      Time Frame (LB Dressing Goal 2, OT-IRF) long-term goal (LTG)  -DN      Progress/Outcomes (LB Dressing Goal 2, OT-IRF) goal revised this date  -DN         Grooming Goal 1 (OT-IRF)    Activity/Device (Grooming Goal 1, OT-IRF) grooming skills, all  -DN      [place] (Grooming Goal 1, OT-IRF) set-up required;supervision required  -DN      Time Frame (Grooming Goal 1, OT-IRF) short-term goal (STG);1 week  -DN      Progress/Outcomes (Grooming Goal 1, OT-IRF) goal partially met  varies  -DN         Grooming Goal 2 (OT-IRF)    Activity/Device (Grooming Goal 2, OT-IRF) grooming skills, all  -DN      [place] (Grooming Goal 2, OT-IRF) set-up required  -DN      Time Frame (Grooming Goal 2, OT-IRF) long-term goal (LTG);by discharge  -DN      Progress/Outcomes (Grooming Goal 2, OT-IRF) goal ongoing  -DN         Toileting Goal 1 (OT-IRF)    Activity/Device (Toileting Goal 1, OT-IRF) toileting skills, all;grab bar/safety frame  -DN      [place] Level (Toileting Goal 1, OT-IRF) moderate assist (50-74% patient effort)  -DN      Progress/Outcomes (Toileting Goal 1,  OT-IRF) goal not met  -DN      Time Frame (Toileting Goal 1, OT-IRF) short-term goal (STG)  -DN         Toileting Goal 2 (OT-IRF)    Activity/Device (Toileting Goal 2, OT-IRF) toileting skills, all;grab bar/safety frame  -DN      [place] Level (Toileting Goal 2, OT-IRF) set-up required;moderate assist (50-74% patient effort)  -DN      Progress/Outcomes (Toileting Goal 2, OT-IRF) goal not met;goal revised this date  -DN      Time Frame (Toileting Goal 2, OT-IRF) long-term goal (LTG)  -DN         Strength Goal 1 (OT-IRF)    Strength Goal 1 (OT-IRF) incr R hand  to 20 and R UE to 4-/5  -DN      Time Frame (Strength Goal 1, OT-IRF) long-term goal (LTG);by discharge  -DN      Progress/Outcomes (Strength Goal 1, OT-IRF) goal not met;goal ongoing  -DN         Balance Goal 1 (OT)    Activity/Assistive Device (Balance Goal 1, OT) standing, dynamic;walker, rolling  -DN      [place] Level/Cues Needed (Balance Goal 1, OT) contact guard assist  -DN      Time Frame (Balance Goal 1, OT) long term goal (LTG);by discharge  -DN      Progress/Outcomes (Balance Goal 1, OT) goal ongoing  -DN         Caregiver Training Goal 1 (OT-IRF)    Caregiver Training Goal 1 (OT-IRF) pt family ed on safety w tsf w pt and ADLs. pt ed on HEP for [facility] and [facility]  -DN      Time Frame (Caregiver Training Goal 1, OT-IRF) long-term goal (LTG);by discharge  -DN      Progress/Outcomes (Caregiver Training Goal 1, OT-IRF) goal ongoing  -DN            User Key  (r) = Recorded By, (t) = Taken By, (c) = Cosigned By    Initials Name Provider Type    Ismael Quintanilla, OT Occupational Therapist    Patsy Rosen OTR Occupational Therapist                    Time Calculation:    Time Calculation- OT     Row Name 04/28/23 1456 04/28/23 1100          Time Calculation- OT    OT Start Time 1330  -KP 1100  -KP     OT Stop Time 1400  -KP 1130  -KP     OT Time Calculation (min) 30 min  -KP 30 min  -KP           User Key  (r) = Recorded By, (t) = Taken  By, (c) = Cosigned By    Initials Name Provider Type    Patsy Rosen OTR Occupational Therapist                Therapy Charges for Today     Code Description Service Date Service Provider Modifiers Qty    30429502836 HC OT THER PROC EA 15 MIN 4/27/2023 Patsy Blank OTR GO 2    03637816149 HC OT NEUROMUSC RE EDUCATION EA 15 MIN 4/27/2023 Patsy Blank OTR GO 2    29473567943 HC OT SELF CARE/MGMT/TRAIN EA 15 MIN 4/28/2023 Patsy Blank OTR GO 2    75368782395 HC OT THER PROC EA 15 MIN 4/28/2023 Patsy Blank OTR GO 2                    RIAN Barton  4/28/2023

## 2023-04-28 NOTE — THERAPY TREATMENT NOTE
Inpatient Rehabilitation - Physical Therapy Treatment Note       [facility]     Patient Name: Melissa Gomez  : 1940  MRN: [id number]    Today's Date: 2023                    Admit Date: 2023      Visit Dx:     ICD-10-CM ICD-9-CM   1. Impaired functional mobility, balance, gait, and endurance  Z74.09 V49.89   2. Follow-up exam  Z09 V67.9       Patient Active Problem List   Diagnosis   • Infarction of left basal ganglia       Past Medical History:   Diagnosis Date   • GERD (gastroesophageal reflux disease)    • Hyperlipidemia    • Hypertension    • Stroke        Past Surgical History:   Procedure Laterality Date   • COLONOSCOPY     • HYSTERECTOMY     • TONSILLECTOMY         PT ASSESSMENT (last 12 hours)     IRF PT Evaluation and Treatment     Row Name 23 0833          PT Time and Intention    Document Type daily treatment  -EE     Mode of Treatment physical therapy;individual therapy  -EE     Patient/Family/Caregiver Comments/Observations Pt sitting up in WC, agreeable to PT.  -EE     Row Name 23 0833          General Information    General Observations of Patient Pt reports B feet feeling slightly numb when she woke up, states she notified the RN. Reports some improvement in numbness by the time she arrived in PT gym. Therapist further discussed with RN and MD.  -EE     Existing Precautions/Restrictions fall;swallow precautions  -EE     Row Name 23 0833          Pain Assessment    Pretreatment Pain Rating 0/10 - no pain  -EE     Posttreatment Pain Rating 0/10 - no pain  -EE     Row Name 23 0833          Cognition/Psychosocial    Affect/Mental Status (Cognition) WFL  -EE     Orientation Status (Cognition) oriented x 3  -EE     Follows Commands (Cognition) follows one-step commands;over 90% accuracy  -EE     Personal Safety Interventions fall prevention program maintained;gait belt;muscle strengthening facilitated;nonskid shoes/slippers when out of bed;supervised activity   -EE     Safety Deficit (Cognition) minimal deficit;safety precautions follow-through/compliance  -EE     Row Name 04/28/23 0833          Mobility    Additional Documentation Wheelchair Mobility/Management (Group)  -EE     Row Name 04/28/23 0833          Sit-Stand Transfer    Sit-Stand [place] (Transfers) contact guard;minimum assist (75% patient effort);verbal cues  -EE     Assistive Device (Sit-Stand Transfers) walker, front-wheeled  -EE     Comment, (Sit-Stand Transfer) cues for sequencing and hand placement  -EE     Row Name 04/28/23 0833          Stand-Sit Transfer    Stand-Sit [place] (Transfers) contact guard;verbal cues  -EE     Assistive Device (Stand-Sit Transfers) walker, front-wheeled  -EE     Row Name 04/28/23 0833          Gait/Stairs (Locomotion)    [place] Level (Gait) contact guard;verbal cues  -EE     Assistive Device (Gait) walker, front-wheeled  -EE     Distance in Feet (Gait) 80' x 3  -EE     Pattern (Gait) step-through  -EE     Deviations/Abnormal Patterns (Gait) gait speed decreased;[place] decreased;stride length decreased;weight shifting decreased  -EE     Bilateral Gait Deviations forward flexed posture;heel strike decreased  -EE     Gait Assessment/Intervention cues for upright posture  -EE     Row Name 04/28/23 0833          Wheelchair Mobility/Management    Method of Wheelchair Locomotion (Mobility) bipedal (lower extremity) propulsion  -EE     Mobility Activities (Wheelchair) forward propulsion;steering  -EE     Forward Propulsion [place] (Wheelchair) contact guard;minimum assist (75% patient effort);verbal cues  -EE     Steering [place] (Wheelchair) minimum assist (75% patient effort);verbal cues  -EE     Distance Propelled in Feet (Wheelchair) 40'  -EE     Comment, Wheelchair Mobility performed for LE strengthening  -EE     Row Name 04/28/23 0833          Safety Issues, Functional Mobility    Impairments Affecting Function (Mobility) balance;endurance/activity  tolerance;strength  -EE     Row Name 04/28/23 0833          Hip (Therapeutic Exercise)    Hip Strengthening (Therapeutic Exercise) bilateral;marching while seated;aBduction;aDduction;10 repetitions  red tband for hip abd  -EE     Row Name 04/28/23 0833          Knee (Therapeutic Exercise)    Knee Strengthening (Therapeutic Exercise) bilateral;LAQ (long arc quad);hamstring curls;10 repetitions  red tband for ham curls  -EE     Row Name 04/28/23 0833          Positioning and Restraints    Pre-Treatment Position sitting in chair/recliner  -EE     Post Treatment Position wheelchair  -EE     In Wheelchair sitting;call light within reach;encouraged to call for assist;exit alarm on  -EE           User Key  (r) = Recorded By, (t) = Taken By, (c) = Cosigned By    Initials Name Provider Type    Diann Batista, PT Physical Therapist                 Physical Therapy Education     Title: PT OT SLP Therapies (Done)     Topic: Physical Therapy (Done)     Point: Mobility training (Done)     Learning Progress Summary           Patient Acceptance, E,TB, VU,NR by EE at 4/28/2023 0851    Acceptance, E,TB, VU,NR by EE at 4/28/2023 0837    Acceptance, E,D, VU,DU by JK at 4/27/2023 1316    Acceptance, E, DU,VU,NR by MG at 4/25/2023 1219    Acceptance, E,D, VU,DU,NR by JK at 4/25/2023 1044    Acceptance, E,D, VU,DU by DP at 4/24/2023 1636    Acceptance, E,TB, VU,NR by EE at 4/21/2023 1507    Acceptance, E,TB,D, VU,NR by EE at 4/20/2023 1110    Acceptance, E,TB, VU,NR by EE at 4/20/2023 1043    Acceptance, E,TB,D, VU,NR by EE at 4/19/2023 1121    Acceptance, E,TB, VU,NR by EE at 4/18/2023 1102    Acceptance, E,D,TB, VU,NR by EE at 4/17/2023 1129    Acceptance, TB,E, VU,NR by EE at 4/13/2023 1047    Acceptance, E,D, VU,DU by DP at 4/11/2023 1038    Acceptance, E, VU,NR by EE at 4/10/2023 1043    Acceptance, E, VU by LS at 4/8/2023 1204    Acceptance, E,TB, VU,NR by EE at 4/7/2023 1054    Acceptance, E,TB, VU by KP at 4/6/2023 1527                    Point: Home exercise program (Done)     Learning Progress Summary           Patient Acceptance, E,TB, VU,NR by EE at 4/28/2023 0851    Acceptance, E,TB, VU,NR by EE at 4/28/2023 0837    Acceptance, E,D, VU,DU by JK at 4/27/2023 1316    Acceptance, E,D, VU,DU,NR by JK at 4/25/2023 1044    Acceptance, E,D, VU,DU by DP at 4/24/2023 1636    Acceptance, E,TB, VU,NR by ERUM at 4/22/2023 1353    Acceptance, E,TB, VU,NR by EE at 4/21/2023 1507    Acceptance, E,TB,D, VU,NR by EE at 4/20/2023 1110    Acceptance, E,TB, VU,NR by EE at 4/20/2023 1043    Acceptance, E,TB,D, VU,NR by EE at 4/19/2023 1121    Acceptance, E,TB, VU,NR by EE at 4/18/2023 1102    Acceptance, E,D,TB, VU,NR by EE at 4/17/2023 1129    Acceptance, TB,E, VU,NR by EE at 4/13/2023 1047    Acceptance, E,D, VU,DU by DP at 4/11/2023 1038    Acceptance, E, VU,NR by EE at 4/10/2023 1043    Acceptance, E, VU by LS at 4/8/2023 1204    Acceptance, E,TB, VU,NR by EE at 4/7/2023 1054    Acceptance, E,TB, VU by KP at 4/6/2023 1527                   Point: Body mechanics (Done)     Learning Progress Summary           Patient Acceptance, E,TB, VU,NR by EE at 4/28/2023 0851    Acceptance, E,TB, VU,NR by EE at 4/28/2023 0837    Acceptance, E, DU,VU,NR by MG at 4/25/2023 1219                   Point: Precautions (Done)     Learning Progress Summary           Patient Acceptance, E,TB, VU,NR by EE at 4/28/2023 0851    Acceptance, E,TB, VU,NR by EE at 4/28/2023 0837    Acceptance, E, DU,VU,NR by MG at 4/25/2023 1214                               User Key     Initials Effective Dates Name Provider Type Discipline    ERUM 06/16/21 -  Tiera Alanis, PT Physical Therapist PT    LS 06/16/21 -  Yamila Wheat MS CCC-SLP Speech and Language Pathologist SLP    EE 06/16/21 -  Diann Gonzáles, PT Physical Therapist PT    JK 06/16/21 -  Stephani Elder, PT Physical Therapist PT    KP 06/16/21 -  Chen Centeno, PT Physical Therapist PT    MG 05/24/22 -  Shama Wild, GIRISH  Physical Therapist PT    DP 08/24/21 -  Gen Gray PT Physical Therapist PT                PT Recommendation and Plan                          Time Calculation:      PT Charges     Row Name 04/28/23 0851             Time Calculation    Start Time 0800  -EE      Stop Time 0900  -EE      Time Calculation (min) 60 min  -EE      PT Received On 04/28/23  -EE      PT - Next Appointment 04/29/23  -EE         Time Calculation- PT    Total Timed Code Minutes- PT 60 minute(s)  -EE            User Key  (r) = Recorded By, (t) = Taken By, (c) = Cosigned By    Initials Name Provider Type    EE Diann Gonzáles, PT Physical Therapist                Therapy Charges for Today     Code Description Service Date Service Provider Modifiers Qty    03058039097 HC PT THERAPEUTIC ACT EA 15 MIN 4/28/2023 Diann Gonzáles, PT GP 3    40510435231 HC PT THER PROC EA 15 MIN 4/28/2023 Diann Gonzáles, PT GP 1                   Diann Gonzáles PT  4/28/2023

## 2023-04-28 NOTE — PROGRESS NOTES
SECTION GG      Self Care Performance Discharge:   Oral Hygiene: [place] sets up or cleans up; patient completes activity. [place]  assists only prior to or following the activity.   Toileting Hygiene: : [place] does more than half the effort. [place] lifts or  holds trunk or limbs and provides more than half the effort.   Shower/Bathe Self: [place] does less than half the effort. [place] lifts, holds  or supports trunk or limbs but provides less than half the effort.   Upper Body Dressing: [place] provides verbal cues and/or touching/steadying  and/or contact guard assistance as patient completes activity.   Lower Body Dressing: [place] does more than half the effort. [place] lifts or  holds trunk or limbs and provides more than half the effort.   Putting On/Taking Off Footwear: [place] does all of the effort. Patient does  none of the effort to complete the activity. Or, the assistance of 2 or more  helpers is required for the patient to complete the activity.    Mobility Toilet Transfer Discharge: [place] does less than half the effort.  [place] lifts, holds or supports trunk or limbs but provides less than half the  effort.    Signed by: Patsy Blank OTR/L

## 2023-04-28 NOTE — PROGRESS NOTES
Case Management Discharge Note      Final Note: Patient d/c to [facility] on 4/29.. Wheelchair transport set up with [facility], per patient request. They will pick patient up at 1:00pm.         Selected Continued Care - Admitted Since 4/5/2023     Destination Coordination complete.    Service Provider Selected Services Address Phone Fax Patient Preferred    [facility] [address] [place], [place] [id number] [phone] --          Durable Medical Equipment    No services have been selected for the patient.              Dialysis/Infusion    No services have been selected for the patient.              Home Medical Care    No services have been selected for the patient.              Therapy    No services have been selected for the patient.              Community Resources    No services have been selected for the patient.              Community & DME    No services have been selected for the patient.                       Final Discharge Disposition Code: 03 - skilled nursing facility (SNF)

## 2023-04-28 NOTE — DISCHARGE SUMMARY
[facility] - REHABILITATION UNIT    PIERRE FREEMAN  1940    ADMIT DATE:  4/5/2023  7:52 PM  DISCHARGE DATE:  4/29/2023      CHIEF COMPLAINT:    Left basal ganglia stroke  Right hemiparesis with impaired motor control       HISTORY OF PRESENT ILLNESS:        HOSPITAL COURSE:    Patient participated in a comprehensive acute inpatient rehabilitation program.     During the hospital stay the following areas were addressed:    Left basal ganglia stroke  Right hemiparesis with impaired motor control  - Stroke prophylaxis: ASA and Plavix x 21 days from March 30, 2023, then ASA 81 mg daily. Crestor 20 mg.  - TSH, Vit B12 and Vit D levels within normal limits   April 26 - stable for dc tomorrow      Dysphagia   April 14 - upgrade to soft (chopped meats)/no mixed consistencies with NTL. Recommend sit upright in wheelchair with supervision. Check intermittently for pocketing. Meds one at a time with applesauce or pudding.  April 26 - VSS today     Dysarthria  -April 21 - Noted to be 100% intelligible in conversation with SLP with some moments of word finding delays.      Hypertension - amlodipine/lisinopril     Urinary retention. Treated for UTI as Reggie Duran. Check bladder scan PVR and cath if >300 cc until three consecutive < 250 cc  April 7-requires intermittent straight cath 400-113-300 cc.  No spontaneous void.  Follow pattern.  April 10-requirement straight catheter 300 cc about every 6 or 8 hours.  - will extend interval to every 12 hours to see if she voids with a larger volume.  4/12- has not required intermittent catheterization since last night.  Noted to have 4 voids with PVR less than 200.  May be recovering bladder function.  Will monitor.  4/13-voiding on her own with postvoid residuals 190 range  4/19-complaints of dysuria-urinalysis ordered     Urinary Tract Infection, resolved  -complaint of dysuria on 4/19 with positive UA.   -Culture positive for morganella morganii and  klebsiella pneumoniae susceptible to cefdinir  -Completed cefdinir 300mg BID for 5 days      Right upper extremity discomfort  -April 17-Has some diffuse pain in the right upper extremity.  Distal right upper extremity slightly warmer than the left. No allodynia.  - Reviewed possible post stroke pain/central pain syndrome versus sympathetic mediated pain versus shoulder subluxation.  Will monitor.  April 26 - persistent pain controlled by Tylenol      DVT prophylaxis- on dual antiplatelet therapies.  SCDs.  Lovenox     Team Conference 4/11/2023  Nursing: continent/incontinent with bladder, requiring cathing each bladder scan, no safety issues  PT: bed mobiliy min assist, contact guard with transfers, ambulating 80 feet, fatigues quickly with low endurance, cues for sequencing, drags left foot, anticipated contact guard with walker  OT: mod assist for bathing, max/dependent with lower body dressing, toileting max assist due to balance  SLP: mildly impaired on CLQT WNL memory/visuospatial, and mild for attention/executive function, puree and nectar thick diet, mild to moderate dysarthria  Discharge Date: 1.5 weeks      TEAM CONF - April 18 - PROGRESS SLOWER THAN INITIAL FIRST DAYS SO ANTICIPATE EXTENDING LENGTH OF STAY - STRONGER BUT STILL IMPAIRED MOTOR CONTROL. FATIGUE. BED MOD. TRANSFERS MIN CTG. GAIT 80 FEET CTG RW , LESS DRAG RIGHT FEET. 4 STAIRS CTG. BATH MOD. LBD MAX. UBD MIN SBA. COGNITION - CUES FOR ATTENTION TO DETAIL. SWALLOW - COUGH WITH WATER BY CUP. MECH SOFT, NTL. VOICING/ARTICULATION- MILD DYSARTHRIA AND WILL SELF CORRECT.  BNE YESTERDAY. SEE REPORT IN [facility]INKS. Attention: WNL  Executive Functioning: Mildly Impaired  Abstract Reasoning: WNL  Arithmetic: Mildly Impaired  Visuospatial Perception: WNL  Visuospatial Praxis: Moderately to Severely Impaired  Verbal Memory: Immediate - Moderately Impaired; Delayed - WNL for Stories, Severely Impaired for Lists  Visual Memory: Mildly Impaired  Emotional: Mild  anxiety and depression related to worries about her quality-of-life following discharge from the hospital.  O2 AT NIGHT. STILL INCONTINENT- TIMED VOIDS. ALLERGIES - SNEEZES.   ELOS - 1.5 WEEKS     TEAM CONF -APRIL 25 -  BED MED. TRANSFER MIN CTG GAIT 80 - 160 FEET CTG RW. TRANSFERS MIN CTG.   TOILET AND SHOWER TRANSFERS CTG RW. BAT MIN MOD WITH ADAPTIVE EQUIPMENT. LVD MOD. UBD SBA. COGNITION - MILD DIFFICULTY WITH DIVERGENT REASONING. DECREASED RECALL/CARRYOVER. MILD DYSARTHRIA. SWALLOW CHOPPED MEAT / NTL. REPEAT VFSS  ELOS - FAMILY UNABLE TO MANAGE PATIENT AT HOME IN SHORT TERM. LOOKING AT [facility].      Disposition April 28-patient approved for subacute rehab.  Plan is for discharge on April 29.  Near the time of discharge the patient was ambulating contact-guard 80 feet with a rolling walker.  Transfers were contact-guard to minimum assist.  Toileting was contact-guard to minimum assist.  Bathing minimum assist.  Upper body dressing supervision.  Lower body dressing maximum assist.  With her swallow she was noted on recent study to have aspiration by thin liquid teaspoon and penetration by thin liquid cup.  Strategies include small sips of breath-hold.  She is on a mechanical soft nectar thick liquid diet no mixed consistency.  Speech therapy also working on her reasoning and thought organization.  She continues to have motor control deficits on the right side.        Physical Exam near the time of discharge:      General: pleasant, in no acute distress  HEENT: NCAT, sclera anicteric, conjunctiva pink, mucoa moist  Cardiovascular: RRR, +S1+S2, no obvious m/g/r  Lungs: CTAB, no rhonchi or wheezing  Abdomen: normoactive bowel sounds, soft, non tender to palpation  Extremities: no peripheral edema  Skin: exposed surfaces of skin warm, intact, without erythema  Neuro: awake alert and oriented to person, place, time, and situation,  Right facial droop   Mild dysarthria  MSK: Right shoulder flexion 4/5, elbow  flexion 4+/5, finger flexion 4+/5, knee extension 4+/5, ankle dorsiflexion 4/5,  weak on the RUE, LUE 5/5 BLE 5/5       RESULTS:  No results found for: POCGLU  Results from last 7 days   Lab Units 04/28/23  0730 04/24/23  0639   WBC 10*3/mm3 8.56 5.92   HEMOGLOBIN g/dL 12.2 12.6   HEMATOCRIT % 36.8 38.6   PLATELETS 10*3/mm3 208 216     Results from last 7 days   Lab Units 04/28/23  0730 04/24/23  0639   SODIUM mmol/L 143 144   POTASSIUM mmol/L 4.1 4.3   CHLORIDE mmol/L 107 109*   CO2 mmol/L 26.4 25.9   BUN mg/dL 27* 29*   CREATININE mg/dL 0.88 0.75   CALCIUM mg/dL 9.8 9.4   GLUCOSE mg/dL 109* 108*              Discharge Medications      New Medications      Instructions Start Date   amLODIPine 10 MG tablet  Commonly known as: NORVASC   10 mg, Oral, Every 24 Hours Scheduled      aspirin 81 MG chewable tablet   81 mg, Oral, Daily      calcium carbonate 500 MG chewable tablet  Commonly known as: TUMS   2 tablets, Oral, 3 Times Daily PRN      lisinopril 5 MG tablet  Commonly known as: PRINIVIL,ZESTRIL   5 mg, Oral, Every 24 Hours Scheduled         Changes to Medications      Instructions Start Date   rosuvastatin 20 MG tablet  Commonly known as: CRESTOR  What changed:   · medication strength  · how much to take   20 mg, Oral, Daily         Continue These Medications      Instructions Start Date   cholecalciferol 25 MCG (1000 UT) tablet  Commonly known as: VITAMIN D3   1,000 Units, Oral, Daily      ibuprofen 200 MG tablet  Commonly known as: ADVIL,MOTRIN   200 mg, Oral, Every 6 Hours PRN      vitamin C 250 MG tablet  Commonly known as: ASCORBIC ACID   250 mg, Oral, Daily              Contact information for follow-up providers     Jayro Fountain MD .    Specialty: Internal Medicine  Contact information:  [address] [id number] [phone]             Elvira Espinoza, APRN Follow up.    Specialty: Nurse Practitioner  Why: or associate at [facility] for outpatient stroke follow  up . Was seen by Dr Maurer in the hospital  Contact information:  [address]  [facility] [place] [address]  [phone]             Anton Fajardo MD Follow up in 1 month(s).    Specialty: Physical Medicine and Rehabilitation  Contact information:  [address]  [facility] [place] [id number] [phone]                   Contact information for after-discharge care     Destination     [facility] .    Service: Skilled Nursing  Contact information:  [address]  [facility] [id number] [phone]                             AVS and Handouts     No AVS or handouts to display          Discharge Instructions       Stroke prophylaxis - Aspirin 81mg daily indefinitely. You have completed 21 days of treatment with Plavix.     Increase Crestor to 20mg    Please start taking your new blood pressure medications. Keep a daily blood pressure log after discharge from the hospital and bring with you to your PCP appointment.     O2 2l nasal cannula at night    No driving. No alcohol.       Mechanical soft, no mixed consistency, nectar thick liquids    Continue PT OT and speech therapy at subacute rehab    >30 on discharge    Anton Fajardo MD

## 2023-04-29 VITALS
WEIGHT: 191.6 LBS | TEMPERATURE: 97.5 F | SYSTOLIC BLOOD PRESSURE: 122 MMHG | HEIGHT: 64 IN | BODY MASS INDEX: 32.71 KG/M2 | OXYGEN SATURATION: 96 % | RESPIRATION RATE: 18 BRPM | HEART RATE: 92 BPM | DIASTOLIC BLOOD PRESSURE: 71 MMHG

## 2023-04-29 PROCEDURE — 63710000001 ONDANSETRON ODT 4 MG TABLET DISPERSIBLE: Performed by: PHYSICAL MEDICINE & REHABILITATION

## 2023-04-29 PROCEDURE — 97530 THERAPEUTIC ACTIVITIES: CPT

## 2023-04-29 PROCEDURE — 97110 THERAPEUTIC EXERCISES: CPT

## 2023-04-29 RX ORDER — ONDANSETRON 4 MG/1
4 TABLET, ORALLY DISINTEGRATING ORAL EVERY 6 HOURS PRN
Status: DISCONTINUED | OUTPATIENT
Start: 2023-04-29 | End: 2023-04-29 | Stop reason: HOSPADM

## 2023-04-29 RX ADMIN — LISINOPRIL 5 MG: 5 TABLET ORAL at 09:18

## 2023-04-29 RX ADMIN — ONDANSETRON 4 MG: 4 TABLET, ORALLY DISINTEGRATING ORAL at 09:15

## 2023-04-29 RX ADMIN — AMLODIPINE BESYLATE 10 MG: 10 TABLET ORAL at 09:18

## 2023-04-29 RX ADMIN — BISACODYL 10 MG: 10 SUPPOSITORY RECTAL at 04:58

## 2023-04-29 RX ADMIN — ASPIRIN 81 MG: 81 TABLET, CHEWABLE ORAL at 09:15

## 2023-04-29 RX ADMIN — OXYCODONE HYDROCHLORIDE AND ACETAMINOPHEN 250 MG: 500 TABLET ORAL at 09:15

## 2023-04-29 RX ADMIN — Medication 1000 UNITS: at 09:15

## 2023-04-29 NOTE — PLAN OF CARE
Goal Outcome Evaluation:  Plan of Care Reviewed With: patient        Progress: improving  Outcome Evaluation: Brigid rested well this shift.  incont of bladder.all meds whole w/applesauce.  NTL, reminder to swallow facing straight ahead.  C/o headache pain, Tylenol given for pain.  Anticipates dc sat 4/29.  2L O2@[facility].  No bm noted since 4/25.  Suppository administered.

## 2023-04-29 NOTE — PLAN OF CARE
Goal Outcome Evaluation:  Plan of Care Reviewed With: patient      Progress: improving  Outcome Evaluation: VSS, pt denies pain, c/o nausea managed by PRN zofran x 1, pt tolerating diet, plan to DC to [facility] at 1300

## 2023-04-29 NOTE — THERAPY DISCHARGE NOTE
Inpatient Rehabilitation - Physical Therapy Treatment Note       [facility]     Patient Name: Melissa Gomez  : 1940  MRN: [id number]    Today's Date: 2023                    Admit Date: 2023      Visit Dx:     ICD-10-CM ICD-9-CM   1. Impaired functional mobility, balance, gait, and endurance  Z74.09 V49.89   2. Follow-up exam  Z09 V67.9       Patient Active Problem List   Diagnosis   • Infarction of left basal ganglia       Past Medical History:   Diagnosis Date   • GERD (gastroesophageal reflux disease)    • Hyperlipidemia    • Hypertension    • Stroke        Past Surgical History:   Procedure Laterality Date   • COLONOSCOPY     • HYSTERECTOMY     • TONSILLECTOMY         PT ASSESSMENT (last 12 hours)     IRF PT Evaluation and Treatment     Row Name 23 1120          PT Time and Intention    Document Type discharge evaluation;daily treatment  -     Mode of Treatment physical therapy  -     Patient/Family/Caregiver Comments/Observations Pt up in chair without clothes on  - needed to be dressed before coming to PT.  Unaware of d/c until end of session.  -     Row Name 23 1120          General Information    Patient Profile Reviewed yes  -     General Observations of Patient Pt noted that she was leaving to [facility] soon.  Had a rough am with emesis and fatigue.  -     Existing Precautions/Restrictions fall;swallow precautions  -     Row Name 23 1120          Pain Assessment    Pretreatment Pain Rating 0/10 - no pain  -     Posttreatment Pain Rating 0/10 - no pain  -     Row Name 23 1120          Cognition/Psychosocial    Affect/Mental Status (Cognition) WFL  -     Orientation Status (Cognition) oriented x 3  -     Follows Commands (Cognition) follows one-step commands;over 90% accuracy;verbal cues/prompting required  -     Personal Safety Interventions fall prevention program maintained;gait belt;nonskid shoes/slippers when out of bed;supervised  activity  -     Cognitive Function memory deficit  -     Attention Deficit (Cognition) minimal deficit  -[facility] Name 04/29/23 1120          Sit-Stand Transfer    Sit-Stand Egnar (Transfers) contact guard;set up;verbal cues  -     Assistive Device (Sit-Stand Transfers) wheelchair;walker, front-wheeled  -     Comment, (Sit-Stand Transfer) VC for foot placement  -     Row Name 04/29/23 1120          Stand-Sit Transfer    Stand-Sit Egnar (Transfers) contact guard;set up  -     Assistive Device (Stand-Sit Transfers) walker, front-wheeled;wheelchair  -     Comment, (Stand-Sit Transfer) VC for foot placement.  -[facility] Name 04/29/23 1120          Gait/Stairs (Locomotion)    Egnar Level (Gait) contact guard;verbal cues  -     Assistive Device (Gait) walker, front-wheeled  -     Distance in Feet (Gait) 80 x 2  -     Pattern (Gait) step-through  -     Deviations/Abnormal Patterns (Gait) gait speed decreased;lenard decreased;stride length decreased;weight shifting decreased  -     Bilateral Gait Deviations forward flexed posture;heel strike decreased  -     Right Sided Gait Deviations heel strike decreased  -     Gait Assessment/Intervention Vc for upright posture in inc foot clearance.  Distance dickey by fatigue.  -[facility] Name 04/29/23 1120          Hip (Therapeutic Exercise)    Hip Strengthening (Therapeutic Exercise) sitting;bilateral;marching while seated;aBduction;10 repetitions;red  -[facility] Name 04/29/23 1120          Knee (Therapeutic Exercise)    Knee Strengthening (Therapeutic Exercise) sitting;bilateral;LAQ (long arc quad);red;10 repetitions  -[facility] Name 04/29/23 1120          Ankle (Therapeutic Exercise)    Ankle Strengthening (Therapeutic Exercise) sitting;bilateral;dorsiflexion;plantarflexion;10 repetitions  -[facility] Name 04/29/23 1120          Positioning and Restraints    Pre-Treatment Position sitting in chair/recliner  -     Post  Treatment Position wheelchair  -KP     In Wheelchair sitting;call light within reach;encouraged to call for assist;exit alarm on  -KP     Row Name 04/29/23 1120          Bed Mobility Goal 1 (PT-IRF)    Progress/Outcomes (Bed Mobility Goal 1, PT-IRF) goal met  -     Row Name 04/29/23 1120          Transfer Goal 1 (PT-IRF)    Progress/Outcomes (Transfer Goal 1, PT-IRF) goal met  -     Row Name 04/29/23 1120          Gait/Walking Locomotion Goal 1 (PT-IRF)    Progress/Outcomes (Gait/Walking Locomotion Goal 1, PT-IRF) goal met  -     Row Name 04/29/23 1120          Stairs Goal 1 (PT-IRF)    Progress/Outcomes (Stairs Goal 1, PT-IRF) goal met  -           User Key  (r) = Recorded By, (t) = Taken By, (c) = Cosigned By    Initials Name Provider Type    Chen Pathak, PT Physical Therapist                 Physical Therapy Education     Title: PT OT SLP Therapies (Done)     Topic: Physical Therapy (Done)     Point: Mobility training (Done)     Learning Progress Summary           Patient Eager, E, VU,DU,NR by  at 4/29/2023 1218    Acceptance, E,TB, VU,NR by EE at 4/28/2023 0851    Acceptance, E,TB, VU,NR by EE at 4/28/2023 0837    Acceptance, E,D, VU,DU by JK at 4/27/2023 1316    Acceptance, E, DU,VU,NR by MG at 4/25/2023 1219    Acceptance, E,D, VU,DU,NR by JK at 4/25/2023 1044    Acceptance, E,D, VU,DU by DP at 4/24/2023 1636    Acceptance, E,TB, VU,NR by EE at 4/21/2023 1507    Acceptance, E,TB,D, VU,NR by EE at 4/20/2023 1110    Acceptance, E,TB, VU,NR by EE at 4/20/2023 1043    Acceptance, E,TB,D, VU,NR by EE at 4/19/2023 1121    Acceptance, E,TB, VU,NR by EE at 4/18/2023 1102    Acceptance, E,D,TB, VU,NR by EE at 4/17/2023 1129    Acceptance, TB,E, VU,NR by EE at 4/13/2023 1047    Acceptance, E,D, VU,DU by DP at 4/11/2023 1038    Acceptance, E, VU,NR by EE at 4/10/2023 1043    Acceptance, E, VU by LS at 4/8/2023 1204    Acceptance, E,TB, VU,NR by EE at 4/7/2023 1054    Acceptance, E,TB, VU by KP at  4/6/2023 1527                   Point: Home exercise program (Done)     Learning Progress Summary           Patient Eager, E, VU,DU,NR by KP at 4/29/2023 1218    Acceptance, E,TB, VU,NR by EE at 4/28/2023 0851    Acceptance, E,TB, VU,NR by EE at 4/28/2023 0837    Acceptance, E,D, VU,DU by JK at 4/27/2023 1316    Acceptance, E,D, VU,DU,NR by JK at 4/25/2023 1044    Acceptance, E,D, VU,DU by DP at 4/24/2023 1636    Acceptance, E,TB, VU,NR by ERUM at 4/22/2023 1353    Acceptance, E,TB, VU,NR by EE at 4/21/2023 1507    Acceptance, E,TB,D, VU,NR by EE at 4/20/2023 1110    Acceptance, E,TB, VU,NR by EE at 4/20/2023 1043    Acceptance, E,TB,D, VU,NR by EE at 4/19/2023 1121    Acceptance, E,TB, VU,NR by EE at 4/18/2023 1102    Acceptance, E,D,TB, VU,NR by EE at 4/17/2023 1129    Acceptance, TB,E, VU,NR by EE at 4/13/2023 1047    Acceptance, E,D, VU,DU by DP at 4/11/2023 1038    Acceptance, E, VU,NR by EE at 4/10/2023 1043    Acceptance, E, VU by LS at 4/8/2023 1204    Acceptance, E,TB, VU,NR by EE at 4/7/2023 1054    Acceptance, E,TB, VU by KP at 4/6/2023 1527                   Point: Body mechanics (Resolved)     Learning Progress Summary           Patient Acceptance, E,TB, VU,NR by EE at 4/28/2023 0851    Acceptance, E,TB, VU,NR by EE at 4/28/2023 0837    Acceptance, E, DU,VU,NR by MG at 4/25/2023 1219                   Point: Precautions (Resolved)     Learning Progress Summary           Patient Acceptance, E,TB, VU,NR by EE at 4/28/2023 0851    Acceptance, E,TB, VU,NR by EE at 4/28/2023 0837    Acceptance, E, DU,VU,NR by  at 4/25/2023 1219                               User Key     Initials Effective Dates Name Provider Type Discipline    ERUM 06/16/21 -  Tiera Alanis, PT Physical Therapist PT    LS 06/16/21 -  Yamila Wheat MS CCC-SLP Speech and Language Pathologist SLP    EE 06/16/21 -  Diann Gonzáles, PT Physical Therapist PT    LORIK 06/16/21 -  Stephani Elder, PT Physical Therapist PT     06/16/21 -  Jacobo  Chen GANDHI, PT Physical Therapist PT    MG 05/24/22 -  Shama Wild, PT Physical Therapist PT    DP 08/24/21 -  Gen Gray PT Physical Therapist PT                PT Recommendation and Plan    Planned Therapy Interventions (PT): balance training, bed mobility training, gait training, home exercise program, neuromuscular re-education, patient/family education, stair training, strengthening, transfer training  Frequency of Treatment (PT): 60 minutes per session, 5 times per week  Anticipated Equipment Needs at Discharge (PT Eval):  (Pt has)                  Time Calculation:      PT Charges     Row Name 04/29/23 1218             Time Calculation    Start Time 1105  -      Stop Time 1135  -      Time Calculation (min) 30 min  -      PT Received On 04/29/23  -      PT - Next Appointment 04/30/23  -            User Key  (r) = Recorded By, (t) = Taken By, (c) = Cosigned By    Initials Name Provider Type     Chen Centeno PT Physical Therapist                Therapy Charges for Today     Code Description Service Date Service Provider Modifiers Qty    14099540704  PT THERAPEUTIC ACT EA 15 MIN 4/29/2023 Chen Centeno, PT GP 1    [id number] HC PT THER PROC EA 15 MIN 4/29/2023 Chen Centeno, PT GP 1                   Chen Centeno PT  4/29/2023

## 2023-05-01 NOTE — PROGRESS NOTES
SECTION GG    Eating Performance Discharge: Patient completed the activities by themself with  no assistance from a helper.    Section B. Health Literacy  Frequency of Needing Assistance Reading:  Often    Section D. Mood  Presence of little interest or pleasure in doing things:   Yes  Frequency of having little interest or pleasure in doing things:   2-6 days  (several days)  Presence of feeling down, depressed, or hopeless:   Yes  Frequency of feeling down, depressed, or hopeless:   12-14 days (nearly every  day)   Continue Interview  Presence of trouble falling or staying asleep, or sleeping too much:   Yes  Frequency of trouble falling or staying asleep, or sleeping too much:   7-11  days (half or more of the days)  Presence of feeling tired or having little energy:   Yes  Frequency of feeling tired or having little energy:   12-14 days (nearly every  day)  Presence of poor appetite or overeating:   No  Frequency of poor appetite or overeating:   Never or 1 day  Presence of feeling bad about yourself-or that you are a failure or have let  yourself or your family down:   No  Frequency of feeling bad about yourself-or that you are a failure or have let  yourself or your family down:  Never or 1 day  Presence of trouble concentrating on things:   No  Frequency of trouble concentrating on things:   Never or 1 day  Presence of Moving/Speaking Slowly or Being Fidgety:   No  Frequency of Moving/Speaking Slowly or Being Fidgety:  Never or 1 day  Presence of Thoughts of Self Harm:  No  Frequency of Thoughts of Self Harm:   Never or 1 day  Total Severity Score:   9    Section D. Social Isolation  Frequency of Feeling Lonely or Isolated:  Often    Section J. Health Conditions (Pain Effect on Sleep)  Pain Effect on Sleep:   Almost constantly    Signed by: Macy Smith RN

## 2023-05-01 NOTE — PROGRESS NOTES
Section C. BIMS  Did not conduct Brief Interview for Mental Status (BIMS). Patient is  rarely/never understood, cannot respond verbally or in writing, or an   is needed, but not available. BIMS was not given 4/28/23, as pt  discharged unexpectedly 4/29.    Section C. Signs and Symptoms of Delirium (from CAM)  Acute Change in Mental Status:   No  Inattention:   Behavior not present  Disorganized Thinking:   Behavior not present  Altered Level of Consciousness:   Behavior not present    Signed by: Janell Gallego, SLP

## 2023-05-01 NOTE — THERAPY DISCHARGE NOTE
Inpatient Rehabilitation - Speech Language Pathology/Discharge  [facility]     Patient Name: Melissa Gomez  : 1940  MRN: [id number]  Today's Date: 2023               Admit Date: 2023     Visit Dx:    ICD-10-CM ICD-9-CM   1. Impaired functional mobility, balance, gait, and endurance  Z74.09 V49.89   2. Follow-up exam  Z09 V67.9     Patient Active Problem List   Diagnosis   • Infarction of left basal ganglia     Past Medical History:   Diagnosis Date   • GERD (gastroesophageal reflux disease)    • Hyperlipidemia    • Hypertension    • Stroke      Past Surgical History:   Procedure Laterality Date   • COLONOSCOPY     • HYSTERECTOMY     • TONSILLECTOMY         SLP Recommendation and Plan  Pt made good progress during inpatient stay toward cognitive, communication and swallow goals. At discharge, pt presented with a mild cognitive-communication deficit, mild dysarthria and mild-moderate oropharyngeal dysphagia. Pt met goal for recall of 3 errands after 20 minutes and immediate recall of a complex paragraph. She required MIN-MOD cues to complete abstract divergent thinking tasks. She required intermittent MIN-MAX cues to complete a moderate level logic puzzle. She was able to complete functional attention to detail tasks (calendar) with 90% accuracy with NO cues, MIN cues to increase. She completed medicine management with pill box with NO cues. Pt required MIN-MOD cues to complete high-level verbal working memory tasks. She exhibited occasional word finding delays in complex conversation; however, she independently used circumlocution. She presented with mild mixed dysarthria characterized imprecise consonants, occasional rushes of speech and reduced vocal intensity; however, she often self-corrected and repeated utterances with improved intelligibility.      Swallow Status: Pt was tolerating soft (chopped meats)/no mixed consistencies with NTL. VFSS was completed 23 with the following results:  Pt presented with mild-moderate oropharyngeal dysphagia. In the oral phase, pt exhibited mildly prolonged mastication of small bite of regular consistency with minimal oral residue in right buccal cavity. In pharyngeal phase, delayed swallow initiation and discoordination of airway closure which led to penetration of thins by tsp before the swallow and aspiration during the swallow with strong cough response on initial trial. On 2nd trial of thins by tsp, pt exhibited trace penetration during the swallow to the level of the vocal folds with no cough response. SLP cued pt to cough. With thins by cup, pt exhibited transient shallow penetration above the vocal folds in 4/4 trials. With thins by straw, pt exhibited transient shallow penetration. Pt with cough following this trial; no aspiration was visualized under fluoro. Pt exhibited trace, transient penetration above the vocal folds with NTL by cup and straw.Pt exhibited silent trace deep penetration above the vocal folds with thin component of mixed, suspect due to spillage from pyriforms. No penetration or aspiration observed with NTL by tsp, puree and regular. Recommend continue soft (chopped meats)/no mixed consistencies with NTL. Hydration protocol by cup 30 minutes after meals with adequate oral care. Meds one at a time with applesauce. Sit upright for all meals. Small bites/sips, slow rate. Trials of soft (chopped meats) with thins with SLP only. Recommend continue EMST 75 exercise, effortful swallows and introduce Mendelsohn exercise. Also, recommend trialing breath hold technique with thins by cup.        SLP EVALUATION (last 72 hours)     SLP SLC Evaluation     Row Name 04/28/23 7579                   Communication Assessment/Intervention    Document Type therapy note (daily note)  -AL        Patient/Family/Caregiver Comments/Observations Pt participated well.  -AL           Pain Scale: Numbers Pre/Post-Treatment    Pretreatment Pain Rating 0/10 - no pain   -AL              User Key  (r) = Recorded By, (t) = Taken By, (c) = Cosigned By    Initials Name Effective Dates    Janell Erickson MS CCC-SLP 06/16/21 -                    EDUCATION  The patient has been educated in the following areas:   Cognitive Impairment Communication Impairment Dysphagia (Swallowing Impairment).           SLP GOALS     Row Name 04/28/23 1430             ([facility]) Pharyngeal Strengthening Exercise Goal 1 (SLP)    Comment (Pharyngeal Strengthening Goal 1, SLP) Pt exhibited no overt s/s of aspiration or penetration observed in 9/12 trials of water by cup; throat clear/coughing x2, delayed throat clear x1. Pt with intermittent throat clearing during conversation; reported drainage in back of throat. SLP encouraged her to complete volitional swallows during conversation. Pt completed 30 repetitions of effortful swallow with MIN-MOD cues. Pt completed 3 sets of 5 reps of EMST 75 at 1.25 cmH20 with MOD cues.  -AL            User Key  (r) = Recorded By, (t) = Taken By, (c) = Cosigned By    Initials Name Provider Type    Janell Erickson MS CCC-SLP Speech and Language Pathologist                    Time Calculation:                    SLP Discharge Summary  Anticipated Discharge Disposition (SLP): skilled nursing facility    Janell Gallego MS CCC-SLP  5/1/2023

## 2023-05-25 ENCOUNTER — TRANSCRIBE ORDERS (OUTPATIENT)
Dept: HOME HEALTH SERVICES | Facility: HOME HEALTHCARE | Age: 83
End: 2023-05-25
Payer: MEDICARE

## 2023-05-25 ENCOUNTER — TRANSCRIBE ORDERS (OUTPATIENT)
Dept: HOME HEALTH SERVICES | Facility: HOME HEALTHCARE | Age: 83
End: 2023-05-25

## 2023-05-25 DIAGNOSIS — I69.351 HEMIPARESIS AFFECTING RIGHT SIDE AS LATE EFFECT OF STROKE: Primary | ICD-10-CM

## 2023-05-26 ENCOUNTER — HOME CARE VISIT (OUTPATIENT)
Dept: HOME HEALTH SERVICES | Facility: HOME HEALTHCARE | Age: 83
End: 2023-05-26
Payer: COMMERCIAL

## 2023-05-26 VITALS
HEART RATE: 83 BPM | OXYGEN SATURATION: 98 % | DIASTOLIC BLOOD PRESSURE: 70 MMHG | RESPIRATION RATE: 18 BRPM | SYSTOLIC BLOOD PRESSURE: 120 MMHG | TEMPERATURE: 98 F

## 2023-05-26 PROCEDURE — G0151 HHCP-SERV OF PT,EA 15 MIN: HCPCS

## 2023-05-26 NOTE — HOME HEALTH
"_________________________________________________  PHYSICAL THERAPY EVALUATION    REASON FOR REFERRAL:  83 yr old female hospitalized at [facility] 3/29/23 - 4/5/23 after sustaining an ACUTE  LEFT BASIL GANGLIA ISCHEMIC STROKE WITH RIGHT HEMIPARESIS & IMPAIRED MOTOR CONTROL.  Patient transferred to [facility] 4/5/23-4/29/23 for comprehensive rehab services.  She then transferred to [facility] 4/29/23 - 5/25/23 for continued subacute rehab services.   She was discharged home &  referred for home health PT services to address deficits in strength, balance & mobility for safe transition into home environment.    Patient & daughter report that pt was discharged home without medication's being called into the pharmacy by [facility].  They have called & were told that medication refills will be sent.  Daughter also reports that the facility was supposed to fax an order for a rollator to [employer].    Pt states the only pain she has is in the (R) shoulder & was told that she has a \"stroke shoulder\".  She states she fractured her (R) great toe years ago & cannot tolerate shoes that apply pressure to the top of her toe.  She typically wears slippers with a back, however acknowledges they are not supportive.     PERTINENT PAST MEDICAL HISTORY:    GERD (gastroesophageal reflux disease)    Hyperlipidemia    Hypertension      PERTINENT PAST SURGICAL HISTORY:    HYSTERECTOMY      TONSILLECTOMY     PRIOR LEVEL OF FUNCTION:  Lived alone.  Ambulated independently with rolling walker x 3 years, independent ADL's & I-ADL's, driving    CAREGIVER:  Daughter- Renu Sandra who resides in [place], however has been assisting pt since she's had a stroke.      HOME ENVIRONMENT:  Lives alone in single story home with 3 steps with rail to enter.  Step in shower.  Pt's daughter- Renu Sandra resides in [place], however has been living in home & assisting pt since the stroke.     MENTAL STATUS:  Alert & Oriented " "x 4    PATIENT GOAL FOR EPISODE OF CARE:   \"I to get back to normal.  I want to be able to get in & out of bed, walk, dress and wipe myself  without help.\"    EDEMA:  None    WOUND / SKIN CONDITION:  Intact    POSTURE:  No gross deformities    MUSCLE TONE/COORDINATION:  WFL    SENSATION/PROPRIOCEPTION:  Impaired (B) feet.      DOMINANT SIDE:  Right    OCCULOMOTOR TESTING  SMOOTH PURSUITS:  Nystagmus at end range (L) lateral gaze  SACCADES:  WFL  CONVERGENCE:  WNL (R), Unable (L)- Impedes depth perception  PERIPHERAL VISION:  WFL  VESTIBULO-OCCULOMOTOR FUNCTION:  Impaired (B).  Pt c/o dizziness during performance    STRENGTH GRADES  UE's:  Grossly 3+ to 4-/5 (R) UE & 4 to 4+/5 (L) LE    HIP      Flexion:  4/5 (R);  4+/5 (L)      Extension: 3/5 (R);  3+/5 (L)     Abduction:  3+/5 (R);  4/5 (L)      Adduction:  4/5 (R);  4+/5 (L)        Internal Rot:  4/5 (R);  4+/5 (L)     External Rot: 4/5 (R);  4+/5 (L)     KNEE      Flexion:  4/5 (R);  4+/5 (L)      Extension:  4/5 (R);  4+/5  (L)      ANKLE       Dorsiflexion:  4/5 (R);  4+/5 (L)    30 SECOND SIT TO STAND: 1  - indicates increased risk for falls  85-89: MEN<8, WOMEN<8    TIMED UP AND GO:  97 seconds- indicates DIMINISHED MOBILITY    (TUG Mobility: High > 10 secTypical 10-19Slower 20-29Diminished>30)    TINETTI SCORE:  11/28- indicates HIGH FALL RISK  (TINETTI FALL RISK SCORING: Under 19= High  19-24= Moderate  25 & up= Low)    POST EVALUATION ASSESSMENT:  Patient is status post Acute Basil Ganglia CVA with (R) hemiparesis.  She presents as a high risk for falls as indicated by Tinetti & TUG scores.  Pt also  demonstrates significant vestibular hypofunction with dizziness & inability to maintain gaze fixation during horizontal head rotation.  Pt requires assistance for safety with all mobility.  Pt has not had BP meds in 2 days, as per daughter due to facility failing to call them into the pharmacy.  Daughter has spoken to the facility & meds are to be " ordered.  PT recommends pt obtain a cuff to monitor BP as it was WNL during home visit.  PT also recommends installing bed rail, permanent grab bars in shower instead of suction & obtain a straight chair with arms, as pt does not have arms on any chairs.    MEDICAL NECESSITY & FOCUS OF CARE FOR ONGOING SKILLED PHYSICAL THERAPY: : Patient requires skilled physical therapy services to address (R) hemiparesis & vestibular hypofunction impeding balance & ability to safely perform functional transfers, bed mobility, ADL's & gait activities.  Patient is unable to ambulate the required distance or negotiate stairs to safely exit home for medical appointments.    PLAN FOR NEXT VISIT: Transfer training, VOR-1 exercises, gait training, progress/establish HEP.

## 2023-05-26 NOTE — Clinical Note
"Marcio,    PT performed a start of care on 5/26/23.  The following is a summary of my home visit.    REASON FOR REFERRAL:  83 yr old female hospitalized at [facility] 3/29/23 - 4/5/23 after sustaining an ACUTE LEFT BASIL GANGLIA ISCHEMIC STROKE WITH RIGHT HEMIPARESIS & IMPAIRED MOTOR CONTROL.  Patient transferred to [facility] 4/5/23-4/29/23 for comprehensive rehab services.  She then transferred to [facility] 4/29/23 - 5/25/23 for continued subacute rehab services.   She was discharged home &  referred for home health PT services to address deficits in strength, balance & mobility for safe transition into home environment.    Patient & daughter report that pt was discharged home without medication's being called into the pharmacy by [facility].  They have called & were told that medication refills will be sent.  Daughter also reports that the facility was supposed to fax an order for a rollator to [employer].    Pt states the only pain she has is in the (R) shoulder & was told that she has a \"stroke shoulder\".  She states she fractured her (R) great toe years ago & cannot tolerate shoes that apply pressure to the top of her toe.  She typically wears slippers with a back, however acknowledges they are not supportive.     PERTINENT PAST MEDICAL HISTORY:    GERD (gastroesophageal reflux disease)    Hyperlipidemia    Hypertension      PERTINENT PAST SURGICAL HISTORY:    HYSTERECTOMY      TONSILLECTOMY     PRIOR LEVEL OF FUNCTION:  Lived alone.  Ambulated independently with rolling walker x 3 years, independent ADL's & I-ADL's, driving    CAREGIVER:  Daughter- Renu Sandra who resides in [place], however has been assisting pt since she's had a stroke.      HOME ENVIRONMENT:  Lives alone in single story home with 3 steps with rail to enter.  Step in shower.  Pt's daughter- Renu Sandra resides in [place], however has been living in home & assisting pt since the stroke.     MENTAL " "STATUS:  Alert & Oriented x 4    PATIENT GOAL FOR EPISODE OF CARE:  \"I to get back to normal\"- I want to be able to get in & out of bed, walk, dress and wipe myself  without help.\"    EDEMA:  None    WOUND / SKIN CONDITION:  Intact    POSTURE:  No gross deformities    MUSCLE TONE/COORDINATION:  WFL    SENSATION/PROPRIOCEPTION:  Impaired (B) feet.      DOMINANT SIDE:  Right    OCCULOMOTOR TESTING  SMOOTH PURSUITS:  Nystagmus at end range (L) lateral gaze  SACCADES:  WFL  CONVERGENCE:  WNL (R), Unable (L)- Impedes depth perception  PERIPHERAL VISION:  WFL  VESTIBULO-OCCULOMOTOR FUNCTION:  Impaired (B).  Pt c/o dizziness during performance    STRENGTH GRADES  UE's:  Grossly 3+ to 4-/5 (R) UE & 4 to 4+/5 (L) LE    HIP      Flexion:  4/5 (R);  4+/5 (L)      Extension: 3/5 (R);  3+/5 (L)     Abduction:  3+/5 (R);  4/5 (L)      Adduction:  4/5 (R);  4+/5 (L)      Internal Rot:  4/5 (R);  4+/5 (L)     External Rot: 4/5 (R);  4+/5 (L)    KNEE      Flexion:  4/5 (R);  4+/5 (L)      Extension:  4/5 (R);  4+/5  (L)     ANKLE       Dorsiflexion:  4/5 (R);  4+/5 (L)    TRANSFERS:  SIT<>STAND:  CGA to Min assist from chair without arms  BED MOBILITY:  SUPINE<>SIT:  SBA to CGA for safety.  Supervision rolling & scooting.    GAIT:  Patient demonstrates step-through to reciprocal pattern with rolling walker, decreased step length (B) with left shorter than right. Decreased safety with turns- pt lifting walker while turning feet, resulting in mild loss of balance.  CGA provided for safety.     30 SECOND SIT TO STAND: 1  - indicates increased risk for falls  85-89: MEN<8, WOMEN<8    TIMED UP AND GO:  97 seconds- indicates DIMINISHED MOBILITY    (TUG Mobility: High > 10 secTypical 10-19Slower 20-29Diminished>30)    TINETTI SCORE:  11/28- indicates HIGH FALL RISK  (TINETTI FALL RISK SCORING: Under 19= High  19-24= Moderate  25 & up= Low)    POST EVALUATION ASSESSMENT:  Patient is status post Acute Basil Ganglia CVA with (R) " hemiparesis.  She presents as a high risk for falls as indicated by Tinetti & TUG scores.  Pt also  demonstrates significant vestibular hypofunction with dizziness & inability to maintain gaze fixation durin g horizontal head rotation.  Pt requires assistance for safety with all mobility.  Pt has not had BP meds in 2 days, as per daughter due to facility failing to call them into the pharmacy.  Daughter has spoken to the facility & meds are to be ordered.  PT recommends pt obtain a cuff to monitor BP as it was 120/70 during home visit.  PT also recommends installing bed rail, permanent grab bars in shower instead of suction & obtain a straight chair with arms, as pt does not have arms on any chairs in the home.    PLAN TO TREAT 1w1, 2w4.  Orders to be forwarded to you for authorization if you agree.    Please feel free to contact me if you have any questions.  Thank you,         Joanna Lawrence, PT        [phone]

## 2023-05-27 ENCOUNTER — HOME CARE VISIT (OUTPATIENT)
Dept: HOME HEALTH SERVICES | Facility: HOME HEALTHCARE | Age: 83
End: 2023-05-27
Payer: COMMERCIAL

## 2023-05-30 ENCOUNTER — HOME CARE VISIT (OUTPATIENT)
Dept: HOME HEALTH SERVICES | Facility: HOME HEALTHCARE | Age: 83
End: 2023-05-30
Payer: COMMERCIAL

## 2023-05-30 VITALS
TEMPERATURE: 97.8 F | RESPIRATION RATE: 18 BRPM | DIASTOLIC BLOOD PRESSURE: 66 MMHG | OXYGEN SATURATION: 97 % | HEART RATE: 88 BPM | SYSTOLIC BLOOD PRESSURE: 112 MMHG

## 2023-05-30 PROCEDURE — G0157 HHC PT ASSISTANT EA 15: HCPCS

## 2023-05-30 PROCEDURE — G0152 HHCP-SERV OF OT,EA 15 MIN: HCPCS

## 2023-05-31 ENCOUNTER — HOME CARE VISIT (OUTPATIENT)
Dept: HOME HEALTH SERVICES | Facility: HOME HEALTHCARE | Age: 83
End: 2023-05-31
Payer: COMMERCIAL

## 2023-05-31 PROCEDURE — G0153 HHCP-SVS OF S/L PATH,EA 15MN: HCPCS

## 2023-05-31 NOTE — CASE COMMUNICATION
Anticipate OT 2w4 for ADL, home safety, falls prevention, monitor vital signs, including pulse oximetry, hand/ upper extremity function/range of motion/strength, therapeutic exercise, neuromuscular ronak, safety in home: bathroom and kitchen, and improve endurance and fatigue management with self care, and functional mobility with durable medical equipment as necessary.

## 2023-05-31 NOTE — HOME HEALTH
Patient said her worst problem is her right arm; she has pain in her shoulder and was right handed and now handwriting is bad and she has trouble using her hand.  Plan for next visit: ADL, IADL, DME, HEP, home safety, falls prevention, activity tolerance, bathroom and kitchen mobility, neuromuscular ronak  MEDICAL NECESSITY FOR ONGOING SKILLED THERAPY: Patient requires skilled occupational therapy for remediation of deficits to improve activities of daily living, home safety, falls prevention, monitor vital signs, including pulse oximetry, hand/ upper extremity function/range of motion/strength, therapeutic exercise, safety in home, and improve endurance and fatigue management with self care, and functional mobility with durable medical equipment as necessary

## 2023-06-01 ENCOUNTER — HOME CARE VISIT (OUTPATIENT)
Dept: HOME HEALTH SERVICES | Facility: HOME HEALTHCARE | Age: 83
End: 2023-06-01
Payer: COMMERCIAL

## 2023-06-01 VITALS
OXYGEN SATURATION: 96 % | TEMPERATURE: 97.7 F | SYSTOLIC BLOOD PRESSURE: 118 MMHG | DIASTOLIC BLOOD PRESSURE: 85 MMHG | HEART RATE: 89 BPM | RESPIRATION RATE: 18 BRPM

## 2023-06-01 VITALS
DIASTOLIC BLOOD PRESSURE: 69 MMHG | SYSTOLIC BLOOD PRESSURE: 117 MMHG | HEART RATE: 72 BPM | TEMPERATURE: 97.2 F | OXYGEN SATURATION: 98 % | RESPIRATION RATE: 18 BRPM

## 2023-06-01 PROCEDURE — G0157 HHC PT ASSISTANT EA 15: HCPCS

## 2023-06-01 NOTE — HOME HEALTH
Daughter present for today's session. Patient reports doing fairly well. Daughter reports continued 24 hour supervision. Discussions of possible additional caregivers. Information provided for some local options as daughter is from [place].     FALLS SINCE LAST VISIT: No    MEDICATION CHANGES: No    PLAN FOR NEXT VISIT:  Review fall risk and safety management strategies  Review/progress home exercise program  Gait training with LRAD

## 2023-06-02 ENCOUNTER — HOME CARE VISIT (OUTPATIENT)
Dept: HOME HEALTH SERVICES | Facility: HOME HEALTHCARE | Age: 83
End: 2023-06-02
Payer: COMMERCIAL

## 2023-06-02 VITALS
SYSTOLIC BLOOD PRESSURE: 131 MMHG | TEMPERATURE: 97.2 F | RESPIRATION RATE: 18 BRPM | OXYGEN SATURATION: 98 % | DIASTOLIC BLOOD PRESSURE: 76 MMHG | HEART RATE: 76 BPM

## 2023-06-02 VITALS
SYSTOLIC BLOOD PRESSURE: 112 MMHG | DIASTOLIC BLOOD PRESSURE: 66 MMHG | TEMPERATURE: 97.5 F | OXYGEN SATURATION: 96 % | RESPIRATION RATE: 18 BRPM | HEART RATE: 85 BPM

## 2023-06-02 PROCEDURE — G0153 HHCP-SVS OF S/L PATH,EA 15MN: HCPCS

## 2023-06-02 PROCEDURE — G0152 HHCP-SERV OF OT,EA 15 MIN: HCPCS

## 2023-06-03 NOTE — HOME HEALTH
Patient/daughter have no questions and reports no new concerns since last visit. Good adherence to HEP reported.    FALLS SINCE LAST VISIT: No    MEDICATION CHANGES: No    PLAN FOR NEXT VISIT:  Review fall risk and safety management strategies  Review/progress home exercise program  Gait training outdoors

## 2023-06-03 NOTE — HOME HEALTH
Patient said her right upper arm is so painful when she tries to raise her arm or use it.  Dtr said she gave patient tylenol and patient said that does take the edge off.    plan for next visit: ADL, DME, HEP, UE function, pain mgmt, bathroom mobility  MEDICAL NECESSITY FOR ONGOING SKILLED THERAPY: Patient requires skilled occupational therapy for remediation of deficits to improve activities of daily living, home safety, falls prevention, monitor vital signs, including pulse oximetry, hand/ upper extremity function/range of motion/strength, therapeutic exercise, safety in home, and improve endurance and fatigue management with self care, and functional mobility with durable medical equipment as necessary

## 2023-06-05 ENCOUNTER — HOME CARE VISIT (OUTPATIENT)
Dept: HOME HEALTH SERVICES | Facility: HOME HEALTHCARE | Age: 83
End: 2023-06-05
Payer: COMMERCIAL

## 2023-06-05 VITALS
DIASTOLIC BLOOD PRESSURE: 68 MMHG | SYSTOLIC BLOOD PRESSURE: 132 MMHG | TEMPERATURE: 97.6 F | HEART RATE: 98 BPM | RESPIRATION RATE: 18 BRPM | OXYGEN SATURATION: 96 %

## 2023-06-05 PROCEDURE — G0152 HHCP-SERV OF OT,EA 15 MIN: HCPCS

## 2023-06-06 ENCOUNTER — HOME CARE VISIT (OUTPATIENT)
Dept: HOME HEALTH SERVICES | Facility: HOME HEALTHCARE | Age: 83
End: 2023-06-06
Payer: COMMERCIAL

## 2023-06-06 PROCEDURE — G0157 HHC PT ASSISTANT EA 15: HCPCS

## 2023-06-06 NOTE — HOME HEALTH
Patient's daughter and patient were upset that the insurance company has not authorized more OT visits, as she needs a lot of help with personal care and dtr is katty to have to go back to her out of town home soon.  Plan for next visit: (waiting for further auth) ADL, personal care, bathroom and kitchen mobility, DME, home safety, falls prevention, IADL, HEP, education  MEDICAL NECESSITY FOR ONGOING SKILLED THERAPY: Patient requires skilled occupational therapy for remediation of deficits to improve activities of daily living, home safety, falls prevention, monitor vital signs, including pulse oximetry, hand/ upper extremity function/range of motion/strength, therapeutic exercise, safety in home, and improve endurance and fatigue management with self care, and functional mobility with durable medical equipment as necessary

## 2023-06-07 ENCOUNTER — HOME CARE VISIT (OUTPATIENT)
Dept: HOME HEALTH SERVICES | Facility: HOME HEALTHCARE | Age: 83
End: 2023-06-07
Payer: COMMERCIAL

## 2023-06-07 VITALS
SYSTOLIC BLOOD PRESSURE: 121 MMHG | RESPIRATION RATE: 18 BRPM | OXYGEN SATURATION: 98 % | DIASTOLIC BLOOD PRESSURE: 69 MMHG | HEART RATE: 71 BPM | TEMPERATURE: 97.7 F

## 2023-06-07 VITALS
HEART RATE: 92 BPM | RESPIRATION RATE: 18 BRPM | TEMPERATURE: 97.7 F | SYSTOLIC BLOOD PRESSURE: 136 MMHG | OXYGEN SATURATION: 96 % | DIASTOLIC BLOOD PRESSURE: 92 MMHG

## 2023-06-07 PROCEDURE — G0153 HHCP-SVS OF S/L PATH,EA 15MN: HCPCS

## 2023-06-07 NOTE — HOME HEALTH
Daughter reports the weekend went well. They were able to exit the home to attend a hair appointment with no major issues. Patient required close supervision with Rollator use. Continued discussions for expected S levels moving forward, in home changes or possibilities of more accommodating home ( patio home in an assisted living community). Daughter has also discussed the possibility of patient moving back with her to [place].  They have purchased a new Rollator that should be delivered tomorrow    FALLS SINCE LAST VISIT: No    MEDICATION CHANGES: No    PLAN FOR NEXT VISIT:  Review fall risk and safety management strategies  Review/progress home exercise program  Gait program  In home safety  transfer safety Normal vision: sees adequately in most situations; can see medication labels, newsprint

## 2023-06-07 NOTE — CASE COMMUNICATION
Patient missed an Occupational Therapy visit from [facility] on June 6, 2023.     Reason: Insurance company has not authorized any further visits at this time.       For your records only.   As per home health protocol, MD must be notified of missed/cancelled visits; therefore the prescribed frequency was not met.

## 2023-06-08 ENCOUNTER — HOME CARE VISIT (OUTPATIENT)
Dept: HOME HEALTH SERVICES | Facility: HOME HEALTHCARE | Age: 83
End: 2023-06-08
Payer: COMMERCIAL

## 2023-06-08 PROCEDURE — G0157 HHC PT ASSISTANT EA 15: HCPCS

## 2023-06-08 NOTE — HOME HEALTH
Routine Visit Note:     Skill/education provided: to determine least restrictive diet and help patient with establishing strategies to decrease her risk of aspiration       Patient/caregiver response: Patient was able to teach back swallowing strategies taught to  her to decrease  her risk of aspiration.    Plan for next visit: to increase cogntive skills and to teach strategies for safe swallowing     Other pertinent info: contact MD to see if patient can get meds for GERD symptoms.

## 2023-06-09 VITALS
HEART RATE: 72 BPM | TEMPERATURE: 96.9 F | OXYGEN SATURATION: 98 % | RESPIRATION RATE: 18 BRPM | DIASTOLIC BLOOD PRESSURE: 84 MMHG | SYSTOLIC BLOOD PRESSURE: 141 MMHG

## 2023-06-09 NOTE — HOME HEALTH
Patient has no questions and reports no new concerns since last visit. Good adherence to HEP reported. Patient going to see PCP today due to persistent cough.      FALLS SINCE LAST VISIT: No    MEDICATION CHANGES: No    PLAN FOR NEXT VISIT:  Review fall risk and safety management strategies  Review/progress home exercise program  Outdoor gait training  transfer training

## 2023-06-13 ENCOUNTER — HOME CARE VISIT (OUTPATIENT)
Dept: HOME HEALTH SERVICES | Facility: HOME HEALTHCARE | Age: 83
End: 2023-06-13
Payer: COMMERCIAL

## 2023-06-13 VITALS
RESPIRATION RATE: 18 BRPM | HEART RATE: 97 BPM | TEMPERATURE: 98.2 F | DIASTOLIC BLOOD PRESSURE: 82 MMHG | SYSTOLIC BLOOD PRESSURE: 127 MMHG | OXYGEN SATURATION: 98 %

## 2023-06-13 PROCEDURE — G0157 HHC PT ASSISTANT EA 15: HCPCS

## 2023-06-13 PROCEDURE — G0153 HHCP-SVS OF S/L PATH,EA 15MN: HCPCS

## 2023-06-13 NOTE — HOME HEALTH
Slow steady progress reported by patients daughter. Patient is becoming more independent with mobility throughout the home.    FALLS SINCE LAST VISIT: No    MEDICATION CHANGES: No    PLAN FOR NEXT VISIT:  Dynamic balance  Outdoor gait training  Review fall risk and safety management strategies  Review/progress home exercise program

## 2023-06-14 ENCOUNTER — HOME CARE VISIT (OUTPATIENT)
Dept: HOME HEALTH SERVICES | Facility: HOME HEALTHCARE | Age: 83
End: 2023-06-14
Payer: COMMERCIAL

## 2023-06-14 VITALS
TEMPERATURE: 98.8 F | OXYGEN SATURATION: 98 % | DIASTOLIC BLOOD PRESSURE: 79 MMHG | RESPIRATION RATE: 18 BRPM | SYSTOLIC BLOOD PRESSURE: 128 MMHG

## 2023-06-14 VITALS
RESPIRATION RATE: 18 BRPM | OXYGEN SATURATION: 96 % | DIASTOLIC BLOOD PRESSURE: 78 MMHG | SYSTOLIC BLOOD PRESSURE: 140 MMHG | TEMPERATURE: 97.4 F | HEART RATE: 105 BPM

## 2023-06-14 PROCEDURE — G0152 HHCP-SERV OF OT,EA 15 MIN: HCPCS

## 2023-06-14 NOTE — HOME HEALTH
Patient said she has been working on dressing and the tip of using the shorter bath brush as OT had receommended has helped her.  Dtr said she called the dr to ask for something stronger for the pain in her R arm. Patient said she saw the dr last Thursday, and he wanted her to get her own BP monitor andkeep track of her BP, and she is to return to him tomorrow.  Plan for next visit: ADL, DME, HEP, home safety, falls prevention, education, bathroom and kitchen mobility, activity tolerance, standing tolerance and balance  MEDICAL NECESSITY FOR ONGOING SKILLED THERAPY: Patient requires skilled occupational therapy for remediation of deficits to improve activities of daily living, home safety, falls prevention, monitor vital signs, including pulse oximetry, hand/ upper extremity function/range of motion/strength, therapeutic exercise, safety in home, and improve endurance and fatigue management with self care, and functional mobility with durable medical equipment as necessary

## 2023-06-15 ENCOUNTER — HOME CARE VISIT (OUTPATIENT)
Dept: HOME HEALTH SERVICES | Facility: HOME HEALTHCARE | Age: 83
End: 2023-06-15
Payer: COMMERCIAL

## 2023-06-15 PROCEDURE — G0157 HHC PT ASSISTANT EA 15: HCPCS

## 2023-06-15 NOTE — HOME HEALTH
Routine Visit Note:    Skill/education provided: SLP went over swallowing strategies with patient taught ROM exercies and did cognitive therapy to increase memory    Patient/caregiver response: Patient was able to perform exercises with  verbal cues and visual cues .Patient did naming og items in a given category with cues and was able to recall safe swallowing strategies   Plan for next visit: to continue with exercises program and  and cognitive skiils to improved cognitive skills    Other pertinent info:

## 2023-06-16 ENCOUNTER — HOME CARE VISIT (OUTPATIENT)
Dept: HOME HEALTH SERVICES | Facility: HOME HEALTHCARE | Age: 83
End: 2023-06-16
Payer: COMMERCIAL

## 2023-06-16 VITALS
SYSTOLIC BLOOD PRESSURE: 127 MMHG | DIASTOLIC BLOOD PRESSURE: 68 MMHG | TEMPERATURE: 97.2 F | RESPIRATION RATE: 18 BRPM | HEART RATE: 76 BPM | OXYGEN SATURATION: 98 %

## 2023-06-16 VITALS
HEART RATE: 94 BPM | TEMPERATURE: 96.6 F | OXYGEN SATURATION: 95 % | SYSTOLIC BLOOD PRESSURE: 114 MMHG | RESPIRATION RATE: 18 BRPM | DIASTOLIC BLOOD PRESSURE: 62 MMHG

## 2023-06-16 PROCEDURE — G0153 HHCP-SVS OF S/L PATH,EA 15MN: HCPCS

## 2023-06-16 PROCEDURE — G0152 HHCP-SERV OF OT,EA 15 MIN: HCPCS

## 2023-06-16 NOTE — HOME HEALTH
Patient has no questions and reports no new concerns since last visit. Good adherence to HEP reported. Daughter will be leaving and going back to [place] soon and returning sometime in July.      FALLS SINCE LAST VISIT: No    MEDICATION CHANGES: No    PLAN FOR NEXT VISIT:  Outdoor ambulation  Dynamic balance re-training  Review fall risk and safety management strategies  Review/progress home exercise program

## 2023-06-16 NOTE — HOME HEALTH
patient said she went to the dr yesterday, and ttok a urine sample and did have a UTI, so he started her on an antibiotic and recommended CBG oil and gummies for her arm pain.  Plan for next visit: ADL, IADL, DME, HEP, home safety, falls prevention, education, bathroom and kitchen mobility, activity tolerance, standing tolerance and balance  MEDICAL NECESSITY FOR ONGOING SKILLED THERAPY: Patient requires skilled occupational therapy for remediation of deficits to improve activities of daily living, home safety, falls prevention, monitor vital signs, including pulse oximetry, hand/ upper extremity function/range of motion/strength, therapeutic exercise, safety in home, and improve endurance and fatigue management with self care, and functional mobility with durable medical equipment as necessary

## 2023-06-18 VITALS
TEMPERATURE: 97.7 F | HEART RATE: 94 BPM | OXYGEN SATURATION: 98 % | RESPIRATION RATE: 18 BRPM | SYSTOLIC BLOOD PRESSURE: 114 MMHG | DIASTOLIC BLOOD PRESSURE: 62 MMHG

## 2023-06-19 NOTE — HOME HEALTH
Routine Visit Note:    Skill/education provided: continue with cognitive therapy to increase  memory and swallow exerices and ROM exercises    Patient/caregiver response: Patient  was able to do task with mild to moderate verbal cues     Plan for next visit: to continue with exercises for memory and problem, safety and swallowing exercises